# Patient Record
Sex: MALE | Race: WHITE | Employment: OTHER | URBAN - NONMETROPOLITAN AREA
[De-identification: names, ages, dates, MRNs, and addresses within clinical notes are randomized per-mention and may not be internally consistent; named-entity substitution may affect disease eponyms.]

---

## 2017-01-31 ENCOUNTER — OFFICE VISIT (OUTPATIENT)
Dept: PRIMARY CARE CLINIC | Age: 67
End: 2017-01-31
Payer: MEDICARE

## 2017-01-31 VITALS
SYSTOLIC BLOOD PRESSURE: 142 MMHG | OXYGEN SATURATION: 99 % | HEART RATE: 82 BPM | BODY MASS INDEX: 32.2 KG/M2 | RESPIRATION RATE: 16 BRPM | WEIGHT: 230 LBS | HEIGHT: 71 IN | DIASTOLIC BLOOD PRESSURE: 90 MMHG

## 2017-01-31 DIAGNOSIS — Z23 NEED FOR VACCINATION WITH 13-POLYVALENT PNEUMOCOCCAL CONJUGATE VACCINE: ICD-10-CM

## 2017-01-31 DIAGNOSIS — E11.42 DIABETIC POLYNEUROPATHY ASSOCIATED WITH TYPE 2 DIABETES MELLITUS (HCC): ICD-10-CM

## 2017-01-31 DIAGNOSIS — E11.9 TYPE 2 DIABETES MELLITUS WITHOUT COMPLICATION, WITHOUT LONG-TERM CURRENT USE OF INSULIN (HCC): Primary | ICD-10-CM

## 2017-01-31 DIAGNOSIS — I10 ESSENTIAL HYPERTENSION: ICD-10-CM

## 2017-01-31 DIAGNOSIS — L98.9 SKIN LESION OF FACE: ICD-10-CM

## 2017-01-31 PROCEDURE — 1036F TOBACCO NON-USER: CPT | Performed by: FAMILY MEDICINE

## 2017-01-31 PROCEDURE — 3044F HG A1C LEVEL LT 7.0%: CPT | Performed by: FAMILY MEDICINE

## 2017-01-31 PROCEDURE — G8427 DOCREV CUR MEDS BY ELIG CLIN: HCPCS | Performed by: FAMILY MEDICINE

## 2017-01-31 PROCEDURE — 1123F ACP DISCUSS/DSCN MKR DOCD: CPT | Performed by: FAMILY MEDICINE

## 2017-01-31 PROCEDURE — G0009 ADMIN PNEUMOCOCCAL VACCINE: HCPCS | Performed by: FAMILY MEDICINE

## 2017-01-31 PROCEDURE — 90670 PCV13 VACCINE IM: CPT | Performed by: FAMILY MEDICINE

## 2017-01-31 PROCEDURE — G8484 FLU IMMUNIZE NO ADMIN: HCPCS | Performed by: FAMILY MEDICINE

## 2017-01-31 PROCEDURE — G8419 CALC BMI OUT NRM PARAM NOF/U: HCPCS | Performed by: FAMILY MEDICINE

## 2017-01-31 PROCEDURE — 4040F PNEUMOC VAC/ADMIN/RCVD: CPT | Performed by: FAMILY MEDICINE

## 2017-01-31 PROCEDURE — 99214 OFFICE O/P EST MOD 30 MIN: CPT | Performed by: FAMILY MEDICINE

## 2017-01-31 PROCEDURE — 3017F COLORECTAL CA SCREEN DOC REV: CPT | Performed by: FAMILY MEDICINE

## 2017-02-08 ENCOUNTER — OFFICE VISIT (OUTPATIENT)
Dept: OTOLARYNGOLOGY | Facility: CLINIC | Age: 67
End: 2017-02-08

## 2017-02-08 VITALS
HEIGHT: 71 IN | SYSTOLIC BLOOD PRESSURE: 120 MMHG | HEART RATE: 68 BPM | WEIGHT: 223 LBS | BODY MASS INDEX: 31.22 KG/M2 | TEMPERATURE: 97.6 F | DIASTOLIC BLOOD PRESSURE: 80 MMHG

## 2017-02-08 DIAGNOSIS — D48.9 NEOPLASM OF UNCERTAIN BEHAVIOR: ICD-10-CM

## 2017-02-08 DIAGNOSIS — L82.1 SEBORRHEIC KERATOSES: ICD-10-CM

## 2017-02-08 DIAGNOSIS — C44.319 BASAL CELL CARCINOMA OF LEFT CHEEK: Primary | ICD-10-CM

## 2017-02-08 PROCEDURE — 99203 OFFICE O/P NEW LOW 30 MIN: CPT | Performed by: OTOLARYNGOLOGY

## 2017-02-08 RX ORDER — GABAPENTIN 300 MG/1
800 CAPSULE ORAL
COMMUNITY
Start: 2015-09-28

## 2017-02-08 RX ORDER — OXYCODONE AND ACETAMINOPHEN 10; 325 MG/1; MG/1
1 TABLET ORAL EVERY 6 HOURS PRN
COMMUNITY

## 2017-02-08 RX ORDER — SUCRALFATE 1 G/1
TABLET ORAL
COMMUNITY
Start: 2015-09-28 | End: 2019-01-21

## 2017-02-08 RX ORDER — POLYETHYLENE GLYCOL 3350 17 G/17G
POWDER, FOR SOLUTION ORAL
COMMUNITY
Start: 2016-10-31 | End: 2019-08-05

## 2017-02-08 RX ORDER — PRAVASTATIN SODIUM 80 MG/1
80 TABLET ORAL NIGHTLY
COMMUNITY
Start: 2016-12-27

## 2017-02-08 RX ORDER — CETIRIZINE HYDROCHLORIDE 10 MG/1
TABLET ORAL
COMMUNITY
Start: 2016-12-27 | End: 2019-01-21

## 2017-02-08 RX ORDER — PANTOPRAZOLE SODIUM 40 MG/1
TABLET, DELAYED RELEASE ORAL
COMMUNITY
Start: 2016-12-27 | End: 2019-08-05

## 2017-02-08 RX ORDER — ESCITALOPRAM OXALATE 20 MG/1
TABLET ORAL
COMMUNITY
Start: 2015-10-22 | End: 2020-09-08

## 2017-02-08 RX ORDER — TRIAMCINOLONE ACETONIDE 1 MG/G
CREAM TOPICAL
COMMUNITY
Start: 2015-09-28 | End: 2019-08-05

## 2017-02-08 RX ORDER — TAMSULOSIN HYDROCHLORIDE 0.4 MG/1
CAPSULE ORAL
COMMUNITY
Start: 2016-11-29

## 2017-02-08 RX ORDER — LISINOPRIL 40 MG/1
40 TABLET ORAL DAILY
COMMUNITY
Start: 2016-12-27

## 2017-02-08 NOTE — PROGRESS NOTES
History of Present Illness  Manolo Sagastume complains of a skin lesion of the left cheek . The lesion has been present for 2 years. The lesion has has changed. Symptoms associated with the lesion are: increasing diameter, increasing thickness, bleeding, tendency to be traumatized. Patient denies itching, pain, drainage, infection.    He states he has no prior history of skin cancer.    He states that a biopsy has not been performed.     He also complains of a skin lesion of the left chin. The lesion has been present for 4 years. Symptoms associated with the lesion are: tendency to be traumatized. Patient denies increasing diameter, increasing thickness, darkening color, itching, pain, drainage, infection. Nothing makes the lesion better or worse. A biopsy has not been performed.     He also complains of a multiple skin lesions of the back. The lesion has been present for several years. Symptoms associated with the lesion are: increasing diameter, increasing thickness. Patient denies bleeding, pain, drainage, infection. Nothing makes the lesion better or worse. A biopsy has not been performed.       Past Medical History   Diagnosis Date   • Chronic back pain    • COPD (chronic obstructive pulmonary disease)    • Hypertension    • Type 2 diabetes mellitus      Past Surgical History   Procedure Laterality Date   • Appendectomy       Family History   Problem Relation Age of Onset   • Cancer Mother    • Heart disease Father      Social History   Substance Use Topics   • Smoking status: Former Smoker   • Smokeless tobacco: None   • Alcohol use No     Allergies:  Penicillins    Current Outpatient Prescriptions:   •  albuterol (VENTOLIN HFA) 108 (90 BASE) MCG/ACT inhaler, INHALE 2 PUFFS INTO THE LUNGS EVERY 6 HOURS AS NEEDED., Disp: , Rfl:   •  cetirizine (zyrTEC) 10 MG tablet, TAKE 1 TABLET BY MOUTH DAILY, Disp: , Rfl:   •  escitalopram (LEXAPRO) 20 MG tablet, Take 20 mg by mouth daily , Disp: , Rfl:   •  gabapentin  "(NEURONTIN) 300 MG capsule, Take 1 capsule by mouth 3 times daily, Disp: , Rfl:   •  lisinopril (PRINIVIL,ZESTRIL) 40 MG tablet, TAKE ONE TABLET BY MOUTH DAILY., Disp: , Rfl:   •  metFORMIN (GLUCOPHAGE) 1000 MG tablet, TAKE ONE TABLET BY MOUTH TWO TIMES A DAY WITH MEALS, Disp: , Rfl:   •  oxyCODONE-acetaminophen (PERCOCET)  MG per tablet, Every 8 (Eight) Hours., Disp: , Rfl:   •  pantoprazole (PROTONIX) 40 MG EC tablet, TAKE ONE TABLET BY MOUTH EVERY DAY, Disp: , Rfl:   •  polyethylene glycol (MIRALAX) pack packet, Take 17 g by mouth daily, Disp: , Rfl:   •  pravastatin (PRAVACHOL) 80 MG tablet, TAKE ONE TABLET BY MOUTH EVERY EVENING., Disp: , Rfl:   •  sucralfate (CARAFATE) 1 G tablet, Take 1 tablet by mouth daily, Disp: , Rfl:   •  tamsulosin (FLOMAX) 0.4 MG capsule 24 hr capsule, Take 1 capsule by mouth daily, Disp: , Rfl:   •  tiotropium (SPIRIVA HANDIHALER) 18 MCG per inhalation capsule, Inhale 1 capsule into the lungs daily, Disp: , Rfl:   •  triamcinolone (KENALOG) 0.1 % cream, Apply topically 2 times daily., Disp: , Rfl:     Review of Systems   Constitutional: Negative for activity change, appetite change, chills, fatigue, fever and unexpected weight change.   HENT: Negative for congestion, dental problem, facial swelling and nosebleeds.    Eyes: Negative for discharge and redness.   Skin: Negative for color change, pallor and rash.   Hematological: Negative for adenopathy. Does not bruise/bleed easily.       Visit Vitals   • /80   • Pulse 68   • Temp 97.6 °F (36.4 °C)   • Ht 71\" (180.3 cm)   • Wt 223 lb (101 kg)   • BMI 31.1 kg/m2       Physical Exam   Constitutional: He is oriented to person, place, and time. He appears well-developed and well-nourished. He is cooperative. No distress.   HENT:   Head: Normocephalic and atraumatic.   Right Ear: External ear normal.   Left Ear: External ear normal.   Nose: Nose normal.   Mouth/Throat: Oropharynx is clear and moist.   Eyes: Conjunctivae and EOM " are normal. Pupils are equal, round, and reactive to light. Right eye exhibits no discharge. Left eye exhibits no discharge. No scleral icterus.   Neck: Normal range of motion and phonation normal. Neck supple. No tracheal deviation present.   Pulmonary/Chest: Effort normal. No stridor. No respiratory distress.   Musculoskeletal: Normal range of motion. He exhibits no edema or deformity.   Lymphadenopathy:     He has no cervical adenopathy.   Neurological: He is alert and oriented to person, place, and time. He has normal strength. No cranial nerve deficit. Coordination normal.   Skin: Skin is warm and dry. Lesion (left cheek with 8 mm lesion with pearly, raised borders and central ulceration c/w BCC, left chin with 6mm raised, erythematous nodule c/w possible granuloma ) noted. No rash noted. He is not diaphoretic. No erythema. No pallor.   Multiple SKs scattered over the back   Psychiatric: He has a normal mood and affect. His speech is normal and behavior is normal. Judgment and thought content normal. Cognition and memory are normal.   Nursing note and vitals reviewed.              Manolo was seen today for skin lesion.    Diagnoses and all orders for this visit:    Basal cell carcinoma of left cheek  -     Excision of Malignant Lesion (1.1 - 2 Cm)    Neoplasm of uncertain behavior    Seborrheic keratoses        Photographs were taken today. Postoperative care instructions were given.     Discussion of skin lesion. Discussed risks, benefits, alternatives, and possible complications of excision of the skin lesion with reconstruction utilizing local tissue rearrangement, full-thickness skin grafting, or local interpolated flaps. Risks include, but are not limited too: bleeding, infection, hematoma, recurrence, need for additional procedures, flap failure, cosmetic deformity. Patient understands risks and would like to proceed with surgery.     I have recommended excision of the basal cell carcinoma of the left  cheek with frozen section analysis and lesion on the left chin with permanent section analysis and reconstruction with linear closure in the office under local anesthesia.     Return for 1 week postoperatively.    Satinder Cox MD   02/08/17  1:22 PM

## 2017-02-16 ENCOUNTER — TELEPHONE (OUTPATIENT)
Dept: PRIMARY CARE CLINIC | Age: 67
End: 2017-02-16

## 2017-03-07 ENCOUNTER — HOSPITAL ENCOUNTER (OUTPATIENT)
Dept: GENERAL RADIOLOGY | Age: 67
Discharge: HOME | End: 2017-03-07
Payer: MEDICARE

## 2017-03-07 DIAGNOSIS — M51.17 INTERVERTEBRAL DISC DISORDER WITH RADICULOPATHY OF LUMBOSACRAL REGION: ICD-10-CM

## 2017-03-07 DIAGNOSIS — M47.812 OSTEOARTHRITIS OF CERVICAL SPINE, UNSPECIFIED SPINAL OSTEOARTHRITIS COMPLICATION STATUS: ICD-10-CM

## 2017-03-07 PROCEDURE — 72100 X-RAY EXAM L-S SPINE 2/3 VWS: CPT

## 2017-03-07 PROCEDURE — 72040 X-RAY EXAM NECK SPINE 2-3 VW: CPT

## 2017-03-16 ENCOUNTER — PROCEDURE VISIT (OUTPATIENT)
Dept: OTOLARYNGOLOGY | Facility: CLINIC | Age: 67
End: 2017-03-16

## 2017-03-16 VITALS
SYSTOLIC BLOOD PRESSURE: 138 MMHG | RESPIRATION RATE: 20 BRPM | BODY MASS INDEX: 31.22 KG/M2 | HEIGHT: 71 IN | DIASTOLIC BLOOD PRESSURE: 93 MMHG | TEMPERATURE: 97.7 F | HEART RATE: 96 BPM | WEIGHT: 223 LBS

## 2017-03-16 DIAGNOSIS — C44.319 BASAL CELL CARCINOMA OF LEFT CHEEK: ICD-10-CM

## 2017-03-16 DIAGNOSIS — D48.5 NEOPLASM OF UNCERTAIN BEHAVIOR OF SKIN OF CHIN: Primary | ICD-10-CM

## 2017-03-16 PROCEDURE — 13132 CMPLX RPR F/C/C/M/N/AX/G/H/F: CPT | Performed by: OTOLARYNGOLOGY

## 2017-03-16 PROCEDURE — 88305 TISSUE EXAM BY PATHOLOGIST: CPT | Performed by: OTOLARYNGOLOGY

## 2017-03-16 PROCEDURE — 11643 EXC F/E/E/N/L MAL+MRG 2.1-3: CPT | Performed by: OTOLARYNGOLOGY

## 2017-03-16 PROCEDURE — 11443 EXC FACE-MM B9+MARG 2.1-3 CM: CPT | Performed by: OTOLARYNGOLOGY

## 2017-03-16 PROCEDURE — 88331 PATH CONSLTJ SURG 1 BLK 1SPC: CPT | Performed by: OTOLARYNGOLOGY

## 2017-03-16 RX ORDER — POLYETHYLENE GLYCOL 3350 17 G/17G
POWDER, FOR SOLUTION ORAL
Refills: 5 | COMMUNITY
Start: 2017-03-02 | End: 2019-08-05 | Stop reason: SDUPTHER

## 2017-03-16 NOTE — PROGRESS NOTES
Preprocedure diagnosis: 1) clinically suspicious for basal cell carcinoma of the left cheek     2) irritated, changing nevus of the left chin     Post procedure diagnosis: 1) basal cell carcinoma of the left cheek     2) irritated, changing nevus of the left chin     Procedure performed: 1) Excision malignant neoplasm of skin of left cheek 14 mm x 30 mm     2)  excisional biopsy neoplasm of uncertain behavior of left chin 12 mm x 22 mm     3) complex closure of skin of left cheek and chin 6 cm    Surgeon: Satinder Cox M.D.    Anesthesia: Local with 3 cc of 1% lidocaine with 1:100,000 epinephrine    Specimen:  1) clinically suspicious for BCC of left cheek - stitch at 12 o'clock      2) neoplasm of uncertain behavior skin of left chin     Complications: None    Disposition: Home    Details: After patient verification and informed consent was obtained, the skin was prepped with alcohol and infiltrated with 1% lidocaine with 1:100,000 epinephrine.  After approximately 10-15 minutes the skin was tested for anesthesia.  The skin was then sterilely prepped with Hibiclens and the patient was sterilely draped.    The skin surrounding each lesion was incised in fusiform fashion with a separate 15 blade beveling the incision laterally to facilitate wound eversion.  The skin was grasped and the lesions were removed from the underlying tissue utilizing sharp dissection with the 15 blade.  The cheek specimen was oriented with a stitch at 12 o'clock and sent for frozen section analysis.  The chin lesion was not oriented, and was sent for permanent.  Hemostasis was obtained with bipolar cautery set at 12.      The surrounding skin was undermined in the subcutaneous plane for approximate 1 cm in each direction utilizing a fresh 15 blade.  Hemostasis was again obtained with bipolar cautery set at 12.  The tissue was advanced and closed utilizing 4-0 and 5-0 undyed Vicryl suture in buried fashion, followed by 5-0 fast absorbing  suture.  Antibiotic ointment was placed to the incision.    The patient tolerated the procedure without any complication.

## 2017-03-17 LAB
CYTO UR: NORMAL
CYTO UR: NORMAL
LAB AP CASE REPORT: NORMAL
LAB AP CASE REPORT: NORMAL
LAB AP CLINICAL INFORMATION: NORMAL
LAB AP CLINICAL INFORMATION: NORMAL
Lab: NORMAL
PATH REPORT.FINAL DX SPEC: NORMAL
PATH REPORT.FINAL DX SPEC: NORMAL
PATH REPORT.GROSS SPEC: NORMAL
PATH REPORT.GROSS SPEC: NORMAL

## 2017-03-23 ENCOUNTER — OFFICE VISIT (OUTPATIENT)
Dept: OTOLARYNGOLOGY | Facility: CLINIC | Age: 67
End: 2017-03-23

## 2017-03-23 VITALS
BODY MASS INDEX: 32.4 KG/M2 | SYSTOLIC BLOOD PRESSURE: 190 MMHG | DIASTOLIC BLOOD PRESSURE: 93 MMHG | WEIGHT: 231.4 LBS | HEART RATE: 58 BPM | TEMPERATURE: 98.2 F | HEIGHT: 71 IN | RESPIRATION RATE: 20 BRPM

## 2017-03-23 DIAGNOSIS — C44.319 BASAL CELL CARCINOMA OF LEFT CHEEK: Primary | ICD-10-CM

## 2017-03-23 DIAGNOSIS — D18.00 BENIGN HEMANGIOMA: ICD-10-CM

## 2017-03-23 PROCEDURE — 99024 POSTOP FOLLOW-UP VISIT: CPT | Performed by: NURSE PRACTITIONER

## 2017-03-23 RX ORDER — PEN NEEDLE, DIABETIC 31 GX5/16"
NEEDLE, DISPOSABLE MISCELLANEOUS
COMMUNITY
Start: 2016-12-27

## 2017-03-23 RX ORDER — GLUCOSAMINE HCL/CHONDROITIN SU 500-400 MG
CAPSULE ORAL
COMMUNITY
Start: 2016-12-27

## 2017-03-23 NOTE — PROGRESS NOTES
Manolo Sagastume presents for a routine postoperative visit following excision of a skin lesion clinically suspicious for basal cell carcinoma of the left cheek with linear closure and excisional biopsy of a skin lesion on the left chin on 03/16/17.     Subjective: Since surgery, he is doing well without complaints.  Symptoms denied postoperatively include fever, chills, bleeding and drainage. The patient states the pain has ceased since surgery.     Objective:   Pathology review: Pathology is available. 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         Physical Exam:  Left cheek and left chin incisions healing well- sutures removed.     Assessment:  Manolo was seen today for skin lesion.    Diagnoses and all orders for this visit:    Basal cell carcinoma of left cheek    Benign hemangioma        Plan:  Patient Instructions   Protect the incisions from sunlight. Sunlight to the incisions will cause permanent pigmentation to the incision line and make the incision more noticeable. After the incision has reepithelialized, you may begin to use sunscreen with an SPF of 15 or greater    I discussed the use of  Vitamin E, Mederma, or a high-quality extra virgin olive oil to the incisions to optimize the end result. Apply topically twice daily, or as directed, to help optimize wound healing and decrease erythema.    Discussed the importance of routine skin checks with a dermatologist every 6-12 months.       Return in about 6 months (around 9/23/2017), or if symptoms worsen or fail to improve, for Recheck.      Sanjuana Powell, CHELSEA  03/23/17  1:34 PM

## 2017-04-05 RX ORDER — TAMSULOSIN HYDROCHLORIDE 0.4 MG/1
CAPSULE ORAL
Qty: 30 CAPSULE | Refills: 3 | Status: SHIPPED | OUTPATIENT
Start: 2017-04-05 | End: 2017-09-09 | Stop reason: SDUPTHER

## 2017-04-26 ENCOUNTER — TELEPHONE (OUTPATIENT)
Dept: PRIMARY CARE CLINIC | Age: 67
End: 2017-04-26

## 2017-04-26 ENCOUNTER — OFFICE VISIT (OUTPATIENT)
Dept: PRIMARY CARE CLINIC | Age: 67
End: 2017-04-26
Payer: MEDICARE

## 2017-04-26 VITALS
HEIGHT: 71 IN | DIASTOLIC BLOOD PRESSURE: 78 MMHG | HEART RATE: 77 BPM | WEIGHT: 227.6 LBS | SYSTOLIC BLOOD PRESSURE: 140 MMHG | OXYGEN SATURATION: 96 % | BODY MASS INDEX: 31.86 KG/M2

## 2017-04-26 DIAGNOSIS — E11.9 TYPE 2 DIABETES MELLITUS WITHOUT COMPLICATION, WITHOUT LONG-TERM CURRENT USE OF INSULIN (HCC): ICD-10-CM

## 2017-04-26 DIAGNOSIS — K59.03 DRUG-INDUCED CONSTIPATION: Primary | ICD-10-CM

## 2017-04-26 DIAGNOSIS — R21 SKIN RASH: ICD-10-CM

## 2017-04-26 DIAGNOSIS — L98.9 SKIN LESION OF CHEST WALL: ICD-10-CM

## 2017-04-26 DIAGNOSIS — N52.9 ERECTILE DYSFUNCTION, UNSPECIFIED ERECTILE DYSFUNCTION TYPE: ICD-10-CM

## 2017-04-26 DIAGNOSIS — I10 ESSENTIAL HYPERTENSION: ICD-10-CM

## 2017-04-26 DIAGNOSIS — C44.91 BASAL CELL CARCINOMA: ICD-10-CM

## 2017-04-26 PROCEDURE — 1123F ACP DISCUSS/DSCN MKR DOCD: CPT | Performed by: FAMILY MEDICINE

## 2017-04-26 PROCEDURE — G8427 DOCREV CUR MEDS BY ELIG CLIN: HCPCS | Performed by: FAMILY MEDICINE

## 2017-04-26 PROCEDURE — 4040F PNEUMOC VAC/ADMIN/RCVD: CPT | Performed by: FAMILY MEDICINE

## 2017-04-26 PROCEDURE — 99214 OFFICE O/P EST MOD 30 MIN: CPT | Performed by: FAMILY MEDICINE

## 2017-04-26 PROCEDURE — G8417 CALC BMI ABV UP PARAM F/U: HCPCS | Performed by: FAMILY MEDICINE

## 2017-04-26 PROCEDURE — 1036F TOBACCO NON-USER: CPT | Performed by: FAMILY MEDICINE

## 2017-04-26 PROCEDURE — 3017F COLORECTAL CA SCREEN DOC REV: CPT | Performed by: FAMILY MEDICINE

## 2017-04-26 PROCEDURE — 3044F HG A1C LEVEL LT 7.0%: CPT | Performed by: FAMILY MEDICINE

## 2017-04-26 RX ORDER — TADALAFIL 10 MG/1
10 TABLET ORAL PRN
Qty: 6 TABLET | Refills: 1 | Status: SHIPPED | OUTPATIENT
Start: 2017-04-26 | End: 2018-06-04

## 2017-04-26 RX ORDER — TRIAMCINOLONE ACETONIDE 1 MG/G
CREAM TOPICAL
Qty: 1 TUBE | Refills: 1 | Status: SHIPPED | OUTPATIENT
Start: 2017-04-26 | End: 2020-01-13 | Stop reason: SDUPTHER

## 2017-04-26 ASSESSMENT — ENCOUNTER SYMPTOMS
COUGH: 0
SHORTNESS OF BREATH: 0

## 2017-05-16 ENCOUNTER — TELEPHONE (OUTPATIENT)
Dept: OTOLARYNGOLOGY | Facility: CLINIC | Age: 67
End: 2017-05-16

## 2017-05-23 RX ORDER — LISINOPRIL 40 MG/1
TABLET ORAL
Qty: 30 TABLET | Refills: 3 | Status: SHIPPED | OUTPATIENT
Start: 2017-05-23 | End: 2017-10-09 | Stop reason: SDUPTHER

## 2017-06-12 ENCOUNTER — PROCEDURE VISIT (OUTPATIENT)
Dept: OTOLARYNGOLOGY | Facility: CLINIC | Age: 67
End: 2017-06-12

## 2017-06-12 VITALS
SYSTOLIC BLOOD PRESSURE: 145 MMHG | TEMPERATURE: 98 F | HEIGHT: 71 IN | WEIGHT: 232 LBS | BODY MASS INDEX: 32.48 KG/M2 | DIASTOLIC BLOOD PRESSURE: 80 MMHG | HEART RATE: 59 BPM | RESPIRATION RATE: 20 BRPM

## 2017-06-12 DIAGNOSIS — C44.529 SQUAMOUS CELL CARCINOMA OF SKIN OF OTHER PART OF TRUNK: ICD-10-CM

## 2017-06-12 DIAGNOSIS — D04.5 SQUAMOUS CELL CARCINOMA IN SITU OF SKIN OF CHEST: Primary | ICD-10-CM

## 2017-06-12 PROCEDURE — 88305 TISSUE EXAM BY PATHOLOGIST: CPT | Performed by: OTOLARYNGOLOGY

## 2017-06-12 PROCEDURE — 13101 CMPLX RPR TRUNK 2.6-7.5 CM: CPT | Performed by: OTOLARYNGOLOGY

## 2017-06-12 PROCEDURE — 11604 EXC TR-EXT MAL+MARG 3.1-4 CM: CPT | Performed by: OTOLARYNGOLOGY

## 2017-06-12 PROCEDURE — 11603 EXC TR-EXT MAL+MARG 2.1-3 CM: CPT | Performed by: OTOLARYNGOLOGY

## 2017-06-12 NOTE — PROGRESS NOTES
Preprocedure diagnosis: 1) suspect squamous cell carcinoma of skin of left chest     2)  suspect actinic keratosis versus squamous cell carcinoma in situ skin of right chest     Post procedure diagnosis:  1) suspect squamous cell carcinoma of skin of left chest     2)  suspect actinic keratosis versus squamous cell carcinoma in situ skin of right chest     Procedure performed: 1) Excision malignant neoplasm of skin of left chest, 1.6 cm x 3.7 cm    2) excision malignant neoplasm skin of right chest, 1.3 cm x 2.7 cm       3) complex closure closure of skin of test, 7 cm    Surgeon: Satinder Cox M.D.    Anesthesia: Local with 5 cc of 1% lidocaine with 1:100,000 epinephrine    Specimen:  1) suspect squamous cell carcinoma of skin of left chest - stitch at 12 o'clock, permanent      2) suspect actinic keratosis versus squamous cell carcinoma in situ skin of right chest - stitch at 12 o'clock, permanent     Complications: None    Disposition: Home    Details: After patient verification and informed consent was obtained, the skin was prepped with alcohol and infiltrated with 1% lidocaine with 1:100,000 epinephrine.  After approximately 10-15 minutes the skin was tested for anesthesia.  The skin was then sterilely prepped with Hibiclens and the patient was sterilely draped.    The skin surrounding each lesion was incised in fusiform fashion with a fresh 15 blade beveling the incisions laterally to facilitate wound eversion.  The skin was grasped and the lesions were removed from the underlying tissue utilizing sharp dissection with the 15 blade.  The specimen was oriented with a stitch at 12 o'clock and sent for frozen section analysis.  Hemostasis was obtained with bipolar cautery set at 12.      The surrounding skin was undermined in the subcutaneous plane for approximate 1 cm in each direction utilizing a fresh 15 blade.  Hemostasis was again obtained with bipolar cautery set at 12.  The tissue was advanced and  closed utilizing 3-0 and 4-0 undyed Vicryl suture in buried fashion, followed by 4-0 nylon suture in simple fashion. Mastisol and steristrips, telfa and tegaderm were placed.    The patient tolerated the procedure without any complication.

## 2017-06-16 LAB
CYTO UR: NORMAL
LAB AP CASE REPORT: NORMAL
LAB AP CLINICAL INFORMATION: NORMAL
Lab: NORMAL
PATH REPORT.FINAL DX SPEC: NORMAL
PATH REPORT.GROSS SPEC: NORMAL

## 2017-06-20 ENCOUNTER — OFFICE VISIT (OUTPATIENT)
Dept: OTOLARYNGOLOGY | Facility: CLINIC | Age: 67
End: 2017-06-20

## 2017-06-20 VITALS
DIASTOLIC BLOOD PRESSURE: 78 MMHG | HEART RATE: 63 BPM | TEMPERATURE: 98 F | HEIGHT: 71 IN | WEIGHT: 232 LBS | SYSTOLIC BLOOD PRESSURE: 139 MMHG | RESPIRATION RATE: 20 BRPM | BODY MASS INDEX: 32.48 KG/M2

## 2017-06-20 DIAGNOSIS — D18.00 BENIGN HEMANGIOMA: Primary | ICD-10-CM

## 2017-06-20 DIAGNOSIS — C44.319 BASAL CELL CARCINOMA OF LEFT CHEEK: ICD-10-CM

## 2017-06-20 DIAGNOSIS — C44.519 BASAL CELL CARCINOMA OF CHEST: ICD-10-CM

## 2017-06-20 PROCEDURE — 99024 POSTOP FOLLOW-UP VISIT: CPT | Performed by: NURSE PRACTITIONER

## 2017-06-20 NOTE — PROGRESS NOTES
PRIMARY CARE PROVIDER: Pablo Lanier MD  REFERRING PROVIDER: No ref. provider found    Chief Complaint   Patient presents with   • Post-op     EXC  CHEST LESIONS X2       Subjective   History of Present Illness:  Manolo Sagastume is a  66 y.o.  male who presents for follow up s/p excisional biopsy of skin lesions of the right and left chest on June 12, 2017. The patient has had a relatively normal postoperative course and currently has no related complaints.     Subjective: Since surgery, he is doing well without complaints.  Symptoms denied postoperatively include fever, chills, bleeding and drainage. The patient states the pain has decreased since surgery.     Objective:   Pathology review: Pathology is available. Pathology demonstrates a diagnosis of basal cell carcinoma with clear margins.     He is also s/p excision of a basal cell carcinoma of the left cheek with clear margins (linear closure) and excision of a benign capillary hemangioma of the left chin with clear margins (linear closure) on 3/16/17.    Review of Systems:  Review of Systems   Constitutional: Negative for activity change, appetite change, chills, fatigue, fever and unexpected weight change.   HENT: Negative for congestion, dental problem, facial swelling and nosebleeds.    Eyes: Negative for discharge and redness.   Skin: Negative for color change, pallor and rash.   Hematological: Negative for adenopathy. Does not bruise/bleed easily.       Past History:  Past Medical History:   Diagnosis Date   • Basal cell carcinoma of left cheek    • Chronic back pain    • COPD (chronic obstructive pulmonary disease)    • Hypertension    • Neoplasm of uncertain behavior of skin of chest    • Seborrheic keratosis    • Type 2 diabetes mellitus      Past Surgical History:   Procedure Laterality Date   • APPENDECTOMY     • SKIN LESION EXCISION      CHEST X2     Family History   Problem Relation Age of Onset   • Cancer Mother    • Heart disease  Father      Social History   Substance Use Topics   • Smoking status: Former Smoker   • Smokeless tobacco: None   • Alcohol use No     Allergies:  Penicillins    Current Outpatient Prescriptions:   •  albuterol (VENTOLIN HFA) 108 (90 BASE) MCG/ACT inhaler, INHALE 2 PUFFS INTO THE LUNGS EVERY 6 HOURS AS NEEDED., Disp: , Rfl:   •  cetirizine (zyrTEC) 10 MG tablet, TAKE 1 TABLET BY MOUTH DAILY, Disp: , Rfl:   •  escitalopram (LEXAPRO) 20 MG tablet, Take 20 mg by mouth daily , Disp: , Rfl:   •  gabapentin (NEURONTIN) 300 MG capsule, Take 1 capsule by mouth 3 times daily, Disp: , Rfl:   •  Glucose Blood (BLOOD GLUCOSE TEST) strip, Test once daily. Medtrix test stripes, Disp: , Rfl:   •  LANCETS ULTRA THIN misc, TEST ONCE DAILY, Disp: , Rfl:   •  lisinopril (PRINIVIL,ZESTRIL) 40 MG tablet, TAKE ONE TABLET BY MOUTH DAILY., Disp: , Rfl:   •  metFORMIN (GLUCOPHAGE) 1000 MG tablet, TAKE ONE TABLET BY MOUTH TWO TIMES A DAY WITH MEALS, Disp: , Rfl:   •  oxyCODONE-acetaminophen (PERCOCET)  MG per tablet, Every 8 (Eight) Hours., Disp: , Rfl:   •  pantoprazole (PROTONIX) 40 MG EC tablet, TAKE ONE TABLET BY MOUTH EVERY DAY, Disp: , Rfl:   •  polyethylene glycol (MIRALAX) pack packet, Take 17 g by mouth daily, Disp: , Rfl:   •  polyethylene glycol (MIRALAX) powder, , Disp: , Rfl: 5  •  pravastatin (PRAVACHOL) 80 MG tablet, TAKE ONE TABLET BY MOUTH EVERY EVENING., Disp: , Rfl:   •  sucralfate (CARAFATE) 1 G tablet, Take 1 tablet by mouth daily, Disp: , Rfl:   •  tamsulosin (FLOMAX) 0.4 MG capsule 24 hr capsule, Take 1 capsule by mouth daily, Disp: , Rfl:   •  tiotropium (SPIRIVA HANDIHALER) 18 MCG per inhalation capsule, Inhale 1 capsule into the lungs daily, Disp: , Rfl:   •  triamcinolone (KENALOG) 0.1 % cream, Apply topically 2 times daily., Disp: , Rfl:       Objective     Vital Signs:  Temp:  [98 °F (36.7 °C)] 98 °F (36.7 °C)  Heart Rate:  [63] 63  Resp:  [20] 20  BP: (139)/(78) 139/78    Physical Exam:  Physical Exam    Constitutional: He is oriented to person, place, and time. He appears well-developed and well-nourished. He is cooperative. No distress.   HENT:   Head: Normocephalic and atraumatic.   Right Ear: External ear normal.   Left Ear: External ear normal.   Nose: Nose normal.   Mouth/Throat: Oropharynx is clear and moist.   Eyes: Conjunctivae, EOM and lids are normal.   Neck: Phonation normal. Neck supple.   Pulmonary/Chest: Effort normal. No stridor. No respiratory distress.   Lymphadenopathy:        Head (right side): No submental, no submandibular, no tonsillar, no preauricular, no posterior auricular and no occipital adenopathy present.        Head (left side): No submental, no submandibular, no tonsillar, no preauricular, no posterior auricular and no occipital adenopathy present.     He has no cervical adenopathy.   Neurological: He is alert and oriented to person, place, and time. He has normal strength.   Skin: Skin is warm and dry.   Right and left chest incisions healing well- sutures removed. Left cheek and left chin incisions well healed without evidence of recurrence   Psychiatric: He has a normal mood and affect. His speech is normal and behavior is normal. Thought content normal.       Results Review:           Assessment   Assessment:  1. Benign hemangioma    2. Basal cell carcinoma of left cheek    3. Basal cell carcinoma of chest        Plan   Plan:    Protect the incisions from sunlight. Sunlight to the incisions will cause permanent pigmentation to the incision line and make the incision more noticeable. After the incision has reepithelialized, you may begin to use sunscreen with an SPF of 15 or greater    I discussed the use of  Vitamin E, Mederma, or a high-quality extra virgin olive oil to the incisions to optimize the end result. Apply topically twice daily, or as directed, to help optimize wound healing and decrease erythema.    Return in about 6 months (around 12/20/2017), or if symptoms worsen  or fail to improve, for Recheck.    My findings and recommendations were discussed and questions were answered.     Sanjuana Powell, CHELSEA  06/20/17  12:51 PM

## 2017-06-21 RX ORDER — POLYETHYLENE GLYCOL 3350 17 G/17G
17 POWDER, FOR SOLUTION ORAL DAILY
Qty: 10 EACH | Refills: 5 | Status: SHIPPED | OUTPATIENT
Start: 2017-06-21 | End: 2017-12-02 | Stop reason: SDUPTHER

## 2017-07-26 ENCOUNTER — OFFICE VISIT (OUTPATIENT)
Dept: PRIMARY CARE CLINIC | Age: 67
End: 2017-07-26
Payer: MEDICARE

## 2017-07-26 VITALS
HEIGHT: 71 IN | HEART RATE: 63 BPM | WEIGHT: 231 LBS | SYSTOLIC BLOOD PRESSURE: 124 MMHG | TEMPERATURE: 97.8 F | DIASTOLIC BLOOD PRESSURE: 72 MMHG | BODY MASS INDEX: 32.34 KG/M2 | OXYGEN SATURATION: 98 %

## 2017-07-26 DIAGNOSIS — E11.9 TYPE 2 DIABETES MELLITUS WITHOUT COMPLICATION, WITHOUT LONG-TERM CURRENT USE OF INSULIN (HCC): Primary | ICD-10-CM

## 2017-07-26 DIAGNOSIS — M25.562 CHRONIC PAIN OF LEFT KNEE: ICD-10-CM

## 2017-07-26 DIAGNOSIS — G89.29 CHRONIC PAIN OF LEFT KNEE: ICD-10-CM

## 2017-07-26 DIAGNOSIS — I10 ESSENTIAL HYPERTENSION: ICD-10-CM

## 2017-07-26 DIAGNOSIS — K59.09 CHRONIC CONSTIPATION: ICD-10-CM

## 2017-07-26 PROCEDURE — 3017F COLORECTAL CA SCREEN DOC REV: CPT | Performed by: FAMILY MEDICINE

## 2017-07-26 PROCEDURE — 1036F TOBACCO NON-USER: CPT | Performed by: FAMILY MEDICINE

## 2017-07-26 PROCEDURE — 3046F HEMOGLOBIN A1C LEVEL >9.0%: CPT | Performed by: FAMILY MEDICINE

## 2017-07-26 PROCEDURE — 4040F PNEUMOC VAC/ADMIN/RCVD: CPT | Performed by: FAMILY MEDICINE

## 2017-07-26 PROCEDURE — 1123F ACP DISCUSS/DSCN MKR DOCD: CPT | Performed by: FAMILY MEDICINE

## 2017-07-26 PROCEDURE — G8417 CALC BMI ABV UP PARAM F/U: HCPCS | Performed by: FAMILY MEDICINE

## 2017-07-26 PROCEDURE — G8427 DOCREV CUR MEDS BY ELIG CLIN: HCPCS | Performed by: FAMILY MEDICINE

## 2017-07-26 PROCEDURE — 99214 OFFICE O/P EST MOD 30 MIN: CPT | Performed by: FAMILY MEDICINE

## 2017-07-26 ASSESSMENT — ENCOUNTER SYMPTOMS
COUGH: 0
SHORTNESS OF BREATH: 0

## 2017-07-27 DIAGNOSIS — E11.9 TYPE 2 DIABETES MELLITUS WITHOUT COMPLICATION, WITHOUT LONG-TERM CURRENT USE OF INSULIN (HCC): ICD-10-CM

## 2017-07-27 LAB
ALBUMIN SERPL-MCNC: 4.1 G/DL (ref 3.5–5.2)
ALP BLD-CCNC: 57 U/L (ref 40–130)
ALT SERPL-CCNC: 11 U/L (ref 5–41)
ANION GAP SERPL CALCULATED.3IONS-SCNC: 14 MMOL/L (ref 7–19)
AST SERPL-CCNC: 13 U/L (ref 5–40)
BILIRUB SERPL-MCNC: 0.5 MG/DL (ref 0.2–1.2)
BUN BLDV-MCNC: 16 MG/DL (ref 8–23)
CALCIUM SERPL-MCNC: 9.6 MG/DL (ref 8.8–10.2)
CHLORIDE BLD-SCNC: 105 MMOL/L (ref 98–111)
CO2: 24 MMOL/L (ref 22–29)
CREAT SERPL-MCNC: 1 MG/DL (ref 0.5–1.2)
CREATININE URINE: 115 MG/DL (ref 4.2–622)
GFR NON-AFRICAN AMERICAN: >60
GLUCOSE BLD-MCNC: 78 MG/DL (ref 74–109)
HBA1C MFR BLD: 5.8 %
MICROALBUMIN UR-MCNC: 1.6 MG/DL (ref 0–19)
MICROALBUMIN/CREAT UR-RTO: 13.9 MG/G
POTASSIUM SERPL-SCNC: 4.5 MMOL/L (ref 3.5–5)
SODIUM BLD-SCNC: 143 MMOL/L (ref 136–145)
TOTAL PROTEIN: 7 G/DL (ref 6.6–8.7)

## 2017-08-08 ENCOUNTER — HOSPITAL ENCOUNTER (OUTPATIENT)
Dept: MRI IMAGING | Age: 67
Discharge: HOME | End: 2017-08-08
Payer: MEDICARE

## 2017-08-08 DIAGNOSIS — G89.29 CHRONIC PAIN OF LEFT KNEE: ICD-10-CM

## 2017-08-08 DIAGNOSIS — M25.562 CHRONIC PAIN OF LEFT KNEE: ICD-10-CM

## 2017-08-08 PROCEDURE — 73721 MRI JNT OF LWR EXTRE W/O DYE: CPT

## 2017-08-14 ENCOUNTER — TELEPHONE (OUTPATIENT)
Dept: PRIMARY CARE CLINIC | Age: 67
End: 2017-08-14

## 2017-08-14 DIAGNOSIS — R93.89 ABNORMAL MRI: Primary | ICD-10-CM

## 2017-08-23 ENCOUNTER — TELEPHONE (OUTPATIENT)
Dept: PRIMARY CARE CLINIC | Age: 67
End: 2017-08-23

## 2017-09-09 RX ORDER — TAMSULOSIN HYDROCHLORIDE 0.4 MG/1
CAPSULE ORAL
Qty: 30 CAPSULE | Refills: 3 | Status: SHIPPED | OUTPATIENT
Start: 2017-09-09 | End: 2018-06-04 | Stop reason: SDUPTHER

## 2017-10-10 RX ORDER — LISINOPRIL 40 MG/1
TABLET ORAL
Qty: 30 TABLET | Refills: 3 | Status: SHIPPED | OUTPATIENT
Start: 2017-10-10 | End: 2018-06-04 | Stop reason: SDUPTHER

## 2017-10-30 ENCOUNTER — OFFICE VISIT (OUTPATIENT)
Dept: PRIMARY CARE CLINIC | Age: 67
End: 2017-10-30
Payer: MEDICARE

## 2017-10-30 VITALS
HEIGHT: 72 IN | SYSTOLIC BLOOD PRESSURE: 128 MMHG | TEMPERATURE: 97.9 F | RESPIRATION RATE: 18 BRPM | HEART RATE: 61 BPM | OXYGEN SATURATION: 97 % | BODY MASS INDEX: 32.91 KG/M2 | WEIGHT: 243 LBS | DIASTOLIC BLOOD PRESSURE: 78 MMHG

## 2017-10-30 DIAGNOSIS — I10 ESSENTIAL HYPERTENSION: ICD-10-CM

## 2017-10-30 DIAGNOSIS — Z23 NEEDS FLU SHOT: ICD-10-CM

## 2017-10-30 DIAGNOSIS — E11.9 TYPE 2 DIABETES MELLITUS WITHOUT COMPLICATION, WITHOUT LONG-TERM CURRENT USE OF INSULIN (HCC): Primary | ICD-10-CM

## 2017-10-30 DIAGNOSIS — E11.42 DIABETIC POLYNEUROPATHY ASSOCIATED WITH TYPE 2 DIABETES MELLITUS (HCC): ICD-10-CM

## 2017-10-30 PROCEDURE — 4040F PNEUMOC VAC/ADMIN/RCVD: CPT | Performed by: FAMILY MEDICINE

## 2017-10-30 PROCEDURE — G0008 ADMIN INFLUENZA VIRUS VAC: HCPCS | Performed by: FAMILY MEDICINE

## 2017-10-30 PROCEDURE — G8417 CALC BMI ABV UP PARAM F/U: HCPCS | Performed by: FAMILY MEDICINE

## 2017-10-30 PROCEDURE — 3044F HG A1C LEVEL LT 7.0%: CPT | Performed by: FAMILY MEDICINE

## 2017-10-30 PROCEDURE — 1123F ACP DISCUSS/DSCN MKR DOCD: CPT | Performed by: FAMILY MEDICINE

## 2017-10-30 PROCEDURE — G8484 FLU IMMUNIZE NO ADMIN: HCPCS | Performed by: FAMILY MEDICINE

## 2017-10-30 PROCEDURE — 99214 OFFICE O/P EST MOD 30 MIN: CPT | Performed by: FAMILY MEDICINE

## 2017-10-30 PROCEDURE — 1036F TOBACCO NON-USER: CPT | Performed by: FAMILY MEDICINE

## 2017-10-30 PROCEDURE — 90688 IIV4 VACCINE SPLT 0.5 ML IM: CPT | Performed by: FAMILY MEDICINE

## 2017-10-30 PROCEDURE — G8427 DOCREV CUR MEDS BY ELIG CLIN: HCPCS | Performed by: FAMILY MEDICINE

## 2017-10-30 PROCEDURE — 3017F COLORECTAL CA SCREEN DOC REV: CPT | Performed by: FAMILY MEDICINE

## 2017-10-30 RX ORDER — GABAPENTIN 400 MG/1
400 CAPSULE ORAL 3 TIMES DAILY
Qty: 90 CAPSULE | Refills: 5 | Status: SHIPPED | OUTPATIENT
Start: 2017-10-30 | End: 2017-12-14 | Stop reason: SDUPTHER

## 2017-10-30 RX ORDER — LORATADINE 10 MG/1
10 TABLET ORAL DAILY
Qty: 30 TABLET | Refills: 3 | Status: SHIPPED | OUTPATIENT
Start: 2017-10-30 | End: 2019-04-17 | Stop reason: SDUPTHER

## 2017-10-30 ASSESSMENT — ENCOUNTER SYMPTOMS
COUGH: 0
SHORTNESS OF BREATH: 0

## 2017-10-30 NOTE — PROGRESS NOTES
Kyle Dent is a 78 y.o. male  Chief Complaint   Patient presents with    Diabetes    Hypertension       HPI  Treatment Adherence:   Medication compliance:  compliant all of the time  Diet compliance:  compliant most of the time  Weight trend: stable  Current exercise: no regular exercise  Barriers: none    Diabetes Mellitus Type 2: Current symptoms/problems include none. Home blood sugar records: fasting range: 80-90  Any episodes of hypoglycemia? no  Eye exam current (within one year): yes  Tobacco history: He  reports that he has quit smoking. He has never used smokeless tobacco.   Daily Aspirin? Yes    Hypertension:  Home blood pressure monitoring: No.  He is adherent to a low sodium diet. Patient denies chest pain and shortness of breath. Antihypertensive medication side effects: no medication side effects noted. Use of agents associated with hypertension: none. Hyperlipidemia:  No new myalgias or GI upset on pravastatin (Pravachol). Lab Results   Component Value Date    LABA1C 5.8 07/27/2017    LABA1C 5.5 11/10/2016    LABA1C 5.6 04/25/2016     Lab Results   Component Value Date    LABMICR 1.60 07/27/2017    CREATININE 1.0 07/27/2017     Lab Results   Component Value Date    ALT 11 07/27/2017    AST 13 07/27/2017     Lab Results   Component Value Date    CHOL 210 (H) 04/25/2016    TRIG 81 (L) 04/25/2016    HDL 56 04/25/2016    LDLCALC 138 04/25/2016    LDLDIRECT 131 (H) 10/07/2015          Review of Systems   Constitutional: Negative for chills and fever. Respiratory: Negative for cough and shortness of breath. Cardiovascular: Negative for chest pain and leg swelling. Prior to Admission medications    Medication Sig Start Date End Date Taking? Authorizing Provider   triamcinolone (KENALOG) 0.1 % ointment Apply topically 2 times daily.  10/30/17 11/6/17 Yes Ermelinda Preston MD   gabapentin (NEURONTIN) 400 MG capsule Take 1 capsule by mouth 3 times daily 10/30/17  Yes

## 2017-12-04 RX ORDER — POLYETHYLENE GLYCOL 3350 17 G/17G
POWDER, FOR SOLUTION ORAL
Qty: 527 G | Refills: 5 | Status: SHIPPED | OUTPATIENT
Start: 2017-12-04 | End: 2018-07-27 | Stop reason: SDUPTHER

## 2017-12-15 RX ORDER — GABAPENTIN 400 MG/1
400 CAPSULE ORAL 3 TIMES DAILY
Qty: 90 CAPSULE | Refills: 5 | Status: SHIPPED | OUTPATIENT
Start: 2017-12-15 | End: 2018-06-04 | Stop reason: SDUPTHER

## 2018-01-17 ENCOUNTER — TELEPHONE (OUTPATIENT)
Dept: OTOLARYNGOLOGY | Facility: CLINIC | Age: 68
End: 2018-01-17

## 2018-01-17 ENCOUNTER — HOSPITAL ENCOUNTER (OUTPATIENT)
Dept: GENERAL RADIOLOGY | Facility: HOSPITAL | Age: 68
Discharge: HOME | End: 2018-01-17
Attending: OTOLARYNGOLOGY | Admitting: OTOLARYNGOLOGY

## 2018-01-17 ENCOUNTER — OFFICE VISIT (OUTPATIENT)
Dept: OTOLARYNGOLOGY | Facility: CLINIC | Age: 68
End: 2018-01-17

## 2018-01-17 ENCOUNTER — HOSPITAL ENCOUNTER (OUTPATIENT)
Dept: GENERAL RADIOLOGY | Facility: HOSPITAL | Age: 68
Discharge: HOME | End: 2018-01-17
Attending: OTOLARYNGOLOGY

## 2018-01-17 VITALS
RESPIRATION RATE: 20 BRPM | WEIGHT: 232 LBS | TEMPERATURE: 97.8 F | BODY MASS INDEX: 32.48 KG/M2 | SYSTOLIC BLOOD PRESSURE: 137 MMHG | HEIGHT: 71 IN | HEART RATE: 85 BPM | DIASTOLIC BLOOD PRESSURE: 82 MMHG
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
## 2018-01-17 DIAGNOSIS — R07.81 RIB PAIN ON LEFT SIDE: ICD-10-CM

## 2018-01-17 DIAGNOSIS — C44.319 BASAL CELL CARCINOMA OF LEFT CHEEK: ICD-10-CM

## 2018-01-17 DIAGNOSIS — M79.602 PAIN OF LEFT UPPER EXTREMITY: ICD-10-CM
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
## 2018-01-17 PROCEDURE — 73030 X-RAY EXAM OF SHOULDER: CPT

## 2018-01-17 PROCEDURE — 73060 X-RAY EXAM OF HUMERUS: CPT

## 2018-01-17 PROCEDURE — 71046 X-RAY EXAM CHEST 2 VIEWS: CPT

## 2018-01-17 PROCEDURE — 99214 OFFICE O/P EST MOD 30 MIN: CPT | Performed by: OTOLARYNGOLOGY

## 2018-01-17 NOTE — PROGRESS NOTES
CC:   Chief Complaint   Patient presents with   • Follow-up     1) exc lesion of chest x2      2) recent fall     3) dry eyes     4) Lesion behing right ear     5) Multiple facial lesions    HPI: Manolo Sagastume is a 67 y.o. male reports multiple areas of skin irritation of the face, neck, body, extremities.  He has been present for many years.  Nothing has made this better or worse.  He has tried nothing for them.  He does have the following skin history:    1) basal cell carcinoma skin of left cheek excised by me 03/16/2017 with linear closure.    2) hemangioma of the chin removed by me on 3/16/2017 with linear closure.     3) basal cell carcinoma skin of the chest removed by me 06/12/2017 with linear closure.    He also reports slipping on the ice 5 days ago.  He fell and hit his head, and left arm.  He reports resolution of the head pain.  He reports possibly a few seconds of loss of consciousness.  He has had improving, but continued pain in the left shoulder, left humerus, and left ribs.  This pain is worse with palpation.  He denies any chest pain with exertion or shortness of breath.    He also reports a lesion of his right postauricular skin.  This is been present for many years and is not bothering him.  This has not bled.  Since slowly getting larger.    He also reports dry eyes for 1 month.  He has associated irritation of the lateral canthi left greater than right.    He has been using Zyrtec.  He has not been using any eyedrops.  He does report some dry eye symptoms    He would like to be established with a dermatologist.     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    • Seborrheic keratosis    • Type 2 diabetes mellitus      Past Surgical History:   Procedure Laterality Date   • APPENDECTOMY     • SKIN LESION  EXCISION      CHEST X2     Family History   Problem Relation Age of Onset   • Cancer Mother    • Heart disease Father      Social History   Substance Use Topics   • Smoking status: Former Smoker   • Smokeless tobacco: Never Used   • Alcohol use No     Allergies:  Penicillins    Current Outpatient Prescriptions:   •  albuterol (VENTOLIN HFA) 108 (90 BASE) MCG/ACT inhaler, INHALE 2 PUFFS INTO THE LUNGS EVERY 6 HOURS AS NEEDED., Disp: , Rfl:   •  cetirizine (zyrTEC) 10 MG tablet, TAKE 1 TABLET BY MOUTH DAILY, Disp: , Rfl:   •  escitalopram (LEXAPRO) 20 MG tablet, Take 20 mg by mouth daily , Disp: , Rfl:   •  gabapentin (NEURONTIN) 300 MG capsule, Take 1 capsule by mouth 3 times daily, Disp: , Rfl:   •  Glucose Blood (BLOOD GLUCOSE TEST) strip, Test once daily. Medtrix test stripes, Disp: , Rfl:   •  LANCETS ULTRA THIN misc, TEST ONCE DAILY, Disp: , Rfl:   •  lisinopril (PRINIVIL,ZESTRIL) 40 MG tablet, TAKE ONE TABLET BY MOUTH DAILY., Disp: , Rfl:   •  metFORMIN (GLUCOPHAGE) 1000 MG tablet, TAKE ONE TABLET BY MOUTH TWO TIMES A DAY WITH MEALS, Disp: , Rfl:   •  oxyCODONE-acetaminophen (PERCOCET)  MG per tablet, Every 8 (Eight) Hours., Disp: , Rfl:   •  pantoprazole (PROTONIX) 40 MG EC tablet, TAKE ONE TABLET BY MOUTH EVERY DAY, Disp: , Rfl:   •  polyethylene glycol (MIRALAX) pack packet, Take 17 g by mouth daily, Disp: , Rfl:   •  polyethylene glycol (MIRALAX) powder, , Disp: , Rfl: 5  •  pravastatin (PRAVACHOL) 80 MG tablet, TAKE ONE TABLET BY MOUTH EVERY EVENING., Disp: , Rfl:   •  sucralfate (CARAFATE) 1 G tablet, Take 1 tablet by mouth daily, Disp: , Rfl:   •  tamsulosin (FLOMAX) 0.4 MG capsule 24 hr capsule, Take 1 capsule by mouth daily, Disp: , Rfl:   •  tiotropium (SPIRIVA HANDIHALER) 18 MCG per inhalation capsule, Inhale 1 capsule into the lungs daily, Disp: , Rfl:   •  triamcinolone (KENALOG) 0.1 % cream, Apply topically 2 times daily., Disp: , Rfl:     ROS:  Review of Systems   Constitutional: Negative  "for chills, fatigue and fever.   Respiratory: Negative for chest tightness and shortness of breath.    Cardiovascular: Negative for chest pain.   Musculoskeletal:        Rib pain   Skin: Positive for color change and rash. Negative for wound.   Hematological: Negative for adenopathy. Does not bruise/bleed easily.   Psychiatric/Behavioral: Negative for behavioral problems and confusion.   All other systems reviewed and are negative.      PE:  /82  Pulse 85  Temp 97.8 °F (36.6 °C)  Resp 20  Ht 180.3 cm (71\")  Wt 105 kg (232 lb)  BMI 32.36 kg/m2  Physical Exam   Constitutional: He is oriented to person, place, and time. He appears well-developed and well-nourished. He is cooperative. No distress.   HENT:   Head: Normocephalic and atraumatic.   Right Ear: External ear normal.   Left Ear: External ear normal.   Nose: Nose normal.   Eyes: Conjunctivae and EOM are normal. Pupils are equal, round, and reactive to light. Right eye exhibits no discharge. Left eye exhibits no discharge. No scleral icterus.       Neck: Normal range of motion. Neck supple. No JVD present. No tracheal deviation present. No thyromegaly present.   Cardiovascular: Normal rate, regular rhythm and normal heart sounds.    Pulmonary/Chest: Effort normal and breath sounds normal. No stridor. He exhibits tenderness (left rib).   Musculoskeletal: Normal range of motion. He exhibits no edema or deformity.        Arms:  Lymphadenopathy:     He has no cervical adenopathy.   Neurological: He is alert and oriented to person, place, and time. He has normal strength. No cranial nerve deficit. Coordination normal.   Skin: Skin is warm and dry. Lesion (right postauricular 1 cm pasted-on lesion c/ wseborrheic keratosis) noted. No rash noted. He is not diaphoretic. No erythema. No pallor.   Multiple AKs of the face.  Left cheek incision well-healed without recurrence.   Psychiatric: He has a normal mood and affect. His speech is normal and behavior is " normal. Judgment and thought content normal. Cognition and memory are normal.   Nursing note and vitals reviewed.      Data review:  Pathology reviewed:        Assessment:  1. Fall from slipping on ice, initial encounter    2. Pain of left upper extremity    3. Rib pain on left side    4. Basal cell carcinoma of left cheek        Plan:    Orders Placed This Encounter   Procedures   • XR Chest 2 View   • XR Shoulder 2+ View Left (In Office)   • XR humerus left   • Ambulatory Referral to Dermatology         1.  I have offered Shave biopsy of the right postauricular skin lesion.  He declined this, as it is not bothering him.  Should it change, bleed, or worsen I would recommend biopsy.  This is not appear concerning for melanoma.  We will get him set up with Dr. quiroz dermatology for his multiple actinic keratoses of his face, neck, body, extremities.  Continue the antihistamine and and artificial tears for his eyes.  Should this persist, follow-up with ophthalmology.  For his recent fall and left shoulder, rib, humerus pain, I have ordered imaging.  I will call him with results.  2. The risks and benefits of my recommendations, as well as other treatment options were discussed with the patient and spouse today.     No Follow-up on file.      Satinder Cox MD   01/17/2018  11:55 AM

## 2018-01-17 NOTE — TELEPHONE ENCOUNTER
Patient informed of normal CHEST XRAY, and XR of left shoulder and arm.  If pain continues, let Dr. Lanier know.  Patient reports understanding.

## 2018-01-30 ENCOUNTER — TELEPHONE (OUTPATIENT)
Dept: OTOLARYNGOLOGY | Facility: CLINIC | Age: 68
End: 2018-01-30

## 2018-03-21 RX ORDER — PANTOPRAZOLE SODIUM 40 MG/1
TABLET, DELAYED RELEASE ORAL
Qty: 30 TABLET | Refills: 11 | Status: SHIPPED | OUTPATIENT
Start: 2018-03-21 | End: 2018-06-04 | Stop reason: SDUPTHER

## 2018-03-21 RX ORDER — PRAVASTATIN SODIUM 80 MG/1
TABLET ORAL
Qty: 30 TABLET | Refills: 11 | Status: SHIPPED | OUTPATIENT
Start: 2018-03-21 | End: 2018-06-04 | Stop reason: SDUPTHER

## 2018-03-27 RX ORDER — CETIRIZINE HYDROCHLORIDE 10 MG/1
TABLET ORAL
Qty: 30 TABLET | Refills: 11 | Status: SHIPPED | OUTPATIENT
Start: 2018-03-27 | End: 2018-06-04 | Stop reason: SDUPTHER

## 2018-04-05 ENCOUNTER — OFFICE VISIT (OUTPATIENT)
Dept: RETAIL CLINIC | Facility: CLINIC | Age: 68
End: 2018-04-05

## 2018-04-05 ENCOUNTER — APPOINTMENT (OUTPATIENT)
Dept: CT IMAGING | Facility: HOSPITAL | Age: 68
End: 2018-04-05

## 2018-04-05 ENCOUNTER — TELEPHONE (OUTPATIENT)
Dept: RETAIL CLINIC | Facility: CLINIC | Age: 68
End: 2018-04-05

## 2018-04-05 ENCOUNTER — HOSPITAL ENCOUNTER (EMERGENCY)
Facility: HOSPITAL | Age: 68
Discharge: HOME | End: 2018-04-05
Attending: EMERGENCY MEDICINE | Admitting: EMERGENCY MEDICINE

## 2018-04-05 VITALS
HEART RATE: 64 BPM | BODY MASS INDEX: 31.5 KG/M2 | WEIGHT: 225 LBS | TEMPERATURE: 98.5 F | OXYGEN SATURATION: 97 % | HEIGHT: 71 IN | DIASTOLIC BLOOD PRESSURE: 81 MMHG | RESPIRATION RATE: 18 BRPM | SYSTOLIC BLOOD PRESSURE: 154 MMHG

## 2018-04-05 VITALS
TEMPERATURE: 97.9 F | SYSTOLIC BLOOD PRESSURE: 164 MMHG | HEART RATE: 67 BPM | DIASTOLIC BLOOD PRESSURE: 76 MMHG | OXYGEN SATURATION: 99 %

## 2018-04-05 DIAGNOSIS — I10 HYPERTENSION, UNSPECIFIED TYPE: Primary | ICD-10-CM

## 2018-04-05 DIAGNOSIS — I15.9 SECONDARY HYPERTENSION: Primary | ICD-10-CM

## 2018-04-05 DIAGNOSIS — R41.82 ALTERED MENTAL STATUS, UNSPECIFIED ALTERED MENTAL STATUS TYPE: ICD-10-CM

## 2018-04-05 DIAGNOSIS — Z76.89 REFERRAL OF PATIENT: ICD-10-CM

## 2018-04-05 LAB
ALBUMIN SERPL-MCNC: 4.5 G/DL (ref 3.5–5)
ALBUMIN/GLOB SERPL: 1.5 G/DL (ref 1.1–2.5)
ALP SERPL-CCNC: 70 U/L (ref 24–120)
ALT SERPL W P-5'-P-CCNC: 29 U/L (ref 0–54)
ANION GAP SERPL CALCULATED.3IONS-SCNC: 10 MMOL/L (ref 4–13)
AST SERPL-CCNC: 23 U/L (ref 7–45)
BASOPHILS # BLD AUTO: 0.03 10*3/MM3 (ref 0–0.2)
BASOPHILS NFR BLD AUTO: 0.3 % (ref 0–2)
BILIRUB SERPL-MCNC: 0.8 MG/DL (ref 0.1–1)
BILIRUB UR QL STRIP: NEGATIVE
BUN BLD-MCNC: 26 MG/DL (ref 5–21)
BUN/CREAT SERPL: 19.8 (ref 7–25)
CALCIUM SPEC-SCNC: 9.7 MG/DL (ref 8.4–10.4)
CHLORIDE SERPL-SCNC: 106 MMOL/L (ref 98–110)
CLARITY UR: CLEAR
CO2 SERPL-SCNC: 27 MMOL/L (ref 24–31)
COLOR UR: YELLOW
CREAT BLD-MCNC: 1.31 MG/DL (ref 0.5–1.4)
DEPRECATED RDW RBC AUTO: 45 FL (ref 40–54)
EOSINOPHIL # BLD AUTO: 0.12 10*3/MM3 (ref 0–0.7)
EOSINOPHIL NFR BLD AUTO: 1.2 % (ref 0–4)
ERYTHROCYTE [DISTWIDTH] IN BLOOD BY AUTOMATED COUNT: 13.2 % (ref 12–15)
GFR SERPL CREATININE-BSD FRML MDRD: 55 ML/MIN/1.73
GLOBULIN UR ELPH-MCNC: 3 GM/DL
GLUCOSE BLD-MCNC: 103 MG/DL (ref 70–100)
GLUCOSE UR STRIP-MCNC: NEGATIVE MG/DL
HCT VFR BLD AUTO: 43.1 % (ref 40–52)
HGB BLD-MCNC: 14.9 G/DL (ref 14–18)
HGB UR QL STRIP.AUTO: NEGATIVE
IMM GRANULOCYTES # BLD: 0.04 10*3/MM3 (ref 0–0.03)
IMM GRANULOCYTES NFR BLD: 0.4 % (ref 0–5)
KETONES UR QL STRIP: NEGATIVE
LEUKOCYTE ESTERASE UR QL STRIP.AUTO: NEGATIVE
LYMPHOCYTES # BLD AUTO: 1.11 10*3/MM3 (ref 0.72–4.86)
LYMPHOCYTES NFR BLD AUTO: 10.9 % (ref 15–45)
MCH RBC QN AUTO: 31.6 PG (ref 28–32)
MCHC RBC AUTO-ENTMCNC: 34.6 G/DL (ref 33–36)
MCV RBC AUTO: 91.5 FL (ref 82–95)
MONOCYTES # BLD AUTO: 0.77 10*3/MM3 (ref 0.19–1.3)
MONOCYTES NFR BLD AUTO: 7.5 % (ref 4–12)
NEUTROPHILS # BLD AUTO: 8.16 10*3/MM3 (ref 1.87–8.4)
NEUTROPHILS NFR BLD AUTO: 79.7 % (ref 39–78)
NITRITE UR QL STRIP: NEGATIVE
NRBC BLD MANUAL-RTO: 0 /100 WBC (ref 0–0)
PH UR STRIP.AUTO: 6 [PH] (ref 5–8)
PLATELET # BLD AUTO: 100 10*3/MM3 (ref 130–400)
PMV BLD AUTO: 9.1 FL (ref 6–12)
POTASSIUM BLD-SCNC: 4 MMOL/L (ref 3.5–5.3)
PROT SERPL-MCNC: 7.5 G/DL (ref 6.3–8.7)
PROT UR QL STRIP: NEGATIVE
RBC # BLD AUTO: 4.71 10*6/MM3 (ref 4.8–5.9)
SODIUM BLD-SCNC: 143 MMOL/L (ref 135–145)
SP GR UR STRIP: 1.02 (ref 1–1.03)
TROPONIN I SERPL-MCNC: <0.012 NG/ML (ref 0–0.03)
UROBILINOGEN UR QL STRIP: NORMAL
WBC NRBC COR # BLD: 10.23 10*3/MM3 (ref 4.8–10.8)

## 2018-04-05 PROCEDURE — 99284 EMERGENCY DEPT VISIT MOD MDM: CPT

## 2018-04-05 PROCEDURE — 81003 URINALYSIS AUTO W/O SCOPE: CPT | Performed by: EMERGENCY MEDICINE

## 2018-04-05 PROCEDURE — 99213 OFFICE O/P EST LOW 20 MIN: CPT | Performed by: NURSE PRACTITIONER

## 2018-04-05 PROCEDURE — 85025 COMPLETE CBC W/AUTO DIFF WBC: CPT | Performed by: EMERGENCY MEDICINE

## 2018-04-05 PROCEDURE — 80053 COMPREHEN METABOLIC PANEL: CPT | Performed by: EMERGENCY MEDICINE

## 2018-04-05 PROCEDURE — 70450 CT HEAD/BRAIN W/O DYE: CPT

## 2018-04-05 PROCEDURE — 84484 ASSAY OF TROPONIN QUANT: CPT | Performed by: EMERGENCY MEDICINE

## 2018-04-05 RX ORDER — LISINOPRIL 10 MG/1
40 TABLET ORAL ONCE
Status: COMPLETED | OUTPATIENT
Start: 2018-04-05 | End: 2018-04-05

## 2018-04-05 RX ORDER — FLUTICASONE PROPIONATE 50 MCG
2 SPRAY, SUSPENSION (ML) NASAL DAILY
Qty: 1 BOTTLE | Refills: 0 | Status: SHIPPED | OUTPATIENT
Start: 2018-04-05 | End: 2018-04-05 | Stop reason: HOSPADM

## 2018-04-05 RX ORDER — AZITHROMYCIN 250 MG/1
TABLET, FILM COATED ORAL
Qty: 6 TABLET | Refills: 0 | Status: SHIPPED | OUTPATIENT
Start: 2018-04-05 | End: 2019-01-21

## 2018-04-05 RX ORDER — AZITHROMYCIN 250 MG/1
TABLET, FILM COATED ORAL
Qty: 6 TABLET | Refills: 0 | Status: SHIPPED | OUTPATIENT
Start: 2018-04-05 | End: 2018-04-05 | Stop reason: HOSPADM

## 2018-04-05 RX ADMIN — LISINOPRIL 40 MG: 10 TABLET ORAL at 14:45

## 2018-04-05 NOTE — PROGRESS NOTES
Subjective   Manolo Sagastume is a 67 y.o. male.     Sore Throat    This is a new problem. The current episode started in the past 7 days (2 days). The problem has been gradually worsening. Maximum temperature: Unknown. Associated symptoms include congestion, coughing (Phlegm), ear pain, headaches and shortness of breath. Pertinent negatives include no diarrhea or vomiting. Treatments tried: States he has taken some kind of medication but doesn't know what it is.        The following portions of the patient's history were reviewed and updated as appropriate: allergies, current medications, past family history, past medical history, past social history, past surgical history and problem list.    Review of Systems   Constitutional: Fever: unknown.   HENT: Positive for congestion, ear pain, postnasal drip and sore throat.    Eyes: Negative.    Respiratory: Positive for cough (Phlegm) and shortness of breath.    Gastrointestinal: Negative for diarrhea, nausea and vomiting.   Neurological: Positive for weakness and headaches.       Objective   Physical Exam   Constitutional: He appears well-developed and well-nourished. He does not appear ill. No distress.   HENT:   Right Ear: External ear normal. Tympanic membrane is not erythematous.   Left Ear: External ear normal. Tympanic membrane is not erythematous.   Nose: Right sinus exhibits maxillary sinus tenderness and frontal sinus tenderness. Left sinus exhibits maxillary sinus tenderness and frontal sinus tenderness.   Mouth/Throat: Posterior oropharyngeal erythema (mild) present. No oropharyngeal exudate. Tonsils are 2+ on the right. Tonsils are 2+ on the left. No tonsillar exudate.   Neck: Neck supple.   Cardiovascular: Normal rate, regular rhythm and normal heart sounds.  Exam reveals no gallop and no friction rub.    No murmur heard.  Pulmonary/Chest: Effort normal and breath sounds normal. No respiratory distress. He has no decreased breath sounds. He has no wheezes.  He has no rhonchi. He has no rales.   Lymphadenopathy:     He has no cervical adenopathy.   Neurological: He is alert. Coordination abnormal.   Patient off balance with ambulation to and from exam room.  Patient memory is decreased; could not remember if or what he ate for breakfast; cannot remember if he took his medication today.  Does remember taking something for illness but unsure what it was.  Unable to problem solve finding ride to higher level of care.     Skin: Skin is warm and dry. He is not diaphoretic.   Psychiatric: He has a normal mood and affect.         Assessment/Plan   Manolo was seen today for sore throat.    Diagnoses and all orders for this visit:      Hypertension, unspecified type    Altered mental status, unspecified altered mental status type    Referral of patient      Patient does not remember what medication he was taking at home for symptoms (thinks it is over the counter but unsure.  Stated he had 3 left so he took them).  Patient also does not recall if he has taken his Blood pressure medication today.  States he can't remember.  PAR reports that patient could not remember his birthday while checking in.  Patient reports that shortness of breath, memory loss, and being off balance is normal for him.  BP high but started to improved throughout visit.  Patient stood at end of exam/visit and lost balance and stumbled that did not result in fall.  Patient was asked to sit and remain in office.  Spoke to nurse with Dr. Lanier's office to see what patient's typical status is.  Discussed normal range of BP and that labs are typically within normal limits but hasn't had them checked in a while.  Scheduled to see them next Wednesday.  Blood Glucose checked at approximately 12:10pm: 112.  BP rechecked: 190/100 left arm; 175/93 to right arm.  Spoke to patient's son via phone who states patient does get off balance at times and memory comes and goes.  Son stated he was worried about him this  morning and requested that he go to the doctor but he was supposed to wait for ride.  Patient drove himself to clinic.  Patient's daughter-in law arrived to transport patient to high level of care. Discussed Urgent care for BP management if patient was at normal mental status for him, but ER if patient appeared abnormal.  Daughter-in law states patient's status was not normal and agreed to take patient to the ER.  She was assisted to a personal vehicle with patient.   Rx at pharmacy canceled due to pending ER evaluation.

## 2018-04-05 NOTE — PATIENT INSTRUCTIONS
Continue daily allergy medication  You may use Albuterol Inhaler as needed for wheezing or shortness of breath.  Start antibiotic.   Avoid OTC medications that contain decongestants  If no improvement over the next 2-3 days, or symptoms worsen, follow up with primary doctor or go to Urgent Care        Sinusitis, Adult  Sinusitis is soreness and inflammation of your sinuses. Sinuses are hollow spaces in the bones around your face. Your sinuses are located:  · Around your eyes.  · In the middle of your forehead.  · Behind your nose.  · In your cheekbones.  Your sinuses and nasal passages are lined with a stringy fluid (mucus). Mucus normally drains out of your sinuses. When your nasal tissues become inflamed or swollen, the mucus can become trapped or blocked so air cannot flow through your sinuses. This allows bacteria, viruses, and funguses to grow, which leads to infection.  Sinusitis can develop quickly and last for 7?10 days (acute) or for more than 12 weeks (chronic). Sinusitis often develops after a cold.  What are the causes?  This condition is caused by anything that creates swelling in the sinuses or stops mucus from draining, including:  · Allergies.  · Asthma.  · Bacterial or viral infection.  · Abnormally shaped bones between the nasal passages.  · Nasal growths that contain mucus (nasal polyps).  · Narrow sinus openings.  · Pollutants, such as chemicals or irritants in the air.  · A foreign object stuck in the nose.  · A fungal infection. This is rare.  What increases the risk?  The following factors may make you more likely to develop this condition:  · Having allergies or asthma.  · Having had a recent cold or respiratory tract infection.  · Having structural deformities or blockages in your nose or sinuses.  · Having a weak immune system.  · Doing a lot of swimming or diving.  · Overusing nasal sprays.  · Smoking.  What are the signs or symptoms?  The main symptoms of this condition are pain and a  feeling of pressure around the affected sinuses. Other symptoms include:  · Upper toothache.  · Earache.  · Headache.  · Bad breath.  · Decreased sense of smell and taste.  · A cough that may get worse at night.  · Fatigue.  · Fever.  · Thick drainage from your nose. The drainage is often green and it may contain pus (purulent).  · Stuffy nose or congestion.  · Postnasal drip. This is when extra mucus collects in the throat or back of the nose.  · Swelling and warmth over the affected sinuses.  · Sore throat.  · Sensitivity to light.  How is this diagnosed?  This condition is diagnosed based on symptoms, a medical history, and a physical exam. To find out if your condition is acute or chronic, your health care provider may:  · Look in your nose for signs of nasal polyps.  · Tap over the affected sinus to check for signs of infection.  · View the inside of your sinuses using an imaging device that has a light attached (endoscope).  If your health care provider suspects that you have chronic sinusitis, you may also:  · Be tested for allergies.  · Have a sample of mucus taken from your nose (nasal culture) and checked for bacteria.  · Have a mucus sample examined to see if your sinusitis is related to an allergy.  If your sinusitis does not respond to treatment and it lasts longer than 8 weeks, you may have an MRI or CT scan to check your sinuses. These scans also help to determine how severe your infection is.  In rare cases, a bone biopsy may be done to rule out more serious types of fungal sinus disease.  How is this treated?  Treatment for sinusitis depends on the cause and whether your condition is chronic or acute. If a virus is causing your sinusitis, your symptoms will go away on their own within 10 days. You may be given medicines to relieve your symptoms, including:  · Topical nasal decongestants. They shrink swollen nasal passages and let mucus drain from your sinuses.  · Antihistamines. These drugs block  inflammation that is triggered by allergies. This can help to ease swelling in your nose and sinuses.  · Topical nasal corticosteroids. These are nasal sprays that ease inflammation and swelling in your nose and sinuses.  · Nasal saline washes. These rinses can help to get rid of thick mucus in your nose.  If your condition is caused by bacteria, you will be given an antibiotic medicine. If your condition is caused by a fungus, you will be given an antifungal medicine.  Surgery may be needed to correct underlying conditions, such as narrow nasal passages. Surgery may also be needed to remove polyps.  Follow these instructions at home:  Medicines   · Take, use, or apply over-the-counter and prescription medicines only as told by your health care provider. These may include nasal sprays.  · If you were prescribed an antibiotic medicine, take it as told by your health care provider. Do not stop taking the antibiotic even if you start to feel better.  Hydrate and Humidify   · Drink enough water to keep your urine clear or pale yellow. Staying hydrated will help to thin your mucus.  · Use a cool mist humidifier to keep the humidity level in your home above 50%.  · Inhale steam for 10-15 minutes, 3-4 times a day or as told by your health care provider. You can do this in the bathroom while a hot shower is running.  · Limit your exposure to cool or dry air.  Rest   · Rest as much as possible.  · Sleep with your head raised (elevated).  · Make sure to get enough sleep each night.  General instructions   · Apply a warm, moist washcloth to your face 3-4 times a day or as told by your health care provider. This will help with discomfort.  · Wash your hands often with soap and water to reduce your exposure to viruses and other germs. If soap and water are not available, use hand .  · Do not smoke. Avoid being around people who are smoking (secondhand smoke).  · Keep all follow-up visits as told by your health care  provider. This is important.  Contact a health care provider if:  · You have a fever.  · Your symptoms get worse.  · Your symptoms do not improve within 10 days.  Get help right away if:  · You have a severe headache.  · You have persistent vomiting.  · You have pain or swelling around your face or eyes.  · You have vision problems.  · You develop confusion.  · Your neck is stiff.  · You have trouble breathing.  This information is not intended to replace advice given to you by your health care provider. Make sure you discuss any questions you have with your health care provider.  Document Released: 12/18/2006 Document Revised: 08/13/2017 Document Reviewed: 10/12/2016  [employer] Patient Education © 2017 [employer]

## 2018-04-05 NOTE — TELEPHONE ENCOUNTER
Patient's daughter-in law contacted the office requesting antibiotic to be sent to pharmacy.  Initially canceled Rx due to patient going to ER for further evaluation.  Sinus issues not address, will resend Z-pack.

## 2018-04-05 NOTE — ED NOTES
PATIENT WENT TO THE URGENT CARE CLINIC AT [facility] D/T COUGH, CONGESTION, CHILLS AND HAD AN ELEVATED BP. PATIENT WAS INSTRUCTED TO COME TO ER FOR EVAL. PATIENT HAS NOT HAD HIS DAILY MEDICATIONS.      Olesya Bill RN  04/05/18 [id number]

## 2018-04-11 ENCOUNTER — OFFICE VISIT (OUTPATIENT)
Dept: PRIMARY CARE CLINIC | Age: 68
End: 2018-04-11
Payer: MEDICARE

## 2018-04-11 VITALS
DIASTOLIC BLOOD PRESSURE: 88 MMHG | HEART RATE: 59 BPM | SYSTOLIC BLOOD PRESSURE: 138 MMHG | WEIGHT: 234 LBS | TEMPERATURE: 97.5 F | OXYGEN SATURATION: 91 % | HEIGHT: 71 IN | BODY MASS INDEX: 32.76 KG/M2

## 2018-04-11 DIAGNOSIS — E11.9 TYPE 2 DIABETES MELLITUS WITHOUT COMPLICATION, WITHOUT LONG-TERM CURRENT USE OF INSULIN (HCC): Primary | ICD-10-CM

## 2018-04-11 DIAGNOSIS — G62.9 NEUROPATHY: ICD-10-CM

## 2018-04-11 DIAGNOSIS — E78.5 HYPERLIPIDEMIA, UNSPECIFIED HYPERLIPIDEMIA TYPE: ICD-10-CM

## 2018-04-11 DIAGNOSIS — I10 WELL-CONTROLLED HYPERTENSION: ICD-10-CM

## 2018-04-11 LAB — HBA1C MFR BLD: 5.4 %

## 2018-04-11 PROCEDURE — G8417 CALC BMI ABV UP PARAM F/U: HCPCS | Performed by: NURSE PRACTITIONER

## 2018-04-11 PROCEDURE — 3017F COLORECTAL CA SCREEN DOC REV: CPT | Performed by: NURSE PRACTITIONER

## 2018-04-11 PROCEDURE — 99214 OFFICE O/P EST MOD 30 MIN: CPT | Performed by: NURSE PRACTITIONER

## 2018-04-11 PROCEDURE — 83036 HEMOGLOBIN GLYCOSYLATED A1C: CPT | Performed by: NURSE PRACTITIONER

## 2018-04-11 PROCEDURE — 2022F DILAT RTA XM EVC RTNOPTHY: CPT | Performed by: NURSE PRACTITIONER

## 2018-04-11 PROCEDURE — 3044F HG A1C LEVEL LT 7.0%: CPT | Performed by: NURSE PRACTITIONER

## 2018-04-11 PROCEDURE — 1036F TOBACCO NON-USER: CPT | Performed by: NURSE PRACTITIONER

## 2018-04-11 PROCEDURE — 1123F ACP DISCUSS/DSCN MKR DOCD: CPT | Performed by: NURSE PRACTITIONER

## 2018-04-11 PROCEDURE — 4040F PNEUMOC VAC/ADMIN/RCVD: CPT | Performed by: NURSE PRACTITIONER

## 2018-04-11 PROCEDURE — G8427 DOCREV CUR MEDS BY ELIG CLIN: HCPCS | Performed by: NURSE PRACTITIONER

## 2018-04-11 ASSESSMENT — ENCOUNTER SYMPTOMS
EYES NEGATIVE: 1
SORE THROAT: 0
RHINORRHEA: 0
NAUSEA: 0
ALLERGIC/IMMUNOLOGIC NEGATIVE: 1
VOMITING: 0
SHORTNESS OF BREATH: 0
CONSTIPATION: 0
CHEST TIGHTNESS: 0
DIARRHEA: 0

## 2018-04-17 DIAGNOSIS — I10 WELL-CONTROLLED HYPERTENSION: ICD-10-CM

## 2018-04-17 LAB
ALBUMIN SERPL-MCNC: 4.2 G/DL (ref 3.5–5.2)
ALP BLD-CCNC: 65 U/L (ref 40–130)
ALT SERPL-CCNC: 21 U/L (ref 5–41)
ANION GAP SERPL CALCULATED.3IONS-SCNC: 13 MMOL/L (ref 7–19)
AST SERPL-CCNC: 17 U/L (ref 5–40)
BILIRUB SERPL-MCNC: 0.5 MG/DL (ref 0.2–1.2)
BUN BLDV-MCNC: 26 MG/DL (ref 8–23)
CALCIUM SERPL-MCNC: 9.7 MG/DL (ref 8.8–10.2)
CHLORIDE BLD-SCNC: 102 MMOL/L (ref 98–111)
CHOLESTEROL, TOTAL: 193 MG/DL (ref 160–199)
CO2: 26 MMOL/L (ref 22–29)
CREAT SERPL-MCNC: 1.2 MG/DL (ref 0.5–1.2)
GFR NON-AFRICAN AMERICAN: >60
GLUCOSE BLD-MCNC: 106 MG/DL (ref 74–109)
HCT VFR BLD CALC: 47.4 % (ref 42–52)
HDLC SERPL-MCNC: 54 MG/DL (ref 55–121)
HEMOGLOBIN: 15.3 G/DL (ref 14–18)
LDL CHOLESTEROL CALCULATED: 111 MG/DL
MCH RBC QN AUTO: 30.8 PG (ref 27–31)
MCHC RBC AUTO-ENTMCNC: 32.3 G/DL (ref 33–37)
MCV RBC AUTO: 95.6 FL (ref 80–94)
PDW BLD-RTO: 13.1 % (ref 11.5–14.5)
PLATELET # BLD: 138 K/UL (ref 130–400)
PMV BLD AUTO: 9.2 FL (ref 9.4–12.4)
POTASSIUM SERPL-SCNC: 4.4 MMOL/L (ref 3.5–5)
RBC # BLD: 4.96 M/UL (ref 4.7–6.1)
SODIUM BLD-SCNC: 141 MMOL/L (ref 136–145)
TOTAL PROTEIN: 6.9 G/DL (ref 6.6–8.7)
TRIGL SERPL-MCNC: 141 MG/DL (ref 0–149)
WBC # BLD: 7.5 K/UL (ref 4.8–10.8)

## 2018-05-03 ENCOUNTER — TELEPHONE (OUTPATIENT)
Dept: PRIMARY CARE CLINIC | Age: 68
End: 2018-05-03

## 2018-05-09 DIAGNOSIS — E11.9 TYPE 2 DIABETES MELLITUS WITHOUT COMPLICATION, WITHOUT LONG-TERM CURRENT USE OF INSULIN (HCC): Primary | ICD-10-CM

## 2018-05-17 ENCOUNTER — CARE COORDINATION (OUTPATIENT)
Dept: CARE COORDINATION | Age: 68
End: 2018-05-17

## 2018-06-04 ENCOUNTER — OFFICE VISIT (OUTPATIENT)
Dept: PRIMARY CARE CLINIC | Age: 68
End: 2018-06-04
Payer: MEDICARE

## 2018-06-04 ENCOUNTER — CARE COORDINATION (OUTPATIENT)
Dept: CARE COORDINATION | Age: 68
End: 2018-06-04

## 2018-06-04 VITALS
SYSTOLIC BLOOD PRESSURE: 136 MMHG | HEIGHT: 71 IN | BODY MASS INDEX: 30.1 KG/M2 | DIASTOLIC BLOOD PRESSURE: 74 MMHG | WEIGHT: 215 LBS | OXYGEN SATURATION: 98 % | RESPIRATION RATE: 20 BRPM | TEMPERATURE: 98 F | HEART RATE: 77 BPM

## 2018-06-04 DIAGNOSIS — Z91.030 BEE STING ALLERGY: ICD-10-CM

## 2018-06-04 DIAGNOSIS — E11.42 DIABETIC PERIPHERAL NEUROPATHY ASSOCIATED WITH TYPE 2 DIABETES MELLITUS (HCC): Primary | ICD-10-CM

## 2018-06-04 DIAGNOSIS — J43.1 PANLOBULAR EMPHYSEMA (HCC): ICD-10-CM

## 2018-06-04 DIAGNOSIS — E11.9 TYPE 2 DIABETES MELLITUS WITHOUT COMPLICATION, WITHOUT LONG-TERM CURRENT USE OF INSULIN (HCC): ICD-10-CM

## 2018-06-04 PROCEDURE — 3044F HG A1C LEVEL LT 7.0%: CPT | Performed by: FAMILY MEDICINE

## 2018-06-04 PROCEDURE — 1036F TOBACCO NON-USER: CPT | Performed by: FAMILY MEDICINE

## 2018-06-04 PROCEDURE — 4040F PNEUMOC VAC/ADMIN/RCVD: CPT | Performed by: FAMILY MEDICINE

## 2018-06-04 PROCEDURE — G8926 SPIRO NO PERF OR DOC: HCPCS | Performed by: FAMILY MEDICINE

## 2018-06-04 PROCEDURE — G8417 CALC BMI ABV UP PARAM F/U: HCPCS | Performed by: FAMILY MEDICINE

## 2018-06-04 PROCEDURE — 99214 OFFICE O/P EST MOD 30 MIN: CPT | Performed by: FAMILY MEDICINE

## 2018-06-04 PROCEDURE — 1123F ACP DISCUSS/DSCN MKR DOCD: CPT | Performed by: FAMILY MEDICINE

## 2018-06-04 PROCEDURE — 3017F COLORECTAL CA SCREEN DOC REV: CPT | Performed by: FAMILY MEDICINE

## 2018-06-04 PROCEDURE — 3023F SPIROM DOC REV: CPT | Performed by: FAMILY MEDICINE

## 2018-06-04 PROCEDURE — G8427 DOCREV CUR MEDS BY ELIG CLIN: HCPCS | Performed by: FAMILY MEDICINE

## 2018-06-04 PROCEDURE — 2022F DILAT RTA XM EVC RTNOPTHY: CPT | Performed by: FAMILY MEDICINE

## 2018-06-04 RX ORDER — GABAPENTIN 400 MG/1
400 CAPSULE ORAL 3 TIMES DAILY
Qty: 90 CAPSULE | Refills: 0 | Status: SHIPPED | OUTPATIENT
Start: 2018-06-04 | End: 2018-08-06 | Stop reason: SDUPTHER

## 2018-06-04 RX ORDER — PRAVASTATIN SODIUM 80 MG/1
TABLET ORAL
Qty: 30 TABLET | Refills: 11 | Status: SHIPPED | OUTPATIENT
Start: 2018-06-04 | End: 2018-08-02 | Stop reason: SDUPTHER

## 2018-06-04 RX ORDER — ESCITALOPRAM OXALATE 20 MG/1
20 TABLET ORAL DAILY
Qty: 30 TABLET | Refills: 3 | Status: SHIPPED | OUTPATIENT
Start: 2018-06-04 | End: 2019-09-04

## 2018-06-04 RX ORDER — CETIRIZINE HYDROCHLORIDE 10 MG/1
TABLET ORAL
Qty: 30 TABLET | Refills: 11 | Status: SHIPPED | OUTPATIENT
Start: 2018-06-04 | End: 2019-11-08 | Stop reason: SDUPTHER

## 2018-06-04 RX ORDER — LISINOPRIL 40 MG/1
TABLET ORAL
Qty: 30 TABLET | Refills: 3 | Status: SHIPPED | OUTPATIENT
Start: 2018-06-04 | End: 2018-08-02 | Stop reason: SDUPTHER

## 2018-06-04 RX ORDER — PANTOPRAZOLE SODIUM 40 MG/1
TABLET, DELAYED RELEASE ORAL
Qty: 30 TABLET | Refills: 11 | Status: SHIPPED | OUTPATIENT
Start: 2018-06-04 | End: 2019-11-08 | Stop reason: SDUPTHER

## 2018-06-04 RX ORDER — ALBUTEROL SULFATE 90 UG/1
AEROSOL, METERED RESPIRATORY (INHALATION)
Qty: 18 G | Refills: 3 | Status: SHIPPED | OUTPATIENT
Start: 2018-06-04 | End: 2019-11-08 | Stop reason: SDUPTHER

## 2018-06-04 RX ORDER — TAMSULOSIN HYDROCHLORIDE 0.4 MG/1
CAPSULE ORAL
Qty: 30 CAPSULE | Refills: 3 | Status: SHIPPED | OUTPATIENT
Start: 2018-06-04 | End: 2019-01-14 | Stop reason: SDUPTHER

## 2018-06-04 ASSESSMENT — ENCOUNTER SYMPTOMS
CONSTIPATION: 0
WHEEZING: 0
SHORTNESS OF BREATH: 0
NAUSEA: 0
COUGH: 0
VOMITING: 0
ABDOMINAL PAIN: 0
CHEST TIGHTNESS: 0
DIARRHEA: 0

## 2018-06-22 ENCOUNTER — TELEPHONE (OUTPATIENT)
Dept: PRIMARY CARE CLINIC | Age: 68
End: 2018-06-22

## 2018-06-22 DIAGNOSIS — T63.441A ALLERGIC REACTION TO BEE STING: Primary | ICD-10-CM

## 2018-06-22 RX ORDER — EPINEPHRINE 0.3 MG/.3ML
INJECTION SUBCUTANEOUS
Qty: 0.3 ML | Refills: 3 | Status: SHIPPED | OUTPATIENT
Start: 2018-06-22

## 2018-07-27 RX ORDER — POLYETHYLENE GLYCOL 3350 17 G/17G
POWDER, FOR SOLUTION ORAL
Qty: 527 G | Refills: 5 | Status: SHIPPED | OUTPATIENT
Start: 2018-07-27 | End: 2019-02-12 | Stop reason: SDUPTHER

## 2018-08-02 RX ORDER — PRAVASTATIN SODIUM 80 MG/1
TABLET ORAL
Qty: 90 TABLET | Refills: 3 | Status: SHIPPED | OUTPATIENT
Start: 2018-08-02 | End: 2018-10-29 | Stop reason: SDUPTHER

## 2018-08-02 RX ORDER — LISINOPRIL 40 MG/1
TABLET ORAL
Qty: 90 TABLET | Refills: 3 | Status: SHIPPED | OUTPATIENT
Start: 2018-08-02 | End: 2019-11-08 | Stop reason: SDUPTHER

## 2018-08-06 DIAGNOSIS — E11.9 TYPE 2 DIABETES MELLITUS WITHOUT COMPLICATION, WITHOUT LONG-TERM CURRENT USE OF INSULIN (HCC): ICD-10-CM

## 2018-08-06 DIAGNOSIS — E11.42 DIABETIC PERIPHERAL NEUROPATHY ASSOCIATED WITH TYPE 2 DIABETES MELLITUS (HCC): ICD-10-CM

## 2018-08-06 RX ORDER — GABAPENTIN 400 MG/1
CAPSULE ORAL
Qty: 90 CAPSULE | Refills: 0 | OUTPATIENT
Start: 2018-08-06 | End: 2018-09-05 | Stop reason: SDUPTHER

## 2018-09-05 DIAGNOSIS — E11.42 DIABETIC PERIPHERAL NEUROPATHY ASSOCIATED WITH TYPE 2 DIABETES MELLITUS (HCC): ICD-10-CM

## 2018-09-05 DIAGNOSIS — E11.9 TYPE 2 DIABETES MELLITUS WITHOUT COMPLICATION, WITHOUT LONG-TERM CURRENT USE OF INSULIN (HCC): ICD-10-CM

## 2018-09-06 RX ORDER — GABAPENTIN 400 MG/1
CAPSULE ORAL
Qty: 90 CAPSULE | Refills: 0 | Status: SHIPPED | OUTPATIENT
Start: 2018-09-06 | End: 2018-10-15 | Stop reason: ALTCHOICE

## 2018-10-15 ENCOUNTER — OFFICE VISIT (OUTPATIENT)
Dept: PRIMARY CARE CLINIC | Age: 68
End: 2018-10-15
Payer: MEDICARE

## 2018-10-15 VITALS
WEIGHT: 220.2 LBS | OXYGEN SATURATION: 99 % | DIASTOLIC BLOOD PRESSURE: 80 MMHG | BODY MASS INDEX: 30.83 KG/M2 | HEIGHT: 71 IN | TEMPERATURE: 98 F | HEART RATE: 56 BPM | SYSTOLIC BLOOD PRESSURE: 138 MMHG

## 2018-10-15 DIAGNOSIS — E11.9 TYPE 2 DIABETES MELLITUS WITHOUT COMPLICATION, WITHOUT LONG-TERM CURRENT USE OF INSULIN (HCC): ICD-10-CM

## 2018-10-15 DIAGNOSIS — E11.42 DIABETIC PERIPHERAL NEUROPATHY ASSOCIATED WITH TYPE 2 DIABETES MELLITUS (HCC): Primary | ICD-10-CM

## 2018-10-15 DIAGNOSIS — L84 FOOT CALLUS: ICD-10-CM

## 2018-10-15 LAB — HBA1C MFR BLD: 5.1 %

## 2018-10-15 PROCEDURE — 1036F TOBACCO NON-USER: CPT | Performed by: NURSE PRACTITIONER

## 2018-10-15 PROCEDURE — 1123F ACP DISCUSS/DSCN MKR DOCD: CPT | Performed by: NURSE PRACTITIONER

## 2018-10-15 PROCEDURE — 3044F HG A1C LEVEL LT 7.0%: CPT | Performed by: NURSE PRACTITIONER

## 2018-10-15 PROCEDURE — 3017F COLORECTAL CA SCREEN DOC REV: CPT | Performed by: NURSE PRACTITIONER

## 2018-10-15 PROCEDURE — G8484 FLU IMMUNIZE NO ADMIN: HCPCS | Performed by: NURSE PRACTITIONER

## 2018-10-15 PROCEDURE — 4040F PNEUMOC VAC/ADMIN/RCVD: CPT | Performed by: NURSE PRACTITIONER

## 2018-10-15 PROCEDURE — 83036 HEMOGLOBIN GLYCOSYLATED A1C: CPT | Performed by: NURSE PRACTITIONER

## 2018-10-15 PROCEDURE — G8427 DOCREV CUR MEDS BY ELIG CLIN: HCPCS | Performed by: NURSE PRACTITIONER

## 2018-10-15 PROCEDURE — 1101F PT FALLS ASSESS-DOCD LE1/YR: CPT | Performed by: NURSE PRACTITIONER

## 2018-10-15 PROCEDURE — G8417 CALC BMI ABV UP PARAM F/U: HCPCS | Performed by: NURSE PRACTITIONER

## 2018-10-15 PROCEDURE — 2022F DILAT RTA XM EVC RTNOPTHY: CPT | Performed by: NURSE PRACTITIONER

## 2018-10-15 PROCEDURE — 99213 OFFICE O/P EST LOW 20 MIN: CPT | Performed by: NURSE PRACTITIONER

## 2018-10-15 RX ORDER — GLUCOSAMINE HCL/CHONDROITIN SU 500-400 MG
CAPSULE ORAL
Qty: 100 STRIP | Refills: 3 | Status: SHIPPED | OUTPATIENT
Start: 2018-10-15 | End: 2019-01-02

## 2018-10-15 RX ORDER — GABAPENTIN 600 MG/1
600 TABLET ORAL 3 TIMES DAILY
Qty: 90 TABLET | Refills: 2 | Status: SHIPPED | OUTPATIENT
Start: 2018-10-15 | End: 2019-07-05 | Stop reason: SDUPTHER

## 2018-10-15 RX ORDER — LANCETS 30 GAUGE
EACH MISCELLANEOUS
Qty: 100 EACH | Refills: 5 | Status: SHIPPED | OUTPATIENT
Start: 2018-10-15 | End: 2020-05-27

## 2018-10-15 ASSESSMENT — ENCOUNTER SYMPTOMS
COUGH: 0
NAUSEA: 0
DIARRHEA: 0
SHORTNESS OF BREATH: 0
WHEEZING: 0
VOMITING: 0
ABDOMINAL PAIN: 0

## 2018-10-15 ASSESSMENT — PATIENT HEALTH QUESTIONNAIRE - PHQ9
1. LITTLE INTEREST OR PLEASURE IN DOING THINGS: 0
SUM OF ALL RESPONSES TO PHQ QUESTIONS 1-9: 0
SUM OF ALL RESPONSES TO PHQ QUESTIONS 1-9: 0
DEPRESSION UNABLE TO ASSESS: PT REFUSES
2. FEELING DOWN, DEPRESSED OR HOPELESS: 0
SUM OF ALL RESPONSES TO PHQ9 QUESTIONS 1 & 2: 0

## 2018-10-15 NOTE — PROGRESS NOTES
[address] [address]  Phone:  [phone]  Fax:  [phone]      Lanie Merchant is a 80 y.o. male who presents today for hismedical conditions/complaints as noted below. Lanie Merchant is c/o of Numbness (feet)      Chief Complaint   Patient presents with    Numbness     feet       HPI:     HPI    Lanie Merchant presents today for numbness in his feet. He states this has been present for at least two years. His wife made him this appointment due to it worsening. Now, he is having increased numbness and feeling like his feet are on fire. He takes Gabapentin and this helps. He does not take this three times per day as prescribed. He states sometimes taking it twice per day and at times taking two at a time, but not consistently taking three times per day. He states this does help some when he takes gabapentin. He also goes to pain management. He takes Norco for pain. This helps with his chronic pain, but not with the numbness and burning in his feet. He denies intermittent claudication, edema, or shortness of breath. He does not have a glucometer. He is unsure what his glucose is at home. Past Medical History:   Diagnosis Date    Chronic back pain     COPD (chronic obstructive pulmonary disease) (HCC)     Hypertension     Type II or unspecified type diabetes mellitus without mention of complication, not stated as uncontrolled         Past Surgical History:   Procedure Laterality Date    APPENDECTOMY         Social History   Substance Use Topics    Smoking status: Former Smoker    Smokeless tobacco: Never Used    Alcohol use No        Current Outpatient Prescriptions   Medication Sig Dispense Refill    gabapentin (NEURONTIN) 600 MG tablet Take 1 tablet by mouth 3 times daily for 90 days. . 90 tablet 2    blood glucose monitor kit and supplies 1 kit by Other route daily Test 1 times a day & as needed for symptoms of irregular blood glucose.  1 kit 0    Negative for appetite change, fatigue and fever. Respiratory: Negative for cough, shortness of breath and wheezing. Cardiovascular: Negative for chest pain and leg swelling. Gastrointestinal: Negative for abdominal pain, diarrhea, nausea and vomiting. Genitourinary: Negative for difficulty urinating and dysuria. Skin: Negative for rash and wound. Neurological: Positive for numbness (burning in feet). Negative for dizziness, weakness and headaches. Objective:     Physical Exam   Constitutional: He is oriented to person, place, and time. He appears well-developed and well-nourished. HENT:   Head: Normocephalic and atraumatic. Eyes: Pupils are equal, round, and reactive to light. Neck: Normal range of motion. Neck supple. Cardiovascular: Normal rate, regular rhythm and intact distal pulses. Pulses:       Dorsalis pedis pulses are 3+ on the right side, and 3+ on the left side. Posterior tibial pulses are 3+ on the right side, and 3+ on the left side. Pulmonary/Chest: Effort normal and breath sounds normal. No respiratory distress. Abdominal: Soft. He exhibits no distension. There is no tenderness. Musculoskeletal: He exhibits no edema. Lumbar back: He exhibits normal range of motion and no tenderness. Lymphadenopathy:     He has no cervical adenopathy. Neurological: He is alert and oriented to person, place, and time. Skin: Skin is warm, dry and intact. No rash noted. No erythema. Psychiatric: He has a normal mood and affect. His behavior is normal. Thought content normal.   Nursing note and vitals reviewed. Visual inspection:  Deformity/amputation: absent  Skin lesions/pre-ulcerative calluses: present - right lateral second toe callus. No open areas.    Edema: right- negative, left- negative    Sensory exam:  Monofilament sensation: abnormal - only feels diminished sensations in mid plantar foot  (minimum of 5 random plantar locations tested, avoiding callused areas - > 1 area with absence of sensation is + for neuropathy)    Plus at least one of the following:  Pulses: normal,         /80   Pulse 56   Temp 98 °F (36.7 °C) (Temporal)   Ht 5' 11\" (1.803 m)   Wt 220 lb 3.2 oz (99.9 kg)   SpO2 99%   BMI 30.71 kg/m²     Assessment:      Diagnosis Orders   1. Diabetic peripheral neuropathy associated with type 2 diabetes mellitus (HCC)  POCT glycosylated hemoglobin (Hb A1C)    gabapentin (NEURONTIN) 600 MG tablet   2. Type 2 diabetes mellitus without complication, without long-term current use of insulin (HCC)  POCT glycosylated hemoglobin (Hb A1C)    gabapentin (NEURONTIN) 600 MG tablet    blood glucose monitor kit and supplies    blood glucose monitor strips    Lancets MISC    External Referral To Podiatry   3. Foot callus  External Referral To Podiatry       Results for orders placed or performed in visit on 10/15/18   POCT glycosylated hemoglobin (Hb A1C)   Result Value Ref Range    Hemoglobin A1C 5.1 %       Plan:     Sergio Vazquez reviewed. A1C controlled. Continue diabetic regimen. Glucometer ordered. Increase Gabapentin to 600mg TID. Return in about 3 months (around 1/15/2019), or if symptoms worsen or fail to improve, for chronic conditions. Orders Placed This Encounter   Procedures    External Referral To Podiatry     Referral Priority:   Routine     Referral Type:   Eval and Treat     Referral Reason:   Specialty Services Required     Requested Specialty:   Podiatry     Number of Visits Requested:   1    POCT glycosylated hemoglobin (Hb A1C)       Orders Placed This Encounter   Medications    gabapentin (NEURONTIN) 600 MG tablet     Sig: Take 1 tablet by mouth 3 times daily for 90 days. .     Dispense:  90 tablet     Refill:  2    blood glucose monitor kit and supplies     Si kit by Other route daily Test 1 times a day & as needed for symptoms of irregular blood glucose.      Dispense:  1 kit     Refill:  0    blood glucose

## 2018-10-23 ENCOUNTER — OFFICE VISIT (OUTPATIENT)
Dept: PRIMARY CARE CLINIC | Age: 68
End: 2018-10-23
Payer: MEDICARE

## 2018-10-23 VITALS
HEART RATE: 67 BPM | TEMPERATURE: 98.4 F | HEIGHT: 71 IN | BODY MASS INDEX: 30.32 KG/M2 | DIASTOLIC BLOOD PRESSURE: 84 MMHG | WEIGHT: 216.6 LBS | SYSTOLIC BLOOD PRESSURE: 134 MMHG | OXYGEN SATURATION: 98 %

## 2018-10-23 DIAGNOSIS — M75.50 SUBACROMIAL BURSITIS: ICD-10-CM

## 2018-10-23 DIAGNOSIS — M17.12 PRIMARY OSTEOARTHRITIS OF LEFT KNEE: Primary | ICD-10-CM

## 2018-10-23 PROCEDURE — 20610 DRAIN/INJ JOINT/BURSA W/O US: CPT | Performed by: FAMILY MEDICINE

## 2018-10-23 PROCEDURE — G8427 DOCREV CUR MEDS BY ELIG CLIN: HCPCS | Performed by: FAMILY MEDICINE

## 2018-10-23 PROCEDURE — 3017F COLORECTAL CA SCREEN DOC REV: CPT | Performed by: FAMILY MEDICINE

## 2018-10-23 PROCEDURE — 1123F ACP DISCUSS/DSCN MKR DOCD: CPT | Performed by: FAMILY MEDICINE

## 2018-10-23 PROCEDURE — G0008 ADMIN INFLUENZA VIRUS VAC: HCPCS | Performed by: FAMILY MEDICINE

## 2018-10-23 PROCEDURE — G8482 FLU IMMUNIZE ORDER/ADMIN: HCPCS | Performed by: FAMILY MEDICINE

## 2018-10-23 PROCEDURE — 90662 IIV NO PRSV INCREASED AG IM: CPT | Performed by: FAMILY MEDICINE

## 2018-10-23 PROCEDURE — 4040F PNEUMOC VAC/ADMIN/RCVD: CPT | Performed by: FAMILY MEDICINE

## 2018-10-23 PROCEDURE — 1036F TOBACCO NON-USER: CPT | Performed by: FAMILY MEDICINE

## 2018-10-23 PROCEDURE — G8417 CALC BMI ABV UP PARAM F/U: HCPCS | Performed by: FAMILY MEDICINE

## 2018-10-23 PROCEDURE — 99213 OFFICE O/P EST LOW 20 MIN: CPT | Performed by: FAMILY MEDICINE

## 2018-10-23 PROCEDURE — 1101F PT FALLS ASSESS-DOCD LE1/YR: CPT | Performed by: FAMILY MEDICINE

## 2018-10-23 RX ORDER — TRIAMCINOLONE ACETONIDE 40 MG/ML
40 INJECTION, SUSPENSION INTRA-ARTICULAR; INTRAMUSCULAR ONCE
Status: COMPLETED | OUTPATIENT
Start: 2018-10-23 | End: 2018-10-23

## 2018-10-23 RX ADMIN — TRIAMCINOLONE ACETONIDE 40 MG: 40 INJECTION, SUSPENSION INTRA-ARTICULAR; INTRAMUSCULAR at 17:40

## 2018-10-23 NOTE — PROGRESS NOTES
Per Dr. Hui Dietz this [address] jono up a left knee injection and left shoulder injection each with 1 mL of Kenalog [address] [address] [id number], lot # [id number], Exp 10/19. 8mL of Lidocaine [address] [address] [id number], Lot #[id number], Exp 03/20.

## 2018-10-24 ASSESSMENT — ENCOUNTER SYMPTOMS
SHORTNESS OF BREATH: 0
COUGH: 0

## 2018-10-29 RX ORDER — PRAVASTATIN SODIUM 80 MG/1
TABLET ORAL
Qty: 90 TABLET | Refills: 3 | Status: SHIPPED | OUTPATIENT
Start: 2018-10-29 | End: 2019-11-08 | Stop reason: SDUPTHER

## 2019-01-02 ENCOUNTER — OFFICE VISIT (OUTPATIENT)
Dept: PRIMARY CARE CLINIC | Age: 69
End: 2019-01-02
Payer: MEDICARE

## 2019-01-02 VITALS
HEART RATE: 64 BPM | RESPIRATION RATE: 16 BRPM | SYSTOLIC BLOOD PRESSURE: 138 MMHG | BODY MASS INDEX: 33.07 KG/M2 | WEIGHT: 231 LBS | HEIGHT: 70 IN | DIASTOLIC BLOOD PRESSURE: 86 MMHG | OXYGEN SATURATION: 95 %

## 2019-01-02 DIAGNOSIS — E11.9 TYPE 2 DIABETES MELLITUS WITHOUT COMPLICATION, WITHOUT LONG-TERM CURRENT USE OF INSULIN (HCC): ICD-10-CM

## 2019-01-02 DIAGNOSIS — Z00.00 ROUTINE GENERAL MEDICAL EXAMINATION AT A HEALTH CARE FACILITY: Primary | ICD-10-CM

## 2019-01-02 DIAGNOSIS — J44.9 CHRONIC OBSTRUCTIVE PULMONARY DISEASE, UNSPECIFIED COPD TYPE (HCC): ICD-10-CM

## 2019-01-02 PROCEDURE — 3046F HEMOGLOBIN A1C LEVEL >9.0%: CPT | Performed by: FAMILY MEDICINE

## 2019-01-02 PROCEDURE — G8482 FLU IMMUNIZE ORDER/ADMIN: HCPCS | Performed by: FAMILY MEDICINE

## 2019-01-02 PROCEDURE — G0438 PPPS, INITIAL VISIT: HCPCS | Performed by: FAMILY MEDICINE

## 2019-01-02 PROCEDURE — 4040F PNEUMOC VAC/ADMIN/RCVD: CPT | Performed by: FAMILY MEDICINE

## 2019-01-02 ASSESSMENT — PATIENT HEALTH QUESTIONNAIRE - PHQ9
SUM OF ALL RESPONSES TO PHQ QUESTIONS 1-9: 0
SUM OF ALL RESPONSES TO PHQ QUESTIONS 1-9: 0

## 2019-01-02 ASSESSMENT — LIFESTYLE VARIABLES: HOW OFTEN DO YOU HAVE A DRINK CONTAINING ALCOHOL: 0

## 2019-01-02 ASSESSMENT — ANXIETY QUESTIONNAIRES: GAD7 TOTAL SCORE: 0

## 2019-01-14 RX ORDER — TAMSULOSIN HYDROCHLORIDE 0.4 MG/1
CAPSULE ORAL
Qty: 30 CAPSULE | Refills: 3 | Status: SHIPPED | OUTPATIENT
Start: 2019-01-14 | End: 2019-11-08 | Stop reason: SDUPTHER

## 2019-01-15 ENCOUNTER — OFFICE VISIT (OUTPATIENT)
Dept: PRIMARY CARE CLINIC | Age: 69
End: 2019-01-15
Payer: MEDICARE

## 2019-01-15 VITALS
OXYGEN SATURATION: 98 % | SYSTOLIC BLOOD PRESSURE: 136 MMHG | DIASTOLIC BLOOD PRESSURE: 84 MMHG | WEIGHT: 217 LBS | HEART RATE: 56 BPM | RESPIRATION RATE: 18 BRPM | HEIGHT: 70 IN | TEMPERATURE: 98.2 F | BODY MASS INDEX: 31.07 KG/M2

## 2019-01-15 DIAGNOSIS — J44.9 CHRONIC OBSTRUCTIVE PULMONARY DISEASE, UNSPECIFIED COPD TYPE (HCC): ICD-10-CM

## 2019-01-15 DIAGNOSIS — E11.9 TYPE 2 DIABETES MELLITUS WITHOUT COMPLICATION, WITHOUT LONG-TERM CURRENT USE OF INSULIN (HCC): Primary | ICD-10-CM

## 2019-01-15 DIAGNOSIS — M17.12 PRIMARY OSTEOARTHRITIS OF LEFT KNEE: ICD-10-CM

## 2019-01-15 DIAGNOSIS — E78.2 MIXED HYPERLIPIDEMIA: ICD-10-CM

## 2019-01-15 DIAGNOSIS — M79.671 BILATERAL FOOT PAIN: ICD-10-CM

## 2019-01-15 DIAGNOSIS — I10 ESSENTIAL HYPERTENSION: ICD-10-CM

## 2019-01-15 DIAGNOSIS — M79.672 BILATERAL FOOT PAIN: ICD-10-CM

## 2019-01-15 LAB — HBA1C MFR BLD: 5.4 %

## 2019-01-15 PROCEDURE — 3017F COLORECTAL CA SCREEN DOC REV: CPT | Performed by: NURSE PRACTITIONER

## 2019-01-15 PROCEDURE — 83036 HEMOGLOBIN GLYCOSYLATED A1C: CPT | Performed by: NURSE PRACTITIONER

## 2019-01-15 PROCEDURE — 1101F PT FALLS ASSESS-DOCD LE1/YR: CPT | Performed by: NURSE PRACTITIONER

## 2019-01-15 PROCEDURE — 2022F DILAT RTA XM EVC RTNOPTHY: CPT | Performed by: NURSE PRACTITIONER

## 2019-01-15 PROCEDURE — 99214 OFFICE O/P EST MOD 30 MIN: CPT | Performed by: NURSE PRACTITIONER

## 2019-01-15 PROCEDURE — G8926 SPIRO NO PERF OR DOC: HCPCS | Performed by: NURSE PRACTITIONER

## 2019-01-15 PROCEDURE — 3044F HG A1C LEVEL LT 7.0%: CPT | Performed by: NURSE PRACTITIONER

## 2019-01-15 PROCEDURE — 3023F SPIROM DOC REV: CPT | Performed by: NURSE PRACTITIONER

## 2019-01-15 PROCEDURE — G8482 FLU IMMUNIZE ORDER/ADMIN: HCPCS | Performed by: NURSE PRACTITIONER

## 2019-01-15 PROCEDURE — 1036F TOBACCO NON-USER: CPT | Performed by: NURSE PRACTITIONER

## 2019-01-15 PROCEDURE — G8417 CALC BMI ABV UP PARAM F/U: HCPCS | Performed by: NURSE PRACTITIONER

## 2019-01-15 PROCEDURE — 4040F PNEUMOC VAC/ADMIN/RCVD: CPT | Performed by: NURSE PRACTITIONER

## 2019-01-15 PROCEDURE — G8427 DOCREV CUR MEDS BY ELIG CLIN: HCPCS | Performed by: NURSE PRACTITIONER

## 2019-01-15 PROCEDURE — 1123F ACP DISCUSS/DSCN MKR DOCD: CPT | Performed by: NURSE PRACTITIONER

## 2019-01-16 ASSESSMENT — ENCOUNTER SYMPTOMS
BACK PAIN: 0
ABDOMINAL PAIN: 0
VOMITING: 0
DIARRHEA: 0
WHEEZING: 0
COUGH: 0
SHORTNESS OF BREATH: 0
NAUSEA: 0

## 2019-01-16 NOTE — PROGRESS NOTES
[facility] - PODIATRY    Today's Date: 01/21/19    Patient Name: Manolo Sagastume  MRN: [id number]  CSN: [id number]  PCP: Pablo Lanier MD  Referring Provider: No ref. provider found    SUBJECTIVE     Chief Complaint   Patient presents with   • Establish Care     Patient is here to establish care. Patient c/o bilateral numbness and calluses. Pain scale: 9/10   • Diabetes     Patient is unsure of last blood sugar level. PCP: Dr Deyanira Lanier last visit 01/15/2019     HPI: Manolo Sagastume, a 68 y.o.male, comes to clinic as a(n) new patient presenting for diabetic foot exam and complaining of painful toenails. Patient has h/o BCC, back pain, COPD, HTN, DM2, Neuropathy. Patient is NIDDM and unsure of last BG level. Admits to numbness and tingling in feet. Denies open wounds or sores. Takes Gabapentin for neuropathy. States that his toenails are long, thick, and discolored. He has trouble caring for them himself. Admits pain at 9/10 level and described as aching, numbness and tingling. Denies previous treatment. Denies any constitutional symptoms. No other pedal complaints at this time.    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    • Seborrheic keratosis    • Type 2 diabetes mellitus (CMS/HCC)      Past Surgical History:   Procedure Laterality Date   • APPENDECTOMY     • SKIN LESION EXCISION      CHEST X2     Family History   Problem Relation Age of Onset   • Cancer Mother    • Heart disease Father      Social History     Socioeconomic History   • Marital status:      Spouse name: Not on file   • Number of children: Not on file   • Years of education: Not on file   • Highest education level: Not on file   Social Needs   • Financial resource strain: Not on file   • Food insecurity - worry:  "Not on file   • Food insecurity - inability: Not on file   • Transportation needs - medical: Not on file   • Transportation needs - non-medical: Not on file   Occupational History   • Not on file   Tobacco Use   • Smoking status: Former Smoker   • Smokeless tobacco: Never Used   Substance and Sexual Activity   • Alcohol use: No   • Drug use: Defer   • Sexual activity: Not on file   Other Topics Concern   • Not on file   Social History Narrative   • Not on file     Allergies   Allergen Reactions   • Penicillins Other (See Comments)     Made pt \"black out\"     Current Outpatient Medications   Medication Sig Dispense Refill   • albuterol (VENTOLIN HFA) 108 (90 BASE) MCG/ACT inhaler INHALE 2 PUFFS INTO THE LUNGS EVERY 6 HOURS AS NEEDED.     • escitalopram (LEXAPRO) 20 MG tablet Take 20 mg by mouth daily      • gabapentin (NEURONTIN) 300 MG capsule Take 1 capsule by mouth 3 times daily     • Glucose Blood (BLOOD GLUCOSE TEST) strip Test once daily. Medtrix test stripes     • LANCETS ULTRA THIN misc TEST ONCE DAILY     • metFORMIN (GLUCOPHAGE) 1000 MG tablet TAKE ONE TABLET BY MOUTH TWO TIMES A DAY WITH MEALS     • oxyCODONE-acetaminophen (PERCOCET)  MG per tablet Every 8 (Eight) Hours.     • pantoprazole (PROTONIX) 40 MG EC tablet TAKE ONE TABLET BY MOUTH EVERY DAY     • polyethylene glycol (MIRALAX) pack packet Take 17 g by mouth daily     • polyethylene glycol (MIRALAX) powder   5   • pravastatin (PRAVACHOL) 80 MG tablet TAKE ONE TABLET BY MOUTH EVERY EVENING.     • tamsulosin (FLOMAX) 0.4 MG capsule 24 hr capsule Take 1 capsule by mouth daily     • tiotropium (SPIRIVA HANDIHALER) 18 MCG per inhalation capsule Inhale 1 capsule into the lungs daily     • triamcinolone (KENALOG) 0.1 % cream Apply topically 2 times daily.     • lisinopril (PRINIVIL,ZESTRIL) 40 MG tablet TAKE ONE TABLET BY MOUTH DAILY.       No current facility-administered medications for this visit.      Review of Systems   Constitutional: Negative " for chills and fever.   HENT: Negative for congestion.    Respiratory: Negative for shortness of breath.    Cardiovascular: Negative for chest pain and leg swelling.   Gastrointestinal: Negative for constipation, diarrhea, nausea and vomiting.   Musculoskeletal:        Foot pain   Skin: Negative for wound.   Neurological: Positive for numbness.       OBJECTIVE     Vitals:    01/21/19 1547   BP: 140/80   Pulse: 58   SpO2: 99%       PHYSICAL EXAM  GEN:   Accompanied by none.     General Exam  Appearance: appears stated age and healthy   Orientation: alert and oriented to person, place, and time   Affect: appropriate   Gait: unimpaired   Assistance: independent   Shoe Gear: casual shoes    Foot/Ankle Exam  Inspection and Palpation  Ecchymosis - Right: none Left: none  Swelling - Right: none   Left: none    Arch - Right: normal Left: normal  Hammertoes - Right: absent Left: absent  Claw Toes - Right: absent Left: absent  Mallet Toes - Right: absent Left: absent  Hallux Valgus - Right: no Left: no  Hallux Limitus - Right: no Left: no  Skin - Right: skin intact  Left: skin intact   Nails -  Right: abnormally thick and onychomycosis Left: abnormally thick and onychomycosis     Vascular  Dorsalis Pedis - Right: 2+ Left: 2+  Posterior Tibial - Right: 2+ Left: 2+  Skin Temperature -  Right: warm  Left: warm    CFT - Right: 3 Left: 3  Pedal Hair Growth - Right: present Left: present  Varicosities -  Right: no varicosities  Left: no varicosities      Neurologic  Saphenous Nerve Sensation  - Right: diminished Left: diminished  Tibial Nerve Sensation - Right: diminished Left: diminished  Superficial Peroneal Nerve Sensation - Right: diminished Left: diminished  Deep Peroneal Nerve Sensation - Right: diminished Left: diminished  Sural Nerve Sensation - Right: diminished Left: diminished  Protective Sensation using [place] Monofilament - Right: 2 Left: 2    Muscle Strength  Dorsiflexion - Right: 5 Left: 5  Plantar Flexion  - Right: 5 Left: 5  Eversion - Right: 5 Left: 5  Inversion - Right: 5 Left: 5  Great Toe Extension - Right: 5 Left: 5  Great Toe Flexion - Right: 5 Left: 5    Range of Motion  Normal right ankle ROM  Normal left ankle ROM            RADIOLOGY/NUCLEAR:  No results found.    LABORATORY/CULTURE RESULTS:      PATHOLOGY RESULTS:       ASSESSMENT/PLAN     Manolo was seen today for establish care and diabetes.    Diagnoses and all orders for this visit:    Onychomycosis    Diabetic foot (CMS/HCC)    Foot callus    Diabetes mellitus type 2 with neurological manifestations (CMS/HCC)      Comprehensive lower extremity examination and evaluation was performed.  Discussed findings and treatment plan including risks, benefits, and treatment options with patient in detail. Patient agreed with treatment plan.  After verbal consent obtained, nail(s) x10 debrided of length and thickness with nail nipper without incidence  Patient may maintain nails and calluses at home utilizing emery board or pumice stone between visits as needed  Reviewed at home diabetic foot care including daily foot checks   Rx: Compounded Pain Cream   An After Visit Summary was printed and given to the patient at discharge, including (if requested) any available informative/educational handouts regarding diagnosis, treatment, or medications. All questions were answered to patient/family satisfaction. Should symptoms fail to improve or worsen they agree to call or return to clinic or to go to the Emergency Department. Discussed the importance of following up with any needed screening tests/labs/specialist appointments and any requested follow-up recommended by me today. Importance of maintaining follow-up discussed and patient accepts that missed appointments can delay diagnosis and potentially lead to worsening of conditions.  Return in about 3 months (around 4/21/2019)., or sooner if acute issues arise.        This document has been electronically signed by  Marvel Hooper DPM on January 21, 2019 4:51 PM

## 2019-01-17 ENCOUNTER — TELEPHONE (OUTPATIENT)
Dept: PODIATRY | Facility: CLINIC | Age: 69
End: 2019-01-17

## 2019-01-17 NOTE — TELEPHONE ENCOUNTER
Left message and callback number reminding patient of appointment with Dr. Davonte Hooper on January 21, 2019 at 3:45 pm.

## 2019-01-21 ENCOUNTER — OFFICE VISIT (OUTPATIENT)
Dept: PODIATRY | Facility: CLINIC | Age: 69
End: 2019-01-21

## 2019-01-21 VITALS
OXYGEN SATURATION: 99 % | HEART RATE: 58 BPM | DIASTOLIC BLOOD PRESSURE: 80 MMHG | WEIGHT: 220 LBS | BODY MASS INDEX: 30.8 KG/M2 | SYSTOLIC BLOOD PRESSURE: 140 MMHG | HEIGHT: 71 IN

## 2019-01-21 DIAGNOSIS — B35.1 ONYCHOMYCOSIS: Primary | ICD-10-CM

## 2019-01-21 DIAGNOSIS — L84 FOOT CALLUS: ICD-10-CM

## 2019-01-21 DIAGNOSIS — E11.49 DIABETES MELLITUS TYPE 2 WITH NEUROLOGICAL MANIFESTATIONS (HCC): ICD-10-CM

## 2019-01-21 DIAGNOSIS — E11.8 DIABETIC FOOT (HCC): ICD-10-CM

## 2019-01-21 PROCEDURE — 11721 DEBRIDE NAIL 6 OR MORE: CPT | Performed by: PODIATRIST

## 2019-01-21 PROCEDURE — 99203 OFFICE O/P NEW LOW 30 MIN: CPT | Performed by: PODIATRIST

## 2019-01-21 NOTE — PATIENT INSTRUCTIONS
Diabetes and Foot Care  Diabetes may cause you to have problems because of poor blood supply (circulation) to your feet and legs. This may cause the skin on your feet to become thinner, break easier, and heal more slowly. Your skin may become dry, and the skin may peel and crack. You may also have nerve damage in your legs and feet causing decreased feeling in them. You may not notice minor injuries to your feet that could lead to infections or more serious problems. Taking care of your feet is one of the most important things you can do for yourself.  Follow these instructions at home:  · Wear shoes at all times, even in the house. Do not go barefoot. Bare feet are easily injured.  · Check your feet daily for blisters, cuts, and redness. If you cannot see the bottom of your feet, use a mirror or ask someone for help.  · Wash your feet with warm water (do not use hot water) and mild soap. Then pat your feet and the areas between your toes until they are completely dry. Do not soak your feet as this can dry your skin.  · Apply a moisturizing lotion or petroleum jelly (that does not contain alcohol and is unscented) to the skin on your feet and to dry, brittle toenails. Do not apply lotion between your toes.  · Trim your toenails straight across. Do not dig under them or around the cuticle. File the edges of your nails with an emery board or nail file.  · Do not cut corns or calluses or try to remove them with medicine.  · Wear clean socks or stockings every day. Make sure they are not too tight. Do not wear knee-high stockings since they may decrease blood flow to your legs.  · Wear shoes that fit properly and have enough cushioning. To break in new shoes, wear them for just a few hours a day. This prevents you from injuring your feet. Always look in your shoes before you put them on to be sure there are no objects inside.  · Do not cross your legs. This may decrease the blood flow to your feet.  · If you find a  minor scrape, cut, or break in the skin on your feet, keep it and the skin around it clean and dry. These areas may be cleansed with mild soap and water. Do not cleanse the area with peroxide, alcohol, or iodine.  · When you remove an adhesive bandage, be sure not to damage the skin around it.  · If you have a wound, look at it several times a day to make sure it is healing.  · Do not use heating pads or hot water bottles. They may burn your skin. If you have lost feeling in your feet or legs, you may not know it is happening until it is too late.  · Make sure your health care provider performs a complete foot exam at least annually or more often if you have foot problems. Report any cuts, sores, or bruises to your health care provider immediately.  Contact a health care provider if:  · You have an injury that is not healing.  · You have cuts or breaks in the skin.  · You have an ingrown nail.  · You notice redness on your legs or feet.  · You feel burning or tingling in your legs or feet.  · You have pain or cramps in your legs and feet.  · Your legs or feet are numb.  · Your feet always feel cold.  Get help right away if:  · There is increasing redness, swelling, or pain in or around a wound.  · There is a red line that goes up your leg.  · Pus is coming from a wound.  · You develop a fever or as directed by your health care provider.  · You notice a bad smell coming from an ulcer or wound.  This information is not intended to replace advice given to you by your health care provider. Make sure you discuss any questions you have with your health care provider.  Document Released: 12/15/2001 Document Revised: 05/25/2017 Document Reviewed: 05/27/2014  [employer] Interactive Patient Education © 2017 [employer]

## 2019-03-25 ENCOUNTER — TELEPHONE (OUTPATIENT)
Dept: PRIMARY CARE CLINIC | Age: 69
End: 2019-03-25

## 2019-04-03 DIAGNOSIS — I10 ESSENTIAL HYPERTENSION: ICD-10-CM

## 2019-04-03 LAB
ALBUMIN SERPL-MCNC: 4.4 G/DL (ref 3.5–5.2)
ALP BLD-CCNC: 61 U/L (ref 40–130)
ALT SERPL-CCNC: 11 U/L (ref 5–41)
ANION GAP SERPL CALCULATED.3IONS-SCNC: 10 MMOL/L (ref 7–19)
AST SERPL-CCNC: 14 U/L (ref 5–40)
BASOPHILS ABSOLUTE: 0 K/UL (ref 0–0.2)
BASOPHILS RELATIVE PERCENT: 0.3 % (ref 0–1)
BILIRUB SERPL-MCNC: 0.9 MG/DL (ref 0.2–1.2)
BUN BLDV-MCNC: 20 MG/DL (ref 8–23)
CALCIUM SERPL-MCNC: 9.5 MG/DL (ref 8.8–10.2)
CHLORIDE BLD-SCNC: 106 MMOL/L (ref 98–111)
CHOLESTEROL, TOTAL: 199 MG/DL (ref 160–199)
CO2: 28 MMOL/L (ref 22–29)
CREAT SERPL-MCNC: 1.2 MG/DL (ref 0.5–1.2)
EOSINOPHILS ABSOLUTE: 0.2 K/UL (ref 0–0.6)
EOSINOPHILS RELATIVE PERCENT: 2.4 % (ref 0–5)
GFR NON-AFRICAN AMERICAN: >60
GLUCOSE BLD-MCNC: 91 MG/DL (ref 74–109)
HCT VFR BLD CALC: 44.4 % (ref 42–52)
HDLC SERPL-MCNC: 63 MG/DL (ref 55–121)
HEMOGLOBIN: 14.7 G/DL (ref 14–18)
LDL CHOLESTEROL CALCULATED: 118 MG/DL
LYMPHOCYTES ABSOLUTE: 2.3 K/UL (ref 1.1–4.5)
LYMPHOCYTES RELATIVE PERCENT: 34 % (ref 20–40)
MCH RBC QN AUTO: 31.9 PG (ref 27–31)
MCHC RBC AUTO-ENTMCNC: 33.1 G/DL (ref 33–37)
MCV RBC AUTO: 96.3 FL (ref 80–94)
MONOCYTES ABSOLUTE: 0.4 K/UL (ref 0–0.9)
MONOCYTES RELATIVE PERCENT: 6.4 % (ref 0–10)
NEUTROPHILS ABSOLUTE: 3.8 K/UL (ref 1.5–7.5)
NEUTROPHILS RELATIVE PERCENT: 56.6 % (ref 50–65)
PDW BLD-RTO: 12.8 % (ref 11.5–14.5)
PLATELET # BLD: 106 K/UL (ref 130–400)
PMV BLD AUTO: 9.8 FL (ref 9.4–12.4)
POTASSIUM SERPL-SCNC: 4.3 MMOL/L (ref 3.5–5)
RBC # BLD: 4.61 M/UL (ref 4.7–6.1)
SODIUM BLD-SCNC: 144 MMOL/L (ref 136–145)
TOTAL PROTEIN: 7.2 G/DL (ref 6.6–8.7)
TRIGL SERPL-MCNC: 92 MG/DL (ref 0–149)
WBC # BLD: 6.7 K/UL (ref 4.8–10.8)

## 2019-04-08 ENCOUNTER — TELEPHONE (OUTPATIENT)
Dept: PRIMARY CARE CLINIC | Age: 69
End: 2019-04-08

## 2019-04-17 ENCOUNTER — OFFICE VISIT (OUTPATIENT)
Dept: PRIMARY CARE CLINIC | Age: 69
End: 2019-04-17
Payer: MEDICARE

## 2019-04-17 VITALS
HEART RATE: 56 BPM | BODY MASS INDEX: 30.72 KG/M2 | TEMPERATURE: 98 F | DIASTOLIC BLOOD PRESSURE: 84 MMHG | SYSTOLIC BLOOD PRESSURE: 132 MMHG | OXYGEN SATURATION: 97 % | WEIGHT: 214.6 LBS | HEIGHT: 70 IN

## 2019-04-17 DIAGNOSIS — E11.42 DIABETIC PERIPHERAL NEUROPATHY ASSOCIATED WITH TYPE 2 DIABETES MELLITUS (HCC): ICD-10-CM

## 2019-04-17 DIAGNOSIS — K59.09 CHRONIC CONSTIPATION: ICD-10-CM

## 2019-04-17 DIAGNOSIS — M17.12 PRIMARY OSTEOARTHRITIS OF LEFT KNEE: ICD-10-CM

## 2019-04-17 DIAGNOSIS — E11.9 TYPE 2 DIABETES MELLITUS WITHOUT COMPLICATION, WITHOUT LONG-TERM CURRENT USE OF INSULIN (HCC): Primary | ICD-10-CM

## 2019-04-17 DIAGNOSIS — E78.5 HYPERLIPIDEMIA, UNSPECIFIED HYPERLIPIDEMIA TYPE: ICD-10-CM

## 2019-04-17 DIAGNOSIS — I10 ESSENTIAL HYPERTENSION: ICD-10-CM

## 2019-04-17 PROCEDURE — 2022F DILAT RTA XM EVC RTNOPTHY: CPT | Performed by: FAMILY MEDICINE

## 2019-04-17 PROCEDURE — 99214 OFFICE O/P EST MOD 30 MIN: CPT | Performed by: FAMILY MEDICINE

## 2019-04-17 PROCEDURE — G8427 DOCREV CUR MEDS BY ELIG CLIN: HCPCS | Performed by: FAMILY MEDICINE

## 2019-04-17 PROCEDURE — 3044F HG A1C LEVEL LT 7.0%: CPT | Performed by: FAMILY MEDICINE

## 2019-04-17 PROCEDURE — 1036F TOBACCO NON-USER: CPT | Performed by: FAMILY MEDICINE

## 2019-04-17 PROCEDURE — 4040F PNEUMOC VAC/ADMIN/RCVD: CPT | Performed by: FAMILY MEDICINE

## 2019-04-17 PROCEDURE — G8417 CALC BMI ABV UP PARAM F/U: HCPCS | Performed by: FAMILY MEDICINE

## 2019-04-17 PROCEDURE — 1123F ACP DISCUSS/DSCN MKR DOCD: CPT | Performed by: FAMILY MEDICINE

## 2019-04-17 PROCEDURE — 3017F COLORECTAL CA SCREEN DOC REV: CPT | Performed by: FAMILY MEDICINE

## 2019-04-17 RX ORDER — LORATADINE 10 MG/1
10 TABLET ORAL DAILY
Qty: 30 TABLET | Refills: 5 | Status: SHIPPED | OUTPATIENT
Start: 2019-04-17 | End: 2019-09-04

## 2019-04-17 ASSESSMENT — ENCOUNTER SYMPTOMS
CONSTIPATION: 1
COLOR CHANGE: 0
SHORTNESS OF BREATH: 0
COUGH: 0

## 2019-04-17 NOTE — PROGRESS NOTES
Kindra Perez is a 80 y.o. male    Chief Complaint   Patient presents with    Follow-up     3 months       HPI   Kindra Perez presents to the office for follow up of multiple medical problems. Patient has history of chronic back pain, chronic left shoulder pain, bilateral knee pain, chronic constipation. Treatment Adherence:   Medication compliance:  compliant all of the time  Diet compliance:  compliant most of the time  Weight trend: stable  Current exercise: no regular exercise  Barriers: none    Diabetes Mellitus Type 2: Current symptoms/problems include none. Home blood sugar records: fasting range: 98 when he has checked it  Any episodes of hypoglycemia? no  Eye exam current (within one year): yes  Tobacco history:   Social History     Tobacco History     Smoking Status  Former Smoker Quit date  10/23/2000 Smoking Frequency  1.5 packs/day for 15 years (22.5 pk yrs) Smoking Tobacco Type  Cigarettes    Smokeless Tobacco Use  Never Used              Daily Aspirin? No:      Hypertension:  Home blood pressure monitoring: No.  The patient is adherent to a low sodium diet. Patient denies chest pain and shortness of breath. Antihypertensive medication side effects: no medication side effects noted. Use of agents associated with hypertension: none. Hyperlipidemia:  No new myalgias or GI upset on pravastatin (Pravachol).        Lab Results   Component Value Date    LABA1C 5.4 01/15/2019     No results found for: EAG    Lab Results   Component Value Date     04/03/2019    K 4.3 04/03/2019     04/03/2019    CO2 28 04/03/2019    BUN 20 04/03/2019    CREATININE 1.2 04/03/2019    GLUCOSE 91 04/03/2019    CALCIUM 9.5 04/03/2019    PROT 7.2 04/03/2019    LABALBU 4.4 04/03/2019    BILITOT 0.9 04/03/2019    ALKPHOS 61 04/03/2019    AST 14 04/03/2019    ALT 11 04/03/2019    LABGLOM >60 04/03/2019    GLOB 2.5 04/25/2016         Lab Results   Component Value Date    LABMICR 1.60 07/27/2017 Patient is on chronic opiates for control of chronic pain syndrome. He is taking percocet. He is dealing with chronic constipation. He states medicare is no longer paying for miralax. He is taking colace daily. Review of Systems   Constitutional: Negative for chills and fever. Respiratory: Negative for cough and shortness of breath. Cardiovascular: Negative for chest pain and leg swelling. Gastrointestinal: Positive for constipation. Skin: Negative for color change and rash. Neurological: Positive for numbness. Prior to Admission medications    Medication Sig Start Date End Date Taking? Authorizing Provider   naloxegol (MOVANTIK) 25 MG TABS tablet Take 1 tablet by mouth every morning 4/17/19  Yes Zohra Velasquez MD   diclofenac sodium (VOLTAREN) 1 % GEL Apply 2 g topically 2 times daily 4/17/19  Yes Zohra Velasquez MD   loratadine (CLARITIN) 10 MG tablet Take 1 tablet by mouth daily 4/17/19  Yes Zohra Velasquez MD   tamsulosin (FLOMAX) 0.4 MG capsule TAKE ONE CAPSULE BY MOUTH ONCE DAILY 1/14/19  Yes Shilrey Gaitan MD   pravastatin (PRAVACHOL) 80 MG tablet TAKE ONE TABLET BY MOUTH EVERY EVENING. 10/29/18  Yes Zohra Velasquez MD   gabapentin (NEURONTIN) 600 MG tablet Take 1 tablet by mouth 3 times daily for 90 days. . 10/15/18 4/17/19 Yes Zohra Velasquez MD   Lancets MISC Daily 10/15/18  Yes Zohra Velasquez MD   metFORMIN (GLUCOPHAGE) 1000 MG tablet TAKE ONE TABLET BY MOUTH TWO TIMES A DAY WITH MEALS 8/2/18  Yes Zohra Velasquez MD   lisinopril (PRINIVIL;ZESTRIL) 40 MG tablet TAKE ONE TABLET BY MOUTH DAILY.  8/2/18  Yes Zohra Velasquez MD   EPINEPHrine [facility]) 0.3 MG/0.3ML SOAJ injection Use as directed for allergic reaction 6/22/18  Yes Shirley Gaitan MD   cetirizine (ZYRTEC) 10 MG tablet TAKE 1 TABLET BY MOUTH DAILY 6/4/18  Yes Shirley Gaitan MD   pantoprazole (PROTONIX) 40 MG tablet TAKE ONE TABLET BY MOUTH EVERY DAY 6/4/18  Yes Shirley Gaitan MD   albuterol sulfate HFA (VENTOLIN HFA) 108 (90 Base) MCG/ACT inhaler INHALE 2 PUFFS INTO THE LUNGS EVERY 6 HOURS AS NEEDED. 18  Yes Jhonnie Essex, MD   escitalopram (LEXAPRO) 20 MG tablet Take 1 tablet by mouth daily 18  Yes Jhonnie Essex, MD   triamcinolone (KENALOG) 0.1 % cream Apply topically 2 times daily.  17  Yes Sylvain Crane MD   Tens Unit MISC by Does not apply route Tens unit pad 16  Yes Jhonnie Essex, MD   oxyCODONE-acetaminophen (PERCOCET)  MG per tablet Take 1 tablet by mouth every 8 hours as needed for Pain   Yes Historical Provider, MD   omega-3 acid ethyl esters (LOVAZA) 1 G capsule Take 2 capsules by mouth 2 times daily 5/11/15 9/28/15  Chelsea Pruett PA-C       Past Medical History:   Diagnosis Date    Chronic back pain     [facility]     Hypertension     Type II or unspecified type diabetes mellitus without mention of complication, not stated as uncontrolled        Past Surgical History:   Procedure Laterality Date    APPENDECTOMY         Social History     Socioeconomic History    Marital status:      Spouse name: None    Number of children: None    Years of education: None    Highest education level: None   Occupational History    None   Social Needs    Financial resource strain: None    Food insecurity:     Worry: None     Inability: None    Transportation needs:     Medical: None     Non-medical: None   Tobacco Use    Smoking status: Former Smoker     Packs/day: 1.50     Years: 15.00     Pack years: 22.50     Types: Cigarettes     Last attempt to quit: 10/23/2000     Years since quittin.4    Smokeless tobacco: Never Used   Substance and Sexual Activity    Alcohol use: No    Drug use: No    Sexual activity: None   Lifestyle    Physical activity:     Days per week: None     Minutes per session: None    Stress: None   Relationships    Social connections:     Talks on phone: None     Gets together: None     Attends Restorationist service: None     Active member of club or organization: None     Attends meetings of clubs or organizations: None     Relationship status: None    Intimate partner violence:     Fear of current or ex partner: None     Emotionally abused: None     Physically abused: None     Forced sexual activity: None   Other Topics Concern    None   Social History Narrative    None       Physical Exam   Constitutional: He is oriented to person, place, and time. He appears well-developed and well-nourished. No distress. HENT:   Head: Normocephalic and atraumatic. Eyes: Conjunctivae are normal. No scleral icterus. Cardiovascular: Normal rate, regular rhythm and normal heart sounds. No murmur heard. Pulmonary/Chest: Effort normal and breath sounds normal. No respiratory distress. He has no wheezes. Musculoskeletal: He exhibits no edema. Left shoulder: He exhibits decreased range of motion, tenderness and bony tenderness. Left knee: He exhibits decreased range of motion and bony tenderness. Tenderness found. Neurological: He is alert and oriented to person, place, and time. Skin: Skin is warm. No rash noted. Psychiatric: He has a normal mood and affect. His behavior is normal.   Nursing note and vitals reviewed. Monofilament Exam Reveals:  Pulses: normal  Edema:normal  Skin Lesions:multiple preulcerative callouse on right and left foot    Right Foot:    Left Foot:    Normal sensation at no where                        Normal sensation at no where   Diminished sensation at 3, 4, and 6               Diminished sensation at 3, 4, 5, 6  No sensation at 1, 2, 7, 8, 9                            No sensation at 1, 2, 7, 8, 9          Assessment    ICD-10-CM    1. Type 2 diabetes mellitus without complication, without long-term current use of insulin (HCC) E11.9 The current medical regimen is effective;  continue present plan and medications.    Lab Results

## 2019-05-02 ENCOUNTER — TELEPHONE (OUTPATIENT)
Dept: PODIATRY | Facility: CLINIC | Age: 69
End: 2019-05-02

## 2019-07-05 DIAGNOSIS — E11.42 DIABETIC PERIPHERAL NEUROPATHY ASSOCIATED WITH TYPE 2 DIABETES MELLITUS (HCC): ICD-10-CM

## 2019-07-05 DIAGNOSIS — E11.9 TYPE 2 DIABETES MELLITUS WITHOUT COMPLICATION, WITHOUT LONG-TERM CURRENT USE OF INSULIN (HCC): ICD-10-CM

## 2019-07-05 RX ORDER — GABAPENTIN 600 MG/1
600 TABLET ORAL 3 TIMES DAILY
Qty: 90 TABLET | Refills: 2 | OUTPATIENT
Start: 2019-07-05 | End: 2019-11-08 | Stop reason: SDUPTHER

## 2019-07-05 NOTE — TELEPHONE ENCOUNTER
Jessica Horan called to request a refill on his medication. Last office visit : 4/17/2019   Next office visit : 1/8/2020     Requested Prescriptions     Signed Prescriptions Disp Refills    gabapentin (NEURONTIN) 600 MG tablet 90 tablet 2     Sig: Take 1 tablet by mouth 3 times daily for 30 days. Authorizing Provider: Janette Mayes User: Art Millry    metFORMIN (GLUCOPHAGE) 1000 MG tablet 180 tablet 3     Sig: TAKE ONE TABLET BY MOUTH TWO TIMES A DAY WITH MEALS     Authorizing Provider: Janette Barber     Ordering User: Prashant Kimball MA         I sent metformin refill and called in gabapentin.

## 2019-08-02 ENCOUNTER — TELEPHONE (OUTPATIENT)
Dept: PRIMARY CARE CLINIC | Age: 69
End: 2019-08-02

## 2019-08-05 ENCOUNTER — OFFICE VISIT (OUTPATIENT)
Dept: GASTROENTEROLOGY | Facility: CLINIC | Age: 69
End: 2019-08-05

## 2019-08-05 VITALS
TEMPERATURE: 98.3 F | SYSTOLIC BLOOD PRESSURE: 128 MMHG | HEIGHT: 71 IN | WEIGHT: 199 LBS | BODY MASS INDEX: 27.86 KG/M2 | OXYGEN SATURATION: 98 % | DIASTOLIC BLOOD PRESSURE: 76 MMHG | HEART RATE: 68 BPM

## 2019-08-05 DIAGNOSIS — R10.13 EPIGASTRIC PAIN: ICD-10-CM

## 2019-08-05 DIAGNOSIS — K21.9 GASTROESOPHAGEAL REFLUX DISEASE, ESOPHAGITIS PRESENCE NOT SPECIFIED: ICD-10-CM

## 2019-08-05 DIAGNOSIS — R12 HEARTBURN: Primary | ICD-10-CM

## 2019-08-05 PROCEDURE — 99214 OFFICE O/P EST MOD 30 MIN: CPT | Performed by: NURSE PRACTITIONER

## 2019-08-05 RX ORDER — CETIRIZINE HYDROCHLORIDE 10 MG/1
1 TABLET ORAL DAILY
COMMUNITY
Start: 2018-06-04

## 2019-08-05 RX ORDER — PANTOPRAZOLE SODIUM 40 MG/1
40 TABLET, DELAYED RELEASE ORAL DAILY
Qty: 30 TABLET | Refills: 11 | Status: SHIPPED | OUTPATIENT
Start: 2019-08-05 | End: 2020-09-08

## 2019-08-05 NOTE — H&P (VIEW-ONLY)
Chief Complaint:   Chief Complaint   Patient presents with   • Heartburn     Pt c/o burning in his stomach/esophagus for the last few months          Patient ID: Manolo Sagastume is a 69 y.o. male     History of Present Illness: This is a very pleasant 69-year-old male who tells me that he has been having quite a bit of heartburn and reflux over the past few months.    The patient states that he has noted that he is having burning in his stomach as well as his esophagus.  He states it is worse with spicy foods.  He states this is been going on for several months.  He states he has not taken any over-the-counter medications to help with this.  He denies any chronic NSAID use.    The patient denies any nausea, vomiting, dysphagia, or hematemesis.  The patient denies any fever or chills.  Denies any melena or hematochezia.  Denies any unintentional weight loss or loss of appetite.  The patient is due for a colonoscopy next year.  The patient's last EGD was performed in 2012 with findings of duodenitis.      Past Medical History:   Diagnosis Date   • Basal cell carcinoma of left cheek    • Chronic back pain    • COPD (chronic obstructive pulmonary disease) (CMS/HCC)    • History of colon polyps    • Hypertension    • Neoplasm of uncertain behavior of skin of chest    • Seborrheic keratosis    • Type 2 diabetes mellitus (CMS/HCC)        Past Surgical History:   Procedure Laterality Date   • APPENDECTOMY     • CAPSULE ENDOSCOPY  09/27/2012    Normal; Gastric passage time: 0h42m; Small bowel passage time: 6h33m   • COLONOSCOPY  09/14/2015    8mm tubular adenomatous polyp in the mid transverse colon; Diverticulosis; Internal hemorrhoids; Repeat 5 years   • COLONOSCOPY  06/20/2012    8mm hyperplastic polyp in the ascending colon; Internal hemorrhoids; Repeat 3-4 years   • COLONOSCOPY  01/19/2010    Less than 3mm adenomatous polyp in the cecum; 5mm adenomatous polyp in the ascending colon; 7mm hyperplastic polyp in the  transverse colon; 1cm tubular adenomatous polyp in the sigmoid colon; Internal hemorrhoids; Repeat 3 years   • COLONOSCOPY  09/11/2008    Dr. Mittal-Small, pedunculated adenomatous polyp at 30cm; Very tortuous colon not allowing visualization to the base of the cecum; Anusitis; Repeat 3 years   • COLONOSCOPY  07/16/2007    Multiple polyps in the sigmoid colon-Six 1cm hyperplastic polyps removed, but not all polyps were removed; Five hyperplastic rectal polyps removed, but not all polyps were removed; Brown spot of no significance in the ascending colon; Repeat flex sig in 3-6 months; Repeat full colon in 1 year   • ENDOSCOPY  06/20/2012    HH; Duodenitis   • ENDOSCOPY  09/18/2008    Dr. Mittal-Obvious Mckeon's esophagus; HH; Gastritis    • ENDOSCOPY  03/27/2007    Irregular Z-line-rule out Mckeon's-path showed no evidence of intestinal metaplasia   • SKIN LESION EXCISION      CHEST X2         Current Outpatient Medications:   •  albuterol (VENTOLIN HFA) 108 (90 BASE) MCG/ACT inhaler, INHALE 2 PUFFS INTO THE LUNGS EVERY 6 HOURS AS NEEDED., Disp: , Rfl:   •  cetirizine (zyrTEC) 10 MG tablet, Take 1 tablet by mouth Daily., Disp: , Rfl:   •  escitalopram (LEXAPRO) 20 MG tablet, Take 20 mg by mouth daily , Disp: , Rfl:   •  gabapentin (NEURONTIN) 300 MG capsule, Take 1 capsule by mouth 3 times daily, Disp: , Rfl:   •  Glucose Blood (BLOOD GLUCOSE TEST) strip, Test once daily. Medtrix test stripes, Disp: , Rfl:   •  LANCETS ULTRA THIN misc, TEST ONCE DAILY, Disp: , Rfl:   •  lisinopril (PRINIVIL,ZESTRIL) 40 MG tablet, TAKE ONE TABLET BY MOUTH DAILY., Disp: , Rfl:   •  metFORMIN (GLUCOPHAGE) 1000 MG tablet, TAKE ONE TABLET BY MOUTH TWO TIMES A DAY WITH MEALS, Disp: , Rfl:   •  Naloxegol Oxalate (MOVANTIK) 12.5 MG tablet, Take 12.5 mg by mouth Every Morning., Disp: , Rfl:   •  oxyCODONE-acetaminophen (PERCOCET)  MG per tablet, Every 8 (Eight) Hours., Disp: , Rfl:   •  pravastatin (PRAVACHOL) 80 MG tablet, TAKE ONE  "TABLET BY MOUTH EVERY EVENING., Disp: , Rfl:   •  tamsulosin (FLOMAX) 0.4 MG capsule 24 hr capsule, Take 1 capsule by mouth daily, Disp: , Rfl:   •  tiotropium (SPIRIVA HANDIHALER) 18 MCG per inhalation capsule, Inhale 1 capsule into the lungs daily, Disp: , Rfl:   •  pantoprazole (PROTONIX) 40 MG EC tablet, Take 1 tablet by mouth Daily., Disp: 30 tablet, Rfl: 11    Allergies   Allergen Reactions   • Penicillins Other (See Comments)     Made pt \"black out\"       Social History     Socioeconomic History   • Marital status:      Spouse name: Not on file   • Number of children: Not on file   • Years of education: Not on file   • Highest education level: Not on file   Tobacco Use   • Smoking status: Former Smoker   • Smokeless tobacco: Never Used   Substance and Sexual Activity   • Alcohol use: No   • Drug use: Defer       Family History   Problem Relation Age of Onset   • Cancer Mother    • Heart disease Father    • Colon cancer Neg Hx    • Colon polyps Neg Hx    • Esophageal cancer Neg Hx    • Liver disease Neg Hx    • Liver cancer Neg Hx    • Rectal cancer Neg Hx    • Stomach cancer Neg Hx        Vitals:    08/05/19 1332   BP: 128/76   BP Location: Left arm   Patient Position: Sitting   Cuff Size: Adult   Pulse: 68   Temp: 98.3 °F (36.8 °C)   TempSrc: Tympanic   SpO2: 98%   Weight: 90.3 kg (199 lb)   Height: 180.3 cm (71\")       Review of Systems:    General:    Present -feeling well   Skin:    Not Present-Rash   HEENT:     Not Present-Acute visual changes or Acute hearing changes   Neck :    Not Present- swollen glands   Genitourinary:      Not Present- burning, frequency, urgency hematuria, dysuria,   Cardiovascular:   Not Present-chest pain, palpitations, or pressure   Respiratory:   Not Present- shortness of breath or cough   Gastrointestinal:  Musculoskeletal:  Neurological:  Psychiatric:   Present as mentioned in the HP    Not Present. Recent gait disturbances.    Not Present-Seizures and weakness in " extremities.    Not Present- Anxiety or Depression.       Physical Exam:    General Appearance:    Alert, cooperative, in no acute distress   Psych:    Mood appropriate    Eyes:          conjunctivae and sclerae normal, no   icterus, no pallor   ENMT:    Ears appear intact with no abnormalities noted oral mucosa moist   Neck:   No adenopathy, supple, trachea midline, no thyromegaly, no   carotid bruit, no JVD    Cardiovascular:    Regular rhythm and normal rate, normal S1 and S2, no            murmur, no gallop, no rub, no click   Gastrointestinal:     Inspection normal.  Normal bowel sounds, no masses, no organomegaly, soft round non-tender, non-distended, no guarding, no rebound or tenderness. No hepatosplenomegaly.   Skin:   No bleeding, bruising or rash   Neurologic:   nonfocal       Lab Results   Component Value Date    WBC 6.7 04/03/2019    WBC 10.23 04/05/2018    WBC 8.18 05/18/2015    HGB 14.7 04/03/2019    HGB 14.9 04/05/2018    HGB 15.4 05/18/2015    HCT 44.4 04/03/2019    HCT 43.1 04/05/2018    HCT 44.3 05/18/2015     (L) 04/03/2019     (L) 04/05/2018     (L) 05/18/2015        Lab Results   Component Value Date     04/03/2019     04/17/2018     04/05/2018    K 4.3 04/03/2019    K 4.4 04/17/2018    K 4.0 04/05/2018     04/03/2019     04/17/2018     04/05/2018    CO2 28 04/03/2019    CO2 26 04/17/2018    CO2 27.0 04/05/2018    BUN 20 04/03/2019    BUN 26 (H) 04/17/2018    BUN 26 (H) 04/05/2018    CREATININE 1.2 04/03/2019    CREATININE 1.2 04/17/2018    CREATININE 1.31 04/05/2018    BILITOT 0.9 04/03/2019    BILITOT 0.5 04/17/2018    BILITOT 0.8 04/05/2018    ALKPHOS 61 04/03/2019    ALKPHOS 65 04/17/2018    ALKPHOS 70 04/05/2018    ALT 11 04/03/2019    ALT 21 04/17/2018    ALT 29 04/05/2018    AST 14 04/03/2019    AST 17 04/17/2018    AST 23 04/05/2018    GLUCOSE 91 04/03/2019    GLUCOSE 106 04/17/2018    GLUCOSE 103 (H) 04/05/2018       No results  found for: INR    Body mass index is 27.75 kg/m². Patient's Body mass index is 27.75 kg/m². BMI is within normal parameters. No follow-up required..    Assessment and Plan:  Assessment/Plan   Manolo was seen today for heartburn.    Diagnoses and all orders for this visit:    Heartburn  Comments:  Initiate Protonix 40 mg daily.  Schedule EGD  Orders:  -     Case Request; Standing  -     Case Request    Epigastric pain  -     Case Request; Standing  -     Case Request    Gastroesophageal reflux disease, esophagitis presence not specified  -     Case Request; Standing  -     Case Request    Other orders  -     Follow Anesthesia Guidelines / Standing Orders; Future  -     Obtain Informed Consent; Future  -     Implement Anesthesia Orders Day of Procedure; Standing  -     Obtain Informed Consent; Standing  -     pantoprazole (PROTONIX) 40 MG EC tablet; Take 1 tablet by mouth Daily.        The risks, benefits, and alternatives of endoscopy were reviewed with the patient today.  Risks including perforation, with or without dilation, possibly requiring surgery.  Additional risks include risk of bleeding.  There is also the risk of a drug reaction or problems with anesthesia.  This will be discussed with the further by the anesthesia team on the day of the procedure. The benefits include the diagnosis and management of disease of the upper digestive tract.  Alternatives to endoscopy include upper GI series, laboratory testing, radiographic evaluation, or no intervention.  The patient verbalizes understanding and agrees.       There are no Patient Instructions on file for this visit.    Next follow-up appointment          EMR Dragon/Transcription disclaimer:  Much of this encounter note is an electronic transcription/translation of spoken language to printed text. The electronic translation of spoken language may permit erroneous, or at times, nonsensical words or phrases to be inadvertently transcribed; although I have  reviewed the note for such errors, some may still exist.

## 2019-08-14 ENCOUNTER — HOSPITAL ENCOUNTER (OUTPATIENT)
Facility: HOSPITAL | Age: 69
Setting detail: HOSPITAL OUTPATIENT SURGERY
Discharge: HOME | End: 2019-08-14
Attending: INTERNAL MEDICINE | Admitting: INTERNAL MEDICINE

## 2019-08-14 ENCOUNTER — ANESTHESIA (OUTPATIENT)
Dept: GASTROENTEROLOGY | Facility: HOSPITAL | Age: 69
End: 2019-08-14

## 2019-08-14 ENCOUNTER — ANESTHESIA EVENT (OUTPATIENT)
Dept: GASTROENTEROLOGY | Facility: HOSPITAL | Age: 69
End: 2019-08-14

## 2019-08-14 VITALS
BODY MASS INDEX: 29.03 KG/M2 | TEMPERATURE: 98.6 F | HEIGHT: 69 IN | HEART RATE: 56 BPM | RESPIRATION RATE: 14 BRPM | OXYGEN SATURATION: 98 % | SYSTOLIC BLOOD PRESSURE: 145 MMHG | WEIGHT: 196 LBS | DIASTOLIC BLOOD PRESSURE: 79 MMHG

## 2019-08-14 LAB — GLUCOSE BLDC GLUCOMTR-MCNC: 74 MG/DL (ref 70–130)

## 2019-08-14 PROCEDURE — 82962 GLUCOSE BLOOD TEST: CPT

## 2019-08-14 PROCEDURE — 25010000002 PROPOFOL 10 MG/ML EMULSION: Performed by: NURSE ANESTHETIST, CERTIFIED REGISTERED

## 2019-08-14 PROCEDURE — 43235 EGD DIAGNOSTIC BRUSH WASH: CPT | Performed by: INTERNAL MEDICINE

## 2019-08-14 RX ORDER — PROPOFOL 10 MG/ML
VIAL (ML) INTRAVENOUS AS NEEDED
Status: DISCONTINUED | OUTPATIENT
Start: 2019-08-14 | End: 2019-08-14 | Stop reason: SURG

## 2019-08-14 RX ORDER — LIDOCAINE HYDROCHLORIDE 20 MG/ML
INJECTION, SOLUTION INFILTRATION; PERINEURAL AS NEEDED
Status: DISCONTINUED | OUTPATIENT
Start: 2019-08-14 | End: 2019-08-14 | Stop reason: SURG

## 2019-08-14 RX ORDER — SODIUM CHLORIDE 9 MG/ML
500 INJECTION, SOLUTION INTRAVENOUS CONTINUOUS PRN
Status: DISCONTINUED | OUTPATIENT
Start: 2019-08-14 | End: 2019-08-14 | Stop reason: HOSPADM

## 2019-08-14 RX ORDER — SODIUM CHLORIDE 0.9 % (FLUSH) 0.9 %
3 SYRINGE (ML) INJECTION AS NEEDED
Status: DISCONTINUED | OUTPATIENT
Start: 2019-08-14 | End: 2019-08-14 | Stop reason: HOSPADM

## 2019-08-14 RX ADMIN — LIDOCAINE HYDROCHLORIDE 50 MG: 20 INJECTION, SOLUTION INFILTRATION; PERINEURAL at 14:31

## 2019-08-14 RX ADMIN — PROPOFOL 50 MG: 10 INJECTION, EMULSION INTRAVENOUS at 14:37

## 2019-08-14 RX ADMIN — SODIUM CHLORIDE 500 ML: 9 INJECTION, SOLUTION INTRAVENOUS at 12:15

## 2019-08-14 RX ADMIN — PROPOFOL 50 MG: 10 INJECTION, EMULSION INTRAVENOUS at 14:31

## 2019-08-14 NOTE — ANESTHESIA POSTPROCEDURE EVALUATION
Patient: Manolo Sagastume    Procedure Summary     Date:  08/14/19 Room / Location:  [facility] END[facility]COPY 2 / [facility]    Anesthesia Start:  1430 Anesthesia Stop:  1441    Procedure:  ESOPHAGOGASTRODUODENOSCOPY WITH ANESTHESIA (N/A ) Diagnosis:       Heartburn      Epigastric pain      Gastroesophageal reflux disease, esophagitis presence not specified      (Heartburn [R12])      (Epigastric pain [R10.13])      (Gastroesophageal reflux disease, esophagitis presence not specified [K21.9])    Surgeon:  Angy Syed MD Provider:  George Tellez CRNA    Anesthesia Type:  MAC ASA Status:  2          Anesthesia Type: MAC  Last vitals  BP   (P) 124/68 (08/14/19 1441)   Temp   98.6 °F (37 °C) (08/14/19 1154)   Pulse   65 (08/14/19 1154)   Resp   (P) 14 (08/14/19 1441)     SpO2   98 % (08/14/19 1154)     Post Anesthesia Care and Evaluation    Patient location during evaluation: PHASE II  Patient participation: complete - patient participated  Level of consciousness: awake and alert  Pain score: 0  Pain management: adequate  Airway patency: patent  Anesthetic complications: No anesthetic complications  PONV Status: none  Cardiovascular status: acceptable and stable  Respiratory status: acceptable  Hydration status: acceptable

## 2019-08-14 NOTE — INTERVAL H&P NOTE
H&P updated. The patient was examined and the following changes are noted:        Chart reviewed.  Patient has thrombocytopenia.  He has long-standing diabetic.  There is no liver imaging available for review.

## 2019-08-14 NOTE — ANESTHESIA PREPROCEDURE EVALUATION
Anesthesia Evaluation     Patient summary reviewed   no history of anesthetic complications:  NPO Solid Status: > 8 hours  NPO Liquid Status: > 8 hours           Airway   Mallampati: II  TM distance: >3 FB  Neck ROM: full  No difficulty expected  Dental    (+) poor dentition    Comment: Extremely poor dentition, denies loose teeth      Pulmonary    (+) a smoker Former, COPD,   (-) sleep apnea  Cardiovascular   Exercise tolerance: good (4-7 METS)    (+) hypertension, hyperlipidemia,       Neuro/Psych- negative ROS  GI/Hepatic/Renal/Endo    (+)  GERD,  diabetes mellitus,   (-) liver disease, no renal disease    Musculoskeletal     Abdominal    Substance History      OB/GYN          Other                        Anesthesia Plan    ASA 2     MAC     intravenous induction   Anesthetic plan, all risks, benefits, and alternatives have been provided, discussed and informed consent has been obtained with: patient.

## 2019-09-04 ENCOUNTER — HOSPITAL ENCOUNTER (OUTPATIENT)
Dept: GENERAL RADIOLOGY | Age: 69
Discharge: HOME | End: 2019-09-04
Payer: MEDICARE

## 2019-09-04 ENCOUNTER — OFFICE VISIT (OUTPATIENT)
Dept: PRIMARY CARE CLINIC | Age: 69
End: 2019-09-04
Payer: MEDICARE

## 2019-09-04 VITALS
HEIGHT: 71 IN | BODY MASS INDEX: 28.98 KG/M2 | TEMPERATURE: 97.6 F | HEART RATE: 67 BPM | DIASTOLIC BLOOD PRESSURE: 84 MMHG | WEIGHT: 207 LBS | OXYGEN SATURATION: 98 % | RESPIRATION RATE: 16 BRPM | SYSTOLIC BLOOD PRESSURE: 132 MMHG

## 2019-09-04 DIAGNOSIS — M25.512 CHRONIC LEFT SHOULDER PAIN: ICD-10-CM

## 2019-09-04 DIAGNOSIS — G89.29 CHRONIC LEFT SHOULDER PAIN: ICD-10-CM

## 2019-09-04 DIAGNOSIS — D69.6 THROMBOCYTOPENIA (HCC): Primary | ICD-10-CM

## 2019-09-04 DIAGNOSIS — E11.9 TYPE 2 DIABETES MELLITUS WITHOUT COMPLICATION, WITHOUT LONG-TERM CURRENT USE OF INSULIN (HCC): ICD-10-CM

## 2019-09-04 DIAGNOSIS — D69.6 THROMBOCYTOPENIA (HCC): ICD-10-CM

## 2019-09-04 DIAGNOSIS — L72.0 EPIDERMOID CYST: ICD-10-CM

## 2019-09-04 LAB
ALBUMIN SERPL-MCNC: 4.5 G/DL (ref 3.5–5.2)
ALP BLD-CCNC: 66 U/L (ref 40–130)
ALT SERPL-CCNC: 12 U/L (ref 5–41)
ANION GAP SERPL CALCULATED.3IONS-SCNC: 12 MMOL/L (ref 7–19)
AST SERPL-CCNC: 17 U/L (ref 5–40)
BASOPHILS ABSOLUTE: 0 K/UL (ref 0–0.2)
BASOPHILS RELATIVE PERCENT: 0.5 % (ref 0–1)
BILIRUB SERPL-MCNC: 0.6 MG/DL (ref 0.2–1.2)
BUN BLDV-MCNC: 28 MG/DL (ref 8–23)
CALCIUM SERPL-MCNC: 10 MG/DL (ref 8.8–10.2)
CHLORIDE BLD-SCNC: 104 MMOL/L (ref 98–111)
CO2: 25 MMOL/L (ref 22–29)
CREAT SERPL-MCNC: 1.4 MG/DL (ref 0.5–1.2)
EOSINOPHILS ABSOLUTE: 0.1 K/UL (ref 0–0.6)
EOSINOPHILS RELATIVE PERCENT: 2.5 % (ref 0–5)
GFR NON-AFRICAN AMERICAN: 50
GLUCOSE BLD-MCNC: 85 MG/DL (ref 74–109)
HBA1C MFR BLD: 5.2 % (ref 4–6)
HCT VFR BLD CALC: 43.3 % (ref 42–52)
HEMOGLOBIN: 14.2 G/DL (ref 14–18)
IMMATURE GRANULOCYTES #: 0 K/UL
INR BLD: 1.04 (ref 0.88–1.18)
LYMPHOCYTES ABSOLUTE: 1.7 K/UL (ref 1.1–4.5)
LYMPHOCYTES RELATIVE PERCENT: 29.3 % (ref 20–40)
MCH RBC QN AUTO: 32.1 PG (ref 27–31)
MCHC RBC AUTO-ENTMCNC: 32.8 G/DL (ref 33–37)
MCV RBC AUTO: 98 FL (ref 80–94)
MONOCYTES ABSOLUTE: 0.4 K/UL (ref 0–0.9)
MONOCYTES RELATIVE PERCENT: 6.7 % (ref 0–10)
NEUTROPHILS ABSOLUTE: 3.4 K/UL (ref 1.5–7.5)
NEUTROPHILS RELATIVE PERCENT: 60.8 % (ref 50–65)
PDW BLD-RTO: 13.1 % (ref 11.5–14.5)
PLATELET # BLD: 108 K/UL (ref 130–400)
PMV BLD AUTO: 10 FL (ref 9.4–12.4)
POTASSIUM SERPL-SCNC: 4.7 MMOL/L (ref 3.5–5)
PROTHROMBIN TIME: 13 SEC (ref 12–14.6)
RBC # BLD: 4.42 M/UL (ref 4.7–6.1)
SODIUM BLD-SCNC: 141 MMOL/L (ref 136–145)
TOTAL PROTEIN: 7.4 G/DL (ref 6.6–8.7)
TSH REFLEX FT4: 2.4 UIU/ML (ref 0.35–5.5)
WBC # BLD: 5.7 K/UL (ref 4.8–10.8)

## 2019-09-04 PROCEDURE — 1123F ACP DISCUSS/DSCN MKR DOCD: CPT | Performed by: NURSE PRACTITIONER

## 2019-09-04 PROCEDURE — 2022F DILAT RTA XM EVC RTNOPTHY: CPT | Performed by: NURSE PRACTITIONER

## 2019-09-04 PROCEDURE — G8427 DOCREV CUR MEDS BY ELIG CLIN: HCPCS | Performed by: NURSE PRACTITIONER

## 2019-09-04 PROCEDURE — 1036F TOBACCO NON-USER: CPT | Performed by: NURSE PRACTITIONER

## 2019-09-04 PROCEDURE — 3017F COLORECTAL CA SCREEN DOC REV: CPT | Performed by: NURSE PRACTITIONER

## 2019-09-04 PROCEDURE — 3044F HG A1C LEVEL LT 7.0%: CPT | Performed by: NURSE PRACTITIONER

## 2019-09-04 PROCEDURE — G8417 CALC BMI ABV UP PARAM F/U: HCPCS | Performed by: NURSE PRACTITIONER

## 2019-09-04 PROCEDURE — 4040F PNEUMOC VAC/ADMIN/RCVD: CPT | Performed by: NURSE PRACTITIONER

## 2019-09-04 PROCEDURE — 99214 OFFICE O/P EST MOD 30 MIN: CPT | Performed by: NURSE PRACTITIONER

## 2019-09-04 PROCEDURE — 73030 X-RAY EXAM OF SHOULDER: CPT

## 2019-09-04 ASSESSMENT — ENCOUNTER SYMPTOMS
GASTROINTESTINAL NEGATIVE: 1
RESPIRATORY NEGATIVE: 1
EYES NEGATIVE: 1

## 2019-09-04 NOTE — PROGRESS NOTES
[facility] PRIMARY CARE  [address] [id number] [address] [address]  Dept: [phone]  Dept Fax: [phone]  Loc: [phone]    Jim Contreras is a 77 y.o. male who presents today for his medical conditions/complaints as noted below. Jim Contreras is c/o of Shoulder Pain (L shoulder increased pain in shoulder joint and deltoid area); Skin Problem (knot on L lower back getting larger even after trying to drain it, pt. denies any heat coming off it and redness no pain ); and Other (Pt. states he was told by Blake Edouard his Platelet count was low a few weeks ago and that he needed to follow up with PCP. )      Chief Complaint   Patient presents with    Shoulder Pain     L shoulder increased pain in shoulder joint and deltoid area    Skin Problem     knot on L lower back getting larger even after trying to drain it, pt. denies any heat coming off it and redness no pain     Other     Pt. states he was told by Blake Edouard his Platelet count was low a few weeks ago and that he needed to follow up with PCP. HPI:     HPI   Patient here to discuss multiple issues including an area of concern to left lower back, left shoulder pain, and low platelets. He reports having his wife try to \"pop\" the area to the back, but nothing has came out of the area. Patient also reports having a procedure with Everlene Skeeters last month and being told that his platelets were too low and needs to have closer follow up. He reports that the pain to the left shoulder is all the time. He denies swelling to the left shoulder. He cannot preform anything over head due to the pain. He does take Percocet for the pain that has not helped with the shoulder. He has tried icing the shoulder over the last few weeks without relief. It has been ongoing for greater than 3 months with the pain without relief.        Past Medical History:   Diagnosis Date    Chronic back pain     COPD (chronic obstructive pulmonary disease) ([facility] [address]     Hypertension     Type II or unspecified type diabetes mellitus without mention of complication, not stated as uncontrolled         Past Surgical History:   Procedure Laterality Date    APPENDECTOMY         Social History     Tobacco Use    Smoking status: Former Smoker     Packs/day: 1.50     Years: 15.00     Pack years: 22.50     Types: Cigarettes     Last attempt to quit: 10/23/2000     Years since quittin.8    Smokeless tobacco: Never Used   Substance Use Topics    Alcohol use: No        Current Outpatient Medications   Medication Sig Dispense Refill    metFORMIN (GLUCOPHAGE) 1000 MG tablet TAKE ONE TABLET BY MOUTH TWO TIMES A DAY WITH MEALS 180 tablet 3    naloxegol (MOVANTIK) 25 MG TABS tablet Take 1 tablet by mouth every morning 30 tablet 5    diclofenac sodium (VOLTAREN) 1 % GEL Apply 2 g topically 2 times daily 1 Tube 3    tamsulosin (FLOMAX) 0.4 MG capsule TAKE ONE CAPSULE BY MOUTH ONCE DAILY 30 capsule 3    pravastatin (PRAVACHOL) 80 MG tablet TAKE ONE TABLET BY MOUTH EVERY EVENING. 90 tablet 3    Lancets MISC Daily 100 each 5    lisinopril (PRINIVIL;ZESTRIL) 40 MG tablet TAKE ONE TABLET BY MOUTH DAILY. 90 tablet 3    EPINEPHrine (EPIPEN) 0.3 MG/0.3ML SOAJ injection Use as directed for allergic reaction 0.3 mL 3    cetirizine (ZYRTEC) 10 MG tablet TAKE 1 TABLET BY MOUTH DAILY 30 tablet 11    pantoprazole (PROTONIX) 40 MG tablet TAKE ONE TABLET BY MOUTH EVERY DAY 30 tablet 11    albuterol sulfate HFA (VENTOLIN HFA) 108 (90 Base) MCG/ACT inhaler INHALE 2 PUFFS INTO THE LUNGS EVERY 6 HOURS AS NEEDED. 18 g 3    triamcinolone (KENALOG) 0.1 % cream Apply topically 2 times daily.  1 Tube 1    Tens Unit MISC by Does not apply route Tens unit pad 15 each 1    oxyCODONE-acetaminophen (PERCOCET)  MG per tablet Take 1 tablet by mouth every 8 hours as needed for Pain      gabapentin (NEURONTIN) 600 MG tablet Take 1 tablet by mouth 3 times daily for 30 days. 90 tablet 2     No current facility-administered medications for this visit. Allergies   Allergen Reactions    Pcn [Penicillins] Other (See Comments)     Made pt \"black out\"       Family History   Problem Relation Age of Onset    Cancer Mother     Heart Disease Father            Subjective:      Review of Systems   Constitutional: Negative. HENT: Negative. Eyes: Negative. Respiratory: Negative. Cardiovascular: Negative. Gastrointestinal: Negative. Endocrine: Negative. Genitourinary: Negative. Musculoskeletal: Positive for arthralgias (left shoulder). Skin: Negative. Neurological: Negative. Hematological: Negative. Psychiatric/Behavioral: Negative. Objective:     Physical Exam   Constitutional: He is oriented to person, place, and time. Vital signs are normal. He appears well-developed and well-nourished. obese   HENT:   Head: Normocephalic and atraumatic. Right Ear: Hearing, tympanic membrane, external ear and ear canal normal.   Left Ear: Hearing, tympanic membrane, external ear and ear canal normal.   Nose: Nose normal.   Mouth/Throat: Uvula is midline, oropharynx is clear and moist and mucous membranes are normal.   Eyes: Pupils are equal, round, and reactive to light. Conjunctivae, EOM and lids are normal.   Neck: Trachea normal and normal range of motion. Neck supple. No thyroid mass and no thyromegaly present. Cardiovascular: Normal rate, regular rhythm, normal heart sounds and intact distal pulses. Pulmonary/Chest: Effort normal and breath sounds normal.   Abdominal: Soft. Normal appearance and bowel sounds are normal.   Musculoskeletal:        Left shoulder: He exhibits decreased range of motion, tenderness, pain and decreased strength. Cervical back: Normal. He exhibits normal range of motion and no tenderness. Thoracic back: Normal. He exhibits normal range of motion and no tenderness.         Lumbar back: Normal. He exhibits

## 2019-09-09 ENCOUNTER — TELEPHONE (OUTPATIENT)
Dept: PRIMARY CARE CLINIC | Age: 69
End: 2019-09-09

## 2019-09-09 DIAGNOSIS — D69.6 TEMPORARY LOW PLATELET COUNT (HCC): Primary | ICD-10-CM

## 2019-09-10 ENCOUNTER — TELEPHONE (OUTPATIENT)
Dept: PRIMARY CARE CLINIC | Age: 69
End: 2019-09-10

## 2019-09-10 DIAGNOSIS — M25.512 CHRONIC LEFT SHOULDER PAIN: Primary | ICD-10-CM

## 2019-09-10 DIAGNOSIS — G89.29 CHRONIC LEFT SHOULDER PAIN: Primary | ICD-10-CM

## 2019-09-18 PROBLEM — C44.519 BASAL CELL CARCINOMA OF CHEST: Status: ACTIVE | Noted: 2017-06-20

## 2019-09-18 PROBLEM — L82.1 SEBORRHEIC KERATOSIS: Status: ACTIVE | Noted: 2019-09-18

## 2019-09-18 PROBLEM — D18.00 BENIGN HEMANGIOMA: Status: ACTIVE | Noted: 2017-03-23
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
## 2019-09-18 PROBLEM — C44.319 BASAL CELL CARCINOMA OF LEFT CHEEK: Status: ACTIVE | Noted: 2019-09-18

## 2019-09-18 PROBLEM — R12 HEARTBURN: Status: ACTIVE | Noted: 2019-08-05

## 2019-09-18 PROBLEM — R10.13 EPIGASTRIC PAIN: Status: ACTIVE | Noted: 2019-08-05

## 2019-09-18 PROBLEM — K21.9 GASTROESOPHAGEAL REFLUX DISEASE: Status: ACTIVE | Noted: 2019-08-05

## 2019-09-18 NOTE — PROGRESS NOTES
(GLUCOPHAGE) 1000 MG tablet, TAKE ONE TABLET BY MOUTH TWO TIMES A DAY WITH MEALS, Disp: 180 tablet, Rfl: 3    naloxegol (MOVANTIK) 25 MG TABS tablet, Take 1 tablet by mouth every morning, Disp: 30 tablet, Rfl: 5    diclofenac sodium (VOLTAREN) 1 % GEL, Apply 2 g topically 2 times daily, Disp: 1 Tube, Rfl: 3    pravastatin (PRAVACHOL) 80 MG tablet, TAKE ONE TABLET BY MOUTH EVERY EVENING., Disp: 90 tablet, Rfl: 3    Lancets MISC, Daily, Disp: 100 each, Rfl: 5    lisinopril (PRINIVIL;ZESTRIL) 40 MG tablet, TAKE ONE TABLET BY MOUTH DAILY. , Disp: 90 tablet, Rfl: 3    EPINEPHrine (EPIPEN) 0.3 MG/0.3ML SOAJ injection, Use as directed for allergic reaction, Disp: 0.3 mL, Rfl: 3    cetirizine (ZYRTEC) 10 MG tablet, TAKE 1 TABLET BY MOUTH DAILY, Disp: 30 tablet, Rfl: 11    pantoprazole (PROTONIX) 40 MG tablet, TAKE ONE TABLET BY MOUTH EVERY DAY, Disp: 30 tablet, Rfl: 11    albuterol sulfate HFA (VENTOLIN HFA) 108 (90 Base) MCG/ACT inhaler, INHALE 2 PUFFS INTO THE LUNGS EVERY 6 HOURS AS NEEDED., Disp: 18 g, Rfl: 3    triamcinolone (KENALOG) 0.1 % cream, Apply topically 2 times daily. , Disp: 1 Tube, Rfl: 1    oxyCODONE-acetaminophen (PERCOCET)  MG per tablet, Take 1 tablet by mouth every 8 hours as needed for Pain, Disp: , Rfl:     gabapentin (NEURONTIN) 600 MG tablet, Take 1 tablet by mouth 3 times daily for 30 days. , Disp: 90 tablet, Rfl: 2    tamsulosin (FLOMAX) 0.4 MG capsule, TAKE ONE CAPSULE BY MOUTH ONCE DAILY, Disp: 30 capsule, Rfl: 3     Allergies: Allergies   Allergen Reactions    Bee Venom Anaphylaxis    Pcn [Penicillins] Other (See Comments)     Made pt \"black out\"     Social History:    Social History     Tobacco Use    Smoking status: Former Smoker     Packs/day: 1.50     Years: 15.00     Pack years: 22.50     Types: Cigarettes     Last attempt to quit: 10/23/2000     Years since quittin.9    Smokeless tobacco: Never Used   Substance Use Topics    Alcohol use: No    Drug use:  No Family History:   Family History   Problem Relation Age of Onset    Cancer Mother     Heart Disease Father          Subjective   REVIEW OF SYSTEMS:   Review of Systems   Constitutional: Positive for fatigue. Negative for chills, diaphoresis, fever and unexpected weight change. HENT: Negative for mouth sores, nosebleeds, sore throat, trouble swallowing and voice change. Eyes: Negative for photophobia, discharge and itching. Respiratory: Positive for shortness of breath. Negative for cough and wheezing. Cardiovascular: Negative for chest pain, palpitations and leg swelling. Gastrointestinal: Negative for abdominal distention, abdominal pain, blood in stool, constipation, diarrhea, nausea and vomiting. Endocrine: Negative for cold intolerance, heat intolerance, polydipsia and polyuria. Genitourinary: Negative for difficulty urinating, dysuria, hematuria and urgency. Musculoskeletal: Positive for arthralgias and back pain. Negative for joint swelling and myalgias. Skin: Negative for color change and rash. Allergic/Immunologic: Positive for environmental allergies (Anaphylactic reaction from bee stings). Neurological: Positive for numbness. Negative for dizziness, tremors, seizures, syncope and light-headedness. Hematological: Negative for adenopathy. Bruises/bleeds easily. Psychiatric/Behavioral: Positive for confusion (Remote memory issues). Negative for behavioral problems and suicidal ideas. The patient is not nervous/anxious. All other systems reviewed and are negative. Objective   /80   Pulse 61   Ht 5' 11\" (1.803 m)   Wt 203 lb (92.1 kg)   SpO2 99%   BMI 28.31 kg/m²     PHYSICAL EXAM:  Physical Exam   Constitutional: He appears well-developed and well-nourished. No distress. Chronically ill-appearing   HENT:   Head: Normocephalic and atraumatic.    Nose: Nose normal.   Mouth/Throat: Oropharynx is clear and moist.   Eyes: Conjunctivae and EOM are normal. No to at least 2014. Recent serology above on 9/4/2019 revealed that his LFTs were normal.  A PT/INR on 9/4/2019 was normal as well. I discussed the abnormalities on his CBC that was limited to his platelets, number. I discussed the role of platelets. He does have ecchymosis mostly on his arms. Serology will be requested. I discussed potential need for bone marrow aspiration and biopsy, described the procedure as well today. We will await findings from the initial serology. Orders Placed This Encounter   Procedures    CBC Auto Differential     5 part     Standing Status:   Future     Standing Expiration Date:   9/19/2020    FATOUMATA Screen with Reflex     Standing Status:   Future     Standing Expiration Date:   9/19/2020    APTT     Standing Status:   Future     Standing Expiration Date:   9/19/2020     Order Specific Question:   Daily Heparin Dose? Answer:   none    Fibrinogen     Standing Status:   Future     Standing Expiration Date:   9/19/2020    HIV Rapid 1&2     Standing Status:   Future     Standing Expiration Date:   9/19/2020     Order Specific Question:   Specify Date & Time of Incident     Answer:   no    Platelet antibodies, direct     Standing Status:   Future     Standing Expiration Date:   9/19/2020    Vitamin B12     Standing Status:   Future     Standing Expiration Date:   9/19/2020    Copper, Serum     Standing Status:   Future     Standing Expiration Date:   9/19/2020    Zinc     Standing Status:   Future     Standing Expiration Date:   9/19/2020    Folate     Standing Status:   Future     Standing Expiration Date:   9/19/2020    CBC Auto Differential     Standing Status:   Standing     Number of Occurrences:   12     Standing Expiration Date:   9/19/2020       Return in about 6 weeks (around 10/31/2019) for With [employer].       Michelle Junior PA-C  3:37 PM  9/19/2019

## 2019-09-19 ENCOUNTER — OFFICE VISIT (OUTPATIENT)
Dept: HEMATOLOGY | Age: 69
End: 2019-09-19
Payer: MEDICARE

## 2019-09-19 ENCOUNTER — HOSPITAL ENCOUNTER (OUTPATIENT)
Dept: INFUSION THERAPY | Age: 69
Discharge: HOME | End: 2019-09-19
Payer: MEDICARE

## 2019-09-19 VITALS
BODY MASS INDEX: 28.42 KG/M2 | OXYGEN SATURATION: 99 % | HEART RATE: 61 BPM | DIASTOLIC BLOOD PRESSURE: 80 MMHG | SYSTOLIC BLOOD PRESSURE: 137 MMHG | WEIGHT: 203 LBS | HEIGHT: 71 IN

## 2019-09-19 DIAGNOSIS — D69.6 THROMBOCYTOPENIA (HCC): ICD-10-CM

## 2019-09-19 DIAGNOSIS — E11.9 TYPE 2 DIABETES MELLITUS WITHOUT COMPLICATION, WITHOUT LONG-TERM CURRENT USE OF INSULIN (HCC): ICD-10-CM

## 2019-09-19 DIAGNOSIS — D69.6 THROMBOCYTOPENIA (HCC): Primary | ICD-10-CM

## 2019-09-19 PROCEDURE — G8427 DOCREV CUR MEDS BY ELIG CLIN: HCPCS | Performed by: PHYSICIAN ASSISTANT

## 2019-09-19 PROCEDURE — 1036F TOBACCO NON-USER: CPT | Performed by: PHYSICIAN ASSISTANT

## 2019-09-19 PROCEDURE — 2022F DILAT RTA XM EVC RTNOPTHY: CPT | Performed by: PHYSICIAN ASSISTANT

## 2019-09-19 PROCEDURE — 99211 OFF/OP EST MAY X REQ PHY/QHP: CPT

## 2019-09-19 PROCEDURE — 4040F PNEUMOC VAC/ADMIN/RCVD: CPT | Performed by: PHYSICIAN ASSISTANT

## 2019-09-19 PROCEDURE — 3044F HG A1C LEVEL LT 7.0%: CPT | Performed by: PHYSICIAN ASSISTANT

## 2019-09-19 PROCEDURE — 85025 COMPLETE CBC W/AUTO DIFF WBC: CPT

## 2019-09-19 PROCEDURE — 3017F COLORECTAL CA SCREEN DOC REV: CPT | Performed by: PHYSICIAN ASSISTANT

## 2019-09-19 PROCEDURE — 1123F ACP DISCUSS/DSCN MKR DOCD: CPT | Performed by: PHYSICIAN ASSISTANT

## 2019-09-19 PROCEDURE — 99203 OFFICE O/P NEW LOW 30 MIN: CPT | Performed by: PHYSICIAN ASSISTANT

## 2019-09-19 PROCEDURE — 82607 VITAMIN B-12: CPT

## 2019-09-19 PROCEDURE — G8417 CALC BMI ABV UP PARAM F/U: HCPCS | Performed by: PHYSICIAN ASSISTANT

## 2019-09-19 PROCEDURE — 82746 ASSAY OF FOLIC ACID SERUM: CPT

## 2019-09-19 ASSESSMENT — ENCOUNTER SYMPTOMS
PHOTOPHOBIA: 0
VOMITING: 0
DIARRHEA: 0
ABDOMINAL DISTENTION: 0
NAUSEA: 0
TROUBLE SWALLOWING: 0
EYE DISCHARGE: 0
SORE THROAT: 0
COLOR CHANGE: 0
SHORTNESS OF BREATH: 1
VOICE CHANGE: 0
COUGH: 0
ABDOMINAL PAIN: 0
WHEEZING: 0
CONSTIPATION: 0
BLOOD IN STOOL: 0
BACK PAIN: 1
EYE ITCHING: 0

## 2019-10-07 ENCOUNTER — HOSPITAL ENCOUNTER (OUTPATIENT)
Dept: INFUSION THERAPY | Age: 69
Discharge: HOME | End: 2019-10-07
Payer: MEDICARE

## 2019-10-07 DIAGNOSIS — D69.6 THROMBOCYTOPENIA (HCC): ICD-10-CM

## 2019-10-07 DIAGNOSIS — D69.6 THROMBOCYTOPENIA (HCC): Primary | ICD-10-CM

## 2019-10-07 PROCEDURE — 6360000002 HC RX W HCPCS

## 2019-10-07 PROCEDURE — 96372 THER/PROPH/DIAG INJ SC/IM: CPT

## 2019-10-28 ENCOUNTER — TELEPHONE (OUTPATIENT)
Dept: PRIMARY CARE CLINIC | Age: 69
End: 2019-10-28

## 2019-10-31 ENCOUNTER — HOSPITAL ENCOUNTER (OUTPATIENT)
Dept: INFUSION THERAPY | Age: 69
Discharge: HOME | End: 2019-10-31
Payer: MEDICARE

## 2019-10-31 ENCOUNTER — OFFICE VISIT (OUTPATIENT)
Dept: HEMATOLOGY | Age: 69
End: 2019-10-31
Payer: MEDICARE

## 2019-10-31 VITALS
HEART RATE: 74 BPM | WEIGHT: 207.5 LBS | OXYGEN SATURATION: 98 % | BODY MASS INDEX: 29.05 KG/M2 | HEIGHT: 71 IN | DIASTOLIC BLOOD PRESSURE: 78 MMHG | SYSTOLIC BLOOD PRESSURE: 136 MMHG

## 2019-10-31 DIAGNOSIS — E53.8 B12 DEFICIENCY: ICD-10-CM

## 2019-10-31 DIAGNOSIS — D69.6 THROMBOCYTOPENIA (HCC): Primary | ICD-10-CM

## 2019-10-31 PROCEDURE — 1036F TOBACCO NON-USER: CPT | Performed by: PHYSICIAN ASSISTANT

## 2019-10-31 PROCEDURE — 85025 COMPLETE CBC W/AUTO DIFF WBC: CPT

## 2019-10-31 PROCEDURE — 1123F ACP DISCUSS/DSCN MKR DOCD: CPT | Performed by: PHYSICIAN ASSISTANT

## 2019-10-31 PROCEDURE — G8427 DOCREV CUR MEDS BY ELIG CLIN: HCPCS | Performed by: PHYSICIAN ASSISTANT

## 2019-10-31 PROCEDURE — 3017F COLORECTAL CA SCREEN DOC REV: CPT | Performed by: PHYSICIAN ASSISTANT

## 2019-10-31 PROCEDURE — G8417 CALC BMI ABV UP PARAM F/U: HCPCS | Performed by: PHYSICIAN ASSISTANT

## 2019-10-31 PROCEDURE — 6360000002 HC RX W HCPCS: Performed by: PHYSICIAN ASSISTANT

## 2019-10-31 PROCEDURE — 4040F PNEUMOC VAC/ADMIN/RCVD: CPT | Performed by: PHYSICIAN ASSISTANT

## 2019-10-31 PROCEDURE — 96372 THER/PROPH/DIAG INJ SC/IM: CPT

## 2019-10-31 PROCEDURE — G8484 FLU IMMUNIZE NO ADMIN: HCPCS | Performed by: PHYSICIAN ASSISTANT

## 2019-10-31 PROCEDURE — 99213 OFFICE O/P EST LOW 20 MIN: CPT | Performed by: PHYSICIAN ASSISTANT

## 2019-10-31 RX ORDER — CYANOCOBALAMIN 1000 UG/ML
1000 INJECTION INTRAMUSCULAR; SUBCUTANEOUS ONCE
Status: CANCELLED
Start: 2019-10-31

## 2019-10-31 RX ORDER — CYANOCOBALAMIN 1000 UG/ML
1000 INJECTION INTRAMUSCULAR; SUBCUTANEOUS ONCE
Status: CANCELLED
Start: 2019-11-07

## 2019-10-31 RX ORDER — CYANOCOBALAMIN 1000 UG/ML
1000 INJECTION INTRAMUSCULAR; SUBCUTANEOUS ONCE
Status: COMPLETED
Start: 2019-10-31 | End: 2019-10-31

## 2019-10-31 RX ADMIN — CYANOCOBALAMIN 1000 MCG: 1000 INJECTION, SOLUTION INTRAMUSCULAR; SUBCUTANEOUS at 12:30

## 2019-10-31 ASSESSMENT — ENCOUNTER SYMPTOMS
BACK PAIN: 1
VOMITING: 0
VOICE CHANGE: 0
ABDOMINAL PAIN: 0
BLOOD IN STOOL: 0
WHEEZING: 0
EYE ITCHING: 0
SORE THROAT: 0
NAUSEA: 0
EYE DISCHARGE: 0
PHOTOPHOBIA: 0
CONSTIPATION: 0
COLOR CHANGE: 0
ABDOMINAL DISTENTION: 0
COUGH: 0
SHORTNESS OF BREATH: 1
TROUBLE SWALLOWING: 0
DIARRHEA: 0

## 2019-11-03 ENCOUNTER — APPOINTMENT (OUTPATIENT)
Dept: CT IMAGING | Age: 69
DRG: 247 | End: 2019-11-03
Payer: MEDICARE

## 2019-11-03 ENCOUNTER — HOSPITAL ENCOUNTER (INPATIENT)
Age: 69
LOS: 1 days | Discharge: HOME | DRG: 247 | End: 2019-11-05
Attending: EMERGENCY MEDICINE | Admitting: HOSPITALIST
Payer: MEDICARE

## 2019-11-03 ENCOUNTER — APPOINTMENT (OUTPATIENT)
Dept: GENERAL RADIOLOGY | Age: 69
DRG: 247 | End: 2019-11-03
Payer: MEDICARE

## 2019-11-03 DIAGNOSIS — R00.1 SYMPTOMATIC BRADYCARDIA: Primary | ICD-10-CM

## 2019-11-03 DIAGNOSIS — R07.9 CHEST PAIN, UNSPECIFIED TYPE: ICD-10-CM

## 2019-11-03 DIAGNOSIS — R10.12 ABDOMINAL PAIN, LEFT UPPER QUADRANT: ICD-10-CM

## 2019-11-03 LAB
ALBUMIN SERPL-MCNC: 4.3 G/DL (ref 3.5–5.2)
ALP BLD-CCNC: 68 U/L (ref 40–130)
ALT SERPL-CCNC: 18 U/L (ref 5–41)
ANION GAP SERPL CALCULATED.3IONS-SCNC: 11 MMOL/L (ref 7–19)
AST SERPL-CCNC: 14 U/L (ref 5–40)
BASOPHILS ABSOLUTE: 0 K/UL (ref 0–0.2)
BASOPHILS RELATIVE PERCENT: 0.2 % (ref 0–1)
BILIRUB SERPL-MCNC: 0.5 MG/DL (ref 0.2–1.2)
BUN BLDV-MCNC: 19 MG/DL (ref 8–23)
CALCIUM SERPL-MCNC: 9.3 MG/DL (ref 8.8–10.2)
CHLORIDE BLD-SCNC: 100 MMOL/L (ref 98–111)
CO2: 26 MMOL/L (ref 22–29)
CREAT SERPL-MCNC: 1.2 MG/DL (ref 0.5–1.2)
D DIMER: 0.36 UG/ML FEU (ref 0–0.48)
EOSINOPHILS ABSOLUTE: 0.1 K/UL (ref 0–0.6)
EOSINOPHILS RELATIVE PERCENT: 1.1 % (ref 0–5)
GFR NON-AFRICAN AMERICAN: >60
GLUCOSE BLD-MCNC: 235 MG/DL (ref 74–109)
HCT VFR BLD CALC: 43.8 % (ref 42–52)
HEMOGLOBIN: 14.1 G/DL (ref 14–18)
IMMATURE GRANULOCYTES #: 0 K/UL
INR BLD: 1.07 (ref 0.88–1.18)
LIPASE: 52 U/L (ref 13–60)
LYMPHOCYTES ABSOLUTE: 1.5 K/UL (ref 1.1–4.5)
LYMPHOCYTES RELATIVE PERCENT: 13.7 % (ref 20–40)
MCH RBC QN AUTO: 32 PG (ref 27–31)
MCHC RBC AUTO-ENTMCNC: 32.2 G/DL (ref 33–37)
MCV RBC AUTO: 99.3 FL (ref 80–94)
MONOCYTES ABSOLUTE: 1 K/UL (ref 0–0.9)
MONOCYTES RELATIVE PERCENT: 9.1 % (ref 0–10)
NEUTROPHILS ABSOLUTE: 8.5 K/UL (ref 1.5–7.5)
NEUTROPHILS RELATIVE PERCENT: 75.6 % (ref 50–65)
PDW BLD-RTO: 13.3 % (ref 11.5–14.5)
PLATELET # BLD: 100 K/UL (ref 130–400)
PMV BLD AUTO: 9.8 FL (ref 9.4–12.4)
POTASSIUM SERPL-SCNC: 4.3 MMOL/L (ref 3.5–5)
PRO-BNP: 146 PG/ML (ref 0–900)
PROTHROMBIN TIME: 13.3 SEC (ref 12–14.6)
RBC # BLD: 4.41 M/UL (ref 4.7–6.1)
SODIUM BLD-SCNC: 137 MMOL/L (ref 136–145)
TOTAL PROTEIN: 7.3 G/DL (ref 6.6–8.7)
TROPONIN: <0.01 NG/ML (ref 0–0.03)
WBC # BLD: 11.2 K/UL (ref 4.8–10.8)

## 2019-11-03 PROCEDURE — 2580000003 HC RX 258: Performed by: EMERGENCY MEDICINE

## 2019-11-03 PROCEDURE — 96375 TX/PRO/DX INJ NEW DRUG ADDON: CPT

## 2019-11-03 PROCEDURE — 2580000003 HC RX 258: Performed by: INTERNAL MEDICINE

## 2019-11-03 PROCEDURE — 36415 COLL VENOUS BLD VENIPUNCTURE: CPT

## 2019-11-03 PROCEDURE — G0378 HOSPITAL OBSERVATION PER HR: HCPCS

## 2019-11-03 PROCEDURE — 99285 EMERGENCY DEPT VISIT HI MDM: CPT

## 2019-11-03 PROCEDURE — C9113 INJ PANTOPRAZOLE SODIUM, VIA: HCPCS | Performed by: EMERGENCY MEDICINE

## 2019-11-03 PROCEDURE — 71275 CT ANGIOGRAPHY CHEST: CPT

## 2019-11-03 PROCEDURE — 6360000002 HC RX W HCPCS: Performed by: EMERGENCY MEDICINE

## 2019-11-03 PROCEDURE — 96374 THER/PROPH/DIAG INJ IV PUSH: CPT

## 2019-11-03 PROCEDURE — 83690 ASSAY OF LIPASE: CPT

## 2019-11-03 PROCEDURE — 74175 CTA ABDOMEN W/CONTRAST: CPT

## 2019-11-03 PROCEDURE — 85025 COMPLETE CBC W/AUTO DIFF WBC: CPT

## 2019-11-03 PROCEDURE — 71045 X-RAY EXAM CHEST 1 VIEW: CPT

## 2019-11-03 PROCEDURE — 93005 ELECTROCARDIOGRAM TRACING: CPT | Performed by: EMERGENCY MEDICINE

## 2019-11-03 PROCEDURE — 80053 COMPREHEN METABOLIC PANEL: CPT

## 2019-11-03 PROCEDURE — 85610 PROTHROMBIN TIME: CPT

## 2019-11-03 PROCEDURE — 84484 ASSAY OF TROPONIN QUANT: CPT

## 2019-11-03 PROCEDURE — 83880 ASSAY OF NATRIURETIC PEPTIDE: CPT

## 2019-11-03 PROCEDURE — 93005 ELECTROCARDIOGRAM TRACING: CPT | Performed by: INTERNAL MEDICINE

## 2019-11-03 PROCEDURE — 96376 TX/PRO/DX INJ SAME DRUG ADON: CPT

## 2019-11-03 PROCEDURE — 6360000004 HC RX CONTRAST MEDICATION: Performed by: EMERGENCY MEDICINE

## 2019-11-03 PROCEDURE — 85379 FIBRIN DEGRADATION QUANT: CPT

## 2019-11-03 PROCEDURE — 6370000000 HC RX 637 (ALT 250 FOR IP): Performed by: EMERGENCY MEDICINE

## 2019-11-03 RX ORDER — ONDANSETRON 2 MG/ML
4 INJECTION INTRAMUSCULAR; INTRAVENOUS EVERY 6 HOURS PRN
Status: DISCONTINUED | OUTPATIENT
Start: 2019-11-03 | End: 2019-11-04 | Stop reason: SDUPTHER

## 2019-11-03 RX ORDER — SODIUM CHLORIDE 9 MG/ML
10 INJECTION INTRAVENOUS DAILY
Status: DISCONTINUED | OUTPATIENT
Start: 2019-11-03 | End: 2019-11-04 | Stop reason: SDUPTHER

## 2019-11-03 RX ORDER — PANTOPRAZOLE SODIUM 40 MG/10ML
40 INJECTION, POWDER, LYOPHILIZED, FOR SOLUTION INTRAVENOUS DAILY
Status: DISCONTINUED | OUTPATIENT
Start: 2019-11-03 | End: 2019-11-04 | Stop reason: SDUPTHER

## 2019-11-03 RX ORDER — SODIUM CHLORIDE 0.9 % (FLUSH) 0.9 %
10 SYRINGE (ML) INJECTION EVERY 12 HOURS SCHEDULED
Status: DISCONTINUED | OUTPATIENT
Start: 2019-11-03 | End: 2019-11-04 | Stop reason: SDUPTHER

## 2019-11-03 RX ORDER — ONDANSETRON 2 MG/ML
4 INJECTION INTRAMUSCULAR; INTRAVENOUS ONCE
Status: COMPLETED | OUTPATIENT
Start: 2019-11-03 | End: 2019-11-03

## 2019-11-03 RX ORDER — SODIUM CHLORIDE 0.9 % (FLUSH) 0.9 %
10 SYRINGE (ML) INJECTION PRN
Status: DISCONTINUED | OUTPATIENT
Start: 2019-11-03 | End: 2019-11-04 | Stop reason: SDUPTHER

## 2019-11-03 RX ORDER — MORPHINE SULFATE 4 MG/ML
4 INJECTION, SOLUTION INTRAMUSCULAR; INTRAVENOUS ONCE
Status: COMPLETED | OUTPATIENT
Start: 2019-11-03 | End: 2019-11-03

## 2019-11-03 RX ORDER — ACETAMINOPHEN 325 MG/1
650 TABLET ORAL EVERY 4 HOURS PRN
Status: DISCONTINUED | OUTPATIENT
Start: 2019-11-03 | End: 2019-11-04 | Stop reason: SDUPTHER

## 2019-11-03 RX ORDER — SENNA PLUS 8.6 MG/1
1 TABLET ORAL DAILY PRN
Status: DISCONTINUED | OUTPATIENT
Start: 2019-11-03 | End: 2019-11-05 | Stop reason: HOSPADM

## 2019-11-03 RX ORDER — SODIUM CHLORIDE 9 MG/ML
INJECTION, SOLUTION INTRAVENOUS CONTINUOUS
Status: DISCONTINUED | OUTPATIENT
Start: 2019-11-03 | End: 2019-11-04 | Stop reason: SDUPTHER

## 2019-11-03 RX ADMIN — MORPHINE SULFATE 4 MG: 4 INJECTION INTRAVENOUS at 18:00

## 2019-11-03 RX ADMIN — PANTOPRAZOLE SODIUM 40 MG: 40 INJECTION, POWDER, FOR SOLUTION INTRAVENOUS at 19:36

## 2019-11-03 RX ADMIN — MORPHINE SULFATE 4 MG: 4 INJECTION INTRAVENOUS at 18:41

## 2019-11-03 RX ADMIN — LIDOCAINE HYDROCHLORIDE: 20 SOLUTION ORAL; TOPICAL at 19:17

## 2019-11-03 RX ADMIN — ONDANSETRON 4 MG: 2 INJECTION INTRAMUSCULAR; INTRAVENOUS at 18:04

## 2019-11-03 RX ADMIN — SODIUM CHLORIDE 10 ML: 9 INJECTION, SOLUTION INTRAMUSCULAR; INTRAVENOUS; SUBCUTANEOUS at 19:36

## 2019-11-03 RX ADMIN — SODIUM CHLORIDE: 9 INJECTION, SOLUTION INTRAVENOUS at 23:41

## 2019-11-03 RX ADMIN — IOPAMIDOL 90 ML: 755 INJECTION, SOLUTION INTRAVENOUS at 18:24

## 2019-11-03 ASSESSMENT — PAIN DESCRIPTION - LOCATION
LOCATION: CHEST;HEAD
LOCATION: CHEST

## 2019-11-03 ASSESSMENT — ENCOUNTER SYMPTOMS
COUGH: 0
SHORTNESS OF BREATH: 1
ABDOMINAL PAIN: 1

## 2019-11-03 ASSESSMENT — PAIN DESCRIPTION - PAIN TYPE
TYPE: ACUTE PAIN
TYPE: ACUTE PAIN

## 2019-11-03 ASSESSMENT — PAIN SCALES - GENERAL
PAINLEVEL_OUTOF10: 10
PAINLEVEL_OUTOF10: 4
PAINLEVEL_OUTOF10: 7
PAINLEVEL_OUTOF10: 10
PAINLEVEL_OUTOF10: 7

## 2019-11-04 PROBLEM — R07.9 CHEST PAIN, UNSPECIFIED: Status: ACTIVE | Noted: 2019-11-04

## 2019-11-04 LAB
CHOLESTEROL, TOTAL: 134 MG/DL (ref 160–199)
EKG P AXIS: 10 DEGREES
EKG P AXIS: 13 DEGREES
EKG P AXIS: 19 DEGREES
EKG P-R INTERVAL: 154 MS
EKG P-R INTERVAL: 158 MS
EKG P-R INTERVAL: 158 MS
EKG Q-T INTERVAL: 356 MS
EKG Q-T INTERVAL: 378 MS
EKG Q-T INTERVAL: 382 MS
EKG QRS DURATION: 90 MS
EKG QRS DURATION: 92 MS
EKG QRS DURATION: 92 MS
EKG QTC CALCULATION (BAZETT): 391 MS
EKG QTC CALCULATION (BAZETT): 399 MS
EKG QTC CALCULATION (BAZETT): 403 MS
EKG T AXIS: 21 DEGREES
EKG T AXIS: 21 DEGREES
EKG T AXIS: 8 DEGREES
GLUCOSE BLD-MCNC: 159 MG/DL (ref 70–99)
GLUCOSE BLD-MCNC: 81 MG/DL (ref 70–99)
GLUCOSE BLD-MCNC: 91 MG/DL (ref 70–99)
HDLC SERPL-MCNC: 64 MG/DL (ref 55–121)
LDL CHOLESTEROL CALCULATED: 58 MG/DL
LV EF: 60 %
LVEF MODALITY: NORMAL
PERFORMED ON: ABNORMAL
PERFORMED ON: NORMAL
PERFORMED ON: NORMAL
RAPID INFLUENZA  B AGN: NEGATIVE
RAPID INFLUENZA A AGN: NEGATIVE
SEDIMENTATION RATE, ERYTHROCYTE: 16 MM/HR (ref 0–15)
TRIGL SERPL-MCNC: 59 MG/DL (ref 0–149)
TROPONIN: 0.02 NG/ML (ref 0–0.03)
TROPONIN: 0.03 NG/ML (ref 0–0.03)
TROPONIN: 0.03 NG/ML (ref 0–0.03)
TROPONIN: 0.04 NG/ML (ref 0–0.03)

## 2019-11-04 PROCEDURE — C1769 GUIDE WIRE: HCPCS

## 2019-11-04 PROCEDURE — B2151ZZ FLUOROSCOPY OF LEFT HEART USING LOW OSMOLAR CONTRAST: ICD-10-PCS | Performed by: INTERNAL MEDICINE

## 2019-11-04 PROCEDURE — 2140000000 HC CCU INTERMEDIATE R&B

## 2019-11-04 PROCEDURE — 6370000000 HC RX 637 (ALT 250 FOR IP)

## 2019-11-04 PROCEDURE — 6370000000 HC RX 637 (ALT 250 FOR IP): Performed by: INTERNAL MEDICINE

## 2019-11-04 PROCEDURE — 85652 RBC SED RATE AUTOMATED: CPT

## 2019-11-04 PROCEDURE — 93458 L HRT ARTERY/VENTRICLE ANGIO: CPT

## 2019-11-04 PROCEDURE — 92928 PRQ TCAT PLMT NTRAC ST 1 LES: CPT | Performed by: INTERNAL MEDICINE

## 2019-11-04 PROCEDURE — B2111ZZ FLUOROSCOPY OF MULTIPLE CORONARY ARTERIES USING LOW OSMOLAR CONTRAST: ICD-10-PCS | Performed by: INTERNAL MEDICINE

## 2019-11-04 PROCEDURE — 2580000003 HC RX 258: Performed by: INTERNAL MEDICINE

## 2019-11-04 PROCEDURE — C1887 CATHETER, GUIDING: HCPCS

## 2019-11-04 PROCEDURE — 99152 MOD SED SAME PHYS/QHP 5/>YRS: CPT

## 2019-11-04 PROCEDURE — C1874 STENT, COATED/COV W/DEL SYS: HCPCS

## 2019-11-04 PROCEDURE — 027034Z DILATION OF CORONARY ARTERY, ONE ARTERY WITH DRUG-ELUTING INTRALUMINAL DEVICE, PERCUTANEOUS APPROACH: ICD-10-PCS | Performed by: INTERNAL MEDICINE

## 2019-11-04 PROCEDURE — C1894 INTRO/SHEATH, NON-LASER: HCPCS

## 2019-11-04 PROCEDURE — 4A023N7 MEASUREMENT OF CARDIAC SAMPLING AND PRESSURE, LEFT HEART, PERCUTANEOUS APPROACH: ICD-10-PCS | Performed by: INTERNAL MEDICINE

## 2019-11-04 PROCEDURE — 6360000002 HC RX W HCPCS

## 2019-11-04 PROCEDURE — 80061 LIPID PANEL: CPT

## 2019-11-04 PROCEDURE — 2709999900 HC NON-CHARGEABLE SUPPLY

## 2019-11-04 PROCEDURE — 99152 MOD SED SAME PHYS/QHP 5/>YRS: CPT | Performed by: INTERNAL MEDICINE

## 2019-11-04 PROCEDURE — C1725 CATH, TRANSLUMIN NON-LASER: HCPCS

## 2019-11-04 PROCEDURE — 99221 1ST HOSP IP/OBS SF/LOW 40: CPT | Performed by: INTERNAL MEDICINE

## 2019-11-04 PROCEDURE — 99153 MOD SED SAME PHYS/QHP EA: CPT

## 2019-11-04 PROCEDURE — 2500000003 HC RX 250 WO HCPCS

## 2019-11-04 PROCEDURE — 92928 PRQ TCAT PLMT NTRAC ST 1 LES: CPT

## 2019-11-04 PROCEDURE — 93306 TTE W/DOPPLER COMPLETE: CPT

## 2019-11-04 PROCEDURE — 96375 TX/PRO/DX INJ NEW DRUG ADDON: CPT

## 2019-11-04 PROCEDURE — 82948 REAGENT STRIP/BLOOD GLUCOSE: CPT

## 2019-11-04 PROCEDURE — 87040 BLOOD CULTURE FOR BACTERIA: CPT

## 2019-11-04 PROCEDURE — 6360000004 HC RX CONTRAST MEDICATION: Performed by: HOSPITALIST

## 2019-11-04 PROCEDURE — 84484 ASSAY OF TROPONIN QUANT: CPT

## 2019-11-04 PROCEDURE — 36415 COLL VENOUS BLD VENIPUNCTURE: CPT

## 2019-11-04 PROCEDURE — 93458 L HRT ARTERY/VENTRICLE ANGIO: CPT | Performed by: INTERNAL MEDICINE

## 2019-11-04 PROCEDURE — 96372 THER/PROPH/DIAG INJ SC/IM: CPT

## 2019-11-04 PROCEDURE — 87804 INFLUENZA ASSAY W/OPTIC: CPT

## 2019-11-04 PROCEDURE — 6360000002 HC RX W HCPCS: Performed by: INTERNAL MEDICINE

## 2019-11-04 RX ORDER — ALPRAZOLAM 0.5 MG/1
0.5 TABLET ORAL
Status: ACTIVE | OUTPATIENT
Start: 2019-11-04 | End: 2019-11-04

## 2019-11-04 RX ORDER — GABAPENTIN 600 MG/1
600 TABLET ORAL 3 TIMES DAILY
Status: DISCONTINUED | OUTPATIENT
Start: 2019-11-04 | End: 2019-11-05 | Stop reason: HOSPADM

## 2019-11-04 RX ORDER — ACETAMINOPHEN 325 MG/1
650 TABLET ORAL EVERY 4 HOURS PRN
Status: DISCONTINUED | OUTPATIENT
Start: 2019-11-04 | End: 2019-11-05 | Stop reason: HOSPADM

## 2019-11-04 RX ORDER — SODIUM CHLORIDE 9 MG/ML
INJECTION, SOLUTION INTRAVENOUS CONTINUOUS
Status: DISCONTINUED | OUTPATIENT
Start: 2019-11-04 | End: 2019-11-05 | Stop reason: HOSPADM

## 2019-11-04 RX ORDER — PANTOPRAZOLE SODIUM 40 MG/1
40 TABLET, DELAYED RELEASE ORAL DAILY
Status: DISCONTINUED | OUTPATIENT
Start: 2019-11-04 | End: 2019-11-05 | Stop reason: HOSPADM

## 2019-11-04 RX ORDER — ASPIRIN 81 MG/1
81 TABLET, CHEWABLE ORAL DAILY
Status: DISCONTINUED | OUTPATIENT
Start: 2019-11-04 | End: 2019-11-05 | Stop reason: HOSPADM

## 2019-11-04 RX ORDER — NICOTINE POLACRILEX 4 MG
15 LOZENGE BUCCAL PRN
Status: DISCONTINUED | OUTPATIENT
Start: 2019-11-04 | End: 2019-11-05 | Stop reason: HOSPADM

## 2019-11-04 RX ORDER — ALBUTEROL SULFATE 90 UG/1
2 AEROSOL, METERED RESPIRATORY (INHALATION) EVERY 6 HOURS PRN
Status: DISCONTINUED | OUTPATIENT
Start: 2019-11-04 | End: 2019-11-05 | Stop reason: HOSPADM

## 2019-11-04 RX ORDER — SODIUM CHLORIDE 0.9 % (FLUSH) 0.9 %
10 SYRINGE (ML) INJECTION PRN
Status: DISCONTINUED | OUTPATIENT
Start: 2019-11-04 | End: 2019-11-05 | Stop reason: HOSPADM

## 2019-11-04 RX ORDER — PRAVASTATIN SODIUM 20 MG
80 TABLET ORAL NIGHTLY
Status: DISCONTINUED | OUTPATIENT
Start: 2019-11-04 | End: 2019-11-04 | Stop reason: ALTCHOICE

## 2019-11-04 RX ORDER — OXYCODONE AND ACETAMINOPHEN 10; 325 MG/1; MG/1
1 TABLET ORAL EVERY 8 HOURS PRN
Status: DISCONTINUED | OUTPATIENT
Start: 2019-11-04 | End: 2019-11-05 | Stop reason: HOSPADM

## 2019-11-04 RX ORDER — SODIUM CHLORIDE 0.9 % (FLUSH) 0.9 %
10 SYRINGE (ML) INJECTION EVERY 12 HOURS SCHEDULED
Status: DISCONTINUED | OUTPATIENT
Start: 2019-11-04 | End: 2019-11-05 | Stop reason: HOSPADM

## 2019-11-04 RX ORDER — DEXTROSE MONOHYDRATE 50 MG/ML
100 INJECTION, SOLUTION INTRAVENOUS PRN
Status: DISCONTINUED | OUTPATIENT
Start: 2019-11-04 | End: 2019-11-05 | Stop reason: HOSPADM

## 2019-11-04 RX ORDER — COLCHICINE 0.6 MG/1
0.6 TABLET ORAL ONCE
Status: COMPLETED | OUTPATIENT
Start: 2019-11-04 | End: 2019-11-04

## 2019-11-04 RX ORDER — ONDANSETRON 2 MG/ML
4 INJECTION INTRAMUSCULAR; INTRAVENOUS EVERY 6 HOURS PRN
Status: DISCONTINUED | OUTPATIENT
Start: 2019-11-04 | End: 2019-11-05 | Stop reason: HOSPADM

## 2019-11-04 RX ORDER — CLOPIDOGREL BISULFATE 75 MG/1
75 TABLET ORAL DAILY
Status: DISCONTINUED | OUTPATIENT
Start: 2019-11-05 | End: 2019-11-05 | Stop reason: HOSPADM

## 2019-11-04 RX ORDER — ATORVASTATIN CALCIUM 40 MG/1
40 TABLET, FILM COATED ORAL NIGHTLY
Status: DISCONTINUED | OUTPATIENT
Start: 2019-11-04 | End: 2019-11-05 | Stop reason: HOSPADM

## 2019-11-04 RX ORDER — KETOROLAC TROMETHAMINE 30 MG/ML
15 INJECTION, SOLUTION INTRAMUSCULAR; INTRAVENOUS ONCE
Status: COMPLETED | OUTPATIENT
Start: 2019-11-04 | End: 2019-11-04

## 2019-11-04 RX ORDER — NITROGLYCERIN 0.4 MG/1
0.4 TABLET SUBLINGUAL EVERY 5 MIN PRN
Status: DISCONTINUED | OUTPATIENT
Start: 2019-11-04 | End: 2019-11-05 | Stop reason: HOSPADM

## 2019-11-04 RX ORDER — TAMSULOSIN HYDROCHLORIDE 0.4 MG/1
0.4 CAPSULE ORAL DAILY
Status: DISCONTINUED | OUTPATIENT
Start: 2019-11-04 | End: 2019-11-05 | Stop reason: HOSPADM

## 2019-11-04 RX ORDER — DEXTROSE MONOHYDRATE 25 G/50ML
12.5 INJECTION, SOLUTION INTRAVENOUS PRN
Status: DISCONTINUED | OUTPATIENT
Start: 2019-11-04 | End: 2019-11-05 | Stop reason: HOSPADM

## 2019-11-04 RX ADMIN — IOPAMIDOL 140 ML: 612 INJECTION, SOLUTION INTRAVENOUS at 14:30

## 2019-11-04 RX ADMIN — OXYCODONE HYDROCHLORIDE AND ACETAMINOPHEN 1 TABLET: 10; 325 TABLET ORAL at 01:07

## 2019-11-04 RX ADMIN — OXYCODONE HYDROCHLORIDE AND ACETAMINOPHEN 1 TABLET: 10; 325 TABLET ORAL at 09:13

## 2019-11-04 RX ADMIN — GABAPENTIN 600 MG: 600 TABLET, FILM COATED ORAL at 20:26

## 2019-11-04 RX ADMIN — ATORVASTATIN CALCIUM 40 MG: 40 TABLET, FILM COATED ORAL at 16:35

## 2019-11-04 RX ADMIN — PANTOPRAZOLE SODIUM 40 MG: 40 TABLET, DELAYED RELEASE ORAL at 09:07

## 2019-11-04 RX ADMIN — COLCHICINE 0.6 MG: 0.6 TABLET, FILM COATED ORAL at 01:42

## 2019-11-04 RX ADMIN — KETOROLAC TROMETHAMINE 15 MG: 30 INJECTION, SOLUTION INTRAMUSCULAR at 01:42

## 2019-11-04 RX ADMIN — GABAPENTIN 600 MG: 600 TABLET, FILM COATED ORAL at 09:07

## 2019-11-04 RX ADMIN — ENOXAPARIN SODIUM 40 MG: 40 INJECTION SUBCUTANEOUS at 09:07

## 2019-11-04 RX ADMIN — TAMSULOSIN HYDROCHLORIDE 0.4 MG: 0.4 CAPSULE ORAL at 09:07

## 2019-11-04 RX ADMIN — GABAPENTIN 600 MG: 600 TABLET, FILM COATED ORAL at 16:35

## 2019-11-04 RX ADMIN — INSULIN LISPRO 1 UNITS: 100 INJECTION, SOLUTION INTRAVENOUS; SUBCUTANEOUS at 20:26

## 2019-11-04 RX ADMIN — SODIUM CHLORIDE: 9 INJECTION, SOLUTION INTRAVENOUS at 14:49

## 2019-11-04 RX ADMIN — ASPIRIN 81 MG 81 MG: 81 TABLET ORAL at 16:35

## 2019-11-04 RX ADMIN — DICLOFENAC 2 G: 10 GEL TOPICAL at 09:07

## 2019-11-04 ASSESSMENT — PAIN DESCRIPTION - PAIN TYPE
TYPE: CHRONIC PAIN
TYPE: CHRONIC PAIN

## 2019-11-04 ASSESSMENT — ENCOUNTER SYMPTOMS
HEARTBURN: 0
DIARRHEA: 0
CONSTIPATION: 0
WHEEZING: 0
DOUBLE VISION: 0
ABDOMINAL PAIN: 0
BACK PAIN: 0
HEMOPTYSIS: 0
SPUTUM PRODUCTION: 0
SHORTNESS OF BREATH: 1
ABDOMINAL PAIN: 1
COUGH: 0
ORTHOPNEA: 0
BLOOD IN STOOL: 0
PHOTOPHOBIA: 0
NAUSEA: 0
VOMITING: 0
ABDOMINAL DISTENTION: 0
BLURRED VISION: 0

## 2019-11-04 ASSESSMENT — PAIN SCALES - GENERAL
PAINLEVEL_OUTOF10: 7
PAINLEVEL_OUTOF10: 4
PAINLEVEL_OUTOF10: 6
PAINLEVEL_OUTOF10: 6

## 2019-11-04 ASSESSMENT — PAIN DESCRIPTION - LOCATION
LOCATION: BACK;SHOULDER
LOCATION: BACK;SHOULDER

## 2019-11-05 VITALS
OXYGEN SATURATION: 99 % | DIASTOLIC BLOOD PRESSURE: 72 MMHG | RESPIRATION RATE: 18 BRPM | TEMPERATURE: 96.1 F | WEIGHT: 208 LBS | BODY MASS INDEX: 29.12 KG/M2 | SYSTOLIC BLOOD PRESSURE: 140 MMHG | HEIGHT: 71 IN | HEART RATE: 64 BPM

## 2019-11-05 LAB
EKG P AXIS: -9 DEGREES
EKG P AXIS: 5 DEGREES
EKG P-R INTERVAL: 154 MS
EKG P-R INTERVAL: 162 MS
EKG Q-T INTERVAL: 388 MS
EKG Q-T INTERVAL: 436 MS
EKG QRS DURATION: 100 MS
EKG QRS DURATION: 96 MS
EKG QTC CALCULATION (BAZETT): 387 MS
EKG QTC CALCULATION (BAZETT): 421 MS
EKG T AXIS: 24 DEGREES
EKG T AXIS: 26 DEGREES
GLUCOSE BLD-MCNC: 154 MG/DL (ref 70–99)
GLUCOSE BLD-MCNC: 211 MG/DL (ref 70–99)
GLUCOSE BLD-MCNC: 77 MG/DL (ref 70–99)
GLUCOSE BLD-MCNC: 92 MG/DL (ref 70–99)
HCT VFR BLD CALC: 39.2 % (ref 42–52)
HEMOGLOBIN: 12.4 G/DL (ref 14–18)
MCH RBC QN AUTO: 32.1 PG (ref 27–31)
MCHC RBC AUTO-ENTMCNC: 31.6 G/DL (ref 33–37)
MCV RBC AUTO: 101.6 FL (ref 80–94)
PDW BLD-RTO: 13.7 % (ref 11.5–14.5)
PERFORMED ON: ABNORMAL
PERFORMED ON: ABNORMAL
PERFORMED ON: NORMAL
PERFORMED ON: NORMAL
PLATELET # BLD: 93 K/UL (ref 130–400)
PMV BLD AUTO: 9.8 FL (ref 9.4–12.4)
RBC # BLD: 3.86 M/UL (ref 4.7–6.1)
TROPONIN: 0.04 NG/ML (ref 0–0.03)
WBC # BLD: 8.2 K/UL (ref 4.8–10.8)

## 2019-11-05 PROCEDURE — 6370000000 HC RX 637 (ALT 250 FOR IP): Performed by: INTERNAL MEDICINE

## 2019-11-05 PROCEDURE — 36415 COLL VENOUS BLD VENIPUNCTURE: CPT

## 2019-11-05 PROCEDURE — G0008 ADMIN INFLUENZA VIRUS VAC: HCPCS | Performed by: INTERNAL MEDICINE

## 2019-11-05 PROCEDURE — 84484 ASSAY OF TROPONIN QUANT: CPT

## 2019-11-05 PROCEDURE — 85027 COMPLETE CBC AUTOMATED: CPT

## 2019-11-05 PROCEDURE — 82948 REAGENT STRIP/BLOOD GLUCOSE: CPT

## 2019-11-05 PROCEDURE — 90686 IIV4 VACC NO PRSV 0.5 ML IM: CPT | Performed by: INTERNAL MEDICINE

## 2019-11-05 PROCEDURE — 93005 ELECTROCARDIOGRAM TRACING: CPT | Performed by: EMERGENCY MEDICINE

## 2019-11-05 PROCEDURE — 2580000003 HC RX 258: Performed by: INTERNAL MEDICINE

## 2019-11-05 PROCEDURE — 96372 THER/PROPH/DIAG INJ SC/IM: CPT

## 2019-11-05 PROCEDURE — 6360000002 HC RX W HCPCS: Performed by: INTERNAL MEDICINE

## 2019-11-05 RX ORDER — CLOPIDOGREL BISULFATE 75 MG/1
75 TABLET ORAL DAILY
Qty: 30 TABLET | Refills: 3 | Status: SHIPPED | OUTPATIENT
Start: 2019-11-06 | End: 2020-03-11 | Stop reason: SDUPTHER

## 2019-11-05 RX ORDER — ASPIRIN 81 MG/1
81 TABLET, CHEWABLE ORAL DAILY
Qty: 30 TABLET | Refills: 3 | Status: SHIPPED | OUTPATIENT
Start: 2019-11-06 | End: 2020-04-06 | Stop reason: SDUPTHER

## 2019-11-05 RX ORDER — NITROGLYCERIN 0.4 MG/1
TABLET SUBLINGUAL
Qty: 25 TABLET | Refills: 3 | Status: SHIPPED | OUTPATIENT
Start: 2019-11-05

## 2019-11-05 RX ADMIN — CLOPIDOGREL BISULFATE 75 MG: 75 TABLET ORAL at 08:50

## 2019-11-05 RX ADMIN — NALOXEGOL OXALATE 25 MG: 12.5 TABLET, FILM COATED ORAL at 08:50

## 2019-11-05 RX ADMIN — PANTOPRAZOLE SODIUM 40 MG: 40 TABLET, DELAYED RELEASE ORAL at 08:50

## 2019-11-05 RX ADMIN — ASPIRIN 81 MG 81 MG: 81 TABLET ORAL at 08:50

## 2019-11-05 RX ADMIN — INSULIN LISPRO 2 UNITS: 100 INJECTION, SOLUTION INTRAVENOUS; SUBCUTANEOUS at 06:15

## 2019-11-05 RX ADMIN — OXYCODONE HYDROCHLORIDE AND ACETAMINOPHEN 1 TABLET: 10; 325 TABLET ORAL at 04:10

## 2019-11-05 RX ADMIN — ENOXAPARIN SODIUM 40 MG: 40 INJECTION SUBCUTANEOUS at 08:50

## 2019-11-05 RX ADMIN — INFLUENZA VIRUS VACCINE 0.5 ML: 15; 15; 15; 15 SUSPENSION INTRAMUSCULAR at 11:27

## 2019-11-05 RX ADMIN — DICLOFENAC 2 G: 10 GEL TOPICAL at 08:51

## 2019-11-05 RX ADMIN — TAMSULOSIN HYDROCHLORIDE 0.4 MG: 0.4 CAPSULE ORAL at 08:50

## 2019-11-05 RX ADMIN — GABAPENTIN 600 MG: 600 TABLET, FILM COATED ORAL at 08:50

## 2019-11-05 RX ADMIN — SENNOSIDES 8.6 MG: 8.6 TABLET, FILM COATED ORAL at 05:16

## 2019-11-05 RX ADMIN — Medication 10 ML: at 08:51

## 2019-11-05 ASSESSMENT — PAIN DESCRIPTION - LOCATION: LOCATION: BACK

## 2019-11-05 ASSESSMENT — PAIN SCALES - GENERAL
PAINLEVEL_OUTOF10: 0
PAINLEVEL_OUTOF10: 1
PAINLEVEL_OUTOF10: 7

## 2019-11-05 ASSESSMENT — PAIN DESCRIPTION - PAIN TYPE: TYPE: CHRONIC PAIN

## 2019-11-05 ASSESSMENT — PAIN SCALES - WONG BAKER: WONGBAKER_NUMERICALRESPONSE: 2

## 2019-11-07 ENCOUNTER — TELEPHONE (OUTPATIENT)
Dept: INTERNAL MEDICINE | Age: 69
End: 2019-11-07

## 2019-11-08 ENCOUNTER — OFFICE VISIT (OUTPATIENT)
Dept: PRIMARY CARE CLINIC | Age: 69
End: 2019-11-08
Payer: MEDICARE

## 2019-11-08 VITALS
SYSTOLIC BLOOD PRESSURE: 120 MMHG | TEMPERATURE: 98 F | DIASTOLIC BLOOD PRESSURE: 72 MMHG | HEIGHT: 70 IN | WEIGHT: 212 LBS | BODY MASS INDEX: 30.35 KG/M2 | OXYGEN SATURATION: 96 % | HEART RATE: 64 BPM

## 2019-11-08 DIAGNOSIS — R07.9 CHEST PAIN, UNSPECIFIED TYPE: ICD-10-CM

## 2019-11-08 DIAGNOSIS — J30.1 SEASONAL ALLERGIC RHINITIS DUE TO POLLEN: ICD-10-CM

## 2019-11-08 DIAGNOSIS — R53.1 WEAKNESS: ICD-10-CM

## 2019-11-08 DIAGNOSIS — E11.42 DIABETIC PERIPHERAL NEUROPATHY ASSOCIATED WITH TYPE 2 DIABETES MELLITUS (HCC): ICD-10-CM

## 2019-11-08 DIAGNOSIS — Z51.81 THERAPEUTIC DRUG MONITORING: ICD-10-CM

## 2019-11-08 DIAGNOSIS — Z09 HOSPITAL DISCHARGE FOLLOW-UP: Primary | ICD-10-CM

## 2019-11-08 DIAGNOSIS — E11.9 TYPE 2 DIABETES MELLITUS WITHOUT COMPLICATION, WITHOUT LONG-TERM CURRENT USE OF INSULIN (HCC): ICD-10-CM

## 2019-11-08 DIAGNOSIS — I25.10 CORONARY ARTERY DISEASE INVOLVING NATIVE CORONARY ARTERY OF NATIVE HEART WITHOUT ANGINA PECTORIS: ICD-10-CM

## 2019-11-08 DIAGNOSIS — H61.23 IMPACTED CERUMEN OF BOTH EARS: ICD-10-CM

## 2019-11-08 DIAGNOSIS — R00.1 BRADYCARDIA: ICD-10-CM

## 2019-11-08 LAB
AMPHETAMINE SCREEN, URINE: NORMAL
BARBITURATE SCREEN, URINE: NORMAL
BENZODIAZEPINE SCREEN, URINE: NORMAL
BUPRENORPHINE URINE: NORMAL
COCAINE METABOLITE SCREEN URINE: NORMAL
GABAPENTIN SCREEN, URINE: NORMAL
MDMA URINE: NORMAL
METHADONE SCREEN, URINE: NORMAL
METHAMPHETAMINE, URINE: NORMAL
OPIATE SCREEN URINE: NORMAL
OXYCODONE SCREEN URINE: NORMAL
PHENCYCLIDINE SCREEN URINE: NORMAL
PROPOXYPHENE SCREEN, URINE: NORMAL
THC SCREEN, URINE: NORMAL
TRICYCLIC ANTIDEPRESSANTS, UR: NORMAL

## 2019-11-08 PROCEDURE — 99496 TRANSJ CARE MGMT HIGH F2F 7D: CPT | Performed by: NURSE PRACTITIONER

## 2019-11-08 PROCEDURE — 69210 REMOVE IMPACTED EAR WAX UNI: CPT | Performed by: NURSE PRACTITIONER

## 2019-11-08 PROCEDURE — 1111F DSCHRG MED/CURRENT MED MERGE: CPT | Performed by: NURSE PRACTITIONER

## 2019-11-08 PROCEDURE — 80305 DRUG TEST PRSMV DIR OPT OBS: CPT | Performed by: NURSE PRACTITIONER

## 2019-11-08 RX ORDER — LISINOPRIL 40 MG/1
TABLET ORAL
Qty: 90 TABLET | Refills: 3 | Status: SHIPPED | OUTPATIENT
Start: 2019-11-08 | End: 2020-10-20

## 2019-11-08 RX ORDER — CETIRIZINE HYDROCHLORIDE 10 MG/1
TABLET ORAL
Qty: 90 TABLET | Refills: 3 | Status: SHIPPED | OUTPATIENT
Start: 2019-11-08 | End: 2020-04-06 | Stop reason: SDUPTHER

## 2019-11-08 RX ORDER — TAMSULOSIN HYDROCHLORIDE 0.4 MG/1
0.4 CAPSULE ORAL DAILY
Qty: 90 CAPSULE | Refills: 3 | Status: SHIPPED | OUTPATIENT
Start: 2019-11-08 | End: 2020-12-03

## 2019-11-08 RX ORDER — ALBUTEROL SULFATE 90 UG/1
AEROSOL, METERED RESPIRATORY (INHALATION)
Qty: 18 G | Refills: 3 | Status: SHIPPED | OUTPATIENT
Start: 2019-11-08 | End: 2020-04-06 | Stop reason: SDUPTHER

## 2019-11-08 RX ORDER — AZELASTINE 1 MG/ML
1 SPRAY, METERED NASAL 2 TIMES DAILY
Qty: 2 BOTTLE | Refills: 1 | Status: SHIPPED | OUTPATIENT
Start: 2019-11-08 | End: 2020-04-06 | Stop reason: SDUPTHER

## 2019-11-08 RX ORDER — PRAVASTATIN SODIUM 80 MG/1
TABLET ORAL
Qty: 90 TABLET | Refills: 3 | Status: SHIPPED | OUTPATIENT
Start: 2019-11-08 | End: 2020-10-20

## 2019-11-08 RX ORDER — PANTOPRAZOLE SODIUM 40 MG/1
TABLET, DELAYED RELEASE ORAL
Qty: 90 TABLET | Refills: 3 | Status: SHIPPED | OUTPATIENT
Start: 2019-11-08 | End: 2020-04-06 | Stop reason: SDUPTHER

## 2019-11-08 RX ORDER — GABAPENTIN 600 MG/1
600 TABLET ORAL 3 TIMES DAILY
Qty: 90 TABLET | Refills: 2 | Status: SHIPPED | OUTPATIENT
Start: 2019-11-08 | End: 2020-03-13

## 2019-11-09 LAB
BLOOD CULTURE, ROUTINE: NORMAL
CULTURE, BLOOD 2: NORMAL

## 2019-11-10 ASSESSMENT — ENCOUNTER SYMPTOMS
SHORTNESS OF BREATH: 0
EYES NEGATIVE: 1
WHEEZING: 0
STRIDOR: 0
SINUS PAIN: 0
SORE THROAT: 0
COUGH: 1
SINUS PRESSURE: 0
GASTROINTESTINAL NEGATIVE: 1

## 2019-11-14 ENCOUNTER — CARE COORDINATION (OUTPATIENT)
Dept: CARE COORDINATION | Age: 69
End: 2019-11-14

## 2019-11-21 ENCOUNTER — HOSPITAL ENCOUNTER (OUTPATIENT)
Dept: INFUSION THERAPY | Age: 69
Discharge: HOME | End: 2019-11-21
Payer: MEDICARE

## 2019-11-21 DIAGNOSIS — E53.8 B12 DEFICIENCY: Primary | ICD-10-CM

## 2019-11-21 PROCEDURE — 96372 THER/PROPH/DIAG INJ SC/IM: CPT

## 2019-11-21 PROCEDURE — 6360000002 HC RX W HCPCS: Performed by: PHYSICIAN ASSISTANT

## 2019-11-21 RX ORDER — CYANOCOBALAMIN 1000 UG/ML
1000 INJECTION INTRAMUSCULAR; SUBCUTANEOUS ONCE
Status: CANCELLED
Start: 2019-11-28

## 2019-11-21 RX ORDER — CYANOCOBALAMIN 1000 UG/ML
1000 INJECTION INTRAMUSCULAR; SUBCUTANEOUS ONCE
Status: COMPLETED
Start: 2019-11-21 | End: 2019-11-21

## 2019-11-21 RX ADMIN — CYANOCOBALAMIN 1000 MCG: 1000 INJECTION, SOLUTION INTRAMUSCULAR; SUBCUTANEOUS at 15:16

## 2019-11-25 ENCOUNTER — HOSPITAL ENCOUNTER (EMERGENCY)
Age: 69
Discharge: HOME | End: 2019-11-25
Attending: EMERGENCY MEDICINE
Payer: MEDICARE

## 2019-11-25 ENCOUNTER — APPOINTMENT (OUTPATIENT)
Dept: GENERAL RADIOLOGY | Age: 69
End: 2019-11-25
Payer: MEDICARE

## 2019-11-25 ENCOUNTER — APPOINTMENT (OUTPATIENT)
Dept: CT IMAGING | Age: 69
End: 2019-11-25
Payer: MEDICARE

## 2019-11-25 VITALS
DIASTOLIC BLOOD PRESSURE: 62 MMHG | RESPIRATION RATE: 22 BRPM | SYSTOLIC BLOOD PRESSURE: 121 MMHG | HEART RATE: 53 BPM | HEIGHT: 70 IN | WEIGHT: 212 LBS | TEMPERATURE: 98.3 F | BODY MASS INDEX: 30.35 KG/M2 | OXYGEN SATURATION: 98 %

## 2019-11-25 DIAGNOSIS — R07.9 CHEST PAIN, UNSPECIFIED TYPE: Primary | ICD-10-CM

## 2019-11-25 DIAGNOSIS — R20.0 NUMBNESS AND TINGLING: ICD-10-CM

## 2019-11-25 DIAGNOSIS — R20.2 NUMBNESS AND TINGLING: ICD-10-CM

## 2019-11-25 LAB
ALBUMIN SERPL-MCNC: 4 G/DL (ref 3.5–5.2)
ALP BLD-CCNC: 77 U/L (ref 40–130)
ALT SERPL-CCNC: 15 U/L (ref 5–41)
AMPHETAMINE SCREEN, URINE: NEGATIVE
ANION GAP SERPL CALCULATED.3IONS-SCNC: 10 MMOL/L (ref 7–19)
AST SERPL-CCNC: 13 U/L (ref 5–40)
BARBITURATE SCREEN URINE: NEGATIVE
BASOPHILS ABSOLUTE: 0 K/UL (ref 0–0.2)
BASOPHILS RELATIVE PERCENT: 0.3 % (ref 0–1)
BENZODIAZEPINE SCREEN, URINE: NEGATIVE
BILIRUB SERPL-MCNC: 0.4 MG/DL (ref 0.2–1.2)
BILIRUBIN URINE: NEGATIVE
BLOOD, URINE: NEGATIVE
BUN BLDV-MCNC: 30 MG/DL (ref 8–23)
CALCIUM SERPL-MCNC: 9.3 MG/DL (ref 8.8–10.2)
CANNABINOID SCREEN URINE: NEGATIVE
CHLORIDE BLD-SCNC: 106 MMOL/L (ref 98–111)
CLARITY: CLEAR
CO2: 24 MMOL/L (ref 22–29)
COCAINE METABOLITE SCREEN URINE: NEGATIVE
COLOR: YELLOW
CREAT SERPL-MCNC: 1.4 MG/DL (ref 0.5–1.2)
EOSINOPHILS ABSOLUTE: 0.2 K/UL (ref 0–0.6)
EOSINOPHILS RELATIVE PERCENT: 2.3 % (ref 0–5)
ETHANOL: <10 MG/DL (ref 0–0.08)
GFR NON-AFRICAN AMERICAN: 50
GLUCOSE BLD-MCNC: 140 MG/DL
GLUCOSE BLD-MCNC: 146 MG/DL (ref 70–99)
GLUCOSE BLD-MCNC: 168 MG/DL (ref 74–109)
GLUCOSE URINE: NEGATIVE MG/DL
HCT VFR BLD CALC: 36.6 % (ref 42–52)
HEMOGLOBIN: 12.1 G/DL (ref 14–18)
IMMATURE GRANULOCYTES #: 0 K/UL
KETONES, URINE: NEGATIVE MG/DL
LEUKOCYTE ESTERASE, URINE: NEGATIVE
LIPASE: 44 U/L (ref 13–60)
LYMPHOCYTES ABSOLUTE: 1.9 K/UL (ref 1.1–4.5)
LYMPHOCYTES RELATIVE PERCENT: 22 % (ref 20–40)
Lab: NORMAL
MCH RBC QN AUTO: 32.3 PG (ref 27–31)
MCHC RBC AUTO-ENTMCNC: 33.1 G/DL (ref 33–37)
MCV RBC AUTO: 97.6 FL (ref 80–94)
MONOCYTES ABSOLUTE: 0.5 K/UL (ref 0–0.9)
MONOCYTES RELATIVE PERCENT: 5.5 % (ref 0–10)
NEUTROPHILS ABSOLUTE: 6 K/UL (ref 1.5–7.5)
NEUTROPHILS RELATIVE PERCENT: 69.6 % (ref 50–65)
NITRITE, URINE: NEGATIVE
OPIATE SCREEN URINE: NEGATIVE
PDW BLD-RTO: 13.2 % (ref 11.5–14.5)
PERFORMED ON: ABNORMAL
PH UA: 5.5 (ref 5–8)
PLATELET # BLD: 106 K/UL (ref 130–400)
PMV BLD AUTO: 9.4 FL (ref 9.4–12.4)
POTASSIUM REFLEX MAGNESIUM: 4.5 MMOL/L (ref 3.5–5)
PRO-BNP: 98 PG/ML (ref 0–900)
PROTEIN UA: NEGATIVE MG/DL
RBC # BLD: 3.75 M/UL (ref 4.7–6.1)
SODIUM BLD-SCNC: 140 MMOL/L (ref 136–145)
SPECIFIC GRAVITY UA: 1.02 (ref 1–1.03)
TOTAL PROTEIN: 6.6 G/DL (ref 6.6–8.7)
TROPONIN: <0.01 NG/ML (ref 0–0.03)
TROPONIN: <0.01 NG/ML (ref 0–0.03)
UROBILINOGEN, URINE: 0.2 E.U./DL
WBC # BLD: 8.6 K/UL (ref 4.8–10.8)

## 2019-11-25 PROCEDURE — 84484 ASSAY OF TROPONIN QUANT: CPT

## 2019-11-25 PROCEDURE — 99283 EMERGENCY DEPT VISIT LOW MDM: CPT | Performed by: INTERNAL MEDICINE

## 2019-11-25 PROCEDURE — 85025 COMPLETE CBC W/AUTO DIFF WBC: CPT

## 2019-11-25 PROCEDURE — 93005 ELECTROCARDIOGRAM TRACING: CPT | Performed by: EMERGENCY MEDICINE

## 2019-11-25 PROCEDURE — 83690 ASSAY OF LIPASE: CPT

## 2019-11-25 PROCEDURE — G0480 DRUG TEST DEF 1-7 CLASSES: HCPCS

## 2019-11-25 PROCEDURE — 71045 X-RAY EXAM CHEST 1 VIEW: CPT

## 2019-11-25 PROCEDURE — 81003 URINALYSIS AUTO W/O SCOPE: CPT

## 2019-11-25 PROCEDURE — 83880 ASSAY OF NATRIURETIC PEPTIDE: CPT

## 2019-11-25 PROCEDURE — 99285 EMERGENCY DEPT VISIT HI MDM: CPT

## 2019-11-25 PROCEDURE — 70450 CT HEAD/BRAIN W/O DYE: CPT

## 2019-11-25 PROCEDURE — 82948 REAGENT STRIP/BLOOD GLUCOSE: CPT

## 2019-11-25 PROCEDURE — 80053 COMPREHEN METABOLIC PANEL: CPT

## 2019-11-25 PROCEDURE — 36415 COLL VENOUS BLD VENIPUNCTURE: CPT

## 2019-11-25 PROCEDURE — 99999 PR OFFICE/OUTPT VISIT,PROCEDURE ONLY: CPT | Performed by: EMERGENCY MEDICINE

## 2019-11-25 PROCEDURE — 80307 DRUG TEST PRSMV CHEM ANLYZR: CPT

## 2019-11-25 ASSESSMENT — ENCOUNTER SYMPTOMS
EYE PAIN: 0
SHORTNESS OF BREATH: 0
ABDOMINAL PAIN: 0
RHINORRHEA: 0
WHEEZING: 0
DIARRHEA: 0
VOICE CHANGE: 0
VOMITING: 0
COUGH: 0
ABDOMINAL PAIN: 1
BACK PAIN: 0
BLOOD IN STOOL: 0
ABDOMINAL DISTENTION: 0
EYE REDNESS: 0

## 2019-11-26 LAB
EKG P AXIS: -4 DEGREES
EKG P-R INTERVAL: 182 MS
EKG Q-T INTERVAL: 456 MS
EKG QRS DURATION: 102 MS
EKG QTC CALCULATION (BAZETT): 434 MS
EKG T AXIS: 36 DEGREES

## 2019-11-26 PROCEDURE — 93010 ELECTROCARDIOGRAM REPORT: CPT | Performed by: INTERNAL MEDICINE

## 2019-11-27 ENCOUNTER — HOSPITAL ENCOUNTER (OUTPATIENT)
Dept: INFUSION THERAPY | Age: 69
Discharge: HOME | End: 2019-11-27
Payer: MEDICARE

## 2019-11-27 DIAGNOSIS — E53.8 B12 DEFICIENCY: Primary | ICD-10-CM

## 2019-11-27 PROCEDURE — 6360000002 HC RX W HCPCS: Performed by: PHYSICIAN ASSISTANT

## 2019-11-27 PROCEDURE — 96372 THER/PROPH/DIAG INJ SC/IM: CPT

## 2019-11-27 RX ORDER — CYANOCOBALAMIN 1000 UG/ML
1000 INJECTION INTRAMUSCULAR; SUBCUTANEOUS ONCE
Status: COMPLETED
Start: 2019-11-27 | End: 2019-11-27

## 2019-11-27 RX ORDER — CYANOCOBALAMIN 1000 UG/ML
1000 INJECTION INTRAMUSCULAR; SUBCUTANEOUS ONCE
Status: CANCELLED
Start: 2019-11-28

## 2019-11-27 RX ADMIN — CYANOCOBALAMIN 1000 MCG: 1000 INJECTION, SOLUTION INTRAMUSCULAR; SUBCUTANEOUS at 15:07

## 2019-12-12 ENCOUNTER — HOSPITAL ENCOUNTER (OUTPATIENT)
Dept: INFUSION THERAPY | Age: 69
Discharge: HOME | End: 2019-12-12
Payer: MEDICARE

## 2019-12-12 DIAGNOSIS — D69.6 THROMBOCYTOPENIA (HCC): ICD-10-CM

## 2019-12-12 DIAGNOSIS — E53.8 B12 DEFICIENCY: Primary | ICD-10-CM

## 2019-12-12 PROCEDURE — 6360000002 HC RX W HCPCS: Performed by: PHYSICIAN ASSISTANT

## 2019-12-12 PROCEDURE — 96372 THER/PROPH/DIAG INJ SC/IM: CPT

## 2019-12-12 RX ORDER — CYANOCOBALAMIN 1000 UG/ML
1000 INJECTION INTRAMUSCULAR; SUBCUTANEOUS ONCE
Status: COMPLETED
Start: 2019-12-12 | End: 2019-12-12

## 2019-12-12 RX ORDER — CYANOCOBALAMIN 1000 UG/ML
1000 INJECTION INTRAMUSCULAR; SUBCUTANEOUS ONCE
Status: CANCELLED
Start: 2019-12-19

## 2019-12-12 RX ADMIN — CYANOCOBALAMIN 1000 MCG: 1000 INJECTION, SOLUTION INTRAMUSCULAR; SUBCUTANEOUS at 14:44

## 2019-12-15 ENCOUNTER — HOSPITAL ENCOUNTER (EMERGENCY)
Facility: HOSPITAL | Age: 69
Discharge: HOME | End: 2019-12-15
Attending: EMERGENCY MEDICINE | Admitting: EMERGENCY MEDICINE

## 2019-12-15 ENCOUNTER — APPOINTMENT (OUTPATIENT)
Dept: GENERAL RADIOLOGY | Facility: HOSPITAL | Age: 69
End: 2019-12-15

## 2019-12-15 VITALS
BODY MASS INDEX: 30.31 KG/M2 | SYSTOLIC BLOOD PRESSURE: 130 MMHG | OXYGEN SATURATION: 97 % | TEMPERATURE: 98 F | HEART RATE: 68 BPM | RESPIRATION RATE: 21 BRPM | WEIGHT: 200 LBS | HEIGHT: 68 IN | DIASTOLIC BLOOD PRESSURE: 72 MMHG

## 2019-12-15 DIAGNOSIS — R07.81 PLEURITIC CHEST PAIN: Primary | ICD-10-CM

## 2019-12-15 LAB
ALBUMIN SERPL-MCNC: 4.4 G/DL (ref 3.5–5.2)
ALBUMIN/GLOB SERPL: 1.8 G/DL
ALP SERPL-CCNC: 88 U/L (ref 39–117)
ALT SERPL W P-5'-P-CCNC: 15 U/L (ref 1–41)
ANION GAP SERPL CALCULATED.3IONS-SCNC: 9 MMOL/L (ref 5–15)
AST SERPL-CCNC: 17 U/L (ref 1–40)
BASOPHILS # BLD AUTO: 0.02 10*3/MM3 (ref 0–0.2)
BASOPHILS NFR BLD AUTO: 0.2 % (ref 0–1.5)
BILIRUB SERPL-MCNC: 0.2 MG/DL (ref 0.2–1.2)
BUN BLD-MCNC: 27 MG/DL (ref 8–23)
BUN/CREAT SERPL: 21.6 (ref 7–25)
CALCIUM SPEC-SCNC: 9.8 MG/DL (ref 8.6–10.5)
CHLORIDE SERPL-SCNC: 102 MMOL/L (ref 98–107)
CO2 SERPL-SCNC: 29 MMOL/L (ref 22–29)
CREAT BLD-MCNC: 1.25 MG/DL (ref 0.76–1.27)
D DIMER PPP FEU-MCNC: 0.96 MG/L (FEU) (ref 0–0.5)
DEPRECATED RDW RBC AUTO: 48.6 FL (ref 37–54)
EOSINOPHIL # BLD AUTO: 0.25 10*3/MM3 (ref 0–0.4)
EOSINOPHIL NFR BLD AUTO: 2.5 % (ref 0.3–6.2)
ERYTHROCYTE [DISTWIDTH] IN BLOOD BY AUTOMATED COUNT: 13.6 % (ref 12.3–15.4)
GFR SERPL CREATININE-BSD FRML MDRD: 57 ML/MIN/1.73
GLOBULIN UR ELPH-MCNC: 2.4 GM/DL
GLUCOSE BLD-MCNC: 158 MG/DL (ref 65–99)
HCT VFR BLD AUTO: 39.3 % (ref 37.5–51)
HGB BLD-MCNC: 13 G/DL (ref 13–17.7)
HOLD SPECIMEN: NORMAL
HOLD SPECIMEN: NORMAL
LYMPHOCYTES # BLD AUTO: 1.19 10*3/MM3 (ref 0.7–3.1)
LYMPHOCYTES NFR BLD AUTO: 12.1 % (ref 19.6–45.3)
MCH RBC QN AUTO: 31.8 PG (ref 26.6–33)
MCHC RBC AUTO-ENTMCNC: 33.1 G/DL (ref 31.5–35.7)
MCV RBC AUTO: 96.1 FL (ref 79–97)
MONOCYTES # BLD AUTO: 0.69 10*3/MM3 (ref 0.1–0.9)
MONOCYTES NFR BLD AUTO: 7 % (ref 5–12)
NEUTROPHILS # BLD AUTO: 7.67 10*3/MM3 (ref 1.7–7)
NEUTROPHILS NFR BLD AUTO: 77.8 % (ref 42.7–76)
PLATELET # BLD AUTO: 138 10*3/MM3 (ref 140–450)
PMV BLD AUTO: 9.2 FL (ref 6–12)
POTASSIUM BLD-SCNC: 4.8 MMOL/L (ref 3.5–5.2)
PROT SERPL-MCNC: 6.8 G/DL (ref 6–8.5)
RBC # BLD AUTO: 4.09 10*6/MM3 (ref 4.14–5.8)
SODIUM BLD-SCNC: 140 MMOL/L (ref 136–145)
TROPONIN T SERPL-MCNC: <0.01 NG/ML (ref 0–0.03)
WBC NRBC COR # BLD: 9.86 10*3/MM3 (ref 3.4–10.8)
WHOLE BLOOD HOLD SPECIMEN: NORMAL
WHOLE BLOOD HOLD SPECIMEN: NORMAL

## 2019-12-15 PROCEDURE — 93005 ELECTROCARDIOGRAM TRACING: CPT | Performed by: EMERGENCY MEDICINE

## 2019-12-15 PROCEDURE — 80053 COMPREHEN METABOLIC PANEL: CPT | Performed by: EMERGENCY MEDICINE

## 2019-12-15 PROCEDURE — 85379 FIBRIN DEGRADATION QUANT: CPT | Performed by: EMERGENCY MEDICINE

## 2019-12-15 PROCEDURE — 71045 X-RAY EXAM CHEST 1 VIEW: CPT

## 2019-12-15 PROCEDURE — 25010000002 KETOROLAC TROMETHAMINE PER 15 MG: Performed by: EMERGENCY MEDICINE

## 2019-12-15 PROCEDURE — 99284 EMERGENCY DEPT VISIT MOD MDM: CPT

## 2019-12-15 PROCEDURE — 85025 COMPLETE CBC W/AUTO DIFF WBC: CPT | Performed by: EMERGENCY MEDICINE

## 2019-12-15 PROCEDURE — 96374 THER/PROPH/DIAG INJ IV PUSH: CPT

## 2019-12-15 PROCEDURE — 84484 ASSAY OF TROPONIN QUANT: CPT | Performed by: EMERGENCY MEDICINE

## 2019-12-15 PROCEDURE — 93010 ELECTROCARDIOGRAM REPORT: CPT | Performed by: INTERNAL MEDICINE

## 2019-12-15 RX ORDER — METHYLPREDNISOLONE 4 MG/1
TABLET ORAL
Qty: 1 EACH | Refills: 0 | Status: SHIPPED | OUTPATIENT
Start: 2019-12-15 | End: 2020-09-08

## 2019-12-15 RX ORDER — HYDROCODONE BITARTRATE AND ACETAMINOPHEN 7.5; 325 MG/1; MG/1
1 TABLET ORAL EVERY 4 HOURS PRN
Qty: 10 TABLET | Refills: 0 | Status: SHIPPED | OUTPATIENT
Start: 2019-12-15 | End: 2020-09-08 | Stop reason: ALTCHOICE

## 2019-12-15 RX ORDER — SODIUM CHLORIDE 0.9 % (FLUSH) 0.9 %
10 SYRINGE (ML) INJECTION AS NEEDED
Status: DISCONTINUED | OUTPATIENT
Start: 2019-12-15 | End: 2019-12-15 | Stop reason: HOSPADM

## 2019-12-15 RX ORDER — KETOROLAC TROMETHAMINE 15 MG/ML
15 INJECTION, SOLUTION INTRAMUSCULAR; INTRAVENOUS ONCE
Status: COMPLETED | OUTPATIENT
Start: 2019-12-15 | End: 2019-12-15

## 2019-12-15 RX ADMIN — KETOROLAC TROMETHAMINE 15 MG: 15 INJECTION, SOLUTION INTRAMUSCULAR; INTRAVENOUS at 13:55

## 2019-12-15 NOTE — ED PROVIDER NOTES
Subjective   Patient complains of sudden chest pain that started about 2 hours ago.  He describes it as a sharp pain along the left side of the sternal border.  It hurts whenever he breathes or moves or coughs in any way.  He has had a cough and congestion over the last couple days and has been using some Mucinex to try to loosen the phlegm up but this pain started today.  He cannot use his nebulizer well because the pain was so severe.  He is not feeling short of breath when he is being still when the pain hurts and bad that he cannot breathe deeply.  He does have a cardiac stent and says some of this feels like that pain also.      History provided by:  Patient   used: No    Pain With Breathing   Location:  Left chest  Quality:  Sharp  Severity:  Severe  Onset quality:  Sudden  Duration:  2 hours  Timing:  Constant  Progression:  Waxing and waning  Chronicity:  New  Associated symptoms: chest pain and cough    Associated symptoms: no abdominal pain, no congestion, no diarrhea, no ear pain, no fatigue, no fever, no headaches, no loss of consciousness, no myalgias, no nausea, no rash, no rhinorrhea, no shortness of breath, no sore throat, no vomiting and no wheezing        Review of Systems   Constitutional: Negative.  Negative for fatigue and fever.   HENT: Negative.  Negative for congestion, ear pain, rhinorrhea and sore throat.    Respiratory: Positive for cough. Negative for shortness of breath and wheezing.    Cardiovascular: Positive for chest pain.   Gastrointestinal: Negative.  Negative for abdominal pain, diarrhea, nausea and vomiting.   Genitourinary: Negative.    Musculoskeletal: Negative.  Negative for myalgias.   Skin: Negative.  Negative for rash.   Neurological: Negative.  Negative for loss of consciousness and headaches.   Psychiatric/Behavioral: Negative.    All other systems reviewed and are negative.      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    • Seborrheic keratosis    • Type 2 diabetes mellitus (CMS/HCC)        Allergies   Allergen Reactions   • Penicillins Other (See Comments)     Made pt \"black out\"       Past Surgical History:   Procedure Laterality Date   • APPENDECTOMY     • CAPSULE ENDOSCOPY  09/27/2012    Normal; Gastric passage time: 0h42m; Small bowel passage time: 6h33m   • COLONOSCOPY  09/14/2015    8mm tubular adenomatous polyp in the mid transverse colon; Diverticulosis; Internal hemorrhoids; Repeat 5 years   • COLONOSCOPY  06/20/2012    8mm hyperplastic polyp in the ascending colon; Internal hemorrhoids; Repeat 3-4 years   • COLONOSCOPY  01/19/2010    Less than 3mm adenomatous polyp in the cecum; 5mm adenomatous polyp in the ascending colon; 7mm hyperplastic polyp in the transverse colon; 1cm tubular adenomatous polyp in the sigmoid colon; Internal hemorrhoids; Repeat 3 years   • COLONOSCOPY  09/11/2008    Dr. Mittal-Small, pedunculated adenomatous polyp at 30cm; Very tortuous colon not allowing visualization to the base of the cecum; Anusitis; Repeat 3 years   • COLONOSCOPY  07/16/2007    Multiple polyps in the sigmoid colon-Six 1cm hyperplastic polyps removed, but not all polyps were removed; Five hyperplastic rectal polyps removed, but not all polyps were removed; Brown spot of no significance in the ascending colon; Repeat flex sig in 3-6 months; Repeat full colon in 1 year   • ENDOSCOPY  06/20/2012    HH; Duodenitis   • ENDOSCOPY  09/18/2008    Dr. Mittal-Obvious Mckeon's esophagus; HH; Gastritis    • ENDOSCOPY  03/27/2007    Irregular Z-line-rule out Mckeon's-path showed no evidence of intestinal metaplasia   • ENDOSCOPY N/A 8/14/2019    Procedure: ESOPHAGOGASTRODUODENOSCOPY WITH ANESTHESIA;  Surgeon: " Angy Syed MD;  Location: [facility] ENDOSCOPY;  Service: Gastroenterology   • SKIN LESION EXCISION      CHEST X2       Family History   Problem Relation Age of Onset   • Cancer Mother    • Heart disease Father    • Colon cancer Neg Hx    • Colon polyps Neg Hx    • Esophageal cancer Neg Hx    • Liver disease Neg Hx    • Liver cancer Neg Hx    • Rectal cancer Neg Hx    • Stomach cancer Neg Hx        Social History     Socioeconomic History   • Marital status:      Spouse name: Not on file   • Number of children: Not on file   • Years of education: Not on file   • Highest education level: Not on file   Tobacco Use   • Smoking status: Former Smoker   • Smokeless tobacco: Never Used   Substance and Sexual Activity   • Alcohol use: No   • Drug use: Defer       Prior to Admission medications    Medication Sig Start Date End Date Taking? Authorizing Provider   albuterol (VENTOLIN HFA) 108 (90 BASE) MCG/ACT inhaler INHALE 2 PUFFS INTO THE LUNGS EVERY 6 HOURS AS NEEDED. 12/27/16   Mary Mohan MD   cetirizine (zyrTEC) 10 MG tablet Take 1 tablet by mouth Daily. 6/4/18   Mary Mohan MD   escitalopram (LEXAPRO) 20 MG tablet Take 20 mg by mouth daily  10/22/15   Mary Mohan MD   gabapentin (NEURONTIN) 300 MG capsule Take 1 capsule by mouth 3 times daily 9/28/15   Mary Mohan MD   Glucose Blood (BLOOD GLUCOSE TEST) strip Test once daily. Medtrix test stripes 12/27/16   Mary Mohan MD   LANCETS ULTRA THIN misc TEST ONCE DAILY 12/27/16   Mary Mohan MD   lisinopril (PRINIVIL,ZESTRIL) 40 MG tablet TAKE ONE TABLET BY MOUTH DAILY. 12/27/16   Mary Mohan MD   metFORMIN (GLUCOPHAGE) 1000 MG tablet TAKE ONE TABLET BY MOUTH TWO TIMES A DAY WITH MEALS 12/27/16   Mary Mohan MD   Naloxegol Oxalate (MOVANTIK) 12.5 MG tablet Take 12.5 mg by mouth Every Morning.    Mary Mohan MD   oxyCODONE-acetaminophen (PERCOCET)  MG per tablet Every 8  (Eight) Hours.    ProviderMary MD   pantoprazole (PROTONIX) 40 MG EC tablet Take 1 tablet by mouth Daily. 8/5/19   Tonya Marie APRN   pravastatin (PRAVACHOL) 80 MG tablet TAKE ONE TABLET BY MOUTH EVERY EVENING. 12/27/16   Mary Mohan MD   tamsulosin (FLOMAX) 0.4 MG capsule 24 hr capsule Take 1 capsule by mouth daily 11/29/16   Mary Mohan MD   tiotropium (SPIRIVA HANDIHALER) 18 MCG per inhalation capsule Inhale 1 capsule into the lungs daily 9/28/15   Mary Mohan MD       Medications   sodium chloride 0.9 % flush 10 mL (has no administration in time range)   sodium chloride 0.9 % flush 10 mL (has no administration in time range)   ketorolac (TORADOL) injection 15 mg (15 mg Intravenous Given 12/15/19 1355)       Vitals:    12/15/19 1445   BP: 134/72   Pulse: 67   Resp: 20   Temp:    SpO2: 94%         Objective   Physical Exam   Constitutional: He is oriented to person, place, and time. He appears well-developed and well-nourished.   HENT:   Head: Normocephalic and atraumatic.   Neck: Normal range of motion. Neck supple.   Cardiovascular: Normal rate and regular rhythm.   Pulmonary/Chest: Effort normal and breath sounds normal.   Chest wall is tender to palpation in the left side next to the sternal border.  There is no crepitus or deformity noted.   Abdominal: Soft. Bowel sounds are normal.   Musculoskeletal: Normal range of motion.   Neurological: He is alert and oriented to person, place, and time.   Skin: Skin is warm and dry.   Psychiatric: He has a normal mood and affect. His behavior is normal.   Nursing note and vitals reviewed.      Procedures         Lab Results (last 24 hours)     Procedure Component Value Units Date/Time    CBC & Differential [[id number]] Collected:  12/15/19 1353    Specimen:  Blood Updated:  12/15/19 1421    Narrative:       The following orders were created for panel order CBC & Differential.  Procedure                               Abnormality          Status                     ---------                               -----------         ------                     CBC Auto Differential[[id number]]        Abnormal            Final result                 Please view results for these tests on the individual orders.    Comprehensive Metabolic Panel [[id number]]  (Abnormal) Collected:  12/15/19 1353    Specimen:  Blood Updated:  12/15/19 1425     Glucose 158 mg/dL      BUN 27 mg/dL      Creatinine 1.25 mg/dL      Sodium 140 mmol/L      Potassium 4.8 mmol/L      Chloride 102 mmol/L      CO2 29.0 mmol/L      Calcium 9.8 mg/dL      Total Protein 6.8 g/dL      Albumin 4.40 g/dL      ALT (SGPT) 15 U/L      AST (SGOT) 17 U/L      Alkaline Phosphatase 88 U/L      Total Bilirubin 0.2 mg/dL      eGFR Non African Amer 57 mL/min/1.73      Globulin 2.4 gm/dL      A/G Ratio 1.8 g/dL      BUN/Creatinine Ratio 21.6     Anion Gap 9.0 mmol/L     Narrative:       GFR Normal >60  Chronic Kidney Disease <60  Kidney Failure <15      Troponin [[id number]]  (Normal) Collected:  12/15/19 1353    Specimen:  Blood Updated:  12/15/19 1425     Troponin T <0.010 ng/mL     Narrative:       Troponin T Reference Range:  <= 0.03 ng/mL-   Negative for AMI  >0.03 ng/mL-     Abnormal for myocardial necrosis.  Clinicians would have to utilize clinical acumen, EKG, Troponin and serial changes to determine if it is an Acute Myocardial Infarction or myocardial injury due to an underlying chronic condition.     D-dimer, Quantitative [[id number]]  (Abnormal) Collected:  12/15/19 1353    Specimen:  Blood Updated:  12/15/19 1447     D-Dimer, Quantitative 0.96 mg/L (FEU)     Narrative:       Reference Range is 0-0.50 mg/L FEU. However, results <0.50 mg/L FEU tends to rule out DVT or PE. Results >0.50 mg/L FEU are not useful in predicting absence or presence of DVT or PE.      CBC Auto Differential [[id number]]  (Abnormal) Collected:  12/15/19 1353    Specimen:  Blood Updated:  12/15/19 1421     WBC 9.86  10*3/mm3      RBC 4.09 10*6/mm3      Hemoglobin 13.0 g/dL      Hematocrit 39.3 %      MCV 96.1 fL      MCH 31.8 pg      MCHC 33.1 g/dL      RDW 13.6 %      RDW-SD 48.6 fl      MPV 9.2 fL      Platelets 138 10*3/mm3      Neutrophil % 77.8 %      Lymphocyte % 12.1 %      Monocyte % 7.0 %      Eosinophil % 2.5 %      Basophil % 0.2 %      Neutrophils, Absolute 7.67 10*3/mm3      Lymphocytes, Absolute 1.19 10*3/mm3      Monocytes, Absolute 0.69 10*3/mm3      Eosinophils, Absolute 0.25 10*3/mm3      Basophils, Absolute 0.02 10*3/mm3           XR Chest 1 View   Final Result   1. No radiographic evidence of acute cardiopulmonary process.           This report was finalized on 12/15/2019 14:54 by Dr. Ryan Mancuso MD.          ED Course  ED Course as of Dec 15 1521   Sun Dec 15, 2019   1513 Told the patient the studies here all seem to be okay.  There are no signs of heart attack and his chest x-ray is okay.  His dimer is mildly elevated but I think that is not high enough to be of great concern.  His description is appropriate description pleuritic pain.  We will treat that and see if we get him better.  He is discharged in stable condition.    [TR]      ED Course User Index  [TR] Trip Thompson Jr., MD          MDM  Number of Diagnoses or Management Options  Pleuritic chest pain: new and requires workup     Amount and/or Complexity of Data Reviewed  Clinical lab tests: ordered and reviewed  Tests in the radiology section of CPT®: ordered and reviewed  Tests in the medicine section of CPT®: ordered and reviewed    Risk of Complications, Morbidity, and/or Mortality  Presenting problems: moderate  Diagnostic procedures: moderate  Management options: moderate    Patient Progress  Patient progress: stable      Final diagnoses:   Pleuritic chest pain          Trip Thompson Jr., MD  12/15/19 1521

## 2019-12-20 ENCOUNTER — OFFICE VISIT (OUTPATIENT)
Dept: CARDIOLOGY | Age: 69
End: 2019-12-20
Payer: MEDICARE

## 2019-12-20 VITALS
WEIGHT: 213.2 LBS | DIASTOLIC BLOOD PRESSURE: 74 MMHG | BODY MASS INDEX: 30.52 KG/M2 | HEIGHT: 70 IN | SYSTOLIC BLOOD PRESSURE: 142 MMHG | HEART RATE: 68 BPM

## 2019-12-20 DIAGNOSIS — I10 ESSENTIAL HYPERTENSION: ICD-10-CM

## 2019-12-20 DIAGNOSIS — E78.5 DYSLIPIDEMIA: ICD-10-CM

## 2019-12-20 DIAGNOSIS — D69.6 THROMBOCYTOPENIA (HCC): ICD-10-CM

## 2019-12-20 DIAGNOSIS — I25.10 CORONARY ARTERY DISEASE INVOLVING NATIVE CORONARY ARTERY OF NATIVE HEART WITHOUT ANGINA PECTORIS: Primary | ICD-10-CM

## 2019-12-20 PROCEDURE — G8482 FLU IMMUNIZE ORDER/ADMIN: HCPCS | Performed by: CLINICAL NURSE SPECIALIST

## 2019-12-20 PROCEDURE — 99214 OFFICE O/P EST MOD 30 MIN: CPT | Performed by: CLINICAL NURSE SPECIALIST

## 2019-12-20 PROCEDURE — 1123F ACP DISCUSS/DSCN MKR DOCD: CPT | Performed by: CLINICAL NURSE SPECIALIST

## 2019-12-20 PROCEDURE — G8598 ASA/ANTIPLAT THER USED: HCPCS | Performed by: CLINICAL NURSE SPECIALIST

## 2019-12-20 PROCEDURE — G8427 DOCREV CUR MEDS BY ELIG CLIN: HCPCS | Performed by: CLINICAL NURSE SPECIALIST

## 2019-12-20 PROCEDURE — 4040F PNEUMOC VAC/ADMIN/RCVD: CPT | Performed by: CLINICAL NURSE SPECIALIST

## 2019-12-20 PROCEDURE — 3017F COLORECTAL CA SCREEN DOC REV: CPT | Performed by: CLINICAL NURSE SPECIALIST

## 2019-12-20 PROCEDURE — G8417 CALC BMI ABV UP PARAM F/U: HCPCS | Performed by: CLINICAL NURSE SPECIALIST

## 2019-12-20 PROCEDURE — 1036F TOBACCO NON-USER: CPT | Performed by: CLINICAL NURSE SPECIALIST

## 2019-12-20 RX ORDER — METHYLPREDNISOLONE 4 MG/1
4 TABLET ORAL DAILY
COMMUNITY
Start: 2019-12-15 | End: 2020-02-13 | Stop reason: ALTCHOICE

## 2019-12-26 ENCOUNTER — HOSPITAL ENCOUNTER (OUTPATIENT)
Dept: INFUSION THERAPY | Age: 69
Discharge: HOME | End: 2019-12-26
Payer: MEDICARE

## 2019-12-26 DIAGNOSIS — D69.6 THROMBOCYTOPENIA (HCC): ICD-10-CM

## 2019-12-26 DIAGNOSIS — E53.8 B12 DEFICIENCY: Primary | ICD-10-CM

## 2019-12-26 PROCEDURE — 6360000002 HC RX W HCPCS: Performed by: PHYSICIAN ASSISTANT

## 2019-12-26 PROCEDURE — 96372 THER/PROPH/DIAG INJ SC/IM: CPT

## 2019-12-26 RX ORDER — CYANOCOBALAMIN 1000 UG/ML
1000 INJECTION INTRAMUSCULAR; SUBCUTANEOUS ONCE
Status: CANCELLED
Start: 2020-01-02

## 2019-12-26 RX ORDER — CYANOCOBALAMIN 1000 UG/ML
1000 INJECTION INTRAMUSCULAR; SUBCUTANEOUS ONCE
Status: COMPLETED
Start: 2019-12-26 | End: 2019-12-26

## 2019-12-26 RX ADMIN — CYANOCOBALAMIN 1000 MCG: 1000 INJECTION, SOLUTION INTRAMUSCULAR; SUBCUTANEOUS at 15:25

## 2020-01-13 ENCOUNTER — OFFICE VISIT (OUTPATIENT)
Dept: PRIMARY CARE CLINIC | Age: 70
End: 2020-01-13
Payer: MEDICARE

## 2020-01-13 VITALS
DIASTOLIC BLOOD PRESSURE: 78 MMHG | TEMPERATURE: 97.3 F | HEIGHT: 71 IN | BODY MASS INDEX: 30.18 KG/M2 | HEART RATE: 54 BPM | OXYGEN SATURATION: 98 % | SYSTOLIC BLOOD PRESSURE: 130 MMHG | WEIGHT: 215.6 LBS

## 2020-01-13 DIAGNOSIS — I10 ESSENTIAL HYPERTENSION: ICD-10-CM

## 2020-01-13 DIAGNOSIS — E11.9 TYPE 2 DIABETES MELLITUS WITHOUT COMPLICATION, WITHOUT LONG-TERM CURRENT USE OF INSULIN (HCC): ICD-10-CM

## 2020-01-13 DIAGNOSIS — E78.5 HYPERLIPIDEMIA, UNSPECIFIED HYPERLIPIDEMIA TYPE: ICD-10-CM

## 2020-01-13 PROBLEM — E11.42 DIABETIC PERIPHERAL NEUROPATHY ASSOCIATED WITH TYPE 2 DIABETES MELLITUS (HCC): Status: ACTIVE | Noted: 2020-01-13

## 2020-01-13 LAB
ALBUMIN SERPL-MCNC: 4.5 G/DL (ref 3.5–5.2)
ALP BLD-CCNC: 68 U/L (ref 40–130)
ALT SERPL-CCNC: 17 U/L (ref 5–41)
AMPHETAMINE SCREEN, URINE: NORMAL
ANION GAP SERPL CALCULATED.3IONS-SCNC: 11 MMOL/L (ref 7–19)
AST SERPL-CCNC: 20 U/L (ref 5–40)
BARBITURATE SCREEN, URINE: NORMAL
BASOPHILS ABSOLUTE: 0 K/UL (ref 0–0.2)
BASOPHILS RELATIVE PERCENT: 0.5 % (ref 0–1)
BENZODIAZEPINE SCREEN, URINE: NORMAL
BILIRUB SERPL-MCNC: 0.6 MG/DL (ref 0.2–1.2)
BUN BLDV-MCNC: 20 MG/DL (ref 8–23)
BUPRENORPHINE URINE: NORMAL
CALCIUM SERPL-MCNC: 9.7 MG/DL (ref 8.8–10.2)
CHLORIDE BLD-SCNC: 104 MMOL/L (ref 98–111)
CHOLESTEROL, FASTING: 150 MG/DL (ref 160–199)
CO2: 25 MMOL/L (ref 22–29)
COCAINE METABOLITE SCREEN URINE: NORMAL
CREAT SERPL-MCNC: 1.3 MG/DL (ref 0.5–1.2)
CREATININE URINE: 117.1 MG/DL (ref 4.2–622)
EOSINOPHILS ABSOLUTE: 0.2 K/UL (ref 0–0.6)
EOSINOPHILS RELATIVE PERCENT: 3.2 % (ref 0–5)
GABAPENTIN SCREEN, URINE: NORMAL
GFR NON-AFRICAN AMERICAN: 55
GLUCOSE BLD-MCNC: 91 MG/DL (ref 74–109)
HBA1C MFR BLD: 5.2 % (ref 4–6)
HCT VFR BLD CALC: 44.1 % (ref 42–52)
HDLC SERPL-MCNC: 71 MG/DL (ref 55–121)
HEMOGLOBIN: 14.3 G/DL (ref 14–18)
IMMATURE GRANULOCYTES #: 0 K/UL
LDL CHOLESTEROL CALCULATED: 63 MG/DL
LYMPHOCYTES ABSOLUTE: 1.6 K/UL (ref 1.1–4.5)
LYMPHOCYTES RELATIVE PERCENT: 25.9 % (ref 20–40)
MCH RBC QN AUTO: 31.8 PG (ref 27–31)
MCHC RBC AUTO-ENTMCNC: 32.4 G/DL (ref 33–37)
MCV RBC AUTO: 98.2 FL (ref 80–94)
MDMA URINE: NORMAL
METHADONE SCREEN, URINE: NORMAL
METHAMPHETAMINE, URINE: NORMAL
MICROALBUMIN UR-MCNC: <1.2 MG/DL (ref 0–19)
MICROALBUMIN/CREAT UR-RTO: NORMAL MG/G
MONOCYTES ABSOLUTE: 0.4 K/UL (ref 0–0.9)
MONOCYTES RELATIVE PERCENT: 6.9 % (ref 0–10)
NEUTROPHILS ABSOLUTE: 4 K/UL (ref 1.5–7.5)
NEUTROPHILS RELATIVE PERCENT: 63.2 % (ref 50–65)
OPIATE SCREEN URINE: NORMAL
OXYCODONE SCREEN URINE: NORMAL
PDW BLD-RTO: 13.5 % (ref 11.5–14.5)
PHENCYCLIDINE SCREEN URINE: NORMAL
PLATELET # BLD: 116 K/UL (ref 130–400)
PMV BLD AUTO: 9.6 FL (ref 9.4–12.4)
POTASSIUM SERPL-SCNC: 5 MMOL/L (ref 3.5–5)
PROPOXYPHENE SCREEN, URINE: NORMAL
RBC # BLD: 4.49 M/UL (ref 4.7–6.1)
SODIUM BLD-SCNC: 140 MMOL/L (ref 136–145)
THC SCREEN, URINE: NORMAL
TOTAL PROTEIN: 7.6 G/DL (ref 6.6–8.7)
TRICYCLIC ANTIDEPRESSANTS, UR: NORMAL
TRIGLYCERIDE, FASTING: 80 MG/DL (ref 0–149)
WBC # BLD: 6.3 K/UL (ref 4.8–10.8)

## 2020-01-13 PROCEDURE — G8482 FLU IMMUNIZE ORDER/ADMIN: HCPCS | Performed by: NURSE PRACTITIONER

## 2020-01-13 PROCEDURE — G0439 PPPS, SUBSEQ VISIT: HCPCS | Performed by: NURSE PRACTITIONER

## 2020-01-13 PROCEDURE — 80305 DRUG TEST PRSMV DIR OPT OBS: CPT | Performed by: NURSE PRACTITIONER

## 2020-01-13 PROCEDURE — 1123F ACP DISCUSS/DSCN MKR DOCD: CPT | Performed by: NURSE PRACTITIONER

## 2020-01-13 PROCEDURE — 3017F COLORECTAL CA SCREEN DOC REV: CPT | Performed by: NURSE PRACTITIONER

## 2020-01-13 PROCEDURE — 4040F PNEUMOC VAC/ADMIN/RCVD: CPT | Performed by: NURSE PRACTITIONER

## 2020-01-13 PROCEDURE — 3044F HG A1C LEVEL LT 7.0%: CPT | Performed by: NURSE PRACTITIONER

## 2020-01-13 RX ORDER — TRIAMCINOLONE ACETONIDE 1 MG/G
CREAM TOPICAL
Qty: 1 TUBE | Refills: 1 | Status: SHIPPED | OUTPATIENT
Start: 2020-01-13

## 2020-01-13 ASSESSMENT — PATIENT HEALTH QUESTIONNAIRE - PHQ9
SUM OF ALL RESPONSES TO PHQ QUESTIONS 1-9: 0
SUM OF ALL RESPONSES TO PHQ QUESTIONS 1-9: 0

## 2020-01-13 NOTE — PATIENT INSTRUCTIONS
Personalized Preventive Plan for Erica Martinez - 1/13/2020  Medicare offers a range of preventive health benefits. Some of the tests and screenings are paid in full while other may be subject to a deductible, co-insurance, and/or copay. Some of these benefits include a comprehensive review of your medical history including lifestyle, illnesses that may run in your family, and various assessments and screenings as appropriate. After reviewing your medical record and screening and assessments performed today your provider may have ordered immunizations, labs, imaging, and/or referrals for you. A list of these orders (if applicable) as well as your Preventive Care list are included within your After Visit Summary for your review. Other Preventive Recommendations:    · A preventive eye exam performed by an eye specialist is recommended every 1-2 years to screen for glaucoma; cataracts, macular degeneration, and other eye disorders. · A preventive dental visit is recommended every 6 months. · Try to get at least 150 minutes of exercise per week or 10,000 steps per day on a pedometer . · Order or download the FREE \"Exercise & Physical Activity: Your Everyday Guide\" from [employer]. Call 4-318.395.6260 or search [employer] online. · You need 2089-4046 mg of calcium and 8305-0003 IU of vitamin D per day. It is possible to meet your calcium requirement with diet alone, but a vitamin D supplement is usually necessary to meet this goal.  · When exposed to the sun, use a sunscreen that protects against both UVA and UVB radiation with an SPF of 30 or greater. Reapply every 2 to 3 hours or after sweating, drying off with a towel, or swimming. · Always wear a seat belt when traveling in a car. Always wear a helmet when riding a bicycle or motorcycle.

## 2020-01-14 ENCOUNTER — TELEPHONE (OUTPATIENT)
Dept: PSYCHIATRY | Age: 70
End: 2020-01-14

## 2020-01-14 NOTE — TELEPHONE ENCOUNTER
The number [phone] is the sons phone number and he said we needed to call his father at [phone].

## 2020-01-14 NOTE — TELEPHONE ENCOUNTER
Called and spoke with patient to get him scheduled to see Jose Kaiser and he refused   an appointment and said if he felt like it he would call us at a later time to scheduled an appointment. Our information was given to the patient.

## 2020-01-20 ENCOUNTER — TELEPHONE (OUTPATIENT)
Dept: PSYCHIATRY | Age: 70
End: 2020-01-20

## 2020-01-23 ENCOUNTER — TELEPHONE (OUTPATIENT)
Dept: PRIMARY CARE CLINIC | Age: 70
End: 2020-01-23

## 2020-01-29 NOTE — PROGRESS NOTES
Progress Note      Pt Name: Ashley Collado  YOB: 1950  MRN: [id number]    Date of evaluation: 1/30/2020  History Obtained From:  patient, spouse, electronic medical record    CHIEF COMPLAINT:    Chief Complaint   Patient presents with    Other     Thrombocytopenia      Current active problems  Thrombocytopenia  B12 deficiency    HISTORY OF PRESENT ILLNESS:    Ashley Collado is a 77 y.o.  male with B12 deficiency and mild to moderate thrombocytopenia followed in the office since 9/19/2019. He was set up for B12 replacement however did not come for his follow-up injections. I saw him on 10/31/2019 and we set up weekly B12 x4 then B12 every 2 weeks for 2 doses. He is now on the monthly dosing. He continues to have significant fatigue. Burning of his feet has not improved any. Dr. Cheryl Crook did place some cardiac stents and he is now on Plavix and aspirin. He denies any COPD exacerbation. Blood pressure is stable at this time. He is diabetic but again does not know his A1c. HEMATOLOGY HISTORY: Thrombocytopenia, B12 deficiency  Katarzyna Banks was seen in initial hematology consultation on 9/19/2019 referred [address] [place] [address] for evaluation of thrombocytopenia.     Katarzyna Banks presented to his initial visit with his wife. I asked him if he had ever had a prior issues with low platelets and he denied knowing any history. He did report that he bruised easily and bleeds easily at times, \"as long as I can remember\". He denied taking aspirin or oral nonsteroidals.     CBCs obtained from epic:          Abdominal ultrasound 10/7/2015 (ordered by Dr. Figueroa Pan) revealed a \"normal spleen\"-no measurement given. I have asked him if he remembered previously being seen by Dr. Figueroa Pan, and he reported that he did not.     He denied any prior history of liver dysfunction.     He has chronic back pain and gets injections by Dr. Shukri So, and denied having any significant bleeding issues except for superficial bleeding.     He had been experiencing thrombocytopenia per records dating back to at least 2014. Recent serology on 9/4/2019 revealed that his LFTs were normal.  A PT/INR on 9/4/2019 was normal as well.     I discussed the abnormalities on his CBC that was limited to his platelets, number 1 we will maintain his wife on his initial visit. I discussed the role of platelets. He does have ecchymosis mostly on his arms. CBC on 9/19/2019 revealed a WBC of 9.94 with normal percent differential, Hgb 14 with MCV 98.8, PLT 91,000.     Serology will be requested. I discussed potential need for bone marrow aspiration and biopsy, described the procedure as well today. We will await findings from the initial serology. Serology 9/19/2019  DIC screen - negative  B12 - 295  Folate - 9.4  Copper - 80  Zinc - 70  Antiplatelet ab - Ia/IIa -negative,  IIb/IIIa POSITIVE  HIV - Non reactive  SPEP - No M spike  QI - IgG 1030, IgA 322, IgM 50 - all WNL  MMA - 253    TREATMENT SUMMARY  B12 1000 mcg IM      Past Medical History:   Diagnosis Date    CAD (coronary artery disease)     Chronic back pain     COPD (chronic obstructive pulmonary disease) (HCC)     Hypertension     Peripheral sensory neuropathy     Pleurisy     Type II or unspecified type diabetes mellitus without mention of complication, not stated as uncontrolled         Past Surgical History:   Procedure Laterality Date    APPENDECTOMY      CARDIAC CATHETERIZATION  11/04/2019    PIETER to LAD           Current Outpatient Medications:     triamcinolone (KENALOG) 0.1 % cream, Apply topically 2 times daily. , Disp: 1 Tube, Rfl: 1    diclofenac sodium (VOLTAREN) 1 % GEL, Apply 2 g topically 2 times daily, Disp: 1 Tube, Rfl: 3    methylPREDNISolone (MEDROL DOSEPACK) 4 MG tablet, Take 4 mg by mouth daily, Disp: , Rfl:     gabapentin (NEURONTIN) 600 MG tablet, Take 1 tablet by mouth 3 times daily for 90 days. , Disp: 90 tablet, Rfl: 2    pravastatin (PRAVACHOL) 80 MG tablet, TAKE ONE TABLET BY MOUTH EVERY EVENING., Disp: 90 tablet, Rfl: 3    tamsulosin (FLOMAX) 0.4 MG capsule, Take 1 capsule by mouth daily, Disp: 90 capsule, Rfl: 3    lisinopril (PRINIVIL;ZESTRIL) 40 MG tablet, TAKE ONE TABLET BY MOUTH DAILY. , Disp: 90 tablet, Rfl: 3    cetirizine (ZYRTEC) 10 MG tablet, TAKE 1 TABLET BY MOUTH DAILY, Disp: 90 tablet, Rfl: 3    pantoprazole (PROTONIX) 40 MG tablet, TAKE ONE TABLET BY MOUTH EVERY DAY, Disp: 90 tablet, Rfl: 3    Misc. Devices (WALKER) MISC, 1 each by Does not apply route daily, Disp: 1 each, Rfl: 0    azelastine (ASTELIN) 0.1 % nasal spray, 1 spray by Nasal route 2 times daily Use in each nostril as directed, Disp: 2 Bottle, Rfl: 1    albuterol sulfate HFA (VENTOLIN HFA) 108 (90 Base) MCG/ACT inhaler, INHALE 2 PUFFS INTO THE LUNGS EVERY 6 HOURS AS NEEDED., Disp: 18 g, Rfl: 3    aspirin 81 MG chewable tablet, Take 1 tablet by mouth daily, Disp: 30 tablet, Rfl: 3    nitroGLYCERIN (NITROSTAT) 0.4 MG SL tablet, up to max of 3 total doses.  If no relief after 1 dose, call 911., Disp: 25 tablet, Rfl: 3    clopidogrel (PLAVIX) 75 MG tablet, Take 1 tablet by mouth daily, Disp: 30 tablet, Rfl: 3    metFORMIN (GLUCOPHAGE) 1000 MG tablet, TAKE ONE TABLET BY MOUTH TWO TIMES A DAY WITH MEALS, Disp: 180 tablet, Rfl: 3    naloxegol (MOVANTIK) 25 MG TABS tablet, Take 1 tablet by mouth every morning, Disp: 30 tablet, Rfl: 5    Lancets MISC, Daily, Disp: 100 each, Rfl: 5    EPINEPHrine (EPIPEN) 0.3 MG/0.3ML SOAJ injection, Use as directed for allergic reaction, Disp: 0.3 mL, Rfl: 3    oxyCODONE-acetaminophen (PERCOCET)  MG per tablet, Take 1 tablet by mouth every 8 hours as needed for Pain, Disp: , Rfl:      Allergies   Allergen Reactions    Bee Venom Anaphylaxis    Pcn [Penicillins] Other (See Comments)     Made pt \"black out\"     Was  Social History     Tobacco Use    Smoking status: Former Smoker     Packs/day: 1.50     Years: 15.00     Pack years: 22.50 Types: Cigarettes     Last attempt to quit: 10/23/2000     Years since quittin.2    Smokeless tobacco: Never Used   Substance Use Topics    Alcohol use: No    Drug use: No       Family History   Problem Relation Age of Onset    Cancer Mother     Heart Disease Father        Subjective   REVIEW OF SYSTEMS:   Review of Systems   Constitutional: Positive for fatigue. Negative for chills, diaphoresis, fever and unexpected weight change. HENT: Negative for mouth sores, nosebleeds, sore throat, trouble swallowing and voice change. Eyes: Negative for photophobia, discharge and itching. Respiratory: Positive for shortness of breath. Negative for cough and wheezing. Cardiovascular: Negative for chest pain, palpitations and leg swelling. Gastrointestinal: Negative for abdominal distention, abdominal pain, blood in stool, constipation, diarrhea, nausea and vomiting. Endocrine: Negative for cold intolerance, heat intolerance, polydipsia and polyuria. Genitourinary: Negative for difficulty urinating, dysuria, hematuria and urgency. Musculoskeletal: Positive for arthralgias and back pain. Negative for joint swelling and myalgias. Skin: Negative for color change and rash. Allergic/Immunologic: Positive for environmental allergies. Neurological: Positive for numbness. Negative for dizziness, tremors, seizures, syncope and light-headedness. Hematological: Negative for adenopathy. Bruises/bleeds easily. Psychiatric/Behavioral: Negative for behavioral problems and suicidal ideas. The patient is not nervous/anxious. All other systems reviewed and are negative. Objective   /70   Pulse 57   Ht 5' 11\" (1.803 m)   Wt 212 lb 14.4 oz (96.6 kg)   SpO2 99%   BMI 29.69 kg/m²     PHYSICAL EXAM:  Physical Exam  Vitals signs reviewed. Constitutional:       General: He is not in acute distress. Appearance: He is well-developed. HENT:      Head: Normocephalic and atraumatic.       Nose:

## 2020-01-30 ENCOUNTER — HOSPITAL ENCOUNTER (OUTPATIENT)
Dept: INFUSION THERAPY | Age: 70
Discharge: HOME | End: 2020-01-30
Payer: MEDICARE

## 2020-01-30 ENCOUNTER — OFFICE VISIT (OUTPATIENT)
Dept: HEMATOLOGY | Age: 70
End: 2020-01-30
Payer: MEDICARE

## 2020-01-30 VITALS
OXYGEN SATURATION: 99 % | DIASTOLIC BLOOD PRESSURE: 70 MMHG | BODY MASS INDEX: 29.81 KG/M2 | SYSTOLIC BLOOD PRESSURE: 130 MMHG | HEIGHT: 71 IN | HEART RATE: 57 BPM | WEIGHT: 212.9 LBS

## 2020-01-30 DIAGNOSIS — E53.8 B12 DEFICIENCY: Primary | ICD-10-CM

## 2020-01-30 DIAGNOSIS — D69.6 THROMBOCYTOPENIA (HCC): ICD-10-CM

## 2020-01-30 PROCEDURE — 96372 THER/PROPH/DIAG INJ SC/IM: CPT

## 2020-01-30 PROCEDURE — 3017F COLORECTAL CA SCREEN DOC REV: CPT | Performed by: PHYSICIAN ASSISTANT

## 2020-01-30 PROCEDURE — G8417 CALC BMI ABV UP PARAM F/U: HCPCS | Performed by: PHYSICIAN ASSISTANT

## 2020-01-30 PROCEDURE — 4040F PNEUMOC VAC/ADMIN/RCVD: CPT | Performed by: PHYSICIAN ASSISTANT

## 2020-01-30 PROCEDURE — 1123F ACP DISCUSS/DSCN MKR DOCD: CPT | Performed by: PHYSICIAN ASSISTANT

## 2020-01-30 PROCEDURE — 85025 COMPLETE CBC W/AUTO DIFF WBC: CPT

## 2020-01-30 PROCEDURE — G8427 DOCREV CUR MEDS BY ELIG CLIN: HCPCS | Performed by: PHYSICIAN ASSISTANT

## 2020-01-30 PROCEDURE — 1036F TOBACCO NON-USER: CPT | Performed by: PHYSICIAN ASSISTANT

## 2020-01-30 PROCEDURE — 99213 OFFICE O/P EST LOW 20 MIN: CPT | Performed by: PHYSICIAN ASSISTANT

## 2020-01-30 PROCEDURE — 6360000002 HC RX W HCPCS: Performed by: PHYSICIAN ASSISTANT

## 2020-01-30 PROCEDURE — G8482 FLU IMMUNIZE ORDER/ADMIN: HCPCS | Performed by: PHYSICIAN ASSISTANT

## 2020-01-30 RX ORDER — CYANOCOBALAMIN 1000 UG/ML
1000 INJECTION INTRAMUSCULAR; SUBCUTANEOUS ONCE
Status: COMPLETED
Start: 2020-01-30 | End: 2020-01-30

## 2020-01-30 RX ORDER — CYANOCOBALAMIN 1000 UG/ML
1000 INJECTION INTRAMUSCULAR; SUBCUTANEOUS ONCE
Status: CANCELLED
Start: 2020-02-06

## 2020-01-30 RX ADMIN — CYANOCOBALAMIN 1000 MCG: 1000 INJECTION, SOLUTION INTRAMUSCULAR; SUBCUTANEOUS at 14:51

## 2020-01-30 ASSESSMENT — ENCOUNTER SYMPTOMS
ABDOMINAL PAIN: 0
NAUSEA: 0
VOMITING: 0
WHEEZING: 0
CONSTIPATION: 0
EYE ITCHING: 0
BACK PAIN: 1
COLOR CHANGE: 0
EYE DISCHARGE: 0
COUGH: 0
TROUBLE SWALLOWING: 0
SHORTNESS OF BREATH: 1
DIARRHEA: 0
ABDOMINAL DISTENTION: 0
BLOOD IN STOOL: 0
SORE THROAT: 0
PHOTOPHOBIA: 0
VOICE CHANGE: 0

## 2020-02-10 ENCOUNTER — OFFICE VISIT (OUTPATIENT)
Dept: URGENT CARE | Age: 70
End: 2020-02-10

## 2020-02-10 ENCOUNTER — APPOINTMENT (OUTPATIENT)
Dept: GENERAL RADIOLOGY | Age: 70
End: 2020-02-10
Payer: MEDICARE

## 2020-02-10 ENCOUNTER — HOSPITAL ENCOUNTER (EMERGENCY)
Age: 70
Discharge: HOME | End: 2020-02-10
Attending: EMERGENCY MEDICINE
Payer: MEDICARE

## 2020-02-10 ENCOUNTER — APPOINTMENT (OUTPATIENT)
Dept: CT IMAGING | Age: 70
End: 2020-02-10
Payer: MEDICARE

## 2020-02-10 VITALS
TEMPERATURE: 97.9 F | RESPIRATION RATE: 20 BRPM | DIASTOLIC BLOOD PRESSURE: 78 MMHG | HEIGHT: 71 IN | OXYGEN SATURATION: 98 % | BODY MASS INDEX: 28 KG/M2 | WEIGHT: 200 LBS | HEART RATE: 65 BPM | SYSTOLIC BLOOD PRESSURE: 143 MMHG

## 2020-02-10 VITALS
DIASTOLIC BLOOD PRESSURE: 70 MMHG | RESPIRATION RATE: 18 BRPM | HEART RATE: 73 BPM | OXYGEN SATURATION: 100 % | TEMPERATURE: 98.2 F | SYSTOLIC BLOOD PRESSURE: 112 MMHG

## 2020-02-10 LAB
ALBUMIN SERPL-MCNC: 4.5 G/DL (ref 3.5–5.2)
ALP BLD-CCNC: 62 U/L (ref 40–130)
ALT SERPL-CCNC: 9 U/L (ref 5–41)
AMPHETAMINE SCREEN, URINE: NEGATIVE
ANION GAP SERPL CALCULATED.3IONS-SCNC: 14 MMOL/L (ref 7–19)
APTT: 28.8 SEC (ref 26–36.2)
AST SERPL-CCNC: 14 U/L (ref 5–40)
BARBITURATE SCREEN URINE: NEGATIVE
BASOPHILS ABSOLUTE: 0 K/UL (ref 0–0.2)
BASOPHILS RELATIVE PERCENT: 0.2 % (ref 0–1)
BENZODIAZEPINE SCREEN, URINE: NEGATIVE
BILIRUB SERPL-MCNC: 0.7 MG/DL (ref 0.2–1.2)
BILIRUBIN URINE: NEGATIVE
BLOOD, URINE: NEGATIVE
BUN BLDV-MCNC: 17 MG/DL (ref 8–23)
CALCIUM SERPL-MCNC: 9.7 MG/DL (ref 8.8–10.2)
CANNABINOID SCREEN URINE: NEGATIVE
CHLORIDE BLD-SCNC: 100 MMOL/L (ref 98–111)
CLARITY: CLEAR
CO2: 24 MMOL/L (ref 22–29)
COCAINE METABOLITE SCREEN URINE: NEGATIVE
COLOR: YELLOW
CREAT SERPL-MCNC: 1.2 MG/DL (ref 0.5–1.2)
EOSINOPHILS ABSOLUTE: 0 K/UL (ref 0–0.6)
EOSINOPHILS RELATIVE PERCENT: 0.2 % (ref 0–5)
ETHANOL: <10 MG/DL (ref 0–0.08)
FOLATE: 14.1 NG/ML (ref 4.5–32.2)
GFR NON-AFRICAN AMERICAN: >60
GLUCOSE BLD-MCNC: 120 MG/DL (ref 74–109)
GLUCOSE URINE: NEGATIVE MG/DL
HCT VFR BLD CALC: 41.7 % (ref 42–52)
HEMOGLOBIN: 13.9 G/DL (ref 14–18)
IMMATURE GRANULOCYTES #: 0 K/UL
INR BLD: 1.08 (ref 0.88–1.18)
KETONES, URINE: NEGATIVE MG/DL
LEUKOCYTE ESTERASE, URINE: ABNORMAL
LYMPHOCYTES ABSOLUTE: 0.9 K/UL (ref 1.1–4.5)
LYMPHOCYTES RELATIVE PERCENT: 14 % (ref 20–40)
Lab: NORMAL
MCH RBC QN AUTO: 31.9 PG (ref 27–31)
MCHC RBC AUTO-ENTMCNC: 33.3 G/DL (ref 33–37)
MCV RBC AUTO: 95.6 FL (ref 80–94)
MONOCYTES ABSOLUTE: 0.5 K/UL (ref 0–0.9)
MONOCYTES RELATIVE PERCENT: 8.5 % (ref 0–10)
NEUTROPHILS ABSOLUTE: 4.8 K/UL (ref 1.5–7.5)
NEUTROPHILS RELATIVE PERCENT: 76.6 % (ref 50–65)
NITRITE, URINE: NEGATIVE
OPIATE SCREEN URINE: NEGATIVE
PDW BLD-RTO: 13.3 % (ref 11.5–14.5)
PH UA: 5 (ref 5–8)
PLATELET # BLD: 76 K/UL (ref 130–400)
PMV BLD AUTO: 9.6 FL (ref 9.4–12.4)
POTASSIUM SERPL-SCNC: 4.4 MMOL/L (ref 3.5–5)
PRO-BNP: 121 PG/ML (ref 0–900)
PROTEIN UA: NEGATIVE MG/DL
PROTHROMBIN TIME: 13.4 SEC (ref 12–14.6)
RBC # BLD: 4.36 M/UL (ref 4.7–6.1)
SODIUM BLD-SCNC: 138 MMOL/L (ref 136–145)
SPECIFIC GRAVITY UA: 1.01 (ref 1–1.03)
TOTAL PROTEIN: 7.5 G/DL (ref 6.6–8.7)
TROPONIN: <0.01 NG/ML (ref 0–0.03)
URINE REFLEX TO CULTURE: YES
UROBILINOGEN, URINE: 0.2 E.U./DL
VITAMIN B-12: 1363 PG/ML (ref 211–946)
WBC # BLD: 6.2 K/UL (ref 4.8–10.8)

## 2020-02-10 PROCEDURE — 70450 CT HEAD/BRAIN W/O DYE: CPT

## 2020-02-10 PROCEDURE — 82607 VITAMIN B-12: CPT

## 2020-02-10 PROCEDURE — 87086 URINE CULTURE/COLONY COUNT: CPT

## 2020-02-10 PROCEDURE — 85730 THROMBOPLASTIN TIME PARTIAL: CPT

## 2020-02-10 PROCEDURE — 85610 PROTHROMBIN TIME: CPT

## 2020-02-10 PROCEDURE — 85025 COMPLETE CBC W/AUTO DIFF WBC: CPT

## 2020-02-10 PROCEDURE — 93005 ELECTROCARDIOGRAM TRACING: CPT | Performed by: PHYSICIAN ASSISTANT

## 2020-02-10 PROCEDURE — 36415 COLL VENOUS BLD VENIPUNCTURE: CPT

## 2020-02-10 PROCEDURE — 83880 ASSAY OF NATRIURETIC PEPTIDE: CPT

## 2020-02-10 PROCEDURE — 80307 DRUG TEST PRSMV CHEM ANLYZR: CPT

## 2020-02-10 PROCEDURE — 84484 ASSAY OF TROPONIN QUANT: CPT

## 2020-02-10 PROCEDURE — 6370000000 HC RX 637 (ALT 250 FOR IP): Performed by: PHYSICIAN ASSISTANT

## 2020-02-10 PROCEDURE — G0480 DRUG TEST DEF 1-7 CLASSES: HCPCS

## 2020-02-10 PROCEDURE — 99285 EMERGENCY DEPT VISIT HI MDM: CPT

## 2020-02-10 PROCEDURE — 71046 X-RAY EXAM CHEST 2 VIEWS: CPT

## 2020-02-10 PROCEDURE — 81003 URINALYSIS AUTO W/O SCOPE: CPT

## 2020-02-10 PROCEDURE — 82746 ASSAY OF FOLIC ACID SERUM: CPT

## 2020-02-10 PROCEDURE — 80053 COMPREHEN METABOLIC PANEL: CPT

## 2020-02-10 RX ORDER — MECLIZINE HCL 12.5 MG/1
12.5 TABLET ORAL ONCE
Status: COMPLETED | OUTPATIENT
Start: 2020-02-10 | End: 2020-02-10

## 2020-02-10 RX ORDER — MECLIZINE HCL 12.5 MG/1
12.5 TABLET ORAL 3 TIMES DAILY PRN
Qty: 15 TABLET | Refills: 0 | Status: SHIPPED | OUTPATIENT
Start: 2020-02-10 | End: 2020-02-11 | Stop reason: SDUPTHER

## 2020-02-10 RX ADMIN — MECLIZINE 12.5 MG: 12.5 TABLET ORAL at 17:57

## 2020-02-10 ASSESSMENT — PAIN SCALES - GENERAL: PAINLEVEL_OUTOF10: 8

## 2020-02-10 NOTE — PROGRESS NOTES
[facility]  [address]  [address] [place] [address]  Dept: [phone]  Loc: [phone]    Thu Fontenot is a 77 y.o. male who presents today for his medical conditions/complaintsas noted below. Thu Fontenot is c/o of Congestion and Cough        HPI:     HPI    Past Medical History:   Diagnosis Date    CAD (coronary artery disease)     Chronic back pain     COPD (chronic obstructive pulmonary disease) (HCC)     Hypertension     Peripheral sensory neuropathy     Pleurisy     Type II or unspecified type diabetes mellitus without mention of complication, not stated as uncontrolled      Past Surgical History:   Procedure Laterality Date    APPENDECTOMY      CARDIAC CATHETERIZATION  2019    PIETER to LAD       Family History   Problem Relation Age of Onset    Cancer Mother     Heart Disease Father        Social History     Tobacco Use    Smoking status: Former Smoker     Packs/day: 1.50     Years: 15.00     Pack years: 22.50     Types: Cigarettes     Last attempt to quit: 10/23/2000     Years since quittin.3    Smokeless tobacco: Never Used   Substance Use Topics    Alcohol use: No      Current Outpatient Medications   Medication Sig Dispense Refill    triamcinolone (KENALOG) 0.1 % cream Apply topically 2 times daily. 1 Tube 1    diclofenac sodium (VOLTAREN) 1 % GEL Apply 2 g topically 2 times daily 1 Tube 3    methylPREDNISolone (MEDROL DOSEPACK) 4 MG tablet Take 4 mg by mouth daily      gabapentin (NEURONTIN) 600 MG tablet Take 1 tablet by mouth 3 times daily for 90 days. 90 tablet 2    pravastatin (PRAVACHOL) 80 MG tablet TAKE ONE TABLET BY MOUTH EVERY EVENING. 90 tablet 3    tamsulosin (FLOMAX) 0.4 MG capsule Take 1 capsule by mouth daily 90 capsule 3    lisinopril (PRINIVIL;ZESTRIL) 40 MG tablet TAKE ONE TABLET BY MOUTH DAILY.  90 tablet 3    cetirizine (ZYRTEC) 10 MG tablet TAKE 1 TABLET BY MOUTH DAILY 90 tablet 3  pantoprazole (PROTONIX) 40 MG tablet TAKE ONE TABLET BY MOUTH EVERY DAY 90 tablet 3    Misc. Devices (WALKER) MISC 1 each by Does not apply route daily 1 each 0    azelastine (ASTELIN) 0.1 % nasal spray 1 spray by Nasal route 2 times daily Use in each nostril as directed 2 Bottle 1    albuterol sulfate HFA (VENTOLIN HFA) 108 (90 Base) MCG/ACT inhaler INHALE 2 PUFFS INTO THE LUNGS EVERY 6 HOURS AS NEEDED. 18 g 3    aspirin 81 MG chewable tablet Take 1 tablet by mouth daily 30 tablet 3    nitroGLYCERIN (NITROSTAT) 0.4 MG SL tablet up to max of 3 total doses. If no relief after 1 dose, call 911. 25 tablet 3    clopidogrel (PLAVIX) 75 MG tablet Take 1 tablet by mouth daily 30 tablet 3    metFORMIN (GLUCOPHAGE) 1000 MG tablet TAKE ONE TABLET BY MOUTH TWO TIMES A DAY WITH MEALS 180 tablet 3    naloxegol (MOVANTIK) 25 MG TABS tablet Take 1 tablet by mouth every morning 30 tablet 5    Lancets MISC Daily 100 each 5    EPINEPHrine (EPIPEN) 0.3 MG/0.3ML SOAJ injection Use as directed for allergic reaction 0.3 mL 3    oxyCODONE-acetaminophen (PERCOCET)  MG per tablet Take 1 tablet by mouth every 8 hours as needed for Pain       No current facility-administered medications for this visit.       Allergies   Allergen Reactions    Bee Venom Anaphylaxis    Pcn [Penicillins] Other (See Comments)     Made pt \"black out\"       Health Maintenance   Topic Date Due    DTaP/Tdap/Td vaccine (1 - Tdap) 07/04/1962    Hepatitis B vaccine (1 of 3 - Risk 3-dose series) 07/04/1970    Shingles Vaccine (1 of 2) 07/04/2001    Diabetic retinal exam  08/21/2014    Diabetic foot exam  04/17/2020    Colon cancer screen colonoscopy  09/14/2020    Pneumococcal 65+ years Vaccine (2 of 2 - PPSV23) 09/28/2020    A1C test (Diabetic or Prediabetic)  01/13/2021    Diabetic microalbuminuria test  01/13/2021    Lipid screen  01/13/2021    Annual Wellness Visit (AWV)  01/13/2021    Potassium monitoring  01/13/2021    Creatinine

## 2020-02-11 ENCOUNTER — CARE COORDINATION (OUTPATIENT)
Dept: CARE COORDINATION | Age: 70
End: 2020-02-11

## 2020-02-11 LAB
EKG P AXIS: 31 DEGREES
EKG P-R INTERVAL: 152 MS
EKG Q-T INTERVAL: 392 MS
EKG QRS DURATION: 94 MS
EKG QTC CALCULATION (BAZETT): 404 MS
EKG T AXIS: 31 DEGREES

## 2020-02-11 PROCEDURE — 93010 ELECTROCARDIOGRAM REPORT: CPT | Performed by: INTERNAL MEDICINE

## 2020-02-11 RX ORDER — MECLIZINE HCL 12.5 MG/1
12.5 TABLET ORAL 3 TIMES DAILY PRN
Qty: 30 TABLET | Refills: 0 | Status: SHIPPED | OUTPATIENT
Start: 2020-02-11 | End: 2020-03-12

## 2020-02-11 ASSESSMENT — ENCOUNTER SYMPTOMS
PHOTOPHOBIA: 0
SHORTNESS OF BREATH: 0
BACK PAIN: 0
EYE DISCHARGE: 0
COUGH: 0
COLOR CHANGE: 0
EYE ITCHING: 0
APNEA: 0

## 2020-02-11 NOTE — CARE COORDINATION
I agree with the Care Coordinator's Plan of Care     I have sent in [id number] for refill. I am not sure that a sleep aid would help his back pain, but I do see that he sees pain management. I would recommend he follow up with PM regarding pain.

## 2020-02-11 NOTE — TELEPHONE ENCOUNTER
I'm sorry, I didn't mean to indicate that he wanted the sleep aid to help his back pain. I will clarify with pt if he feels his back pain is causing his sleeping problems. Thank you!

## 2020-02-11 NOTE — CARE COORDINATION
Ambulatory Care Coordination Note  2/11/2020  CM Risk Score: 3  Charlson 10 Year Mortality Risk Score: 98%     ACC: Timothy Saucedo, RN    Summary Note:  Spoke with Rober Macy and Judy Obrien via phone call today. Rober Cavazos denies any current problems, but requests I talk to Judy Micah about his medications. She states he does have some difficulty paying for medications after paying rent, bills, etc.  She prepares a 7-day pill tray for him weekly and is able to complete Med Rec with me today. He is still taking his movantik but claims it \"doesn't work\". He is currently out of Gabapentin. I was able to call [facility] and get this refilled. He has a rx for meclizine that Judy Micah needs to , she is doing this today. She wants to know if [facility] can call in a rx for meclizine, as they only have a 15 ct rx from ED. He is taking OTC fish oil instead of the rx lovaza. He is requesting a sleep aid and/or a muscle relaxer for his chronic back pain. Notified him that he would likely need to see Elba at the office to be prescribed something. He sees PM for his chronic back pain and receives injections q3 months. He is not a surgical candidate, and so is managed with injections and Percocet q8hrs. He states the gabapentin helps with his back also. His back pain impairs his mobility and performance of ADLs. They have to keep things up high for him to reach as he cannot easily bend over. He requires assistance getting dressed/putting on shoes. He has a walking stick for when he takes walks. Cold weather exacerbates his pain. His DM is currently very well-controlled as evidenced by his HbA1C of 5.2 last month. Judy Obrien is a type-2 diabetic and frequently makes meals for him. He is not very good about eating/cooking for himself when she is not around. He denies any symptoms other than diabetic neuropathy for which he takes gabapentin. His COPD is fairly well-controlled.   Judy Micah states he has been having some resp symptoms, a slight increase in cough. She is giving him Mucinex BID to help remove mucus. Enc Carlos to increase PO water intake to help thin secretions and avoid dehydration d/t Mucinex use. She states he does drink some water, but also diet citrus drinks. He did require a breathing treatment yesterday. Neris Pool states they need more albuterol vials for his nebulizer. I did not see this med on his list.    He was seen yesterday in ED for vertigo. Neris Pool states they did perform the Epley maneuver at that time and prescribed him meclizine. He is still having some dizziness today. Neris Pool has given him some OTC motion sickness medication to tide him over until she can  the meclizine. He has a scheduled b12 infusion and a bone marrow aspiration and biopsy on Thursday. Plan:  Carlos/Naima will notify of any further difficulty with affordability of medications. Will consult CESAR Christensen for assistance. He has Medicare/Medicaid and medications should be affordable at this time. They will continue to monitor Marsa Quiet for worsening of COPD and DM symptoms or new onset. He will take caution with ambulation and sit or lie down when vertigo occurs. Will f/u in 1-2 weeks. Ambulatory Care Coordination Assessment    Care Coordination Protocol  Program Enrollment:  Rising Risk  Week 1 - Initial Assessment     Do you have all of your prescriptions and are they filled?:  No  Barriers to medication adherence:  None  Are you able to afford your medications?:  Yes  How often do you have trouble taking your medications the way you have been told to take them?:  I always take them as prescribed. Do you have Home O2 Therapy?:  No      Ability to seek help/take action for Emergent Urgent situations i.e. fire, crime, inclement weather or health crisis. :  Independent  Ability to ambulate to restroom:  Independent  Ability handle personal hygeine needs (bathing/dressing/grooming):  Needs Assistance  Ability to manage Medications:  Needs Assistance  Ability to prepare Food Preparation:  Needs Assistance  Ability to maintain home (clean home, laundry):  Needs Assistance  Is patient able to live independently?:  Yes                 Are you experiencing loss of meaning?:  No  Are you experiencing loss of hope and peace?:  No     Suggested Interventions and Community Resources  Fall Risk Prevention: In Process Other Services or Interventions:  2/11/20: Some potential difficulty paying for medications   Medi Set or Pill Pack:  Completed   Zone Management Tools: In Process         Set up/Review Goals, Set up/Review an Education Plan              Prior to Admission medications    Medication Sig Start Date End Date Taking? Authorizing Provider   meclizine (ANTIVERT) 12.5 MG tablet Take 1 tablet by mouth 3 times daily as needed for Dizziness or Nausea 2/10/20 2/20/20 Yes MARINA Stack   triamcinolone (KENALOG) 0.1 % cream Apply topically 2 times daily. 1/13/20  Yes BRIGETTE Green   diclofenac sodium (VOLTAREN) 1 % GEL Apply 2 g topically 2 times daily 1/13/20  Yes BRIGETTE Triana   pravastatin (PRAVACHOL) 80 MG tablet TAKE ONE TABLET BY MOUTH EVERY EVENING. 11/8/19  Yes BRIGETTE Triana   tamsulosin (FLOMAX) 0.4 MG capsule Take 1 capsule by mouth daily 11/8/19  Yes BRIGETTE Triana   lisinopril (PRINIVIL;ZESTRIL) 40 MG tablet TAKE ONE TABLET BY MOUTH DAILY.  11/8/19  Yes BRIGETTE Triana   cetirizine (ZYRTEC) 10 MG tablet TAKE 1 TABLET BY MOUTH DAILY 11/8/19  Yes BRIGETTE Triana   pantoprazole (PROTONIX) 40 MG tablet TAKE ONE TABLET BY MOUTH EVERY DAY 11/8/19  Yes BRIGETTE Triana   azelastine (ASTELIN) 0.1 % nasal spray 1 spray by Nasal route 2 times daily Use in each nostril as directed 11/8/19  Yes BRIGETTE Triana   albuterol sulfate HFA (VENTOLIN HFA) 108 (90 Base) MCG/ACT inhaler INHALE 2 PUFFS INTO THE LUNGS EVERY 6 HOURS AS NEEDED. 11/8/19  Yes Erika Back MD have a nebulizer?:  Yes  Does the patient use a space with inhaled medications?:  No     Increase in cough         Symptoms:      Symptom course:  stable

## 2020-02-12 LAB — URINE CULTURE, ROUTINE: NORMAL

## 2020-02-12 NOTE — PROGRESS NOTES
Patient name: Danielle Sorensen  Patient : 1950      Procedure Note: Bone Marrow Aspirate and Biopsy    Indication: Thrombocytopenia    Informed consent was signed by Danielle Sorensen. he  was placed in the left lateral decubitus position. The patient was prepped and draped in sterile fashion. Lidocaine 1% was used for local anesthetic of the skin and periosteum. A bone marrow aspirate and biopsy was obtained from the [facility] and sent for surgical pathology review,  flow cytometry, and chromosome analysis. The total blood loss was less than 3 mL. The skin was cleaned and a sterile bandage was placed on the entrance site. Carlos Ramirez tolerated the procedure without complication.      Gurinder Marroquin PA-C    20  8:39 AM

## 2020-02-13 ENCOUNTER — OFFICE VISIT (OUTPATIENT)
Dept: HEMATOLOGY | Age: 70
End: 2020-02-13
Payer: MEDICARE

## 2020-02-13 ENCOUNTER — HOSPITAL ENCOUNTER (OUTPATIENT)
Dept: INFUSION THERAPY | Age: 70
Discharge: HOME | End: 2020-02-13
Payer: MEDICARE

## 2020-02-13 VITALS
BODY MASS INDEX: 28.39 KG/M2 | HEART RATE: 67 BPM | WEIGHT: 202.8 LBS | OXYGEN SATURATION: 96 % | HEIGHT: 71 IN | SYSTOLIC BLOOD PRESSURE: 120 MMHG | DIASTOLIC BLOOD PRESSURE: 72 MMHG

## 2020-02-13 DIAGNOSIS — D69.6 THROMBOCYTOPENIA (HCC): ICD-10-CM

## 2020-02-13 PROCEDURE — 88305 TISSUE EXAM BY PATHOLOGIST: CPT

## 2020-02-13 PROCEDURE — 88341 IMHCHEM/IMCYTCHM EA ADD ANTB: CPT

## 2020-02-13 PROCEDURE — 88185 FLOWCYTOMETRY/TC ADD-ON: CPT

## 2020-02-13 PROCEDURE — 88342 IMHCHEM/IMCYTCHM 1ST ANTB: CPT

## 2020-02-13 PROCEDURE — 88184 FLOWCYTOMETRY/ TC 1 MARKER: CPT

## 2020-02-13 PROCEDURE — 38222 DX BONE MARROW BX & ASPIR: CPT | Performed by: PHYSICIAN ASSISTANT

## 2020-02-13 PROCEDURE — 85097 BONE MARROW INTERPRETATION: CPT

## 2020-02-13 PROCEDURE — 85025 COMPLETE CBC W/AUTO DIFF WBC: CPT

## 2020-02-13 PROCEDURE — 88311 DECALCIFY TISSUE: CPT

## 2020-02-13 PROCEDURE — 88313 SPECIAL STAINS GROUP 2: CPT

## 2020-02-26 NOTE — PROGRESS NOTES
Progress Note      Pt Name: Liset Park  YOB: 1950  MRN: [id number]    Date of evaluation: 2/27/2020  History Obtained From:  patient, spouse, electronic medical record    CHIEF COMPLAINT:    Chief Complaint   Patient presents with    Other     Thrombocytopenia     Current active problems  Thrombocytopenia  B12 deficiency    HISTORY OF PRESENT ILLNESS:    Liset Park is a 77 y.o.  male with B12 deficiency and mild to moderate thrombocytopenia followed in the office since 9/19/2019. He did have bone marrow aspiration and biopsy performed a couple weeks ago and he is here to review the results. He denies any bleeding. He does have a drug-eluting cardiac stent in place so he is continuing on Plavix and aspirin daily. He does bruise somewhat. He denies any COPD exacerbation. He reports his blood pressure stable with his current medication. He is diabetic but again does not know his A1c but reports that his blood sugars have been running \"okay\". He does have moderate arthritic type symptoms which are stable. HEMATOLOGY HISTORY: Thrombocytopenia, B12 deficiency  Joseph Sesay was seen in initial hematology consultation on 9/19/2019 referred [facility] for evaluation of thrombocytopenia.     Joseph Sesay presented to his initial visit with his wife. I asked him if he had ever had a prior issues with low platelets and he denied knowing any history. He did report that he bruised easily and bleeds easily at times, \"as long as I can remember\". He denied taking aspirin or oral nonsteroidals.     CBCs obtained from epic:          Abdominal ultrasound 10/7/2015 (ordered by Dr. Lang Miller) revealed a \"normal spleen\"-no measurement given. I have asked him if he remembered previously being seen by Dr. Lang Miller, and he reported that he did not.     He denied any prior history of liver dysfunction.     He has chronic back pain and gets injections by Dr. Jet Hoyt, and denied having any significant bleeding issues except for superficial bleeding.     He had been experiencing thrombocytopenia per records dating back to at least 2014. Recent serology on 9/4/2019 revealed that his LFTs were normal.  A PT/INR on 9/4/2019 was normal as well.     I discussed the abnormalities on his CBC that was limited to his platelets, number 1 we will maintain his wife on his initial visit. I discussed the role of platelets. He does have ecchymosis mostly on his arms. CBC on 9/19/2019 revealed a WBC of 9.94 with normal percent differential, Hgb 14 with MCV 98.8, PLT 91,000.     Serology will be requested. I discussed potential need for bone marrow aspiration and biopsy, described the procedure as well today. We will await findings from the initial serology. Serology 9/19/2019  DIC screen - negative  B12 - 295  Folate - 9.4  Copper - 80  Zinc - 70  Antiplatelet ab - Ia/IIa -negative,  IIb/IIIa POSITIVE  HIV - Non reactive  SPEP - No M spike  QI - IgG 1030, IgA 322, IgM 50 - all WNL  MMA - 253  FATOUMATA - negative    Bone marrow aspiration and biopsy 2/13/2020  · Normocellular bone marrow for age (~30 to 35%) with trilineage hematopoiesis, no significant dyspoiesis and no increase in blasts (~1%)  · Adequate megakaryocytes  · No diagnostic evidence of lymphoproliferative or myelodysplastic process or metastatic malignancy or granulomas  · Normal male karyotype    CTA abdomen 11/3/2019 at [facility] revealed that the liver and spleen were \"normal\". TREATMENT SUMMARY  B12 1000 mcg IM then changed to p.o.       Past Medical History:   Diagnosis Date    CAD (coronary artery disease)     Chronic back pain     COPD (chronic obstructive pulmonary disease) (HCC)     Hypertension     Peripheral sensory neuropathy     Pleurisy     Type II or unspecified type diabetes mellitus without mention of complication, not stated as uncontrolled         Past Surgical History:   Procedure Laterality Date    APPENDECTOMY      CARDIAC CATHETERIZATION  11/04/2019    PIETER to LAD           Current Outpatient Medications:     gabapentin (NEURONTIN) 600 MG tablet, , Disp: , Rfl:     meclizine (ANTIVERT) 12.5 MG tablet, Take 1 tablet by mouth 3 times daily as needed for Dizziness or Nausea, Disp: 30 tablet, Rfl: 0    triamcinolone (KENALOG) 0.1 % cream, Apply topically 2 times daily. , Disp: 1 Tube, Rfl: 1    diclofenac sodium (VOLTAREN) 1 % GEL, Apply 2 g topically 2 times daily, Disp: 1 Tube, Rfl: 3    pravastatin (PRAVACHOL) 80 MG tablet, TAKE ONE TABLET BY MOUTH EVERY EVENING., Disp: 90 tablet, Rfl: 3    tamsulosin (FLOMAX) 0.4 MG capsule, Take 1 capsule by mouth daily, Disp: 90 capsule, Rfl: 3    lisinopril (PRINIVIL;ZESTRIL) 40 MG tablet, TAKE ONE TABLET BY MOUTH DAILY. , Disp: 90 tablet, Rfl: 3    cetirizine (ZYRTEC) 10 MG tablet, TAKE 1 TABLET BY MOUTH DAILY, Disp: 90 tablet, Rfl: 3    pantoprazole (PROTONIX) 40 MG tablet, TAKE ONE TABLET BY MOUTH EVERY DAY, Disp: 90 tablet, Rfl: 3    Misc. Devices (WALKER) MISC, 1 each by Does not apply route daily, Disp: 1 each, Rfl: 0    azelastine (ASTELIN) 0.1 % nasal spray, 1 spray by Nasal route 2 times daily Use in each nostril as directed, Disp: 2 Bottle, Rfl: 1    albuterol sulfate HFA (VENTOLIN HFA) 108 (90 Base) MCG/ACT inhaler, INHALE 2 PUFFS INTO THE LUNGS EVERY 6 HOURS AS NEEDED., Disp: 18 g, Rfl: 3    aspirin 81 MG chewable tablet, Take 1 tablet by mouth daily, Disp: 30 tablet, Rfl: 3    nitroGLYCERIN (NITROSTAT) 0.4 MG SL tablet, up to max of 3 total doses.  If no relief after 1 dose, call 911., Disp: 25 tablet, Rfl: 3    clopidogrel (PLAVIX) 75 MG tablet, Take 1 tablet by mouth daily, Disp: 30 tablet, Rfl: 3    metFORMIN (GLUCOPHAGE) 1000 MG tablet, TAKE ONE TABLET BY MOUTH TWO TIMES A DAY WITH MEALS, Disp: 180 tablet, Rfl: 3    naloxegol (MOVANTIK) 25 MG TABS tablet, Take 1 tablet by mouth every morning, Disp: 30 tablet, Rfl: 5    Lancets MISC, Daily, Disp: 100 each, Rfl: 5   EPINEPHrine (EPIPEN) 0.3 MG/0.3ML SOAJ injection, Use as directed for allergic reaction, Disp: 0.3 mL, Rfl: 3    oxyCODONE-acetaminophen (PERCOCET)  MG per tablet, Take 1 tablet by mouth every 8 hours as needed for Pain, Disp: , Rfl:     gabapentin (NEURONTIN) 600 MG tablet, Take 1 tablet by mouth 3 times daily for 90 days. , Disp: 90 tablet, Rfl: 2     Allergies   Allergen Reactions    Bee Venom Anaphylaxis    Pcn [Penicillins] Other (See Comments)     Made pt \"black out\"     Was  Social History     Tobacco Use    Smoking status: Former Smoker     Packs/day: 1.50     Years: 15.00     Pack years: 22.50     Types: Cigarettes     Last attempt to quit: 10/23/2000     Years since quittin.3    Smokeless tobacco: Never Used   Substance Use Topics    Alcohol use: No    Drug use: No       Family History   Problem Relation Age of Onset    Cancer Mother     Heart Disease Father        Subjective   REVIEW OF SYSTEMS:   Review of Systems   Constitutional: Positive for fatigue (Mild to moderate unchanged). Negative for chills, diaphoresis, fever and unexpected weight change. HENT: Negative for mouth sores, nosebleeds, sore throat, trouble swallowing and voice change. Eyes: Negative for photophobia, discharge and itching. Respiratory: Positive for shortness of breath (Mild and stable). Negative for cough and wheezing. Cardiovascular: Negative for chest pain, palpitations and leg swelling. Gastrointestinal: Negative for abdominal distention, abdominal pain, blood in stool, constipation, diarrhea, nausea and vomiting. Endocrine: Negative for cold intolerance, heat intolerance, polydipsia and polyuria. Genitourinary: Negative for difficulty urinating, dysuria, hematuria and urgency. Musculoskeletal: Positive for arthralgias and back pain. Negative for joint swelling and myalgias. Skin: Negative for color change and rash. Allergic/Immunologic: Positive for environmental allergies. Neurological: Positive for numbness. Negative for dizziness, tremors, seizures, syncope and light-headedness. Hematological: Negative for adenopathy. Bruises/bleeds easily. Psychiatric/Behavioral: Negative for behavioral problems and suicidal ideas. The patient is not nervous/anxious. All other systems reviewed and are negative. Objective   /72   Pulse 55   Ht 5' 11\" (1.803 m)   Wt 211 lb 4.8 oz (95.8 kg)   SpO2 99%   BMI 29.47 kg/m²     PHYSICAL EXAM:  Physical Exam  Vitals signs reviewed. Constitutional:       General: He is not in acute distress. Appearance: He is well-developed. HENT:      Head: Normocephalic and atraumatic. Nose: Nose normal.   Eyes:      General: No scleral icterus. Conjunctiva/sclera: Conjunctivae normal.   Neck:      Vascular: No JVD. Trachea: No tracheal deviation. Cardiovascular:      Rate and Rhythm: Normal rate and regular rhythm. Heart sounds: Normal heart sounds. No murmur. Pulmonary:      Effort: Pulmonary effort is normal. No respiratory distress. Breath sounds: Normal breath sounds. No wheezing. Abdominal:      General: Bowel sounds are normal. There is no distension. Palpations: Abdomen is soft. There is no mass. Tenderness: There is no abdominal tenderness. Musculoskeletal: Normal range of motion. General: No tenderness or deformity. Skin:     Findings: Ecchymosis (Forearms) present. No erythema or rash. Neurological:      Mental Status: He is alert and oriented to person, place, and time. Coordination: Coordination abnormal.      Gait: Gait abnormal.   Psychiatric:         Thought Content: Thought content normal.         VISIT DIAGNOSES  1. Thrombocytopenia ([address]    2. B12 deficiency    3. Lesion of pancreas        ASSESSMENT/PLAN:    1. Thrombocytopenia  2.   B12 deficiency    Bone marrow aspiration and biopsy 2/13/2020  · Normocellular bone marrow for age (~30 to 35%) with trilineage hematopoiesis, no significant dyspoiesis and no increase in blasts (~1%)  · Adequate megakaryocytes  · No diagnostic evidence of lymphoproliferative or myelodysplastic process or metastatic malignancy or granulomas  · Normal male karyotype    CTA abdomen 11/3/2019 at [facility] revealed that the liver and spleen were \"normal\". CBC today reveals a WBC of 8.6, Hgb 13.3 with MCV 99.3, PLT 98,000. He did have a positive antiplatelet antibody so he may have a mild ITP. However, his platelet count is adequate at present but if it were to drop in the 50,000 range we may consider a course of steroids. He is diabetic. He has been B12 IM here in the office. He is taking B12 pills so he will continue the B12 pills and I will recheck his B12 level in 3 months when I reevaluate him. 3.  Pancreas lesion. He did have a CTA of the abdomen performed on 11/3/2019 which revealed that his liver and spleen were \"normal\". It did show some rectal wall thickening, and a tiny density in the pancreatic tail. He has declined PAO. Last colonoscopy was 9/14/2015 by Dr. Duglas Barriga with follow-up planned for September of this year. CMP from 1/13/2020 revealed that his LFTs were within normal range. Creatinine was 1.3 with GFR 55. Consider MRI abdomen with and without contrast including MRCP protocol for correlation to further evaluate the tiny density in the pancreatic tail. I did review the imaging studies today with Tg Turner and his wife. His wife is concerned that he had a cardiac stent placed in November and wants to make sure that is compatible with MRI. I contacted radiology and discussed with the MRI technologist to confirm that the current cardiac stents are MRI compatible. The study will be ordered. Return in about 3 months (around 5/27/2020) for With [facility].      Fer Villanueva PA-C  2:45 PM  2/27/2020

## 2020-02-27 ENCOUNTER — HOSPITAL ENCOUNTER (OUTPATIENT)
Dept: INFUSION THERAPY | Age: 70
Discharge: HOME | End: 2020-02-27
Payer: MEDICARE

## 2020-02-27 ENCOUNTER — OFFICE VISIT (OUTPATIENT)
Dept: HEMATOLOGY | Age: 70
End: 2020-02-27
Payer: MEDICARE

## 2020-02-27 VITALS
DIASTOLIC BLOOD PRESSURE: 72 MMHG | BODY MASS INDEX: 29.58 KG/M2 | HEART RATE: 55 BPM | WEIGHT: 211.3 LBS | HEIGHT: 71 IN | SYSTOLIC BLOOD PRESSURE: 138 MMHG | OXYGEN SATURATION: 99 %

## 2020-02-27 DIAGNOSIS — D69.6 THROMBOCYTOPENIA (HCC): ICD-10-CM

## 2020-02-27 PROCEDURE — 4040F PNEUMOC VAC/ADMIN/RCVD: CPT | Performed by: PHYSICIAN ASSISTANT

## 2020-02-27 PROCEDURE — 99214 OFFICE O/P EST MOD 30 MIN: CPT | Performed by: PHYSICIAN ASSISTANT

## 2020-02-27 PROCEDURE — G8427 DOCREV CUR MEDS BY ELIG CLIN: HCPCS | Performed by: PHYSICIAN ASSISTANT

## 2020-02-27 PROCEDURE — 85025 COMPLETE CBC W/AUTO DIFF WBC: CPT

## 2020-02-27 PROCEDURE — 1123F ACP DISCUSS/DSCN MKR DOCD: CPT | Performed by: PHYSICIAN ASSISTANT

## 2020-02-27 PROCEDURE — 3017F COLORECTAL CA SCREEN DOC REV: CPT | Performed by: PHYSICIAN ASSISTANT

## 2020-02-27 PROCEDURE — G8417 CALC BMI ABV UP PARAM F/U: HCPCS | Performed by: PHYSICIAN ASSISTANT

## 2020-02-27 PROCEDURE — G8482 FLU IMMUNIZE ORDER/ADMIN: HCPCS | Performed by: PHYSICIAN ASSISTANT

## 2020-02-27 PROCEDURE — 1036F TOBACCO NON-USER: CPT | Performed by: PHYSICIAN ASSISTANT

## 2020-02-27 PROCEDURE — 99212 OFFICE O/P EST SF 10 MIN: CPT

## 2020-02-27 RX ORDER — GABAPENTIN 600 MG/1
TABLET ORAL
COMMUNITY
Start: 2020-02-11 | End: 2020-03-11

## 2020-02-27 ASSESSMENT — ENCOUNTER SYMPTOMS
SORE THROAT: 0
DIARRHEA: 0
ABDOMINAL DISTENTION: 0
SHORTNESS OF BREATH: 1
ABDOMINAL PAIN: 0
NAUSEA: 0
CONSTIPATION: 0
EYE DISCHARGE: 0
BACK PAIN: 1
COUGH: 0
TROUBLE SWALLOWING: 0
WHEEZING: 0
VOMITING: 0
BLOOD IN STOOL: 0
PHOTOPHOBIA: 0
EYE ITCHING: 0
VOICE CHANGE: 0
COLOR CHANGE: 0

## 2020-03-11 ENCOUNTER — OFFICE VISIT (OUTPATIENT)
Dept: CARDIOLOGY | Age: 70
End: 2020-03-11
Payer: MEDICARE

## 2020-03-11 VITALS
HEIGHT: 70 IN | BODY MASS INDEX: 29.92 KG/M2 | DIASTOLIC BLOOD PRESSURE: 68 MMHG | WEIGHT: 209 LBS | SYSTOLIC BLOOD PRESSURE: 124 MMHG | HEART RATE: 51 BPM

## 2020-03-11 PROCEDURE — 99214 OFFICE O/P EST MOD 30 MIN: CPT | Performed by: CLINICAL NURSE SPECIALIST

## 2020-03-11 PROCEDURE — G8427 DOCREV CUR MEDS BY ELIG CLIN: HCPCS | Performed by: CLINICAL NURSE SPECIALIST

## 2020-03-11 PROCEDURE — 4040F PNEUMOC VAC/ADMIN/RCVD: CPT | Performed by: CLINICAL NURSE SPECIALIST

## 2020-03-11 PROCEDURE — 1123F ACP DISCUSS/DSCN MKR DOCD: CPT | Performed by: CLINICAL NURSE SPECIALIST

## 2020-03-11 PROCEDURE — 1036F TOBACCO NON-USER: CPT | Performed by: CLINICAL NURSE SPECIALIST

## 2020-03-11 PROCEDURE — G8417 CALC BMI ABV UP PARAM F/U: HCPCS | Performed by: CLINICAL NURSE SPECIALIST

## 2020-03-11 PROCEDURE — 3017F COLORECTAL CA SCREEN DOC REV: CPT | Performed by: CLINICAL NURSE SPECIALIST

## 2020-03-11 PROCEDURE — G8482 FLU IMMUNIZE ORDER/ADMIN: HCPCS | Performed by: CLINICAL NURSE SPECIALIST

## 2020-03-11 RX ORDER — CLOPIDOGREL BISULFATE 75 MG/1
75 TABLET ORAL DAILY
Qty: 90 TABLET | Refills: 3 | Status: SHIPPED | OUTPATIENT
Start: 2020-03-11 | End: 2020-11-25

## 2020-03-11 NOTE — PROGRESS NOTES
[facility]  [place] [address]  Phone: [phone]  Fax: [phone]    OFFICE VISIT:  3/11/2020    Erica Martinez - : 1950    Reason For Visit:  Berenice Adams is a 77 y.o. male who is here for 3 Month Follow-Up (no cardiac symptoms) and Coronary Artery Disease  He was admitted to the hospital through the emergency room 11/3/2019 for chest pain  Heart catheterization with Dr. Garry Melendrez 2019. Single-vessel disease with obstructive mid LAD stenosis treated with drug-eluting stent.     Patient was evaluated in the emergency room 2019 for generalized weakness and chest pain. Thought to be noncardiac and evaluated by Dr. Garry Melendrez.     Patient was seen at [facility] emergency room 12/15/2019 for sharp chest pain that was positional and with coughing. No concern for myocardial ischemia. Thought to be pleuritic pain    Returns today in follow-up accompanied with his wife. He states overall he is doing fairly well. He has not had any more discomfort. He is using his treadmill regularly at home without any problems       Subjective  Berenice Adams denies exertional chest pain, shortness of breath, orthopnea, paroxysmal nocturnal dyspnea, syncope, presyncope, arrhythmia, edema and fatigue. The patient denies numbness or weakness to suggest cerebrovascular accident or transient ischemic attack. BRIGETTE Titus is PCP and follows labs including lipids.     Erica Martinez has the following history as recorded in [facility]:    Patient Active Problem List    Diagnosis Date Noted    Diabetic peripheral neuropathy associated with type 2 diabetes mellitus ([facility] 2020    CAD (coronary artery disease)     Chest pain, unspecified 2019    Symptomatic bradycardia     Chest pain 2019    B12 deficiency 10/31/2019    Thrombocytopenia ([facility] 2019    Basal cell carcinoma of left cheek 2019    Seborrheic keratosis 2019    Epigastric pain 2019 or shortness of breath. Cardiovascular - no exertional chest pain, orthopnea or PND. No sensation of arrhythmia or slow heart rate. No claudication or leg edema. Gastrointestinal - no abdominal swelling or pain. No blood in stool. No severe constipation, diarrhea, nausea, or vomiting. Genitourinary - no difficulty urinating, dysuria, frequency, or urgency. No flank pain or hematuria. Musculoskeletal - no back pain, gait disturbance, or myalgia. Skin - no color change or rash. No pallor. No new surgical incision. Neurologic - no speech difficulty, facial asymmetry or lateralizing weakness. No seizures, presyncope, syncope, or significant dizziness. Hematologic - no easy bruising or excessive bleeding. Psychiatric - no severe anxiety or insomnia. No confusion. All other review of systems are negative. Objective  Vital Signs - /68   Pulse 51   Ht 5' 10\" (1.778 m)   Wt 209 lb (94.8 kg)   BMI 29.99 kg/m²   General - Rober Cavazos is alert, cooperative, and pleasant. Well groomed. No acute distress. Body habitus is overweight. HEENT - The head is normocephalic. No circumoral cyanosis. Dentition is normal.   EYES -  No Xanthelasma, no arcus senilis, no conjunctival hemorrhages or discharge. Neck - Supple, without increased jugular venous pressures. No carotid bruits. No mass. Respiratory - Lungs are clear bilaterally. No wheezes or rales. Normal effort without use of accessory muscles. Cardiovascular - Heart has regular rhythm and rate. No murmurs, rubs or gallops. + pedal pulses and no varicosities. Abdominal -  Soft, nontender, nondistended. Bowel sounds are intact. Extremities - No clubbing, cyanosis, or  edema. Musculoskeletal -  No clubbing . No Osler's nodes. Gait normal   No kyphosis or scoliosis. Skin -  no statis ulcers or dermatitis. Neurological - No focal signs are identified. Oriented to person, place and time.     Psychiatric -  Appropriate affect and mood. Assessment:     Diagnosis Orders   1. Coronary artery disease involving native coronary artery of native heart without angina pectoris     2. Essential hypertension     3. Dyslipidemia     4. Thrombocytopenia ([facility]       Data:  BP Readings from Last 3 Encounters:   03/11/20 124/68   02/27/20 138/72   02/13/20 120/72    Pulse Readings from Last 3 Encounters:   03/11/20 51   02/27/20 55   02/13/20 67        Wt Readings from Last 3 Encounters:   03/11/20 209 lb (94.8 kg)   02/27/20 211 lb 4.8 oz (95.8 kg)   02/13/20 202 lb 12.8 oz (92 kg)     Blood pressure and heart rate controlled. Continues on DAPT aspirin and Plavix. Will need to continue 1 year post stenting  Does have thrombocytopenia with last platelet count 76. We will have to monitor carefully for bleeding  Following with hematology was last office visit last month. At this time we will watch and no treatment  On ACE inhibitor and statin-lipids controlled  Diabetic with last hemoglobin A1c 5.2  States taking medications as prescribed  Stable cardiovascular status. No evidence of overt heart failure, angina or dysrhythmia. Plan  Do not stop or hold your aspirin or Plavix 1 year from cardiac stenting  Follow up in 6 mos With Dr. Camilla Serrano   Call with any questions or concerns  Follow up with BRIGETTE Nuñez for non cardiac problems  Report any new problems  Cardiovascular Fitness-Exercise as tolerated. Strive for 30 minutes of exercise most days of the week. Cardiac / Healthy Diet  Continue current medications as directed  Continue plan of treatment  It is always recommended that you bring your medications bottles with you to each visit - this is for your safety! BRIGETTE Street    EMR dragon/transcription disclaimer: Much of this encounter note is electronic transcription/translation of spoken language to printed tach.  Electronic translation of spoken language may be erroneous, or at times, nonsensical words or phrases

## 2020-03-13 RX ORDER — GABAPENTIN 600 MG/1
TABLET ORAL
Qty: 90 TABLET | Refills: 2 | Status: SHIPPED | OUTPATIENT
Start: 2020-03-13 | End: 2020-06-29

## 2020-03-25 ENCOUNTER — CARE COORDINATION (OUTPATIENT)
Dept: CARE COORDINATION | Age: 70
End: 2020-03-25

## 2020-03-26 ENCOUNTER — CARE COORDINATION (OUTPATIENT)
Dept: CARE COORDINATION | Age: 70
End: 2020-03-26

## 2020-04-06 ENCOUNTER — VIRTUAL VISIT (OUTPATIENT)
Dept: PRIMARY CARE CLINIC | Age: 70
End: 2020-04-06
Payer: MEDICARE

## 2020-04-06 VITALS
SYSTOLIC BLOOD PRESSURE: 137 MMHG | HEIGHT: 70 IN | BODY MASS INDEX: 28.63 KG/M2 | HEART RATE: 62 BPM | DIASTOLIC BLOOD PRESSURE: 72 MMHG | WEIGHT: 200 LBS

## 2020-04-06 PROCEDURE — G8427 DOCREV CUR MEDS BY ELIG CLIN: HCPCS | Performed by: NURSE PRACTITIONER

## 2020-04-06 PROCEDURE — 2022F DILAT RTA XM EVC RTNOPTHY: CPT | Performed by: NURSE PRACTITIONER

## 2020-04-06 PROCEDURE — 4040F PNEUMOC VAC/ADMIN/RCVD: CPT | Performed by: NURSE PRACTITIONER

## 2020-04-06 PROCEDURE — 3017F COLORECTAL CA SCREEN DOC REV: CPT | Performed by: NURSE PRACTITIONER

## 2020-04-06 PROCEDURE — 99214 OFFICE O/P EST MOD 30 MIN: CPT | Performed by: NURSE PRACTITIONER

## 2020-04-06 PROCEDURE — 3044F HG A1C LEVEL LT 7.0%: CPT | Performed by: NURSE PRACTITIONER

## 2020-04-06 PROCEDURE — 1123F ACP DISCUSS/DSCN MKR DOCD: CPT | Performed by: NURSE PRACTITIONER

## 2020-04-06 RX ORDER — ASPIRIN 81 MG/1
81 TABLET, CHEWABLE ORAL DAILY
Qty: 30 TABLET | Refills: 3 | Status: SHIPPED | OUTPATIENT
Start: 2020-04-06

## 2020-04-06 RX ORDER — PANTOPRAZOLE SODIUM 40 MG/1
TABLET, DELAYED RELEASE ORAL
Qty: 90 TABLET | Refills: 3 | Status: SHIPPED | OUTPATIENT
Start: 2020-04-06 | End: 2020-12-15

## 2020-04-06 RX ORDER — ALBUTEROL SULFATE 90 UG/1
AEROSOL, METERED RESPIRATORY (INHALATION)
Qty: 18 G | Refills: 3 | Status: SHIPPED | OUTPATIENT
Start: 2020-04-06 | End: 2020-12-15

## 2020-04-06 RX ORDER — AZELASTINE 1 MG/ML
1 SPRAY, METERED NASAL 2 TIMES DAILY
Qty: 2 BOTTLE | Refills: 1 | Status: SHIPPED | OUTPATIENT
Start: 2020-04-06 | End: 2021-09-16 | Stop reason: SDUPTHER

## 2020-04-06 RX ORDER — CETIRIZINE HYDROCHLORIDE 10 MG/1
TABLET ORAL
Qty: 90 TABLET | Refills: 3 | Status: SHIPPED | OUTPATIENT
Start: 2020-04-06 | End: 2021-09-29 | Stop reason: SDUPTHER

## 2020-04-06 ASSESSMENT — ENCOUNTER SYMPTOMS
GASTROINTESTINAL NEGATIVE: 1
EYES NEGATIVE: 1
RESPIRATORY NEGATIVE: 1

## 2020-04-06 NOTE — PROGRESS NOTES
Types: Cigarettes     Last attempt to quit: 10/23/2000     Years since quittin.4    Smokeless tobacco: Never Used   Substance Use Topics    Alcohol use: No    Drug use: No        Allergies   Allergen Reactions    Bee Venom Anaphylaxis    Pcn [Penicillins] Other (See Comments)     Made pt \"black out\"   ,   Past Medical History:   Diagnosis Date    CAD (coronary artery disease)     Chronic back pain     COPD (chronic obstructive pulmonary disease) (HCC)     Hypertension     Peripheral sensory neuropathy     Pleurisy     Type II or unspecified type diabetes mellitus without mention of complication, not stated as uncontrolled        PHYSICAL EXAMINATION:  Vital Signs: (As obtained by patient/caregiver or practitioner observation)    Blood pressure- 137/72 Heart rate- 62    Constitutional: [x] Appears well-developed and well-nourished [x] No apparent distress      [] Abnormal-   Mental status  [x] Alert and awake  [x] Oriented to person/place/time [x]Able to follow commands      Eyes:  EOM    [x]  Normal  [] Abnormal-  Sclera  [x]  Normal  [] Abnormal -         Discharge [x]  None visible  [] Abnormal -    HENT:   [x] Normocephalic, atraumatic.   [] Abnormal   [x] Mouth/Throat: Mucous membranes are moist.     External Ears [x] Normal  [] Abnormal-     Neck: [x] No visualized mass     Pulmonary/Chest: [x] Respiratory effort normal.  [x] No visualized signs of difficulty breathing or respiratory distress        [] Abnormal-      Musculoskeletal:   [x] Normal gait with no signs of ataxia         [x] Normal range of motion of neck        [] Abnormal-       Neurological:        [x] No Facial Asymmetry (Cranial nerve 7 motor function) (limited exam to video visit)          [x] No gaze palsy        [] Abnormal-         Skin:        [x] No significant exanthematous lesions or discoloration noted on facial skin         [] Abnormal-            Psychiatric:       [x] Normal Affect [x] No Hallucinations        [] Abnormal-     Other pertinent observable physical exam findings-     ASSESSMENT/PLAN:  1. Seasonal allergic rhinitis due to pollen  - azelastine (ASTELIN) 0.1 % nasal spray; 1 spray by Nasal route 2 times daily Use in each nostril as directed  Dispense: 2 Bottle; Refill: 1  - cetirizine (ZYRTEC) 10 MG tablet; TAKE 1 TABLET BY MOUTH DAILY  Dispense: 90 tablet; Refill: 3    2. Chronic obstructive pulmonary disease, unspecified COPD type ([facility])  - albuterol sulfate HFA (VENTOLIN HFA) 108 (90 Base) MCG/ACT inhaler; INHALE 2 PUFFS INTO THE LUNGS EVERY 6 HOURS AS NEEDED. Dispense: 18 g; Refill: 3    3. Diabetic peripheral neuropathy associated with type 2 diabetes mellitus ([facility])  - metFORMIN (GLUCOPHAGE) 1000 MG tablet; TAKE ONE TABLET BY MOUTH TWO TIMES A DAY WITH MEALS  Dispense: 180 tablet; Refill: 3    4. Memory loss, short term  - [facility], BRIGETTE Smith, Neurology, [place]    5. Daytime somnolence  - Home Sleep Study; Future    6. Snoring  - Home Sleep Study; Future    7. Hypersomnia, unspecified   - Home Sleep Study; Future    8. Coronary artery disease involving native coronary artery of native heart without angina pectoris  - aspirin 81 MG chewable tablet; Take 1 tablet by mouth daily  Dispense: 30 tablet; Refill: 3    9. Gastroesophageal reflux disease, esophagitis presence not specified  - pantoprazole (PROTONIX) 40 MG tablet; TAKE ONE TABLET BY MOUTH EVERY DAY  Dispense: 90 tablet; Refill: 3    Over 50% of the total visit time of 25 minutes was spent on counseling and or coordination of care of seasonal allergic rhinitis, COPD, neuropathy, diabetes mellitus, memory loss, daytime somnolence, snoring, hypersomnia, CAD, GERD, medications, and follow-up. Return in about 3 months (around 7/6/2020). Justo Cody is a 77 y.o. male being evaluated by a Virtual Visit (video visit) encounter to address concerns as mentioned above. A caregiver was present when appropriate.  Due to this being a

## 2020-05-26 NOTE — PROGRESS NOTES
Progress Note      Pt Name: Dewey Toney: 1950  MRN: [id number]    Date of evaluation: 5/27/2020  History Obtained From:  patient, spouse, electronic medical record    CHIEF COMPLAINT:    Chief Complaint   Patient presents with    Other     Thrombocytopenia     Current active problems  Thrombocytopenia  B12 deficiency  Pancreatic lesion    HISTORY OF PRESENT ILLNESS:    Preston Blankenship is a 77 y.o.  male with B12 deficiency and mild to moderate thrombocytopenia followed in the office since 9/19/2019. He continues taking oral B12 daily. He denies any bleeding. He does have a drug-eluting cardiac stent in place so he is continuing on Plavix and aspirin daily. He does bruise somewhat. He was also found to have a small abnormality in the tail of the pancreas. Due to his stent he and his wife did not want to have a MRI performed. He denies any new abdominal pain. He does have chronic constipation issues. He does have COPD without any exacerbation. His blood pressures been stable on his current medication. His diabetes has been doing well and his A1c in January was 5.2. He has chronic arthritic issues and a significant problem with chronic back pain. HEMATOLOGY HISTORY: Thrombocytopenia, B12 deficiency  Dwight Vaz was seen in initial hematology consultation on 9/19/2019 referred [address] [place] [address] for evaluation of thrombocytopenia.     Dwight Vaz presented to his initial visit with his wife. I asked him if he had ever had a prior issues with low platelets and he denied knowing any history. He did report that he bruised easily and bleeds easily at times, \"as long as I can remember\". He denied taking aspirin or oral nonsteroidals.     CBCs obtained from epic:          Abdominal ultrasound 10/7/2015 (ordered by Dr. Didi Anderson) revealed a \"normal spleen\"-no measurement given.   I have asked him if he remembered previously being seen by Dr. Didi Anderson, and he reported that he did and suicidal ideas. The patient is not nervous/anxious. All other systems reviewed and are negative. Objective   /78   Pulse 83   Temp 98.3 °F (36.8 °C)   Ht 5' 10\" (1.778 m)   Wt 212 lb (96.2 kg)   SpO2 96%   BMI 30.42 kg/m²     PHYSICAL EXAM:  Physical Exam  Vitals signs reviewed. Constitutional:       General: He is not in acute distress. Appearance: He is well-developed. HENT:      Head: Normocephalic and atraumatic. Nose: Nose normal.   Eyes:      General: No scleral icterus. Conjunctiva/sclera: Conjunctivae normal.   Neck:      Vascular: No JVD. Trachea: No tracheal deviation. Cardiovascular:      Rate and Rhythm: Normal rate and regular rhythm. Heart sounds: Normal heart sounds. No murmur. Pulmonary:      Effort: Pulmonary effort is normal. No respiratory distress. Breath sounds: Normal breath sounds. No wheezing. Abdominal:      General: Bowel sounds are normal. There is no distension. Palpations: Abdomen is soft. There is no mass. Tenderness: There is no abdominal tenderness. Musculoskeletal: Normal range of motion. General: No tenderness or deformity. Skin:     Findings: Ecchymosis (Forearms abdomen) present. No erythema or rash. Neurological:      Mental Status: He is alert and oriented to person, place, and time. Coordination: Coordination abnormal.      Gait: Gait abnormal.   Psychiatric:         Thought Content: Thought content normal.         Lab Results   Component Value Date    WBC 7.89 05/27/2020    HGB 13.3 (L) 05/27/2020    HCT 40.3 05/27/2020    .2 (H) 05/27/2020    PLT 94 (L) 05/27/2020       VISIT DIAGNOSES  1. Thrombocytopenia ([address]    2. B12 deficiency    3. Lesion of pancreas        ASSESSMENT/PLAN:    1. Thrombocytopenia  2. B12 deficiency    PLT today is stable at 94,000. MCV is 100.2 so hemolytic panel will be requested. Hgb is okay at 13.3.       He did have a positive antiplatelet

## 2020-05-27 ENCOUNTER — OFFICE VISIT (OUTPATIENT)
Dept: HEMATOLOGY | Age: 70
End: 2020-05-27
Payer: MEDICARE

## 2020-05-27 ENCOUNTER — HOSPITAL ENCOUNTER (OUTPATIENT)
Dept: INFUSION THERAPY | Age: 70
Discharge: HOME | End: 2020-05-27
Payer: MEDICARE

## 2020-05-27 VITALS
SYSTOLIC BLOOD PRESSURE: 118 MMHG | HEART RATE: 83 BPM | HEIGHT: 70 IN | BODY MASS INDEX: 30.35 KG/M2 | WEIGHT: 212 LBS | OXYGEN SATURATION: 96 % | TEMPERATURE: 98.3 F | DIASTOLIC BLOOD PRESSURE: 78 MMHG

## 2020-05-27 DIAGNOSIS — D69.6 THROMBOCYTOPENIA (HCC): ICD-10-CM

## 2020-05-27 LAB
ALBUMIN SERPL-MCNC: 4.8 G/DL (ref 3.5–5.2)
ALP BLD-CCNC: 63 U/L (ref 40–130)
ALT SERPL-CCNC: 16 U/L (ref 21–72)
ANION GAP SERPL CALCULATED.3IONS-SCNC: 11 MMOL/L (ref 7–19)
AST SERPL-CCNC: 25 U/L (ref 17–59)
BASOPHILS ABSOLUTE: 0.02 K/UL (ref 0.01–0.08)
BASOPHILS RELATIVE PERCENT: 0.3 % (ref 0.1–1.2)
BILIRUB SERPL-MCNC: 1 MG/DL (ref 0.2–1.3)
BUN BLDV-MCNC: 24 MG/DL (ref 9–20)
CALCIUM SERPL-MCNC: 9.5 MG/DL (ref 8.4–10.2)
CHLORIDE BLD-SCNC: 102 MMOL/L (ref 98–111)
CO2: 27 MMOL/L (ref 22–29)
CREAT SERPL-MCNC: 1.6 MG/DL (ref 0.6–1.2)
EOSINOPHILS ABSOLUTE: 0.18 K/UL (ref 0.04–0.54)
EOSINOPHILS RELATIVE PERCENT: 2.3 % (ref 0.7–7)
GFR NON-AFRICAN AMERICAN: 43
GLOBULIN: 2.5 G/DL
GLUCOSE BLD-MCNC: 95 MG/DL (ref 74–106)
HCT VFR BLD CALC: 40.3 % (ref 40.1–51)
HEMOGLOBIN: 13.3 G/DL (ref 13.7–17.5)
LACTATE DEHYDROGENASE: 307 U/L (ref 313–618)
LYMPHOCYTES ABSOLUTE: 2.88 K/UL (ref 1.18–3.74)
LYMPHOCYTES RELATIVE PERCENT: 36.5 % (ref 19.3–53.1)
MCH RBC QN AUTO: 33.1 PG (ref 25.7–32.2)
MCHC RBC AUTO-ENTMCNC: 33 G/DL (ref 32.3–36.5)
MCV RBC AUTO: 100.2 FL (ref 79–92.2)
MONOCYTES ABSOLUTE: 0.67 K/UL (ref 0.24–0.82)
MONOCYTES RELATIVE PERCENT: 8.5 % (ref 4.7–12.5)
NEUTROPHILS ABSOLUTE: 4.14 K/UL (ref 1.56–6.13)
NEUTROPHILS RELATIVE PERCENT: 52.4 % (ref 34–71.1)
PDW BLD-RTO: 12.8 % (ref 11.6–14.4)
PLATELET # BLD: 94 K/UL (ref 163–337)
PMV BLD AUTO: 9.7 FL (ref 7.4–10.4)
POTASSIUM SERPL-SCNC: 4.8 MMOL/L (ref 3.5–5.1)
RBC # BLD: 4.02 M/UL (ref 4.63–6.08)
SODIUM BLD-SCNC: 140 MMOL/L (ref 137–145)
TOTAL PROTEIN: 7.3 G/DL (ref 6.3–8.2)
VITAMIN B-12: 867 PG/ML (ref 239–931)
WBC # BLD: 7.89 K/UL (ref 4.23–9.07)

## 2020-05-27 PROCEDURE — 1036F TOBACCO NON-USER: CPT | Performed by: PHYSICIAN ASSISTANT

## 2020-05-27 PROCEDURE — 99214 OFFICE O/P EST MOD 30 MIN: CPT | Performed by: PHYSICIAN ASSISTANT

## 2020-05-27 PROCEDURE — 85025 COMPLETE CBC W/AUTO DIFF WBC: CPT

## 2020-05-27 PROCEDURE — 83615 LACTATE (LD) (LDH) ENZYME: CPT

## 2020-05-27 PROCEDURE — 1123F ACP DISCUSS/DSCN MKR DOCD: CPT | Performed by: PHYSICIAN ASSISTANT

## 2020-05-27 PROCEDURE — G8417 CALC BMI ABV UP PARAM F/U: HCPCS | Performed by: PHYSICIAN ASSISTANT

## 2020-05-27 PROCEDURE — G8427 DOCREV CUR MEDS BY ELIG CLIN: HCPCS | Performed by: PHYSICIAN ASSISTANT

## 2020-05-27 PROCEDURE — 99212 OFFICE O/P EST SF 10 MIN: CPT

## 2020-05-27 PROCEDURE — 82607 VITAMIN B-12: CPT

## 2020-05-27 PROCEDURE — 4040F PNEUMOC VAC/ADMIN/RCVD: CPT | Performed by: PHYSICIAN ASSISTANT

## 2020-05-27 PROCEDURE — 80053 COMPREHEN METABOLIC PANEL: CPT

## 2020-05-27 PROCEDURE — 3017F COLORECTAL CA SCREEN DOC REV: CPT | Performed by: PHYSICIAN ASSISTANT

## 2020-05-27 ASSESSMENT — ENCOUNTER SYMPTOMS
TROUBLE SWALLOWING: 0
SHORTNESS OF BREATH: 1
NAUSEA: 0
VOMITING: 0
PHOTOPHOBIA: 0
DIARRHEA: 0
COLOR CHANGE: 0
BLOOD IN STOOL: 0
VOICE CHANGE: 0
COUGH: 0
EYE ITCHING: 0
ABDOMINAL DISTENTION: 0
WHEEZING: 0
EYE DISCHARGE: 0
SORE THROAT: 0
BACK PAIN: 1
CONSTIPATION: 0
ABDOMINAL PAIN: 0

## 2020-05-28 ENCOUNTER — TELEPHONE (OUTPATIENT)
Dept: INFUSION THERAPY | Age: 70
End: 2020-05-28

## 2020-06-03 ENCOUNTER — OFFICE VISIT (OUTPATIENT)
Dept: NEUROSURGERY | Age: 70
End: 2020-06-03
Payer: MEDICARE

## 2020-06-03 VITALS
BODY MASS INDEX: 30.35 KG/M2 | DIASTOLIC BLOOD PRESSURE: 66 MMHG | SYSTOLIC BLOOD PRESSURE: 107 MMHG | HEART RATE: 70 BPM | WEIGHT: 212 LBS | OXYGEN SATURATION: 97 % | HEIGHT: 70 IN

## 2020-06-03 DIAGNOSIS — R53.81 OTHER MALAISE: ICD-10-CM

## 2020-06-03 DIAGNOSIS — R41.3 MEMORY LOSS: ICD-10-CM

## 2020-06-03 LAB
ALBUMIN SERPL-MCNC: 4.7 G/DL (ref 3.5–5.2)
ALP BLD-CCNC: 54 U/L (ref 40–130)
ALT SERPL-CCNC: 18 U/L (ref 5–41)
ANION GAP SERPL CALCULATED.3IONS-SCNC: 17 MMOL/L (ref 7–19)
AST SERPL-CCNC: 22 U/L (ref 5–40)
BASOPHILS ABSOLUTE: 0 K/UL (ref 0–0.2)
BASOPHILS RELATIVE PERCENT: 0.1 % (ref 0–1)
BILIRUB SERPL-MCNC: 0.7 MG/DL (ref 0.2–1.2)
BUN BLDV-MCNC: 25 MG/DL (ref 8–23)
C-REACTIVE PROTEIN: 0.05 MG/DL (ref 0–0.5)
CALCIUM SERPL-MCNC: 10 MG/DL (ref 8.8–10.2)
CHLORIDE BLD-SCNC: 103 MMOL/L (ref 98–111)
CO2: 25 MMOL/L (ref 22–29)
CREAT SERPL-MCNC: 1.5 MG/DL (ref 0.5–1.2)
EOSINOPHILS ABSOLUTE: 0.2 K/UL (ref 0–0.6)
EOSINOPHILS RELATIVE PERCENT: 3 % (ref 0–5)
GFR NON-AFRICAN AMERICAN: 46
GLUCOSE BLD-MCNC: 140 MG/DL (ref 74–109)
HCT VFR BLD CALC: 44.2 % (ref 42–52)
HEMOGLOBIN: 14.7 G/DL (ref 14–18)
HIV-1 P24 AG: NORMAL
IMMATURE GRANULOCYTES #: 0 K/UL
LYMPHOCYTES ABSOLUTE: 2 K/UL (ref 1.1–4.5)
LYMPHOCYTES RELATIVE PERCENT: 27.4 % (ref 20–40)
MCH RBC QN AUTO: 32.5 PG (ref 27–31)
MCHC RBC AUTO-ENTMCNC: 33.3 G/DL (ref 33–37)
MCV RBC AUTO: 97.6 FL (ref 80–94)
MONOCYTES ABSOLUTE: 0.5 K/UL (ref 0–0.9)
MONOCYTES RELATIVE PERCENT: 6.5 % (ref 0–10)
NEUTROPHILS ABSOLUTE: 4.6 K/UL (ref 1.5–7.5)
NEUTROPHILS RELATIVE PERCENT: 62.7 % (ref 50–65)
PDW BLD-RTO: 12.9 % (ref 11.5–14.5)
PLATELET # BLD: 100 K/UL (ref 130–400)
PMV BLD AUTO: 10 FL (ref 9.4–12.4)
POTASSIUM SERPL-SCNC: 4.5 MMOL/L (ref 3.5–5)
RAPID HIV 1&2: NORMAL
RBC # BLD: 4.53 M/UL (ref 4.7–6.1)
SEDIMENTATION RATE, ERYTHROCYTE: 1 MM/HR (ref 0–15)
SODIUM BLD-SCNC: 145 MMOL/L (ref 136–145)
T4 FREE: 1.27 NG/DL (ref 0.93–1.7)
TOTAL PROTEIN: 7.1 G/DL (ref 6.6–8.7)
TSH SERPL DL<=0.05 MIU/L-ACNC: 1.49 UIU/ML (ref 0.27–4.2)
VITAMIN B-12: 783 PG/ML (ref 211–946)
WBC # BLD: 7.3 K/UL (ref 4.8–10.8)

## 2020-06-03 PROCEDURE — 99214 OFFICE O/P EST MOD 30 MIN: CPT | Performed by: NURSE PRACTITIONER

## 2020-06-03 PROCEDURE — 1036F TOBACCO NON-USER: CPT | Performed by: NURSE PRACTITIONER

## 2020-06-03 PROCEDURE — 1123F ACP DISCUSS/DSCN MKR DOCD: CPT | Performed by: NURSE PRACTITIONER

## 2020-06-03 PROCEDURE — 4040F PNEUMOC VAC/ADMIN/RCVD: CPT | Performed by: NURSE PRACTITIONER

## 2020-06-03 PROCEDURE — 3017F COLORECTAL CA SCREEN DOC REV: CPT | Performed by: NURSE PRACTITIONER

## 2020-06-03 PROCEDURE — G8417 CALC BMI ABV UP PARAM F/U: HCPCS | Performed by: NURSE PRACTITIONER

## 2020-06-03 PROCEDURE — G8427 DOCREV CUR MEDS BY ELIG CLIN: HCPCS | Performed by: NURSE PRACTITIONER

## 2020-06-03 NOTE — PROGRESS NOTES
[x]? Denies all unless marked  Cardiovascular:[]? Heart Disease []? Chest Pain []? Palpitations  [x]? Denies all unless marked  Pulmonary: []? Shortness of Breath []? Cough   [x]? Denies all unless marke  Gastrointestinal: []? Nausea  []? Vomiting  []? Abdominal Pain  []? Constipation  []? Diarrhea  []? Dark Bloody Stools  [x]? Denies all unless marked  Psychiatric/Behavioral:[]? Depression []? Anxiety [x]? Denies all unless marked  Genitourinary:   []? Frequency  []? Urgency  []? Incontinence []? Pain with Urination  [x]? Denies all unless marked  Extremities: []? Pain  []? Swelling  [x]? Denies all unless marked  Musculoskeletal: []? Muscle Pain  []? Joint Pain  []? Arthritis []? Muscle Cramps []? Muscle Twitches  [x]? Denies all unless marked  Sleep: []? Insomnia []? Snoring []? Restless Legs []? Sleep Apnea  []? Daytime Sleepiness  [x]? Denies all unless marked  Skin:[]? Rash []? Skin Discoloration [x]? Denies all unless marked   Neurological: [x]? Visual Disturbance/Memory Loss []? Loss of Balance []? Slurred Speech/Weakness []? Seizures  []? Vertigo/Dizziness []? Denies all unless marked    The MA has completed the ROS with the patient. I have reviewed it in its' entirety with the patient and agree with the documentation. PHYSICAL EXAM  /66   Pulse 70   Ht 5' 10\" (1.778 m)   Wt 212 lb (96.2 kg)   SpO2 97%   BMI 30.42 kg/m²       Constitutional - No acute distress    HEENT- Conjunctiva normal.  No scars, masses, or lesions over external nose or ears, no neck masses noted, no jugular vein distension, no bruit  Cardiac- Regular rate and rhythm  Pulmonary- Good expansion, normal effort without use of accessory muscles  Musculoskeletal - No significant wasting of muscles noted, no bony deformities  Extremities - No clubbing, cyanosis or edema  Skin - Warm, dry, and intact.   No rash, erythema, or pallor  Psychiatric - Mood, affect, and behavior appear normal      NEUROLOGICAL EXAM     Mental status   [x] Awake, LABALBU 4.8 05/27/2020    BILITOT 1.0 05/27/2020    ALKPHOS 63 05/27/2020    AST 25 05/27/2020    ALT 16 (L) 05/27/2020    LABGLOM 43 (A) 05/27/2020    GLOB 2.5 05/27/2020     Lab Results   Component Value Date    CHOL 134 (L) 11/04/2019    TRIG 59 11/04/2019    HDL 71 01/13/2020    LDLCALC 63 01/13/2020     Lab Results   Component Value Date    TSH 1.36 04/16/2015     Lab Results   Component Value Date    SEDRATE 16 (H) 11/04/2019      Reviewed referral records     ASSESSMENT:    Chayo Vo is a 77y.o. year old male here for evaluation of memory loss. Exam today is overall non focal outside of unsteady gait and cognitive testing. MoCA 13/30 which is consistent with dementia. However family reports that they have really only seen symptoms over the past 6 months. Patient did not speak much during exam, little eye contact made. Suspect neuropathy is source of unsteady gait. Will further clarify symptoms with MRI brain and lab work. Consider memory agent pending work up. ICD-10-CM    1. Memory loss R41.3 MRI Brain W WO Contrast     EEG     Vitamin B12     TSH without Reflex     T4, Free     Sedimentation Rate     RPR Reflex to Titer and TPPA     Electrophoresis Protein, Serum without Reflex to Immunofixation     Sveta Titer IgG by IFA Reflex     CBC Auto Differential     Comprehensive Metabolic Panel     C-Reactive Protein     Heavy Metals, Blood     HIV Screen     Vitamin B1, Whole Blood   2. Other malaise  R53.81 HIV Screen     PLAN:  1. MRI brain   2. EEG  3. Lab work as listed above  4. Consider memory agent pending work up  5. Discussed safety precautions, no driving, manage/monitor medications, increase supervision   6. Return in about 2 months (around 8/3/2020) for follow up, sooner if worsening. Serenity Guillen, APRN    Note:  A total of >50% (>23 minutes) of 45 minutes was spent discussing the pathophysiology and treatment and/or coordination of care of the above diagnoses.     This dictation

## 2020-06-04 LAB — RPR: NORMAL

## 2020-06-05 ENCOUNTER — TELEPHONE (OUTPATIENT)
Dept: NEUROSURGERY | Age: 70
End: 2020-06-05

## 2020-06-06 LAB
ANTINUCLEAR AB INTERPRETIVE COMMENT: NORMAL
ANTINUCLEAR ANTIBODY, HEP-2, IGG: NORMAL

## 2020-06-08 LAB
ALBUMIN SERPL-MCNC: 4.55 G/DL (ref 3.75–5.01)
ALPHA-1-GLOBULIN: 0.21 G/DL (ref 0.19–0.46)
ALPHA-2-GLOBULIN: 0.6 G/DL (ref 0.48–1.05)
ARSENIC BLOOD: <10 UG/L (ref 0–12)
BETA GLOBULIN: 0.77 G/DL (ref 0.48–1.1)
GAMMA GLOBULIN: 0.97 G/DL (ref 0.62–1.51)
LEAD LEVEL BLOOD: <2 UG/DL (ref 0–4.9)
MERCURY BLOOD: <2.5 UG/L (ref 0–10)
PROTEIN ELECTROPHORESIS, SERUM: NORMAL
SPE/IFE INTERPRETATION: NORMAL
TOTAL PROTEIN: 7.1 G/DL (ref 6.3–8.2)

## 2020-06-09 ENCOUNTER — TELEPHONE (OUTPATIENT)
Dept: NEUROSURGERY | Age: 70
End: 2020-06-09

## 2020-06-09 RX ORDER — MECLIZINE HYDROCHLORIDE 25 MG/1
25 TABLET ORAL 3 TIMES DAILY PRN
Qty: 90 TABLET | Refills: 0 | Status: CANCELLED | OUTPATIENT
Start: 2020-06-09 | End: 2020-07-09

## 2020-06-09 RX ORDER — MECLIZINE HYDROCHLORIDE 25 MG/1
25 TABLET ORAL 3 TIMES DAILY PRN
COMMUNITY
End: 2020-06-24 | Stop reason: SDUPTHER

## 2020-06-10 ENCOUNTER — TRANSCRIBE ORDERS (OUTPATIENT)
Dept: ADMINISTRATIVE | Facility: HOSPITAL | Age: 70
End: 2020-06-10

## 2020-06-10 ENCOUNTER — TELEPHONE (OUTPATIENT)
Dept: NEUROSURGERY | Age: 70
End: 2020-06-10

## 2020-06-10 DIAGNOSIS — Z01.818 PREOP TESTING: Primary | ICD-10-CM

## 2020-06-10 NOTE — TELEPHONE ENCOUNTER
Spoke with PT wife and stated that since the pt has only been seen once and the dizziness complaint was not addressed at the appointment [facility] cannot fill this medication. She voiced understanding.

## 2020-06-15 ENCOUNTER — LAB (OUTPATIENT)
Dept: LAB | Facility: HOSPITAL | Age: 70
End: 2020-06-15

## 2020-06-15 ENCOUNTER — HOSPITAL ENCOUNTER (OUTPATIENT)
Dept: CT IMAGING | Age: 70
Discharge: HOME | End: 2020-06-15
Payer: MEDICARE

## 2020-06-15 PROCEDURE — 74160 CT ABDOMEN W/CONTRAST: CPT

## 2020-06-15 PROCEDURE — U0003 INFECTIOUS AGENT DETECTION BY NUCLEIC ACID (DNA OR RNA); SEVERE ACUTE RESPIRATORY SYNDROME CORONAVIRUS 2 (SARS-COV-2) (CORONAVIRUS DISEASE [COVID-19]), AMPLIFIED PROBE TECHNIQUE, MAKING USE OF HIGH THROUGHPUT TECHNOLOGIES AS DESCRIBED BY CMS-2020-01-R: HCPCS | Performed by: PAIN MEDICINE

## 2020-06-15 PROCEDURE — 6360000004 HC RX CONTRAST MEDICATION: Performed by: PHYSICIAN ASSISTANT

## 2020-06-15 RX ADMIN — IOPAMIDOL 90 ML: 755 INJECTION, SOLUTION INTRAVENOUS at 11:04

## 2020-06-16 LAB
COVID LABCORP PRIORITY: NORMAL
SARS-COV-2 RNA RESP QL NAA+PROBE: NOT DETECTED

## 2020-06-22 LAB — VITAMIN B1 WHOLE BLOOD: 135 NMOL/L (ref 70–180)

## 2020-06-23 ENCOUNTER — HOSPITAL ENCOUNTER (OUTPATIENT)
Dept: NEUROLOGY | Age: 70
Discharge: HOME | End: 2020-06-23
Payer: MEDICARE

## 2020-06-23 ENCOUNTER — HOSPITAL ENCOUNTER (OUTPATIENT)
Dept: MRI IMAGING | Age: 70
Discharge: HOME | End: 2020-06-23
Payer: MEDICARE

## 2020-06-23 LAB
GFR NON-AFRICAN AMERICAN: 46
PERFORMED ON: ABNORMAL
POC CREATININE: 1.5 MG/DL (ref 0.3–1.3)
POC SAMPLE TYPE: ABNORMAL

## 2020-06-23 PROCEDURE — 6360000004 HC RX CONTRAST MEDICATION: Performed by: NURSE PRACTITIONER

## 2020-06-23 PROCEDURE — 95816 EEG AWAKE AND DROWSY: CPT

## 2020-06-23 PROCEDURE — 70553 MRI BRAIN STEM W/O & W/DYE: CPT

## 2020-06-23 PROCEDURE — A9577 INJ MULTIHANCE: HCPCS | Performed by: NURSE PRACTITIONER

## 2020-06-23 PROCEDURE — 82565 ASSAY OF CREATININE: CPT

## 2020-06-23 PROCEDURE — 95819 EEG AWAKE AND ASLEEP: CPT | Performed by: PSYCHIATRY & NEUROLOGY

## 2020-06-23 RX ADMIN — GADOBENATE DIMEGLUMINE 19 ML: 529 INJECTION, SOLUTION INTRAVENOUS at 15:58

## 2020-06-24 ENCOUNTER — TELEPHONE (OUTPATIENT)
Dept: PRIMARY CARE CLINIC | Age: 70
End: 2020-06-24

## 2020-06-24 RX ORDER — MECLIZINE HYDROCHLORIDE 25 MG/1
25 TABLET ORAL PRN
Qty: 30 TABLET | Refills: 2 | Status: SHIPPED | OUTPATIENT
Start: 2020-06-24 | End: 2020-07-24

## 2020-06-29 RX ORDER — GABAPENTIN 600 MG/1
TABLET ORAL
Qty: 90 TABLET | Refills: 2 | Status: SHIPPED | OUTPATIENT
Start: 2020-06-29 | End: 2020-08-05

## 2020-07-20 ENCOUNTER — OFFICE VISIT (OUTPATIENT)
Age: 70
End: 2020-07-20

## 2020-07-20 VITALS — OXYGEN SATURATION: 98 % | TEMPERATURE: 98.2 F | HEART RATE: 62 BPM

## 2020-07-22 LAB
REPORT: NORMAL
SARS-COV-2: NOT DETECTED
THIS TEST SENT TO: NORMAL

## 2020-07-23 ENCOUNTER — TELEPHONE (OUTPATIENT)
Age: 70
End: 2020-07-23

## 2020-07-23 NOTE — TELEPHONE ENCOUNTER
Mehdi Mendiola MA   2020  1:43 PM       Patient verified .  Patient notified of negative COVID19 test results and verbalized understanding.          Gerry Rosario, April, PAShaynaC   2020  9:37 AM       Call and let pt know [place] was negative, if sx's worsen need to see prn

## 2020-08-05 ENCOUNTER — VIRTUAL VISIT (OUTPATIENT)
Dept: NEUROSURGERY | Age: 70
End: 2020-08-05
Payer: MEDICARE

## 2020-08-05 PROCEDURE — 1123F ACP DISCUSS/DSCN MKR DOCD: CPT | Performed by: NURSE PRACTITIONER

## 2020-08-05 PROCEDURE — 3017F COLORECTAL CA SCREEN DOC REV: CPT | Performed by: NURSE PRACTITIONER

## 2020-08-05 PROCEDURE — 99213 OFFICE O/P EST LOW 20 MIN: CPT | Performed by: NURSE PRACTITIONER

## 2020-08-05 PROCEDURE — G8427 DOCREV CUR MEDS BY ELIG CLIN: HCPCS | Performed by: NURSE PRACTITIONER

## 2020-08-05 PROCEDURE — 4040F PNEUMOC VAC/ADMIN/RCVD: CPT | Performed by: NURSE PRACTITIONER

## 2020-08-05 RX ORDER — MEMANTINE HYDROCHLORIDE 5 MG/1
5 TABLET ORAL 2 TIMES DAILY
Qty: 60 TABLET | Refills: 5 | Status: SHIPPED | OUTPATIENT
Start: 2020-08-05 | End: 2020-10-21

## 2020-08-05 RX ORDER — GABAPENTIN 600 MG/1
600 TABLET ORAL 3 TIMES DAILY
COMMUNITY
End: 2020-09-22 | Stop reason: SDUPTHER

## 2020-08-05 NOTE — PROGRESS NOTES
[id number] [facility] Neurology Virtual Visit Note    2020    TELEHEALTH EVALUATION -- Audio/Visual (During  public health emergency)      Patient:   Rivas Hernandez  MR#:    [id number]  Account Number:                         YOB: 1950  Date of Evaluation:  2020  Time of Note:                          2:12 PM  Primary/Referring Physician:  BRIGETTE Hooper   Consulting Physician:  BRIGETTE Goncalves     FOLLOW UP    Chief Complaint   Patient presents with    Follow-up       Patient is located at home  Also present during this call is patient's wife   Provider is located at [place] [address]    HPI:    Rivas Hernandez (:  1950) has requested an audio/video evaluation for the following concern(s): For follow up of memory loss. Feels like there has been progression since last visit. Primarily short term memory affected, no clear issues with long term. First noted issues around 6 months ago. Some repetition of questions noted. Word recall difficulty noted as well. Some irritability noted. No clear personality changes. Denies depression/anxiety. Denies falls. Has noted gait changes but correlates this with neuropathy, unsteady, using a cane now. No urinary incontinence. No concern for hallucinations. Does not sleep well at night, reports this has always been an issue. Memory loss does interfere with ADLs at times. His wife handles his medications. He is not driving. Patient reports that he has gotten lost before while driving. No tickets or accidents. No prior memory agent. No prior memory medications. No stroke history. No family history of dementia or memory loss. Follows with hematology for anemia, family history with mother having leukemia. REVIEW OF SYSTEMS    Constitutional: []? Fever []? Sweat []? Chills []? Recent Injury [x]? Denies all unless marked  HEENT:[]? Headache  []? Head Injury/Hearing Loss  []? Sore Throat  []? Ear Ache/Dizziness  [x]? Denies all unless marked  Spine:  []? Neck pain  []? Back pain  []? Sciaticia  [x]? Denies all unless marked  Cardiovascular:[]? Heart Disease []? Chest Pain []? Palpitations  [x]? Denies all unless marked  Pulmonary: []? Shortness of Breath []? Cough   [x]? Denies all unless marke  Gastrointestinal: []? Nausea  []? Vomiting  []? Abdominal Pain  []? Constipation  []? Diarrhea  []? Dark Bloody Stools  [x]? Denies all unless marked  Psychiatric/Behavioral:[]? Depression []? Anxiety [x]? Denies all unless marked  Genitourinary:   []? Frequency  []? Urgency  []? Incontinence []? Pain with Urination  [x]? Denies all unless marked  Extremities: []? Pain  []? Swelling  [x]? Denies all unless marked  Musculoskeletal: []? Muscle Pain  []? Joint Pain  []? Arthritis []? Muscle Cramps []? Muscle Twitches  [x]? Denies all unless marked  Sleep: []? Insomnia []? Snoring []? Restless Legs []? Sleep Apnea  []? Daytime Sleepiness  [x]? Denies all unless marked  Skin:[]? Rash []? Skin Discoloration [x]? Denies all unless marked   Neurological: []? Visual Disturbance/Memory Loss []? Loss of Balance []? Slurred Speech/Weakness []? Seizures  []? Vertigo/Dizziness [x]? Denies all unless marked    The MA has completed the ROS with the patient. I have reviewed it in its' entirety with the patient and agree with the documentation.      Past Medical History:   Diagnosis Date    CAD (coronary artery disease)     Chronic back pain     COPD (chronic obstructive pulmonary disease) (HCC)     Hypertension     Peripheral sensory neuropathy     Pleurisy     Type II or unspecified type diabetes mellitus without mention of complication, not stated as uncontrolled        Past Surgical History:   Procedure Laterality Date    APPENDECTOMY      CARDIAC CATHETERIZATION  11/04/2019    PIETER to LAD       Family History   Problem Relation Age of Onset    Cancer Mother     Heart Disease Father        Social History     Socioeconomic History    Marital status: Take 1 tablet by mouth daily 30 tablet 3    cetirizine (ZYRTEC) 10 MG tablet TAKE 1 TABLET BY MOUTH DAILY 90 tablet 3    metFORMIN (GLUCOPHAGE) 1000 MG tablet TAKE ONE TABLET BY MOUTH TWO TIMES A DAY WITH MEALS 180 tablet 3    pantoprazole (PROTONIX) 40 MG tablet TAKE ONE TABLET BY MOUTH EVERY DAY 90 tablet 3    clopidogrel (PLAVIX) 75 MG tablet Take 1 tablet by mouth daily 90 tablet 3    triamcinolone (KENALOG) 0.1 % cream Apply topically 2 times daily. 1 Tube 1    diclofenac sodium (VOLTAREN) 1 % GEL Apply 2 g topically 2 times daily 1 Tube 3    pravastatin (PRAVACHOL) 80 MG tablet TAKE ONE TABLET BY MOUTH EVERY EVENING. 90 tablet 3    tamsulosin (FLOMAX) 0.4 MG capsule Take 1 capsule by mouth daily 90 capsule 3    lisinopril (PRINIVIL;ZESTRIL) 40 MG tablet TAKE ONE TABLET BY MOUTH DAILY. 90 tablet 3    Misc. Devices (WALKER) MISC 1 each by Does not apply route daily 1 each 0    nitroGLYCERIN (NITROSTAT) 0.4 MG SL tablet up to max of 3 total doses. If no relief after 1 dose, call 911. 25 tablet 3    EPINEPHrine (EPIPEN) 0.3 MG/0.3ML SOAJ injection Use as directed for allergic reaction 0.3 mL 3    oxyCODONE-acetaminophen (PERCOCET)  MG per tablet Take 1 tablet by mouth every 8 hours as needed for Pain      gabapentin (NEURONTIN) 600 MG tablet TAKE 1 TABLET BY MOUTH 3 TIMES DAILY 90 tablet 2     No current facility-administered medications for this visit.         Allergies   Allergen Reactions    Bee Venom Anaphylaxis    Pcn [Penicillins] Other (See Comments)     Made pt \"black out\"       PHYSICAL EXAMINATION:    Constitutional -   General appearance: No acute distress   EYES -   Conjunctiva normal  ENT-    No scars, masses, or lesions over external nose or ears  Cardiovascular -   No clubbing, cyanosis, or edema   Pulmonary-   Good expansion, normal effort without use of accessory muscles  Musculoskeletal -   No significant wasting of muscles noted  Gait as below, see gait exam in the neurologic exam  No gross bony deformities  Skin -   No rash, erythema, or pallor  Psychiatric -   Mood, affect, and behavior appear normal    Memory as below see mental status examination in the neurologic exam    NEUROLOGICAL EXAM    Mental status   [x]Awake, alert, oriented   []Affect attention and concentration appear appropriate  []Recent and remote memory appears unremarkable  []Speech normal without dysarthria or aphasia, comprehension and repetition intact. COMMENTS: MoCA 13/30- moderate loss noted     Cranial Nerves [x] PER, EOMI, no nystagmus, conjugate eye movements, no ptosis  [x]Face symmetric  [x]Tongue midline   [x]Shoulder shrug normal bilaterally  COMMENTS:   Motor   [x]Antigravity x 4 extremities  [x]Normal bulk and tone  [x]No tremor present  COMMENTS:       Coordination [x] HTS normal bilaterally   COMMENTS:       Gait                  []Normal steady gait    []Ataxic    []Spastic     []Magnetic     []Shuffling  COMMENTS: cautious, unsteady        [] OTHER:      Due to this being a TeleHealth encounter, evaluation of the following organ systems is limited: Vitals/Constitutional/EENT/Resp/CV/GI//MS/Neuro/Skin/Heme-Lymph-Imm.       LABS RECORD AND IMAGING REVIEW (As below and per HPI)    Lab Results   Component Value Date    EMEOPSXT62 783 06/03/2020     Lab Results   Component Value Date    WBC 7.3 06/03/2020    HGB 14.7 06/03/2020    HCT 44.2 06/03/2020    MCV 97.6 (H) 06/03/2020     (L) 06/03/2020     Lab Results   Component Value Date     06/03/2020    K 4.5 06/03/2020     06/03/2020    CO2 25 06/03/2020    BUN 25 (H) 06/03/2020    CREATININE 1.5 (H) 06/23/2020    GLUCOSE 140 (H) 06/03/2020    CALCIUM 10.0 06/03/2020    PROT 7.1 06/03/2020    PROT 7.1 06/03/2020    LABALBU 4.55 06/03/2020    LABALBU 4.7 06/03/2020    BILITOT 0.7 06/03/2020    ALKPHOS 54 06/03/2020    AST 22 06/03/2020    ALT 18 06/03/2020    LABGLOM 46 (A) 06/23/2020    GLOB 2.5 05/27/2020       Mri Brain W Wo Contrast    Result Date: 6/23/2020  MRI BRAIN W WO CONTRAST - 6/23/2020 2:00 PM Indication: Memory loss, Comparison: 4/24/2015 Findings: No diffusion restriction to suggest acute infarction. No increased susceptibility to suggest intracranial hemorrhage. The major physiologic flow voids are maintained. A few nonspecific scattered periventricular and subcortical T2 FLAIR hyperintense lesions are present. Most of these were present on a 2015 exam although a few appear slightly increased in size. No other abnormality of brain parenchyma signal intensity. No midline shift or mass effect. Mild global cerebral volume loss. No evidence of hydrocephalus. Cavum septum lucidum and vergae is again noted. Partially empty sella turcica. 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 Optic nerves appear unremarkable. Globes appear unremarkable. Mastoid air cells are clear. No paranasal sinus fluid levels or significant mucosal thickening. Following contrast administration, no abnormal foci of enhancement are identified. 1. No acute intracranial findings. 2. Chronic microvascular ischemic white matter change with only mild progression from 2015. 3. 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 Signed by Dr Hilary Solano on 6/23/2020 4:48 PM    EEG (6/2020)- normal     ASSESSMENT:    Linette Conrad is a 72y.o. year old male evaluated via Telehealth encounter for follow up of memory loss. Exam today is unchanged from prior. Suspect gait changes are neuropathy related. Prior MoCA score 13/30. Secondary work up with MRI brain, EEG and lab work was unremarkable. Given progression and prior MoCA testing will add Namenda today. Will slowly titrate this given some issues with IBS. Diagnosis Orders   1. Memory loss          PLAN:  1. Add Namenda, titrate to 10mg BID. Discussed side effects with patient.     2. Discussed safety precautions, no driving, manage/monitor medications, increase supervision   3. Recommend 30 minutes daily exercise, daily mental stimulation, and healthy diet with fish, fruits, vegetables, fiber and water   4. Follow up in 3 months, sooner with any worsening, video visit     An  electronic signature was used to authenticate this note. BRIGETTE Das       Pursuant to the emergency declaration under the [facility] waiver authority and the [employer] Act, this Virtual  Visit was conducted, with patient's consent, to reduce the patient's risk of exposure to COVID-19 and provide continuity of care for an established patient. Services were provided through a video synchronous discussion virtually to substitute for in-person clinic visit.

## 2020-08-21 ENCOUNTER — TELEPHONE (OUTPATIENT)
Dept: NEUROLOGY | Age: 70
End: 2020-08-21

## 2020-08-21 NOTE — TELEPHONE ENCOUNTER
Patient wife Williams Gunter left message stating that she had some medication questions and new behaviors to report. I called Naima back but she did not answer and mailbox was full.

## 2020-09-01 ENCOUNTER — TELEPHONE (OUTPATIENT)
Dept: CARDIOLOGY | Age: 70
End: 2020-09-01

## 2020-09-01 NOTE — TELEPHONE ENCOUNTER
Patient has appointment with Dr. Checo Dickey in October, they can discuss at that time.   Cannot hold meds due to drug-eluting stents in November 2019  At appointment can assess cardiac risk as well as when he can stop his medication

## 2020-09-08 ENCOUNTER — OFFICE VISIT (OUTPATIENT)
Dept: GASTROENTEROLOGY | Facility: CLINIC | Age: 70
End: 2020-09-08

## 2020-09-08 VITALS
HEIGHT: 70 IN | BODY MASS INDEX: 28.92 KG/M2 | OXYGEN SATURATION: 98 % | WEIGHT: 202 LBS | DIASTOLIC BLOOD PRESSURE: 74 MMHG | TEMPERATURE: 97.3 F | SYSTOLIC BLOOD PRESSURE: 118 MMHG | HEART RATE: 51 BPM

## 2020-09-08 DIAGNOSIS — Z79.02 ANTIPLATELET OR ANTITHROMBOTIC LONG-TERM USE: ICD-10-CM

## 2020-09-08 DIAGNOSIS — Z86.010 HISTORY OF ADENOMATOUS POLYP OF COLON: Primary | ICD-10-CM

## 2020-09-08 PROCEDURE — S0260 H&P FOR SURGERY: HCPCS | Performed by: NURSE PRACTITIONER

## 2020-09-08 RX ORDER — CLOPIDOGREL BISULFATE 75 MG/1
1 TABLET ORAL DAILY
Status: ON HOLD | COMMUNITY
Start: 2020-06-04 | End: 2021-02-24

## 2020-09-08 RX ORDER — MEMANTINE HYDROCHLORIDE 5 MG/1
5 TABLET ORAL 2 TIMES DAILY
COMMUNITY
Start: 2020-08-05

## 2020-09-08 RX ORDER — NITROGLYCERIN 0.4 MG/1
1 TABLET SUBLINGUAL TAKE AS DIRECTED
COMMUNITY
Start: 2019-11-05

## 2020-09-08 RX ORDER — ASPIRIN 81 MG/1
81 TABLET, CHEWABLE ORAL DAILY
COMMUNITY
Start: 2020-04-06

## 2020-09-08 RX ORDER — SODIUM, POTASSIUM,MAG SULFATES 17.5-3.13G
1 SOLUTION, RECONSTITUTED, ORAL ORAL EVERY 12 HOURS
Qty: 2 BOTTLE | Refills: 0 | Status: ON HOLD | OUTPATIENT
Start: 2020-09-08 | End: 2021-02-24

## 2020-09-08 NOTE — PROGRESS NOTES
Chief Complaint   Patient presents with   • Colon Cancer Screening     Pt presents today for colon recall-last colon was 9/14/2015; Personal history of adenomatous and hyperplastic polyps     Subjective   HPI  Manolo Sagastume is a 70 y.o. male who presents as a referral for preventative maintenance. He has no complaints of nausea or vomiting. No change in bowels. No wt loss. No BRBPR. No melena. There is no family hx for colon cancer. No abdominal pain. There was no Cologuard screening this year. The patients last colonoscopy 9/14/15 with adenomatous polyps.      Past Medical History:   Diagnosis Date   • Basal cell carcinoma of left cheek    • Chronic back pain    • COPD (chronic obstructive pulmonary disease) (CMS/HCC)    • GERD (gastroesophageal reflux disease)    • History of adenomatous polyp of colon    • History of colon polyps    • Hypertension    • Neoplasm of uncertain behavior of skin of chest    • Seborrheic keratosis    • Type 2 diabetes mellitus (CMS/HCC)      Past Surgical History:   Procedure Laterality Date   • APPENDECTOMY     • CAPSULE ENDOSCOPY  09/27/2012    Normal; Gastric passage time: 0h42m; Small bowel passage time: 6h33m   • COLONOSCOPY  09/14/2015    8mm tubular adenomatous polyp in the mid transverse colon; Diverticulosis; Internal hemorrhoids; Repeat 5 years   • COLONOSCOPY  06/20/2012    8mm hyperplastic polyp in the ascending colon; Internal hemorrhoids; Repeat 3-4 years   • COLONOSCOPY  01/19/2010    Less than 3mm adenomatous polyp in the cecum; 5mm adenomatous polyp in the ascending colon; 7mm hyperplastic polyp in the transverse colon; 1cm tubular adenomatous polyp in the sigmoid colon; Internal hemorrhoids; Repeat 3 years   • COLONOSCOPY  09/11/2008    Dr. Mittal-Small, pedunculated adenomatous polyp at 30cm; Very tortuous colon not allowing visualization to the base of the cecum; Anusitis; Repeat 3 years   • COLONOSCOPY  07/16/2007    Multiple polyps in the sigmoid colon-Six  1cm hyperplastic polyps removed, but not all polyps were removed; Five hyperplastic rectal polyps removed, but not all polyps were removed; Brown spot of no significance in the ascending colon; Repeat flex sig in 3-6 months; Repeat full colon in 1 year   • CORONARY STENT PLACEMENT  11/2019   • ENDOSCOPY  06/20/2012    HH; Duodenitis   • ENDOSCOPY  09/18/2008    Dr. Mittal-Obvious Mckeon's esophagus; HH; Gastritis    • ENDOSCOPY  03/27/2007    Irregular Z-line-rule out Mckeon's-path showed no evidence of intestinal metaplasia   • ENDOSCOPY N/A 8/14/2019    Normal esophagus; Normal stomach; Normal examined duodenum; No specimens collected   • SKIN LESION EXCISION      CHEST X2       Current Outpatient Medications:   •  albuterol (VENTOLIN HFA) 108 (90 BASE) MCG/ACT inhaler, Inhale 1 puff As Needed., Disp: , Rfl:   •  aspirin 81 MG chewable tablet, Chew 81 mg Daily., Disp: , Rfl:   •  cetirizine (zyrTEC) 10 MG tablet, Take 1 tablet by mouth Daily., Disp: , Rfl:   •  clopidogrel (PLAVIX) 75 MG tablet, Take 1 tablet by mouth Daily., Disp: , Rfl:   •  diclofenac (VOLTAREN) 1 % gel gel, Apply  topically to the appropriate area as directed As Needed., Disp: , Rfl:   •  gabapentin (NEURONTIN) 300 MG capsule, Take 1 capsule by mouth 3 times daily, Disp: , Rfl:   •  Glucose Blood (BLOOD GLUCOSE TEST) strip, Test once daily. Medtrix test stripes, Disp: , Rfl:   •  LANCETS ULTRA THIN misc, TEST ONCE DAILY, Disp: , Rfl:   •  lisinopril (PRINIVIL,ZESTRIL) 40 MG tablet, Take 40 mg by mouth Daily., Disp: , Rfl:   •  memantine (NAMENDA) 5 MG tablet, Take 5 mg by mouth 2 (two) times a day., Disp: , Rfl:   •  metFORMIN (GLUCOPHAGE) 1000 MG tablet, Take 1,000 mg by mouth 2 (Two) Times a Day With Meals., Disp: , Rfl:   •  Naloxegol Oxalate (MOVANTIK) 12.5 MG tablet, Take 12.5 mg by mouth Every Morning., Disp: , Rfl:   •  nitroglycerin (NITROSTAT) 0.4 MG SL tablet, Take 1 tablet by mouth Take As Directed., Disp: , Rfl:   •   "oxyCODONE-acetaminophen (PERCOCET)  MG per tablet, Take 1 tablet by mouth As Needed., Disp: , Rfl:   •  pravastatin (PRAVACHOL) 80 MG tablet, Take 80 mg by mouth Every Night., Disp: , Rfl:   •  tamsulosin (FLOMAX) 0.4 MG capsule 24 hr capsule, Take 1 capsule by mouth daily, Disp: , Rfl:   •  tiotropium (SPIRIVA HANDIHALER) 18 MCG per inhalation capsule, Place 1 capsule into inhaler and inhale Daily., Disp: , Rfl:   •  sodium-potassium-magnesium sulfates (Suprep Bowel Prep Kit) 17.5-3.13-1.6 GM/177ML solution oral solution, Take 1 bottle by mouth Every 12 (Twelve) Hours. Split dose prep as directed by office instructions provided.  2 bottles = one kit., Disp: 2 bottle, Rfl: 0  Allergies   Allergen Reactions   • Penicillins Other (See Comments)     Made pt \"black out\"     Social History     Socioeconomic History   • Marital status:      Spouse name: Not on file   • Number of children: Not on file   • Years of education: Not on file   • Highest education level: Not on file   Tobacco Use   • Smoking status: Former Smoker   • Smokeless tobacco: Never Used   Substance and Sexual Activity   • Alcohol use: No   • Drug use: Never     Family History   Problem Relation Age of Onset   • Cancer Mother    • Heart disease Father    • Colon cancer Neg Hx    • Colon polyps Neg Hx    • Esophageal cancer Neg Hx    • Liver disease Neg Hx    • Liver cancer Neg Hx    • Rectal cancer Neg Hx    • Stomach cancer Neg Hx        REVIEW OF SYSTEMS  General: well appearing, no fever chills or sweats, no unexplained wt loss  HEENT: no acute visual or hearing disturbances  Cardiovascular: No chest pain or palpitations  Pulmonary: No shortness of breath, coughing, wheezing or hemoptysis  : No burning, urgency, hematuria, or dysuria  Musculoskeletal: No joint pain or stiffness  Peripheral: no edema  Skin: No lesions or rashes  Neuro: No dizziness, headaches, stroke, syncope  Endocrine: No hot or cold intolerances  Hematological: No " "blood dyscrasias    Objective   Vitals:    09/08/20 0916   BP: 118/74   BP Location: Right arm   Patient Position: Sitting   Cuff Size: Adult   Pulse: 51   Temp: 97.3 °F (36.3 °C)   TempSrc: Infrared   SpO2: 98%   Weight: 91.6 kg (202 lb)   Height: 177.8 cm (70\")     Body mass index is 28.98 kg/m².    PHYSICAL EXAM  General: age appropriate well nourished well appearing, no acute distress  Head: normocephalic and atraumatic  Global assessment-supple  Neck-No JVD noted, no lymphadenopathy  Pulmonary-clear to auscultation bilaterally, normal respiratory effort  Cardiovascular-normal rate and rhythm, normal heart sounds, S1 and S2 noted  Abdomen-soft, non tender, non distended, normal bowel sounds all 4 quadrants, no hepatosplenomegaly noted  Extremities-No clubbing cyanosis or edema  Neuro-Non focal, converses appropriately, awake, alert, oriented    Lab Results - Last 18 Months   Lab Units 06/03/20  1402 05/27/20  1421 02/10/20  1520 01/13/20  1230 12/15/19  1353 11/25/19  0940 11/25/19  0917 11/04/19  0131 11/03/19  1721   GLUCOSE mg/dL 140* 95 120* 91 158* 140 168*  --  235*   BUN mg/dL 25* 24* 17 20 27*  --  30*  --  19   CREATININE mg/dL 1.5* 1.6* 1.2 1.3* 1.25  --  1.4*  --  1.2   SODIUM mmol/L 145 140 138 140 140  --  140  --  137   POTASSIUM mmol/L 4.5 4.8 4.4 5.0 4.8  --   --   --  4.3   CHLORIDE mmol/L 103 102 100 104 102  --  106  --  100   TOTAL CO2 mmol/L 25 27 24 25  --   --  24  --  26   CO2 mmol/L  --   --   --   --  29.0  --   --   --   --    TOTAL PROTEIN g/dL 7.1 7.3 7.5 7.6 6.8  --  6.6  --  7.3   ALBUMIN g/dL 4.7 4.8 4.5 4.5 4.40  --  4  --  4.3   ALT (SGPT) U/L 18 16* 9 17 15  --  15  --  18   AST (SGOT) U/L 22 25 14 20 17  --  13  --  14   ALK PHOS U/L 54 63 62 68 88  --  77  --  68   BILIRUBIN mg/dL 0.7 1.0 0.7 0.6 0.2  --  0.4  --  0.5   GLOBULIN g/dL  --  2.5  --   --  2.4  --   --   --   --    SED RATE mm/Hr 1  --   --   --   --   --   --  16*  --        Lab Results - Last 18 Months   Lab " Units 06/03/20  1402 05/27/20  1259 02/10/20  1520 01/13/20  1230 12/15/19  1353 11/25/19  0917   HEMOGLOBIN g/dL 14.7 13.3* 13.9* 14.3 13.0 12.1*   HEMATOCRIT % 44.2 40.3 41.7* 44.1 39.3 36.6*   MCV fL 97.6* 100.2* 95.6* 98.2* 96.1 97.6*   WBC K/uL 7.3 7.89 6.2 6.3 9.86 8.6   RDW % 12.9 12.8 13.3 13.5 13.6 13.2   MPV fL 10.0 9.7 9.6 9.6 9.2 9.4   PLATELETS K/uL 100* 94* 76* 116* 138* 106*   INR   --   --  1.08  --   --   --        Lab Results - Last 18 Months   Lab Units 06/03/20  1402 02/10/20  1520   TSH uIU/mL 1.490  --    FOLATE ng/mL  --  14.1            Imaging Results (Most Recent)     None        Assessment/Plan   Manolo was seen today for colon cancer screening.    Diagnoses and all orders for this visit:    History of adenomatous polyp of colon  -     Case Request; Standing  -     Case Request    Antiplatelet or antithrombotic long-term use  Comments:  on plavix CAD     Other orders  -     Follow Anesthesia Guidelines / Protocol; Future  -     Obtain Informed Consent; Future  -     sodium-potassium-magnesium sulfates (Suprep Bowel Prep Kit) 17.5-3.13-1.6 GM/177ML solution oral solution; Take 1 bottle by mouth Every 12 (Twelve) Hours. Split dose prep as directed by office instructions provided.  2 bottles = one kit.      COLONOSCOPY WITH ANESTHESIA (N/A)       Body mass index is 28.98 kg/m². Patient's Body mass index is 28.98 kg/m². BMI is within normal parameters. No follow-up required..      All risks, benefits, alternatives, and indications of colonoscopy procedure have been discussed with the patient. Risks to include perforation of the colon requiring possible surgery or colostomy, risk of bleeding from biopsies or removal of colon tissue, possibility of missing a colon polyp or cancer, or adverse drug reaction.  Benefits to include the diagnosis and management of disease of the colon and rectum. Alternatives to include barium enema, radiographic evaluation, lab testing or no intervention. Pt  verbalizes understanding and agrees.

## 2020-09-11 PROBLEM — Z86.010 HISTORY OF ADENOMATOUS POLYP OF COLON: Status: ACTIVE | Noted: 2020-09-11

## 2020-09-14 ENCOUNTER — TRANSCRIBE ORDERS (OUTPATIENT)
Dept: ADMINISTRATIVE | Facility: HOSPITAL | Age: 70
End: 2020-09-14

## 2020-09-14 DIAGNOSIS — Z01.818 PREOP TESTING: Primary | ICD-10-CM

## 2020-09-15 ENCOUNTER — LAB (OUTPATIENT)
Dept: LAB | Facility: HOSPITAL | Age: 70
End: 2020-09-15

## 2020-09-15 PROCEDURE — U0003 INFECTIOUS AGENT DETECTION BY NUCLEIC ACID (DNA OR RNA); SEVERE ACUTE RESPIRATORY SYNDROME CORONAVIRUS 2 (SARS-COV-2) (CORONAVIRUS DISEASE [COVID-19]), AMPLIFIED PROBE TECHNIQUE, MAKING USE OF HIGH THROUGHPUT TECHNOLOGIES AS DESCRIBED BY CMS-2020-01-R: HCPCS | Performed by: PAIN MEDICINE

## 2020-09-15 PROCEDURE — C9803 HOPD COVID-19 SPEC COLLECT: HCPCS | Performed by: PAIN MEDICINE

## 2020-09-16 LAB
COVID LABCORP PRIORITY: NORMAL
SARS-COV-2 RNA RESP QL NAA+PROBE: NOT DETECTED

## 2020-09-22 ENCOUNTER — OFFICE VISIT (OUTPATIENT)
Dept: PRIMARY CARE CLINIC | Age: 70
End: 2020-09-22
Payer: MEDICARE

## 2020-09-22 VITALS
DIASTOLIC BLOOD PRESSURE: 70 MMHG | TEMPERATURE: 96.9 F | BODY MASS INDEX: 29.89 KG/M2 | HEIGHT: 70 IN | OXYGEN SATURATION: 99 % | HEART RATE: 50 BPM | SYSTOLIC BLOOD PRESSURE: 130 MMHG | WEIGHT: 208.8 LBS

## 2020-09-22 PROCEDURE — 90694 VACC AIIV4 NO PRSRV 0.5ML IM: CPT | Performed by: FAMILY MEDICINE

## 2020-09-22 PROCEDURE — 4040F PNEUMOC VAC/ADMIN/RCVD: CPT | Performed by: FAMILY MEDICINE

## 2020-09-22 PROCEDURE — 3017F COLORECTAL CA SCREEN DOC REV: CPT | Performed by: FAMILY MEDICINE

## 2020-09-22 PROCEDURE — G8427 DOCREV CUR MEDS BY ELIG CLIN: HCPCS | Performed by: FAMILY MEDICINE

## 2020-09-22 PROCEDURE — G0008 ADMIN INFLUENZA VIRUS VAC: HCPCS | Performed by: FAMILY MEDICINE

## 2020-09-22 PROCEDURE — 2022F DILAT RTA XM EVC RTNOPTHY: CPT | Performed by: FAMILY MEDICINE

## 2020-09-22 PROCEDURE — 1036F TOBACCO NON-USER: CPT | Performed by: FAMILY MEDICINE

## 2020-09-22 PROCEDURE — 1123F ACP DISCUSS/DSCN MKR DOCD: CPT | Performed by: FAMILY MEDICINE

## 2020-09-22 PROCEDURE — 3044F HG A1C LEVEL LT 7.0%: CPT | Performed by: FAMILY MEDICINE

## 2020-09-22 PROCEDURE — 99214 OFFICE O/P EST MOD 30 MIN: CPT | Performed by: FAMILY MEDICINE

## 2020-09-22 PROCEDURE — G8417 CALC BMI ABV UP PARAM F/U: HCPCS | Performed by: FAMILY MEDICINE

## 2020-09-22 RX ORDER — GABAPENTIN 800 MG/1
800 TABLET ORAL 3 TIMES DAILY
Qty: 90 TABLET | Refills: 5 | Status: SHIPPED | OUTPATIENT
Start: 2020-09-22 | End: 2021-03-29

## 2020-09-22 ASSESSMENT — ENCOUNTER SYMPTOMS
CHEST TIGHTNESS: 0
WHEEZING: 0
COUGH: 0
DIARRHEA: 0
ABDOMINAL PAIN: 0
VOMITING: 0
SHORTNESS OF BREATH: 0
NAUSEA: 0
CONSTIPATION: 0

## 2020-09-22 NOTE — PROGRESS NOTES
Romi Salinas is a 72 y.o. male who presents today for   Chief Complaint   Patient presents with    Other     Diabetic foot exam       HPI  Patient is here for follow-up for diabetic shoes. He does note that his peripheral neuropathy has worsened. He has had negative work-up for heavy metals previously. His A1c has been controlled. He is on gabapentin 600 3 times daily. He currently wears diabetic shoes. He does have some bruising on his toes and he does not know where they came from. No change in PMH, family, social, or surgical history unless mentioned above. Review of Systems   Constitutional: Negative for chills and fever. Respiratory: Negative for cough, chest tightness, shortness of breath and wheezing. Cardiovascular: Negative for chest pain, palpitations and leg swelling. Gastrointestinal: Negative for abdominal pain, constipation, diarrhea, nausea and vomiting. Genitourinary: Negative for difficulty urinating, dysuria and frequency. Musculoskeletal: Positive for gait problem. Neurological: Positive for numbness (with burning). Negative for weakness. Past Medical History:   Diagnosis Date    CAD (coronary artery disease)     Chronic back pain     COPD (chronic obstructive pulmonary disease) ([facility])     Hypertension     Peripheral sensory neuropathy     Pleurisy     Type II or unspecified type diabetes mellitus without mention of complication, not stated as uncontrolled        Current Outpatient Medications   Medication Sig Dispense Refill    gabapentin (NEURONTIN) 800 MG tablet Take 1 tablet by mouth 3 times daily for 30 days.  90 tablet 5    Diabetic Shoe MISC by Does not apply route DISPENSE ONE PAIR DIABETIC SHOES AND THREE PAIRS OF HEAT MOLDED INSERTS 1 each 0    diclofenac sodium (VOLTAREN) 1 % GEL APPLY 2 G TO SKIN 2 TIMES DAILY 100 g 3    memantine (NAMENDA) 5 MG tablet Take 1 tablet by mouth 2 times daily 60 tablet 5    albuterol sulfate HFA (VENTOLIN HFA) 19.9    Smokeless tobacco: Never Used   Substance Use Topics    Alcohol use: No    Drug use: No       Family History   Problem Relation Age of Onset    Cancer Mother     Heart Disease Father        /70 (Site: Left Upper Arm)   Pulse 50   Temp 96.9 °F (36.1 °C)   Ht 5' 10\" (1.778 m)   Wt 208 lb 12.8 oz (94.7 kg)   SpO2 99%   BMI 29.96 kg/m²     Physical Exam  Vitals signs and nursing note reviewed. Constitutional:       General: He is not in acute distress. Appearance: He is well-developed. He is not ill-appearing, toxic-appearing or diaphoretic. HENT:      Head: Normocephalic and atraumatic. Cardiovascular:      Rate and Rhythm: Normal rate and regular rhythm. Heart sounds: Normal heart sounds. No murmur. No friction rub. No gallop. Pulmonary:      Effort: Pulmonary effort is normal. No respiratory distress. Breath sounds: Normal breath sounds. No wheezing or rales. Chest:      Chest wall: No tenderness. Abdominal:      General: Bowel sounds are normal. There is no distension. Palpations: Abdomen is soft. There is no mass. Tenderness: There is no abdominal tenderness. There is no guarding or rebound. Musculoskeletal:      Right lower leg: No edema. Left lower leg: No edema. Skin:     General: Skin is warm and dry. Nails: There is no clubbing. Neurological:      Mental Status: He is alert and oriented to person, place, and time. Coordination: Coordination normal.      Gait: Gait normal.   Psychiatric:         Mood and Affect: Mood is not anxious or depressed. Speech: Speech is not rapid and pressured or slurred. Judgment: Judgment normal. Judgment is not impulsive or inappropriate.      Monofilament Exam Reveals:  Pulses: normal  Edema:normal  Skin Lesions:bruising 2nd L toe and calluses on calcaneus b/l  Right Foot:    Left Foot:  Normal sensation at 0   Normal sensation at 0   Diminished sensation at 1-10   Diminished sensation at 1-10   No sensation at 0    No sensation at 0           Assessment:    ICD-10-CM    1. Diabetic peripheral neuropathy associated with type 2 diabetes mellitus (Former[facility])  E11.42 gabapentin (NEURONTIN) 800 MG tablet      DIABETES FOOT EXAM   2. Type 2 diabetes mellitus without complication, without long-term current use of insulin (Former[facility])  E11.9 gabapentin (NEURONTIN) 800 MG tablet      DIABETES FOOT EXAM       Plan:   Has decreased sensation of the foot as well as some callus changes and peripheral neuropathy; needs new diabetic shoes. Patient will use these as prescribed. Patient also needs to have gabapentin increased. Both of these are uncontrolled conditions  Orders Placed This Encounter   Procedures    INFLUENZA, QUADV, ADJUVANTED, 65 YRS =, IM, PF, PREFILL SYR, 0.5ML (FLUAD)     DIABETES FOOT EXAM     Orders Placed This Encounter   Medications    gabapentin (NEURONTIN) 800 MG tablet     Sig: Take 1 tablet by mouth 3 times daily for 30 days. Dispense:  90 tablet     Refill:  5    Diabetic Shoe MISC     Sig: by Does not apply route DISPENSE ONE PAIR DIABETIC SHOES AND THREE PAIRS OF HEAT MOLDED INSERTS     Dispense:  1 each     Refill:  0     Medications Discontinued During This Encounter   Medication Reason    gabapentin (NEURONTIN) 600 MG tablet REORDER     There are no Patient Instructions on file for this visit. Patient given educational handouts and has had all questions answered. Patient voices understanding and agrees to plans along with risks and benefits of plan. Patient isinstructed to continue prior meds, diet, and exercise plans unless instructed otherwise. Patient agrees to follow up as instructed and sooner if needed. Patient agrees to go to ER if condition becomes emergent. Notesmay be completed with dictation device and spelling errors may occur.   Materials may be copied and pasted from a notepad outside of EMR, all of which, I, Dr. Rosie Pham MD, take sole intellectual ownership of and have approved adding to my note. Return if symptoms worsen or fail to improve. On the basis of positive falls risk screening, assessment and plan is as follows: home safety tips provided.

## 2020-09-29 NOTE — PROGRESS NOTES
Progress Note      Pt Name: Adan Arteaga: 1950  MRN: [id number]    Date of evaluation: 9/30/2020  History Obtained From:  patient, spouse, electronic medical record    CHIEF COMPLAINT:    Chief Complaint   Patient presents with    Follow-up     Thrombocytopenia     Current active problems  Thrombocytopenia  B12 deficiency  Pancreatic lesion    HISTORY OF PRESENT ILLNESS:    Rey Gan is a 72 y.o.  male with B12 deficiency and mild to moderate thrombocytopenia followed in the office since 9/19/2019. He continues taking oral B12 daily. He continues on aspirin 81 and Plavix 75 mg daily for cardiac stent. He has significant peripheral neuropathy secondary to his diabetes and had his Neurontin increased recently. His wife states that he is having progressive sedation, fatigue. He does have COPD but has not had any exacerbations recently. Blood pressure overall has been fairly stable without significant elevation. She reports that his blood sugars have been doing fairly well overall. He has chronic arthritis issues as well as chronic back pain. He does not eat very well-he does not have much of an appetite. He feels as if he gets full fairly quickly and does have constipation issues. He apparently drinks plenty of fluids. HEMATOLOGY HISTORY: Thrombocytopenia, B12 deficiency  Ti Prince was seen in initial hematology consultation on 9/19/2019 referred [address] [place] [address] for evaluation of thrombocytopenia.     Ti Prince presented to his initial visit with his wife. I asked him if he had ever had a prior issues with low platelets and he denied knowing any history. He did report that he bruised easily and bleeds easily at times, \"as long as I can remember\". He denied taking aspirin or oral nonsteroidals.     CBCs obtained from epic:          Abdominal ultrasound 10/7/2015 (ordered by Dr. Alen Richter) revealed a \"normal spleen\"-no measurement given.   I have asked him if he in the pancreatic tail. He and his wife declined MRI-MRCP. His insurance would not cover a CA-19-9 to be drawn. CT abdomen with contrast 6/15/2020 [facility] was compared to 11/3/2019 revealed  · 4 mm hypodense nodule in tail of the pancreas possibly representing cyst or pseudocyst  · Nondilated pancreatic duct  · Another 6-month follow-up CT recommended to ensure stability. · Bilateral renal cysts and nonobstructing renal calculi        Past Medical History:   Diagnosis Date    CAD (coronary artery disease)     Chronic back pain     COPD (chronic obstructive pulmonary disease) (HCC)     Hypertension     Peripheral sensory neuropathy     Pleurisy     Type II or unspecified type diabetes mellitus without mention of complication, not stated as uncontrolled         Past Surgical History:   Procedure Laterality Date    APPENDECTOMY      CARDIAC CATHETERIZATION  11/04/2019    PIETER to LAD       Current Outpatient Medications:     gabapentin (NEURONTIN) 800 MG tablet, Take 1 tablet by mouth 3 times daily for 30 days. , Disp: 90 tablet, Rfl: 5    Diabetic Shoe MISC, by Does not apply route DISPENSE ONE PAIR DIABETIC SHOES AND THREE PAIRS OF HEAT MOLDED INSERTS, Disp: 1 each, Rfl: 0    diclofenac sodium (VOLTAREN) 1 % GEL, APPLY 2 G TO SKIN 2 TIMES DAILY, Disp: 100 g, Rfl: 3    memantine (NAMENDA) 5 MG tablet, Take 1 tablet by mouth 2 times daily, Disp: 60 tablet, Rfl: 5    albuterol sulfate HFA (VENTOLIN HFA) 108 (90 Base) MCG/ACT inhaler, INHALE 2 PUFFS INTO THE LUNGS EVERY 6 HOURS AS NEEDED., Disp: 18 g, Rfl: 3    azelastine (ASTELIN) 0.1 % nasal spray, 1 spray by Nasal route 2 times daily Use in each nostril as directed, Disp: 2 Bottle, Rfl: 1    aspirin 81 MG chewable tablet, Take 1 tablet by mouth daily, Disp: 30 tablet, Rfl: 3    cetirizine (ZYRTEC) 10 MG tablet, TAKE 1 TABLET BY MOUTH DAILY, Disp: 90 tablet, Rfl: 3    metFORMIN (GLUCOPHAGE) 1000 MG tablet, TAKE ONE TABLET BY MOUTH TWO TIMES A DAY WITH MEALS, Disp: 180 tablet, Rfl: 3    pantoprazole (PROTONIX) 40 MG tablet, TAKE ONE TABLET BY MOUTH EVERY DAY, Disp: 90 tablet, Rfl: 3    clopidogrel (PLAVIX) 75 MG tablet, Take 1 tablet by mouth daily, Disp: 90 tablet, Rfl: 3    triamcinolone (KENALOG) 0.1 % cream, Apply topically 2 times daily. , Disp: 1 Tube, Rfl: 1    pravastatin (PRAVACHOL) 80 MG tablet, TAKE ONE TABLET BY MOUTH EVERY EVENING., Disp: 90 tablet, Rfl: 3    tamsulosin (FLOMAX) 0.4 MG capsule, Take 1 capsule by mouth daily, Disp: 90 capsule, Rfl: 3    lisinopril (PRINIVIL;ZESTRIL) 40 MG tablet, TAKE ONE TABLET BY MOUTH DAILY. , Disp: 90 tablet, Rfl: 3    Misc. Devices (WALKER) MISC, 1 each by Does not apply route daily, Disp: 1 each, Rfl: 0    nitroGLYCERIN (NITROSTAT) 0.4 MG SL tablet, up to max of 3 total doses. If no relief after 1 dose, call 911., Disp: 25 tablet, Rfl: 3    EPINEPHrine (EPIPEN) 0.3 MG/0.3ML SOAJ injection, Use as directed for allergic reaction, Disp: 0.3 mL, Rfl: 3    oxyCODONE-acetaminophen (PERCOCET)  MG per tablet, Take 1 tablet by mouth every 8 hours as needed for Pain, Disp: , Rfl:        Allergies   Allergen Reactions    Bee Venom Anaphylaxis    Pcn [Penicillins] Other (See Comments)     Made pt \"black out\"       Social History     Tobacco Use    Smoking status: Former Smoker     Packs/day: 1.50     Years: 15.00     Pack years: 22.50     Types: Cigarettes     Last attempt to quit: 10/23/2000     Years since quittin.9    Smokeless tobacco: Never Used   Substance Use Topics    Alcohol use: No    Drug use: No       Family History   Problem Relation Age of Onset    Cancer Mother     Heart Disease Father        Subjective   REVIEW OF SYSTEMS:   Review of Systems   Constitutional: Positive for fatigue (Moderate and more noticeable). Negative for chills, diaphoresis, fever and unexpected weight change. HENT: Negative for mouth sores, nosebleeds, sore throat, trouble swallowing and voice change. Eyes: Negative for photophobia, discharge and itching. Respiratory: Positive for shortness of breath (Mild and stable). Negative for cough and wheezing. Cardiovascular: Negative for chest pain, palpitations and leg swelling. Gastrointestinal: Negative for abdominal distention, abdominal pain, blood in stool, constipation, diarrhea, nausea and vomiting. Endocrine: Negative for cold intolerance, heat intolerance, polydipsia and polyuria. Genitourinary: Negative for difficulty urinating, dysuria, hematuria and urgency. Musculoskeletal: Positive for arthralgias and back pain. Negative for joint swelling and myalgias. Skin: Negative for color change and rash. Allergic/Immunologic: Positive for environmental allergies. Neurological: Positive for numbness. Negative for dizziness, tremors, seizures, syncope and light-headedness. Hematological: Negative for adenopathy. Bruises/bleeds easily (Bruising is stable overall without any exacerbation). Psychiatric/Behavioral: Negative for behavioral problems and suicidal ideas. The patient is not nervous/anxious. All other systems reviewed and are negative. Objective   BP (!) 142/78   Pulse 59   Temp 97.5 °F (36.4 °C) (Temporal)   Ht 5' 10\" (1.778 m)   Wt 206 lb 1.6 oz (93.5 kg)   SpO2 98%   BMI 29.57 kg/m²     PHYSICAL EXAM:  Physical Exam  Vitals signs reviewed. Constitutional:       General: He is not in acute distress. Appearance: He is well-developed. HENT:      Head: Normocephalic and atraumatic. Nose: Nose normal.   Eyes:      General: No scleral icterus. Conjunctiva/sclera: Conjunctivae normal.   Neck:      Vascular: No JVD. Trachea: No tracheal deviation. Cardiovascular:      Rate and Rhythm: Normal rate and regular rhythm. Heart sounds: Normal heart sounds. No murmur. Pulmonary:      Effort: Pulmonary effort is normal. No respiratory distress. Breath sounds: Normal breath sounds. No wheezing. Abdominal:      General: Bowel sounds are normal. There is no distension. Palpations: Abdomen is soft. There is no mass. Tenderness: There is no abdominal tenderness. Musculoskeletal: Normal range of motion. General: No tenderness or deformity. Skin:     Findings: Ecchymosis (Mild on forearms) present. No erythema or rash. Neurological:      Mental Status: He is alert and oriented to person, place, and time. Coordination: Coordination abnormal.      Gait: Gait abnormal.   Psychiatric:         Thought Content: Thought content normal.         Narrative    Examination. CT ABDOMEN W CONTRAST 6/15/2020 8:25 AM    History: Abdominal pain. The lesion of the tail of pancreas in the    previous CT scan of the abdomen. DLP: 1168 mGycm. The CT scan of the abdomen is performed after intravenous contrast    enhancement. The images are acquired in axial plane with subsequent    reconstruction in coronal and sagittal planes. The comparison is made with the previous study dated 11/3/2019. The lung bases included in the study appears unremarkable. A few    calcified granulomas are noted. Mild dependent atelectasis at    bilateral bases. The liver and spleen appear normal. The gallbladder is moderately well    distended. No radiopaque gallstones are seen. Common bile duct is    nondilated. A tiny hypodense nodules in the tail of the pancreas is reidentified    and measures 4 mm in maximum dimension. It appears to be adjacent to    the nondilated pancreatic duct. The remaining pancreatic duct is    nondilated. The adrenal glands bilaterally appear normal.    There are several well-defined low density nodules in both kidneys,    the largest one located laterally in the upper pole measuring 1.9 cm    in diameter. The CT density suggest a cyst. The remaining nodules are    too small to be further characterized. There are several small    radiopaque calculi in both kidneys.  No hydronephrosis on either side. The visualized ureters appear normal. The urinary bladder is not    completely visualized are evaluated. The stomach and duodenum appear normal. The small bowel is    nondistended. Appendix is surgically absent. Moderate gas and stool is    seen in the visualized part of the colon. The distal colon is not    included in the study. Mild atheromatous changes of the abdominal aorta and iliac arteries    are noted. No aneurysmal dilatation. There is no evidence of abdominal or pelvic lymphadenopathy. No    mesenteric adenopathy. There is moderate laxity and protrusion of the central anterior    abdominal wall. The opacified small bowel loop protruding into this    defect. No elaine herniation. No obstruction. The images reviewed in bone window show chronic degenerative changes    of the lumbar and visualized thoracic spine. No focal bony lesion or    bone destruction.         Impression    A stable tiny low-density nodule in the tail of the    pancreas which may represent a cyst or a pseudocyst. No change since    November 2019. Another follow-up examination after 6 months may be    obtained to ensure stability. Bilateral nonobstructing renal calculi. Incompletely evaluated low-density adrenal nodules as detailed above. Other findings as above. Signed by Dr Grover Jamil on 6/15/2020 12:19 PM              Lab Results   Component Value Date    WBC 8.88 09/30/2020    HGB 14.0 09/30/2020    HCT 42.5 09/30/2020    .4 (H) 09/30/2020    PLT 92 (L) 09/30/2020       VISIT DIAGNOSES  1. Thrombocytopenia ([address]    2. B12 deficiency    3. Lesion of pancreas        ASSESSMENT/PLAN:    1. Thrombocytopenia  2. B12 deficiency      He did have a positive antiplatelet antibody so he may have a mild ITP. However, his platelet count is adequate at present but if it were to drop in the 50,000 range we may consider a course of steroids. He is diabetic.     Serology OLGA  2:04 PM  9/30/2020

## 2020-09-30 ENCOUNTER — HOSPITAL ENCOUNTER (OUTPATIENT)
Dept: INFUSION THERAPY | Age: 70
Discharge: HOME | End: 2020-09-30
Payer: MEDICARE

## 2020-09-30 ENCOUNTER — OFFICE VISIT (OUTPATIENT)
Dept: HEMATOLOGY | Age: 70
End: 2020-09-30
Payer: MEDICARE

## 2020-09-30 VITALS
DIASTOLIC BLOOD PRESSURE: 78 MMHG | OXYGEN SATURATION: 98 % | BODY MASS INDEX: 29.51 KG/M2 | SYSTOLIC BLOOD PRESSURE: 142 MMHG | HEART RATE: 59 BPM | HEIGHT: 70 IN | WEIGHT: 206.1 LBS | TEMPERATURE: 97.5 F

## 2020-09-30 DIAGNOSIS — D69.6 THROMBOCYTOPENIA (HCC): ICD-10-CM

## 2020-09-30 LAB
BASOPHILS ABSOLUTE: 0.01 K/UL (ref 0.01–0.08)
BASOPHILS RELATIVE PERCENT: 0.1 % (ref 0.1–1.2)
EOSINOPHILS ABSOLUTE: 0.23 K/UL (ref 0.04–0.54)
EOSINOPHILS RELATIVE PERCENT: 2.6 % (ref 0.7–7)
HCT VFR BLD CALC: 42.5 % (ref 40.1–51)
HEMOGLOBIN: 14 G/DL (ref 13.7–17.5)
LYMPHOCYTES ABSOLUTE: 2.65 K/UL (ref 1.18–3.74)
LYMPHOCYTES RELATIVE PERCENT: 29.8 % (ref 19.3–53.1)
MCH RBC QN AUTO: 33.7 PG (ref 25.7–32.2)
MCHC RBC AUTO-ENTMCNC: 32.9 G/DL (ref 32.3–36.5)
MCV RBC AUTO: 102.4 FL (ref 79–92.2)
MONOCYTES ABSOLUTE: 0.65 K/UL (ref 0.24–0.82)
MONOCYTES RELATIVE PERCENT: 7.3 % (ref 4.7–12.5)
NEUTROPHILS ABSOLUTE: 5.34 K/UL (ref 1.56–6.13)
NEUTROPHILS RELATIVE PERCENT: 60.2 % (ref 34–71.1)
PDW BLD-RTO: 12.8 % (ref 11.6–14.4)
PLATELET # BLD: 92 K/UL (ref 163–337)
PMV BLD AUTO: 8.9 FL (ref 7.4–10.4)
RBC # BLD: 4.15 M/UL (ref 4.63–6.08)
WBC # BLD: 8.88 K/UL (ref 4.23–9.07)

## 2020-09-30 PROCEDURE — G8427 DOCREV CUR MEDS BY ELIG CLIN: HCPCS | Performed by: PHYSICIAN ASSISTANT

## 2020-09-30 PROCEDURE — 3017F COLORECTAL CA SCREEN DOC REV: CPT | Performed by: PHYSICIAN ASSISTANT

## 2020-09-30 PROCEDURE — 99211 OFF/OP EST MAY X REQ PHY/QHP: CPT

## 2020-09-30 PROCEDURE — G8417 CALC BMI ABV UP PARAM F/U: HCPCS | Performed by: PHYSICIAN ASSISTANT

## 2020-09-30 PROCEDURE — 99214 OFFICE O/P EST MOD 30 MIN: CPT | Performed by: PHYSICIAN ASSISTANT

## 2020-09-30 PROCEDURE — 1036F TOBACCO NON-USER: CPT | Performed by: PHYSICIAN ASSISTANT

## 2020-09-30 PROCEDURE — 4040F PNEUMOC VAC/ADMIN/RCVD: CPT | Performed by: PHYSICIAN ASSISTANT

## 2020-09-30 PROCEDURE — 1123F ACP DISCUSS/DSCN MKR DOCD: CPT | Performed by: PHYSICIAN ASSISTANT

## 2020-09-30 PROCEDURE — 85025 COMPLETE CBC W/AUTO DIFF WBC: CPT

## 2020-09-30 ASSESSMENT — ENCOUNTER SYMPTOMS
VOICE CHANGE: 0
EYE DISCHARGE: 0
TROUBLE SWALLOWING: 0
VOMITING: 0
SHORTNESS OF BREATH: 1
PHOTOPHOBIA: 0
SORE THROAT: 0
EYE ITCHING: 0
COLOR CHANGE: 0
ABDOMINAL PAIN: 0
DIARRHEA: 0
WHEEZING: 0
ABDOMINAL DISTENTION: 0
CONSTIPATION: 0
BACK PAIN: 1
BLOOD IN STOOL: 0
NAUSEA: 0
COUGH: 0

## 2020-10-06 ENCOUNTER — CARE COORDINATION (OUTPATIENT)
Dept: CARE COORDINATION | Age: 70
End: 2020-10-06

## 2020-10-06 NOTE — CARE COORDINATION
Date/Time:  10/6/2020 2:19 PM  Attempted to reach patient by telephone. Left HIPPA compliant message requesting a return call. Will attempt to reach patient again.

## 2020-10-13 ENCOUNTER — OFFICE VISIT (OUTPATIENT)
Age: 70
End: 2020-10-13

## 2020-10-13 VITALS — TEMPERATURE: 97.9 F | OXYGEN SATURATION: 98 % | HEART RATE: 65 BPM

## 2020-10-15 LAB — SARS-COV-2, NAA: NOT DETECTED

## 2020-10-20 RX ORDER — MEMANTINE HYDROCHLORIDE 5 MG/1
5 TABLET ORAL 2 TIMES DAILY
Qty: 60 TABLET | Refills: 5 | Status: CANCELLED | OUTPATIENT
Start: 2020-10-20

## 2020-10-20 RX ORDER — LISINOPRIL 40 MG/1
TABLET ORAL
Qty: 90 TABLET | Refills: 3 | Status: SHIPPED | OUTPATIENT
Start: 2020-10-20 | End: 2022-01-02

## 2020-10-20 RX ORDER — PRAVASTATIN SODIUM 80 MG/1
TABLET ORAL
Qty: 90 TABLET | Refills: 3 | Status: SHIPPED | OUTPATIENT
Start: 2020-10-20 | End: 2022-01-02

## 2020-10-21 RX ORDER — MEMANTINE HYDROCHLORIDE 10 MG/1
10 TABLET ORAL 2 TIMES DAILY
Qty: 60 TABLET | Refills: 2 | Status: SHIPPED | OUTPATIENT
Start: 2020-10-21 | End: 2020-11-05 | Stop reason: SDUPTHER

## 2020-10-21 NOTE — TELEPHONE ENCOUNTER
Patient is now titrated up to 10mg in the AM and PM    Requested Prescriptions     Pending Prescriptions Disp Refills    memantine (NAMENDA) 10 MG tablet 60 tablet 2     Sig: Take 1 tablet by mouth 2 times daily       Last Office Visit: 8/5/2020  Next Office Visit: 11/5/2020

## 2020-11-03 PROBLEM — R07.9 CHEST PAIN: Status: RESOLVED | Noted: 2019-11-03 | Resolved: 2020-11-03

## 2020-11-05 ENCOUNTER — VIRTUAL VISIT (OUTPATIENT)
Dept: NEUROSURGERY | Age: 70
End: 2020-11-05
Payer: MEDICARE

## 2020-11-05 PROCEDURE — 99213 OFFICE O/P EST LOW 20 MIN: CPT | Performed by: NURSE PRACTITIONER

## 2020-11-05 PROCEDURE — G8427 DOCREV CUR MEDS BY ELIG CLIN: HCPCS | Performed by: NURSE PRACTITIONER

## 2020-11-05 PROCEDURE — 1123F ACP DISCUSS/DSCN MKR DOCD: CPT | Performed by: NURSE PRACTITIONER

## 2020-11-05 PROCEDURE — 4040F PNEUMOC VAC/ADMIN/RCVD: CPT | Performed by: NURSE PRACTITIONER

## 2020-11-05 PROCEDURE — 3017F COLORECTAL CA SCREEN DOC REV: CPT | Performed by: NURSE PRACTITIONER

## 2020-11-05 RX ORDER — MEMANTINE HYDROCHLORIDE 10 MG/1
10 TABLET ORAL 2 TIMES DAILY
Qty: 60 TABLET | Refills: 2 | Status: SHIPPED | OUTPATIENT
Start: 2020-11-05 | End: 2021-04-22 | Stop reason: SDUPTHER

## 2020-11-05 RX ORDER — MECLIZINE HYDROCHLORIDE 25 MG/1
25 TABLET ORAL 3 TIMES DAILY PRN
COMMUNITY
Start: 2020-10-19

## 2020-11-05 NOTE — PROGRESS NOTES
[facility] Neurology Virtual Visit Note    2020    TELEHEALTH EVALUATION -- Audio/Visual (During AJTTS-37 public health emergency)      Patient:   Lucita Shaffer  MR#:    [id number]  Account Number:                         YOB: 1950  Date of Evaluation:  2020  Time of Note:                          3:37 PM  Primary/Referring Physician:  BRIGETTE Robison   Consulting Physician:  BRIGETTE Kenny     FOLLOW UP    Chief Complaint   Patient presents with    Follow-up    Memory Loss       Patient is located at home  Also present during this call is patient's wife   Provider is located at [address]    HPI:    Lucita Shaffer (:  1950) has requested an audio/video evaluation for the following concern(s): For follow up of memory loss. Doing about the same since last visit, started on Namenda and seems to have been beneficial. First noted symptoms about 1 year ago, possibly longer. Primarily short term memory affected, no clear issues with long term. Repetition of questions has improved since adding Namenda. Word recall difficulty noted as well. Some irritability noted. No clear personality changes. Denies depression/anxiety. Denies falls. Has noted gait changes but correlates this with neuropathy, unsteady, using a cane now. No urinary incontinence. No concern for hallucinations. Doesn't sleep well at night but this is somewhat of a chronic issue. Appetite waxes and wanes. Memory loss does interfere with ADLs at times. His wife handles his medications. He is not driving. Patient reports that he has gotten lost before while driving. No tickets or accidents. No prior memory medications. No stroke history. No family history of dementia or memory loss. Follows with hematology for anemia, family history with mother having leukemia. REVIEW OF SYSTEMS    Constitutional: []? Fever []? Sweats []? Chills []? Recent Injury [x]?  Denies all unless marked  HEENT:[]? Headache  []? Head Injury/Hearing Loss  []? Sore Throat  []? Ear Ach/Dizziness  [x]? Denies all unless marked  Spine:  []? Neck pain  []? Back pain  []? Sciaticia  [x]? Denies all unless marked  Psychiatric/Behavioral:[]? Depression []? Anxiety [x]? Denies all unless marked  Extremities: []? Pain  []? Swelling  [x]? Denies all unless marked  Musculoskeletal: []? Myalgias  []? Joint Pain  []? Arthritis []? Muscle Cramps []? Muscle Twitches  [x]? Denies all unless marked  Sleep: []? Insomnia[]? Snoring []? Restless Legs  []? Sleep Apnea  []? Daytime Sleepiness  [x]? Denies all unless marked  Neurological:[]? Visual Disturbance/Memory Loss []? Loss of Balance []? Slurred Speech/Weakness []? Seizures  []? Vertigo/Dizziness [x]? Denies all unless marked    The MA has completed the ROS with the patient. I have reviewed it in its' entirety with the patient and agree with the documentation.      Past Medical History:   Diagnosis Date    CAD (coronary artery disease)     Chronic back pain     COPD (chronic obstructive pulmonary disease) (HCC)     Hypertension     Peripheral sensory neuropathy     Pleurisy     Type II or unspecified type diabetes mellitus without mention of complication, not stated as uncontrolled        Past Surgical History:   Procedure Laterality Date    APPENDECTOMY      CARDIAC CATHETERIZATION  11/04/2019    PIETER to LAD       Family History   Problem Relation Age of Onset    Cancer Mother     Heart Disease Father        Social History     Socioeconomic History    Marital status:      Spouse name: Not on file    Number of children: Not on file    Years of education: Not on file    Highest education level: Not on file   Occupational History    Not on file   Social Needs    Financial resource strain: Not on file    Food insecurity     Worry: Not on file     Inability: Not on file    Transportation needs     Medical: Not on file     Non-medical: Not on file   Tobacco Use    Smoking status: Former Smoker     Packs/day: 1.50     Years: 15.00     Pack years: 22.50     Types: Cigarettes     Last attempt to quit: 10/23/2000     Years since quittin.0    Smokeless tobacco: Never Used   Substance and Sexual Activity    Alcohol use: No    Drug use: No    Sexual activity: Not on file   Lifestyle    Physical activity     Days per week: Not on file     Minutes per session: Not on file    Stress: Not on file   Relationships    Social connections     Talks on phone: Not on file     Gets together: Not on file     Attends Anabaptist service: Not on file     Active member of club or organization: Not on file     Attends meetings of clubs or organizations: Not on file     Relationship status: Not on file    Intimate partner violence     Fear of current or ex partner: Not on file     Emotionally abused: Not on file     Physically abused: Not on file     Forced sexual activity: Not on file   Other Topics Concern    Not on file   Social History Narrative    Not on file       Current Outpatient Medications   Medication Sig Dispense Refill    meclizine (ANTIVERT) 25 MG tablet Take 25 mg by mouth      memantine (NAMENDA) 10 MG tablet Take 1 tablet by mouth 2 times daily 60 tablet 2    lisinopril (PRINIVIL;ZESTRIL) 40 MG tablet TAKE ONE TABLET BY MOUTH DAILY. 90 tablet 3    pravastatin (PRAVACHOL) 80 MG tablet TAKE ONE TABLET BY MOUTH EVERY EVENING. 90 tablet 3    Diabetic Shoe MISC by Does not apply route DISPENSE ONE PAIR DIABETIC SHOES AND THREE PAIRS OF HEAT MOLDED INSERTS 1 each 0    diclofenac sodium (VOLTAREN) 1 % GEL APPLY 2 G TO SKIN 2 TIMES DAILY 100 g 3    albuterol sulfate HFA (VENTOLIN HFA) 108 (90 Base) MCG/ACT inhaler INHALE 2 PUFFS INTO THE LUNGS EVERY 6 HOURS AS NEEDED.  18 g 3    azelastine (ASTELIN) 0.1 % nasal spray 1 spray by Nasal route 2 times daily Use in each nostril as directed 2 Bottle 1    aspirin 81 MG chewable tablet Take 1 tablet by mouth daily 30 tablet 3  cetirizine (ZYRTEC) 10 MG tablet TAKE 1 TABLET BY MOUTH DAILY 90 tablet 3    metFORMIN (GLUCOPHAGE) 1000 MG tablet TAKE ONE TABLET BY MOUTH TWO TIMES A DAY WITH MEALS 180 tablet 3    pantoprazole (PROTONIX) 40 MG tablet TAKE ONE TABLET BY MOUTH EVERY DAY 90 tablet 3    clopidogrel (PLAVIX) 75 MG tablet Take 1 tablet by mouth daily 90 tablet 3    triamcinolone (KENALOG) 0.1 % cream Apply topically 2 times daily. 1 Tube 1    tamsulosin (FLOMAX) 0.4 MG capsule Take 1 capsule by mouth daily 90 capsule 3    Misc. Devices (WALKER) MISC 1 each by Does not apply route daily 1 each 0    nitroGLYCERIN (NITROSTAT) 0.4 MG SL tablet up to max of 3 total doses. If no relief after 1 dose, call 911. 25 tablet 3    EPINEPHrine (EPIPEN) 0.3 MG/0.3ML SOAJ injection Use as directed for allergic reaction 0.3 mL 3    oxyCODONE-acetaminophen (PERCOCET)  MG per tablet Take 1 tablet by mouth every 8 hours as needed for Pain      gabapentin (NEURONTIN) 800 MG tablet Take 1 tablet by mouth 3 times daily for 30 days. 90 tablet 5     No current facility-administered medications for this visit.         Allergies   Allergen Reactions    Bee Venom Anaphylaxis    Pcn [Penicillins] Other (See Comments)     Made pt \"black out\"       PHYSICAL EXAMINATION:    Constitutional -   General appearance: No acute distress   EYES -   Conjunctiva normal  ENT-    No scars, masses, or lesions over external nose or ears  Cardiovascular -   No clubbing, cyanosis, or edema   Pulmonary-   Good expansion, normal effort without use of accessory muscles  Musculoskeletal -   No significant wasting of muscles noted  Gait as below, see gait exam in the neurologic exam  No gross bony deformities  Skin -   No rash, erythema, or pallor  Psychiatric -   Mood, affect, and behavior appear normal    Memory as below see mental status examination in the neurologic exam    NEUROLOGICAL EXAM    Mental status   [x]Awake, alert, oriented   []Affect attention and concentration appear appropriate  []Recent and remote memory appears unremarkable  []Speech normal without dysarthria or aphasia, comprehension and repetition intact. COMMENTS: MoCA 13/30- moderate loss noted     Cranial Nerves [x] PER, EOMI, no nystagmus, conjugate eye movements, no ptosis  [x]Face symmetric  [x]Tongue midline   [x]Shoulder shrug normal bilaterally  COMMENTS:   Motor   [x]Antigravity x 4 extremities  [x]Normal bulk and tone  [x]No tremor present  COMMENTS:       Coordination [x] HTS normal bilaterally   COMMENTS:       Gait                  []Normal steady gait    []Ataxic    []Spastic     []Magnetic     []Shuffling  COMMENTS: cautious, unsteady        [] OTHER:      Due to this being a TeleHealth encounter, evaluation of the following organ systems is limited: Vitals/Constitutional/EENT/Resp/CV/GI//MS/Neuro/Skin/Heme-Lymph-Imm. LABS RECORD AND IMAGING REVIEW (As below and per HPI)    Lab Results   Component Value Date    [id number] 783 06/03/2020     Lab Results   Component Value Date    WBC 8.88 09/30/2020    HGB 14.0 09/30/2020    HCT 42.5 09/30/2020    .4 (H) 09/30/2020    PLT 92 (L) 09/30/2020     Lab Results   Component Value Date     06/03/2020    K 4.5 06/03/2020     06/03/2020    CO2 25 06/03/2020    BUN 25 (H) 06/03/2020    CREATININE 1.5 (H) 06/23/2020    GLUCOSE 140 (H) 06/03/2020    CALCIUM 10.0 06/03/2020    PROT 7.1 06/03/2020    PROT 7.1 06/03/2020    LABALBU 4.55 06/03/2020    LABALBU 4.7 06/03/2020    BILITOT 0.7 06/03/2020    ALKPHOS 54 06/03/2020    AST 22 06/03/2020    ALT 18 06/03/2020    LABGLOM 46 (A) 06/23/2020    GLOB 2.5 05/27/2020       Mri Brain W Wo Contrast    Result Date: 6/23/2020  MRI BRAIN W WO CONTRAST - 6/23/2020 2:00 PM Indication: Memory loss, Comparison: 4/24/2015 Findings: No diffusion restriction to suggest acute infarction. No increased susceptibility to suggest intracranial hemorrhage.  The major physiologic flow voids are maintained. A few nonspecific scattered periventricular and subcortical T2 FLAIR hyperintense lesions are present. Most of these were present on a 2015 exam although a few appear slightly increased in size. No other abnormality of brain parenchyma signal intensity. No midline shift or mass effect. Mild global cerebral volume loss. No evidence of hydrocephalus. Cavum septum lucidum and vergae is again noted. Partially empty sella turcica. 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 Optic nerves appear unremarkable. Globes appear unremarkable. Mastoid air cells are clear. No paranasal sinus fluid levels or significant mucosal thickening. Following contrast administration, no abnormal foci of enhancement are identified. 1. No acute intracranial findings. 2. Chronic microvascular ischemic white matter change with only mild progression from 2015. 3. 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 Signed by Dr Pepper Murphy on 6/23/2020 4:48 PM    EEG (6/2020)- normal     ASSESSMENT:    Liz Stanley is a 72y.o. year old male evaluated via Telehealth encounter for follow up of memory loss. Doing about the same since last visit. Exam is unchanged. Suspect that gait changes are neuropathy related. Prior MoCA score 13/30. Secondary work up with MRI brain, EEG and lab work was unremarkable. No concern for NPH given imaging, exam/gait. Namenda has been helpful, will continue this. Will add Aricept on down the line. Diagnosis Orders   1. Memory loss          PLAN:  1. Continue Namenda 10mg BID. Discussed side effects with patient and wife   2. Discussed safety precautions, no driving, manage/monitor medications, increase supervision   3. Recommend 30 minutes daily exercise, daily mental stimulation, and healthy diet with fish, fruits, vegetables, fiber and water   4.  Follow up in 4 months, sooner with any worsening    An  electronic signature was used to authenticate this note.    BRIGETTE Da Silva       Pursuant to the emergency declaration under the Formerly named [facility] waiver authority and the [employer] Act, this Virtual  Visit was conducted, with patient's consent, to reduce the patient's risk of exposure to COVID-19 and provide continuity of care for an established patient. Services were provided through a video synchronous discussion virtually to substitute for in-person clinic visit.

## 2020-11-06 ENCOUNTER — TRANSCRIBE ORDERS (OUTPATIENT)
Dept: LAB | Facility: HOSPITAL | Age: 70
End: 2020-11-06

## 2020-11-06 ENCOUNTER — TELEPHONE (OUTPATIENT)
Dept: CARDIOLOGY | Age: 70
End: 2020-11-06

## 2020-11-06 DIAGNOSIS — Z01.818 PRE-OP TESTING: Primary | ICD-10-CM

## 2020-11-06 NOTE — TELEPHONE ENCOUNTER
Patient has a 6-month follow-up this month with Dr. Michael Booth. Keep that appointment.   Then we will decide if he can stop his Plavix and cardiac clearance

## 2020-11-13 ENCOUNTER — APPOINTMENT (OUTPATIENT)
Dept: LAB | Facility: HOSPITAL | Age: 70
End: 2020-11-13

## 2020-11-24 ENCOUNTER — HOSPITAL ENCOUNTER (OUTPATIENT)
Dept: MRI IMAGING | Age: 70
Discharge: HOME | End: 2020-11-24
Payer: MEDICARE

## 2020-11-24 LAB
GFR AFRICAN AMERICAN: >60
GFR NON-AFRICAN AMERICAN: 60
PERFORMED ON: ABNORMAL
POC CREATININE: 1.2 MG/DL (ref 0.3–1.3)
POC SAMPLE TYPE: ABNORMAL

## 2020-11-24 PROCEDURE — A9577 INJ MULTIHANCE: HCPCS | Performed by: PHYSICIAN ASSISTANT

## 2020-11-24 PROCEDURE — 82565 ASSAY OF CREATININE: CPT

## 2020-11-24 PROCEDURE — 74183 MRI ABD W/O CNTR FLWD CNTR: CPT

## 2020-11-24 PROCEDURE — 6360000004 HC RX CONTRAST MEDICATION: Performed by: PHYSICIAN ASSISTANT

## 2020-11-24 RX ADMIN — GADOBENATE DIMEGLUMINE 19 ML: 529 INJECTION, SOLUTION INTRAVENOUS at 11:01

## 2020-11-25 ENCOUNTER — OFFICE VISIT (OUTPATIENT)
Dept: CARDIOLOGY | Age: 70
End: 2020-11-25
Payer: MEDICARE

## 2020-11-25 VITALS
BODY MASS INDEX: 30.64 KG/M2 | HEIGHT: 70 IN | HEART RATE: 45 BPM | WEIGHT: 214 LBS | SYSTOLIC BLOOD PRESSURE: 148 MMHG | DIASTOLIC BLOOD PRESSURE: 80 MMHG

## 2020-11-25 PROCEDURE — 3017F COLORECTAL CA SCREEN DOC REV: CPT | Performed by: INTERNAL MEDICINE

## 2020-11-25 PROCEDURE — G8417 CALC BMI ABV UP PARAM F/U: HCPCS | Performed by: INTERNAL MEDICINE

## 2020-11-25 PROCEDURE — 1123F ACP DISCUSS/DSCN MKR DOCD: CPT | Performed by: INTERNAL MEDICINE

## 2020-11-25 PROCEDURE — 99214 OFFICE O/P EST MOD 30 MIN: CPT | Performed by: INTERNAL MEDICINE

## 2020-11-25 PROCEDURE — G8428 CUR MEDS NOT DOCUMENT: HCPCS | Performed by: INTERNAL MEDICINE

## 2020-11-25 PROCEDURE — 93000 ELECTROCARDIOGRAM COMPLETE: CPT | Performed by: INTERNAL MEDICINE

## 2020-11-25 PROCEDURE — 4040F PNEUMOC VAC/ADMIN/RCVD: CPT | Performed by: INTERNAL MEDICINE

## 2020-11-25 PROCEDURE — G8484 FLU IMMUNIZE NO ADMIN: HCPCS | Performed by: INTERNAL MEDICINE

## 2020-11-25 PROCEDURE — 1036F TOBACCO NON-USER: CPT | Performed by: INTERNAL MEDICINE

## 2020-11-25 ASSESSMENT — ENCOUNTER SYMPTOMS
WHEEZING: 0
DIARRHEA: 0
BLOOD IN STOOL: 0
ABDOMINAL PAIN: 0
ABDOMINAL DISTENTION: 0
SHORTNESS OF BREATH: 0
COUGH: 0
BACK PAIN: 1
VOMITING: 0

## 2020-11-25 NOTE — PROGRESS NOTES
[facility]  Cardiology Office Note  [phone] [id number]  Phone: [phone]  Fax: [phone]                            Date:  11/25/2020  Patient: Bairon Sims  Age:  72 y.o., 1950    Referral: BRIGETTE Moreira      PROBLEM LIST:    Patient Active Problem List    Diagnosis Date Noted    Diabetic peripheral neuropathy associated with type 2 diabetes mellitus ([facility] [address] 01/13/2020     Priority: Low    CAD (coronary artery disease)      Priority: Low    Chest pain, unspecified 11/04/2019     Priority: Low    Symptomatic bradycardia      Priority: Low    B12 deficiency 10/31/2019     Priority: Low    [facility] 09/19/2019     Priority: Low    Basal cell carcinoma of left cheek 09/18/2019     Priority: Low    Seborrheic keratosis 09/18/2019     Priority: Low    Epigastric pain 08/05/2019     Priority: Low     Overview Note:     Added automatically from request for surgery [id number]      Gastroesophageal reflux disease 08/05/2019     Priority: Low     Overview Note:     Added automatically from request for surgery [id number]      Heartburn 08/05/2019     Priority: Low     Overview Note:     Added automatically from request for surgery [id number]      Fall from slipping on ice, initial encounter 01/17/2018     Priority: Low    Basal cell carcinoma of chest 06/20/2017     Priority: Low    Benign hemangioma 03/23/2017     Priority: Low    Benign non-nodular prostatic hyperplasia with lower urinary tract symptoms 11/29/2016     Priority: Low    Family history of prostate cancer 11/29/2016     Priority: Low    Skin lesion of face 11/29/2016     Priority: Low    Hypertension      Priority: Low    Type 2 diabetes mellitus without complication (HCC)      Priority: Low    COPD (chronic obstructive pulmonary disease) (HCC)      Priority: Low    Chronic back pain      Priority: Low     1.   Coronary artery disease, PCI 11/4/2020 to mid LAD just after first diagonal with residual mid LAD 55% stenosis, normal LV ejection fraction. 2.  Diabetes mellitus. 3.  Ascending aortic moderate size aneurysm at 4.4 cm (11/2019 CT)  4. Mild thrombocytopenia. 5.  Chronic low back pain. 6.  COPD with prior tobacco use stopped 30 years ago. PRESENTATION: Scott Mathias is a 72y.o. year old male presents for follow-up evaluation. He has been doing well from a cardiac perspective with no chest pain or shortness of breath. He is limited by his back. He is able to walk on the treadmill as long as he holds on and does this daily without any difficulty. He has issues with his left shoulder and requires replacement. Surgery is being planned in this regard. REVIEW OF SYSTEMS:  Review of Systems   Constitutional: Negative for activity change, diaphoresis and fatigue. HENT: Negative for hearing loss, nosebleeds and tinnitus. Eyes: Negative for visual disturbance. Respiratory: Negative for cough, shortness of breath and wheezing. Cardiovascular: Negative for chest pain, palpitations and leg swelling. Gastrointestinal: Negative for abdominal distention, abdominal pain, blood in stool, diarrhea and vomiting. Endocrine: Negative for cold intolerance, heat intolerance, polydipsia, polyphagia and polyuria. Genitourinary: Negative for difficulty urinating, flank pain and hematuria. Musculoskeletal: Positive for arthralgias, back pain and gait problem. Negative for joint swelling and myalgias. Skin: Negative for pallor and rash. Neurological: Negative for dizziness, seizures, syncope and headaches. Psychiatric/Behavioral: Negative for behavioral problems and dysphoric mood. The patient is not nervous/anxious.         Past Medical History:      Diagnosis Date    CAD (coronary artery disease)     Chronic back pain     COPD (chronic obstructive pulmonary disease) (HCC)     Hypertension     Peripheral sensory neuropathy     Pleurisy     Type II or unspecified type diabetes mellitus without mention of complication, not stated as uncontrolled        Past Surgical History:      Procedure Laterality Date    APPENDECTOMY      CARDIAC CATHETERIZATION  11/04/2019    PIETER to LAD       Medications:  Current Outpatient Medications   Medication Sig Dispense Refill    meclizine (ANTIVERT) 25 MG tablet Take 25 mg by mouth      memantine (NAMENDA) 10 MG tablet Take 1 tablet by mouth 2 times daily 60 tablet 2    lisinopril (PRINIVIL;ZESTRIL) 40 MG tablet TAKE ONE TABLET BY MOUTH DAILY. 90 tablet 3    pravastatin (PRAVACHOL) 80 MG tablet TAKE ONE TABLET BY MOUTH EVERY EVENING. 90 tablet 3    Diabetic Shoe MISC by Does not apply route DISPENSE ONE PAIR DIABETIC SHOES AND THREE PAIRS OF HEAT MOLDED INSERTS 1 each 0    diclofenac sodium (VOLTAREN) 1 % GEL APPLY 2 G TO SKIN 2 TIMES DAILY 100 g 3    albuterol sulfate HFA (VENTOLIN HFA) 108 (90 Base) MCG/ACT inhaler INHALE 2 PUFFS INTO THE LUNGS EVERY 6 HOURS AS NEEDED. 18 g 3    azelastine (ASTELIN) 0.1 % nasal spray 1 spray by Nasal route 2 times daily Use in each nostril as directed 2 Bottle 1    aspirin 81 MG chewable tablet Take 1 tablet by mouth daily 30 tablet 3    cetirizine (ZYRTEC) 10 MG tablet TAKE 1 TABLET BY MOUTH DAILY 90 tablet 3    metFORMIN (GLUCOPHAGE) 1000 MG tablet TAKE ONE TABLET BY MOUTH TWO TIMES A DAY WITH MEALS 180 tablet 3    pantoprazole (PROTONIX) 40 MG tablet TAKE ONE TABLET BY MOUTH EVERY DAY 90 tablet 3    triamcinolone (KENALOG) 0.1 % cream Apply topically 2 times daily. 1 Tube 1    tamsulosin (FLOMAX) 0.4 MG capsule Take 1 capsule by mouth daily 90 capsule 3    Misc. Devices (WALKER) MISC 1 each by Does not apply route daily 1 each 0    nitroGLYCERIN (NITROSTAT) 0.4 MG SL tablet up to max of 3 total doses.  If no relief after 1 dose, call 911. 25 tablet 3    EPINEPHrine (EPIPEN) 0.3 MG/0.3ML SOAJ injection Use as directed for allergic reaction 0.3 mL 3    oxyCODONE-acetaminophen (PERCOCET)  MG per tablet Take 1 tablet by mouth every 8 hours as needed for Pain      gabapentin (NEURONTIN) 800 MG tablet Take 1 tablet by mouth 3 times daily for 30 days. 90 tablet 5     No current facility-administered medications for this visit.         Allergies:  Bee venom and Pcn [penicillins]    Past Social History:  Social History     Socioeconomic History    Marital status:      Spouse name: Not on file    Number of children: Not on file    Years of education: Not on file    Highest education level: Not on file   Occupational History    Not on file   Social Needs    Financial resource strain: Not on file    Food insecurity     Worry: Not on file     Inability: Not on file   [employer] needs     Medical: Not on file     Non-medical: Not on file   Tobacco Use    Smoking status: Former Smoker     Packs/day: 1.50     Years: 15.00     Pack years: 22.50     Types: Cigarettes     Last attempt to quit: 10/23/2000     Years since quittin.1    Smokeless tobacco: Never Used   Substance and Sexual Activity    Alcohol use: No    Drug use: No    Sexual activity: Not on file   Lifestyle    Physical activity     Days per week: Not on file     Minutes per session: Not on file    Stress: Not on file   Relationships    Social connections     Talks on phone: Not on file     Gets together: Not on file     Attends Confucianist service: Not on file     Active member of club or organization: Not on file     Attends meetings of clubs or organizations: Not on file     Relationship status: Not on file    Intimate partner violence     Fear of current or ex partner: Not on file     Emotionally abused: Not on file     Physically abused: Not on file     Forced sexual activity: Not on file   Other Topics Concern    Not on file   Social History Narrative    Not on file       Family History:       Problem Relation Age of Onset    Cancer Mother     Heart Disease Father Physical Examination:  BP (!) 148/80   Pulse (!) 45   Ht 5' 10\" (1.778 m)   Wt 214 lb (97.1 kg)   BMI 30.71 kg/m²   Physical Exam  Constitutional:       Appearance: He is well-developed. Comments: Mild truncal obesity  Difficulty with transfers with pain from low back  Blood pressure right arm sitting 140/70 mmHg, pulse 60 bpm regular   HENT:      Mouth/Throat:      Pharynx: No oropharyngeal exudate. Eyes:      General: No scleral icterus. Right eye: No discharge. Left eye: No discharge. Neck:      Thyroid: No thyromegaly. Vascular: No JVD. Cardiovascular:      Rate and Rhythm: Normal rate and regular rhythm. Heart sounds: No murmur. No friction rub. No gallop. Comments: No JVD  No edema  No significant systolic or diastolic murmurs appreciated    Pulmonary:      Effort: No respiratory distress. Breath sounds: No stridor. No wheezing or rales. Comments: No crepitations or wheezes noted  Abdominal:      General: Bowel sounds are normal. There is no distension. Palpations: Abdomen is soft. There is no mass. Tenderness: There is no abdominal tenderness. There is no guarding or rebound. Comments: No palpable organomegaly   Musculoskeletal:         General: No deformity. Skin:     General: Skin is warm. Coloration: Skin is not pale. Findings: No erythema or rash. Neurological:      Mental Status: He is alert and oriented to person, place, and time. Motor: No abnormal muscle tone. Coordination: Coordination normal.      Deep Tendon Reflexes: Reflexes normal.           Labs:   CBC: No results for input(s): WBC, HGB, HCT, PLT in the last 72 hours. BMP:  Recent Labs     11/24/20  1041   CREATININE 1.2   LABGLOM 60*     BNP: No results for input(s): BNP in the last 72 hours. PT/INR: No results for input(s): PROTIME, INR in the last 72 hours. APTT:No results for input(s): APTT in the last 72 hours.   CARDIAC ENZYMES:No results for input(s): CKTOTAL, CKMB, CKMBINDEX, TROPONINI in the last 72 hours. FASTING LIPID PANEL:  Lab Results   Component Value Date    HDL 71 01/13/2020    LDLDIRECT 131 10/07/2015    LDLCALC 63 01/13/2020    TRIG 59 11/04/2019     LIVER PROFILE:No results for input(s): AST, ALT, LABALBU in the last 72 hours. Imaging:          ASSESSMENT and PLAN:    20-year-old gentleman with past medical history of diabetes mellitus, mild thrombocytopenia, moderate size ascending aortic aneurysm (4.4 cm), coronary artery disease with PCI to mid LAD 11/4/2019, degenerative disc disease here for follow-up evaluation. 1.  He is doing well now 1 year out since his PCI. He has residual disease in the mid LAD which is managed medically and he is asymptomatic in this regard. He is exercising regularly. He may come off Plavix and continue aspirin as the sole antiplatelet agent. 2.  He may proceed with orthopedic shoulder replacement surgery as clinically indicated. 3.  A repeat CTA of his aorta has been requested as it has been 1 year. If stability is noted this can be done serially every 2 years. 4.  He will follow-up with me in 7 months. Orders:  Orders Placed This Encounter   Procedures    CTA RECONSTRUCTION AORTA    EKG 12 lead     No orders of the defined types were placed in this encounter. Return in about 7 months (around 6/25/2021). Electronically signed by Mendez Vieira MD on 11/25/2020 at 1:45 PM    [facility]      Thisdictation was generated by voice recognition computer software. Although all attempts are made to edit the dictation for accuracy, there may be errors in the transcription that are not intended.

## 2020-12-01 ENCOUNTER — HOSPITAL ENCOUNTER (INPATIENT)
Age: 70
LOS: 1 days | Discharge: HOME | DRG: 093 | End: 2020-12-02
Attending: EMERGENCY MEDICINE | Admitting: STUDENT IN AN ORGANIZED HEALTH CARE EDUCATION/TRAINING PROGRAM
Payer: MEDICARE

## 2020-12-01 ENCOUNTER — APPOINTMENT (OUTPATIENT)
Dept: CT IMAGING | Age: 70
DRG: 093 | End: 2020-12-01
Payer: MEDICARE

## 2020-12-01 ENCOUNTER — APPOINTMENT (OUTPATIENT)
Dept: MRI IMAGING | Age: 70
DRG: 093 | End: 2020-12-01
Payer: MEDICARE

## 2020-12-01 ENCOUNTER — APPOINTMENT (OUTPATIENT)
Dept: GENERAL RADIOLOGY | Age: 70
DRG: 093 | End: 2020-12-01
Payer: MEDICARE

## 2020-12-01 PROBLEM — R19.5 GUAIAC POSITIVE STOOLS: Status: ACTIVE | Noted: 2020-12-01

## 2020-12-01 PROBLEM — R29.90 STROKE-LIKE SYMPTOMS: Status: ACTIVE | Noted: 2020-12-01

## 2020-12-01 LAB
ALBUMIN SERPL-MCNC: 4.3 G/DL (ref 3.5–5.2)
ALP BLD-CCNC: 76 U/L (ref 40–130)
ALT SERPL-CCNC: 27 U/L (ref 5–41)
ANION GAP SERPL CALCULATED.3IONS-SCNC: 8 MMOL/L (ref 7–19)
AST SERPL-CCNC: 27 U/L (ref 5–40)
BACTERIA: NEGATIVE /HPF
BASOPHILS ABSOLUTE: 0 K/UL (ref 0–0.2)
BASOPHILS RELATIVE PERCENT: 0.4 % (ref 0–1)
BILIRUB SERPL-MCNC: 0.4 MG/DL (ref 0.2–1.2)
BILIRUBIN URINE: NEGATIVE
BLOOD, URINE: ABNORMAL
BUN BLDV-MCNC: 26 MG/DL (ref 8–23)
CALCIUM SERPL-MCNC: 9.2 MG/DL (ref 8.8–10.2)
CHLORIDE BLD-SCNC: 103 MMOL/L (ref 98–111)
CLARITY: CLEAR
CO2: 28 MMOL/L (ref 22–29)
COLOR: YELLOW
CREAT SERPL-MCNC: 1.2 MG/DL (ref 0.5–1.2)
CRYSTALS, UA: ABNORMAL /HPF
EOSINOPHILS ABSOLUTE: 0.2 K/UL (ref 0–0.6)
EOSINOPHILS RELATIVE PERCENT: 3.1 % (ref 0–5)
EPITHELIAL CELLS, UA: 1 /HPF (ref 0–5)
GFR AFRICAN AMERICAN: >59
GFR NON-AFRICAN AMERICAN: 60
GLUCOSE BLD-MCNC: 71 MG/DL (ref 70–99)
GLUCOSE BLD-MCNC: 84 MG/DL (ref 74–109)
GLUCOSE URINE: NEGATIVE MG/DL
HCT VFR BLD CALC: 41.2 % (ref 42–52)
HEMOGLOBIN: 12.8 G/DL (ref 14–18)
HYALINE CASTS: 1 /HPF (ref 0–8)
IMMATURE GRANULOCYTES #: 0 K/UL
KETONES, URINE: NEGATIVE MG/DL
LACTIC ACID: 1.3 MMOL/L (ref 0.5–1.9)
LEUKOCYTE ESTERASE, URINE: NEGATIVE
LYMPHOCYTES ABSOLUTE: 2.4 K/UL (ref 1.1–4.5)
LYMPHOCYTES RELATIVE PERCENT: 32.1 % (ref 20–40)
MCH RBC QN AUTO: 32.1 PG (ref 27–31)
MCHC RBC AUTO-ENTMCNC: 31.1 G/DL (ref 33–37)
MCV RBC AUTO: 103.3 FL (ref 80–94)
MONOCYTES ABSOLUTE: 0.6 K/UL (ref 0–0.9)
MONOCYTES RELATIVE PERCENT: 8.4 % (ref 0–10)
NEUTROPHILS ABSOLUTE: 4.1 K/UL (ref 1.5–7.5)
NEUTROPHILS RELATIVE PERCENT: 55.7 % (ref 50–65)
NITRITE, URINE: NEGATIVE
PDW BLD-RTO: 13.3 % (ref 11.5–14.5)
PERFORMED ON: NORMAL
PH UA: 8 (ref 5–8)
PLATELET # BLD: 103 K/UL (ref 130–400)
PMV BLD AUTO: 9.3 FL (ref 9.4–12.4)
POTASSIUM REFLEX MAGNESIUM: 5.8 MMOL/L (ref 3.5–5)
POTASSIUM SERPL-SCNC: 5.7 MMOL/L (ref 3.5–5)
PROTEIN UA: NEGATIVE MG/DL
RBC # BLD: 3.99 M/UL (ref 4.7–6.1)
RBC UA: 22 /HPF (ref 0–4)
SODIUM BLD-SCNC: 139 MMOL/L (ref 136–145)
SPECIFIC GRAVITY UA: 1.01 (ref 1–1.03)
TOTAL PROTEIN: 7 G/DL (ref 6.6–8.7)
TROPONIN: <0.01 NG/ML (ref 0–0.03)
UROBILINOGEN, URINE: 0.2 E.U./DL
WBC # BLD: 7.4 K/UL (ref 4.8–10.8)
WBC UA: 0 /HPF (ref 0–5)

## 2020-12-01 PROCEDURE — 6370000000 HC RX 637 (ALT 250 FOR IP): Performed by: PHYSICIAN ASSISTANT

## 2020-12-01 PROCEDURE — 80053 COMPREHEN METABOLIC PANEL: CPT

## 2020-12-01 PROCEDURE — 1210000000 HC MED SURG R&B

## 2020-12-01 PROCEDURE — 70450 CT HEAD/BRAIN W/O DYE: CPT

## 2020-12-01 PROCEDURE — 2580000003 HC RX 258: Performed by: PHYSICIAN ASSISTANT

## 2020-12-01 PROCEDURE — 96375 TX/PRO/DX INJ NEW DRUG ADDON: CPT

## 2020-12-01 PROCEDURE — 93005 ELECTROCARDIOGRAM TRACING: CPT | Performed by: EMERGENCY MEDICINE

## 2020-12-01 PROCEDURE — 6360000002 HC RX W HCPCS: Performed by: EMERGENCY MEDICINE

## 2020-12-01 PROCEDURE — 85025 COMPLETE CBC W/AUTO DIFF WBC: CPT

## 2020-12-01 PROCEDURE — 82947 ASSAY GLUCOSE BLOOD QUANT: CPT

## 2020-12-01 PROCEDURE — 84132 ASSAY OF SERUM POTASSIUM: CPT

## 2020-12-01 PROCEDURE — 83605 ASSAY OF LACTIC ACID: CPT

## 2020-12-01 PROCEDURE — 99283 EMERGENCY DEPT VISIT LOW MDM: CPT

## 2020-12-01 PROCEDURE — 6360000002 HC RX W HCPCS: Performed by: PHYSICIAN ASSISTANT

## 2020-12-01 PROCEDURE — 71045 X-RAY EXAM CHEST 1 VIEW: CPT

## 2020-12-01 PROCEDURE — 70551 MRI BRAIN STEM W/O DYE: CPT

## 2020-12-01 PROCEDURE — 96374 THER/PROPH/DIAG INJ IV PUSH: CPT

## 2020-12-01 PROCEDURE — 84484 ASSAY OF TROPONIN QUANT: CPT

## 2020-12-01 PROCEDURE — 81001 URINALYSIS AUTO W/SCOPE: CPT

## 2020-12-01 PROCEDURE — 36415 COLL VENOUS BLD VENIPUNCTURE: CPT

## 2020-12-01 PROCEDURE — C9113 INJ PANTOPRAZOLE SODIUM, VIA: HCPCS | Performed by: PHYSICIAN ASSISTANT

## 2020-12-01 RX ORDER — DEXTROSE MONOHYDRATE 50 MG/ML
100 INJECTION, SOLUTION INTRAVENOUS PRN
Status: DISCONTINUED | OUTPATIENT
Start: 2020-12-01 | End: 2020-12-02 | Stop reason: HOSPADM

## 2020-12-01 RX ORDER — PRAVASTATIN SODIUM 20 MG
80 TABLET ORAL NIGHTLY
Status: DISCONTINUED | OUTPATIENT
Start: 2020-12-01 | End: 2020-12-02 | Stop reason: HOSPADM

## 2020-12-01 RX ORDER — TAMSULOSIN HYDROCHLORIDE 0.4 MG/1
0.4 CAPSULE ORAL DAILY
Status: DISCONTINUED | OUTPATIENT
Start: 2020-12-01 | End: 2020-12-02 | Stop reason: HOSPADM

## 2020-12-01 RX ORDER — ASPIRIN 300 MG/1
300 SUPPOSITORY RECTAL DAILY
Status: DISCONTINUED | OUTPATIENT
Start: 2020-12-01 | End: 2020-12-02 | Stop reason: HOSPADM

## 2020-12-01 RX ORDER — ONDANSETRON 2 MG/ML
4 INJECTION INTRAMUSCULAR; INTRAVENOUS EVERY 6 HOURS PRN
Status: DISCONTINUED | OUTPATIENT
Start: 2020-12-01 | End: 2020-12-02 | Stop reason: HOSPADM

## 2020-12-01 RX ORDER — NICOTINE POLACRILEX 4 MG
15 LOZENGE BUCCAL PRN
Status: DISCONTINUED | OUTPATIENT
Start: 2020-12-01 | End: 2020-12-02 | Stop reason: HOSPADM

## 2020-12-01 RX ORDER — MEMANTINE HYDROCHLORIDE 5 MG/1
10 TABLET ORAL 2 TIMES DAILY
Status: DISCONTINUED | OUTPATIENT
Start: 2020-12-01 | End: 2020-12-02

## 2020-12-01 RX ORDER — OXYCODONE AND ACETAMINOPHEN 10; 325 MG/1; MG/1
1 TABLET ORAL EVERY 8 HOURS PRN
Status: DISCONTINUED | OUTPATIENT
Start: 2020-12-01 | End: 2020-12-02 | Stop reason: HOSPADM

## 2020-12-01 RX ORDER — POLYETHYLENE GLYCOL 3350 17 G/17G
17 POWDER, FOR SOLUTION ORAL DAILY PRN
Status: DISCONTINUED | OUTPATIENT
Start: 2020-12-01 | End: 2020-12-02 | Stop reason: HOSPADM

## 2020-12-01 RX ORDER — KETOROLAC TROMETHAMINE 30 MG/ML
15 INJECTION, SOLUTION INTRAMUSCULAR; INTRAVENOUS ONCE
Status: COMPLETED | OUTPATIENT
Start: 2020-12-01 | End: 2020-12-01

## 2020-12-01 RX ORDER — LORAZEPAM 2 MG/ML
1 INJECTION INTRAMUSCULAR ONCE
Status: COMPLETED | OUTPATIENT
Start: 2020-12-01 | End: 2020-12-01

## 2020-12-01 RX ORDER — PANTOPRAZOLE SODIUM 40 MG/10ML
40 INJECTION, POWDER, LYOPHILIZED, FOR SOLUTION INTRAVENOUS 2 TIMES DAILY
Status: DISCONTINUED | OUTPATIENT
Start: 2020-12-01 | End: 2020-12-02 | Stop reason: HOSPADM

## 2020-12-01 RX ORDER — PROMETHAZINE HYDROCHLORIDE 12.5 MG/1
12.5 TABLET ORAL EVERY 6 HOURS PRN
Status: DISCONTINUED | OUTPATIENT
Start: 2020-12-01 | End: 2020-12-02 | Stop reason: HOSPADM

## 2020-12-01 RX ORDER — SODIUM CHLORIDE 9 MG/ML
10 INJECTION INTRAVENOUS DAILY
Status: DISCONTINUED | OUTPATIENT
Start: 2020-12-01 | End: 2020-12-02 | Stop reason: HOSPADM

## 2020-12-01 RX ORDER — NITROGLYCERIN 0.4 MG/1
0.4 TABLET SUBLINGUAL EVERY 5 MIN PRN
Status: DISCONTINUED | OUTPATIENT
Start: 2020-12-01 | End: 2020-12-02 | Stop reason: HOSPADM

## 2020-12-01 RX ORDER — SODIUM CHLORIDE 0.9 % (FLUSH) 0.9 %
10 SYRINGE (ML) INJECTION PRN
Status: DISCONTINUED | OUTPATIENT
Start: 2020-12-01 | End: 2020-12-02 | Stop reason: HOSPADM

## 2020-12-01 RX ORDER — SODIUM POLYSTYRENE SULFONATE 15 G/60ML
15 SUSPENSION ORAL; RECTAL ONCE
Status: COMPLETED | OUTPATIENT
Start: 2020-12-01 | End: 2020-12-01

## 2020-12-01 RX ORDER — DEXTROSE MONOHYDRATE 25 G/50ML
12.5 INJECTION, SOLUTION INTRAVENOUS PRN
Status: DISCONTINUED | OUTPATIENT
Start: 2020-12-01 | End: 2020-12-02 | Stop reason: HOSPADM

## 2020-12-01 RX ORDER — LABETALOL HYDROCHLORIDE 5 MG/ML
10 INJECTION, SOLUTION INTRAVENOUS EVERY 10 MIN PRN
Status: DISCONTINUED | OUTPATIENT
Start: 2020-12-01 | End: 2020-12-02 | Stop reason: HOSPADM

## 2020-12-01 RX ORDER — AZELASTINE 1 MG/ML
1 SPRAY, METERED NASAL 2 TIMES DAILY
Status: DISCONTINUED | OUTPATIENT
Start: 2020-12-01 | End: 2020-12-01 | Stop reason: CLARIF

## 2020-12-01 RX ORDER — SODIUM CHLORIDE 0.9 % (FLUSH) 0.9 %
10 SYRINGE (ML) INJECTION EVERY 12 HOURS SCHEDULED
Status: DISCONTINUED | OUTPATIENT
Start: 2020-12-01 | End: 2020-12-02 | Stop reason: HOSPADM

## 2020-12-01 RX ORDER — ASPIRIN 81 MG/1
81 TABLET ORAL DAILY
Status: DISCONTINUED | OUTPATIENT
Start: 2020-12-01 | End: 2020-12-02 | Stop reason: HOSPADM

## 2020-12-01 RX ADMIN — PANTOPRAZOLE SODIUM 40 MG: 40 INJECTION, POWDER, FOR SOLUTION INTRAVENOUS at 23:00

## 2020-12-01 RX ADMIN — TAMSULOSIN HYDROCHLORIDE 0.4 MG: 0.4 CAPSULE ORAL at 23:00

## 2020-12-01 RX ADMIN — MEMANTINE 10 MG: 5 TABLET ORAL at 23:00

## 2020-12-01 RX ADMIN — Medication 10 ML: at 23:00

## 2020-12-01 RX ADMIN — SODIUM CHLORIDE 10 ML: 9 INJECTION, SOLUTION INTRAMUSCULAR; INTRAVENOUS; SUBCUTANEOUS at 23:00

## 2020-12-01 RX ADMIN — PRAVASTATIN SODIUM 80 MG: 20 TABLET ORAL at 23:00

## 2020-12-01 RX ADMIN — KETOROLAC TROMETHAMINE 15 MG: 30 INJECTION, SOLUTION INTRAMUSCULAR; INTRAVENOUS at 13:01

## 2020-12-01 RX ADMIN — Medication 15 G: at 23:00

## 2020-12-01 RX ADMIN — LORAZEPAM 1 MG: 2 INJECTION INTRAMUSCULAR; INTRAVENOUS at 13:01

## 2020-12-01 RX ADMIN — ASPIRIN 81 MG: 81 TABLET, COATED ORAL at 23:00

## 2020-12-01 ASSESSMENT — PAIN SCALES - GENERAL
PAINLEVEL_OUTOF10: 1
PAINLEVEL_OUTOF10: 0

## 2020-12-01 ASSESSMENT — ENCOUNTER SYMPTOMS
VISUAL CHANGE: 0
SHORTNESS OF BREATH: 0
VOMITING: 0

## 2020-12-01 NOTE — ED PROVIDER NOTES
[facility] 5 SURG SERVICES  eMERGENCY dEPARTMENT eNCOUnter      Pt Name: Bairon Sims  MRN: [id number]  [place] 1950  Date of evaluation: 12/1/2020  Provider: Gloria Tay MD    [address] [place] [address]       Chief Complaint   Patient presents with    Chest Pain    Numbness     to face and bilat arms         HISTORY OF PRESENT ILLNESS   (Location/Symptom, Timing/Onset,Context/Setting, Quality, Duration, Modifying Factors, Severity)  Note limiting factors. Bairon Sims is a 72 y.o. male who presents to the emergency department      The history is provided by the patient. Neurologic Problem   Primary symptoms include loss of sensation (\"Numb all over\", bilateral process). Primary symptoms include no focal weakness, no loss of balance, no speech change, no memory loss, no movement disorder, no visual change, no auditory change, and no dizziness. This is a new problem. The current episode started less than 1 hour ago. The problem has not changed since onset. There was no focality noted. There has been no fever. Associated symptoms include chest pain (\"sore\"). Pertinent negatives include no shortness of breath, no vomiting, no altered mental status, no confusion and no headaches. Associated medical issues comments: CAD - stent last year. Denies alchol use or being particularly anxious. .       NursingNotes were reviewed. REVIEW OF SYSTEMS    (2-9 systems for level 4, 10 or more for level 5)     Review of Systems   Respiratory: Negative for shortness of breath. Cardiovascular: Positive for chest pain (\"sore\"). Gastrointestinal: Negative for vomiting. Neurological: Negative for dizziness, speech change, focal weakness, headaches and loss of balance. Psychiatric/Behavioral: Negative for confusion and memory loss. All other systems reviewed and are negative. Except as noted above the remainder of the review of systems was reviewed and negative.        PAST MEDICAL HISTORY     Past Medical History: Diagnosis Date    CAD (coronary artery disease)     Chronic back pain     COPD [facility]ch[facility] obstructive pulmonary disease) ([facility])     Hypertension     Peripheral sensory neuropathy     Pleurisy     Type II or unspecified type diabetes mellitus without mention of complication, not stated as uncontrolled          SURGICALHISTORY       Past Surgical History:   Procedure Laterality Date    APPENDECTOMY      CARDIAC CATHETERIZATION  11/04/2019    PIETER to LAD         CURRENT MEDICATIONS       Discharge Medication List as of 12/2/2020  5:59 PM      CONTINUE these medications which have NOT CHANGED    Details   meclizine (ANTIVERT) 25 MG tablet Take 25 mg by mouthHistorical Med      memantine (NAMENDA) 10 MG tablet Take 1 tablet by mouth 2 times daily, Disp-60 tablet,R-2Normal      lisinopril (PRINIVIL;ZESTRIL) 40 MG tablet TAKE ONE TABLET BY MOUTH DAILY. , Disp-90 tablet,R-3Normal      pravastatin (PRAVACHOL) 80 MG tablet TAKE ONE TABLET BY MOUTH EVERY EVENING., Disp-90 tablet,R-3Normal      gabapentin (NEURONTIN) 800 MG tablet Take 1 tablet by mouth 3 times daily for 30 days. , Disp-90 tablet,R-5Normal      Diabetic Shoe MISC Starting Tue 9/22/2020, Disp-1 each,R-0, PrintDISPENSE ONE PAIR DIABETIC SHOES AND THREE PAIRS OF HEAT MOLDED INSERTS      diclofenac sodium (VOLTAREN) 1 % GEL APPLY 2 G TO SKIN 2 TIMES DAILY, Disp-100 g,R-3, Normal      albuterol sulfate HFA (VENTOLIN HFA) 108 (90 Base) MCG/ACT inhaler INHALE 2 PUFFS INTO THE LUNGS EVERY 6 HOURS AS NEEDED., Disp-18 g, R-3Normal      azelastine (ASTELIN) 0.1 % nasal spray 1 spray by Nasal route 2 times daily Use in each nostril as directed, Disp-2 Bottle, R-1Normal      aspirin 81 MG chewable tablet Take 1 tablet by mouth daily, Disp-30 tablet, R-3Normal      cetirizine (ZYRTEC) 10 MG tablet TAKE 1 TABLET BY MOUTH DAILY, Disp-90 tablet, R-3Normal      metFORMIN (GLUCOPHAGE) 1000 MG tablet TAKE ONE TABLET BY MOUTH TWO TIMES A DAY WITH MEALS, Disp-180 tablet, R-3Normal      pantoprazole (PROTONIX) 40 MG tablet TAKE ONE TABLET BY MOUTH EVERY DAY, Disp-90 tablet, R-3Normal      triamcinolone (KENALOG) 0.1 % cream Apply topically 2 times daily. , Disp-1 Tube, R-1, Normal      Misc. Devices [facility]) MISC DAILY Starting 2019, Disp-1 each, R-0, Print      tamsulosin (FLOMAX) 0.4 MG capsule Take 1 capsule by mouth daily, Disp-90 capsule, R-3Normal      nitroGLYCERIN (NITROSTAT) 0.4 MG SL tablet up to max of 3 total doses.  If no relief after 1 dose, call 911., Disp-25 tablet, R-3Normal      EPINEPHrine (EPIPEN) 0.3 MG/0.3ML SOAJ injection Use as directed for allergic reaction, Disp-0.3 mL, R-3Normal      oxyCODONE-acetaminophen (PERCOCET)  MG per tablet Take 1 tablet by mouth every 8 hours as needed for Pain             ALLERGIES     Bee venom and Pcn [penicillins]    FAMILY HISTORY       Family History   Problem Relation Age of Onset    Cancer Mother     Heart Disease Father           SOCIAL HISTORY       Social History     Socioeconomic History    Marital status:      Spouse name: None    Number of children: None    Years of education: None    Highest education level: None   Occupational History    None   Social Needs    Financial resource strain: None    Food insecurity     Worry: None     Inability: None    Transportation needs     Medical: None     Non-medical: None   Tobacco Use    Smoking status: Former Smoker     Packs/day: 1.50     Years: 15.00     Pack years: 22.50     Types: Cigarettes     Last attempt to quit: 10/23/2000     Years since quittin.1    Smokeless tobacco: Never Used   Substance and Sexual Activity    Alcohol use: No    Drug use: No    Sexual activity: None   Lifestyle    Physical activity     Days per week: None     Minutes per session: None    Stress: None   Relationships    Social connections     Talks on phone: None     Gets together: None     Attends Church service: None     Active member of club or organization: None     Attends meetings of clubs or organizations: None     Relationship status: None    Intimate partner violence     Fear of current or ex partner: None     Emotionally abused: None     Physically abused: None     Forced sexual activity: None   Other Topics Concern    None   Social History Narrative    None       SCREENINGS    [place] Coma Scale  Eye Opening: Spontaneous  Best Verbal Response: Oriented  Best Motor Response: Obeys commands  [place] Coma Scale Score: 15 @FLOW([id number])@      PHYSICAL EXAM    (up to 7 for level 4, 8 or more for level 5)     ED Triage Vitals   BP Temp Temp src Pulse Resp SpO2 Height Weight   12/01/20 1224 12/01/20 1224 -- 12/01/20 1224 12/01/20 1224 12/01/20 1224 12/01/20 1223 12/01/20 1223   (!) 205/87 97.8 °F (36.6 °C)  (!) 48 18 100 % 5' 10\" (1.778 m) 214 lb (97.1 kg)       Physical Exam  Vitals signs and nursing note reviewed. Constitutional:       General: He is not in acute distress. Appearance: Normal appearance. He is obese. He is not ill-appearing, toxic-appearing or diaphoretic. HENT:      Head: Normocephalic and atraumatic. Nose: Nose normal.      Mouth/Throat:      Mouth: Mucous membranes are moist.      Pharynx: Oropharynx is clear. Comments: Tongue midline  Eyes:      Extraocular Movements: Extraocular movements intact. Conjunctiva/sclera: Conjunctivae normal.      Pupils: Pupils are equal, round, and reactive to light. Neck:      Musculoskeletal: Normal range of motion and neck supple. Cardiovascular:      Rate and Rhythm: Regular rhythm. Bradycardia present. Heart sounds: Normal heart sounds. Pulmonary:      Effort: Pulmonary effort is normal.      Breath sounds: Normal breath sounds. Chest:      Chest wall: Tenderness (Tmod - reproduces \"soreness\") present. Abdominal:      Tenderness: There is no abdominal tenderness. Genitourinary:     Rectum: Guaiac result positive.    Musculoskeletal:         General: No swelling. Right lower leg: No edema. Left lower leg: No edema. Skin:     General: Skin is warm and dry. Neurological:      General: No focal deficit present. Mental Status: He is alert. Cranial Nerves: No cranial nerve deficit. Comments: Mild tremor, no drift, finger to nose intact bilaterally. Mumbles at times. Slow to answer orientation questions, but is able. Psychiatric:      Comments: Seems anxious         DIAGNOSTIC RESULTS     EKG: All EKG's are interpreted by the Emergency Department Physician who either signs or Co-signsthis chart in the absence of a cardiologist.    EKG 1:  Regular rate and rhythm. Normal P waves and FL interval. Normal QRS. Normal QT interval. No ST elevation or depression. This EKG was interpreted by me. EKG 2: sinus isabella, VR 48, no injury      RADIOLOGY:   Non-plain filmimages such as CT, Ultrasound and MRI are read by the radiologist. Plain radiographic images are visualized and preliminarily interpreted by the emergency physician with the below findings:        Interpretation per the Radiologist below, if available at the time ofthis note:    Vascular carotid duplex bilateral   Final Result      MRI Brain WO Contrast   Final Result   Impression:    1. There is no restricted diffusion to suggest acute ischemia or   infarct. 2. There is atrophy of the brain with prominence of the subarachnoid   spaces and ventricular enlargement. Dilated perivascular spaces are   present. 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 Small vessel ischemic changes are noted involving   the periventricular and higher white matter tracts. 3. Stable pineal cyst.    4. Stable appearance of the brain from previous MRI dated 6/23/2020. Signed by Dr Oc Garcia on 12/1/2020 9:25 PM      CT Head WO Contrast   Final Result   1. Subtle round hypodensity at the right thalamus. This could   represent artifact or infarct. Recommend MRI brain for further   evaluation. 2. Mild volume loss and chronic microvascular changes. 3. Stable 1.6 cm pineal cyst, better demonstrated on prior MRI   6/23/2020. Signed by Dr Tomas Gould on 12/1/2020 4:47 PM      XR CHEST PORTABLE   Final Result   1. No acute disease.    Signed by Dr Kathryn Zambrano on 12/1/2020 12:53 PM            ED BEDSIDE ULTRASOUND:   Performed by ED Physician - none    LABS:  Labs Reviewed   CBC WITH AUTO DIFFERENTIAL - Abnormal; Notable for the following components:       Result Value    RBC 3.99 (*)     Hemoglobin 12.8 (*)     Hematocrit 41.2 (*)     .3 (*)     MCH 32.1 (*)     MCHC 31.1 (*)     Platelets 760 (*)     MPV 9.3 (*)     All other components within normal limits   COMPREHENSIVE METABOLIC PANEL W/ REFLEX TO MG FOR LOW K - Abnormal; Notable for the following components:    Potassium reflex Magnesium 5.8 (*)     BUN 26 (*)     GFR Non- 60 (*)     All other components within normal limits   POTASSIUM - Abnormal; Notable for the following components:    Potassium 5.7 (*)     All other components within normal limits   URINE RT REFLEX TO CULTURE - Abnormal; Notable for the following components:    Blood, Urine SMALL (*)     All other components within normal limits   MICROSCOPIC URINALYSIS - Abnormal; Notable for the following components:    Bacteria, UA NEGATIVE (*)     Crystals, UA NEG (*)     RBC, UA 22 (*)     All other components within normal limits   LIPID PANEL - Abnormal; Notable for the following components:    Cholesterol, Total 141 (*)     All other components within normal limits   CBC - Abnormal; Notable for the following components:    RBC 3.80 (*)     Hemoglobin 12.4 (*)     Hematocrit 37.9 (*)     MCV 99.7 (*)     MCH 32.6 (*)     MCHC 32.7 (*)     Platelets 944 (*)     All other components within normal limits   TROPONIN   TROPONIN   LACTIC ACID, PLASMA   BASIC METABOLIC PANEL W/ REFLEX TO MG FOR LOW K   HEMOGLOBIN A1C   TSH WITH REFLEX TO FT4   TROPONIN   TROPONIN   HEMOGLOBIN AND HEMATOCRIT, BLOOD   POCT GLUCOSE   POCT GLUCOSE   POCT GLUCOSE   POCT GLUCOSE   POCT GLUCOSE   POCT GLUCOSE   POCT GLUCOSE   POCT GLUCOSE       All other labs were within normal range or not returned as of this dictation. EMERGENCY DEPARTMENT COURSE and DIFFERENTIAL DIAGNOSIS/MDM:   Vitals:    Vitals:    12/02/20 0203 12/02/20 0609 12/02/20 1035 12/02/20 1400   BP: 136/77 (!) 172/88 135/69 (!) 185/92   Pulse: 51 60 57 59   Resp: 16 16 16 18   Temp: 96.8 °F (36 °C) 96.5 °F (35.8 °C) 97.8 °F (36.6 °C) 97.9 °F (36.6 °C)   TempSrc: Temporal Temporal Temporal Temporal   SpO2: 94% 95% 96% 96%   Weight:       Height:               MDM  Number of Diagnoses or Management Options  Diagnosis management comments: Pt found staring at bed with bloody fluid on sheets. \"I didn't do it\". Heme positive with vault sweep. RN discussed with family - pt has had episodes of partial responsiveness of late. CRITICAL CARE TIME   Total Critical Care time was 0 minutes, excluding separately reportable procedures. There was a high probability of clinically significant/lifethreatening deterioration in the patient's condition which required my urgent intervention. CONSULTS:  IP CONSULT TO NEUROLOGY    PROCEDURES:  Unless otherwise noted below, none     Procedures    FINAL IMPRESSION      1. Altered mental status, unspecified altered mental status type    2. Gastrointestinal hemorrhage, unspecified gastrointestinal hemorrhage type    3. Hyperkalemia          DISPOSITION/PLAN   DISPOSITION        PATIENT REFERRED TO:  BRIGETTE Henderson  [facility]  [address]    Schedule an appointment as soon as possible for a visit in 1 week  Pt/ Family to call and make appt as soon as possible.      Ayaan Arrington MD  [address] [facility] [place] [phone]    Schedule an appointment as soon as possible for a visit in 1 week  Please call office tomorrow morning and make 1 week follow up appointment    Jeferson Head MD  [address] [facility] [address] [facility] [address] [address]  [phone]    Schedule an appointment as soon as possible for a visit in 1 week  Call office tomorrow morning and make a 1 week follow up appointment      DISCHARGE MEDICATIONS:  Discharge Medication List as of 12/2/2020  5:59 PM             (Please note that portions of this note were completed with a voice recognition program.  Efforts were made to edit the dictations but occasionally words are mis-transcribed.)    Sybil Phoenix, MD (electronically signed)  Attending Emergency Physician          Sybil Phoenix, MD  12/08/20 8601

## 2020-12-02 ENCOUNTER — TELEPHONE (OUTPATIENT)
Dept: GASTROENTEROLOGY | Facility: CLINIC | Age: 70
End: 2020-12-02

## 2020-12-02 VITALS
RESPIRATION RATE: 18 BRPM | HEIGHT: 70 IN | TEMPERATURE: 97.9 F | SYSTOLIC BLOOD PRESSURE: 185 MMHG | BODY MASS INDEX: 32.01 KG/M2 | HEART RATE: 59 BPM | WEIGHT: 223.6 LBS | OXYGEN SATURATION: 96 % | DIASTOLIC BLOOD PRESSURE: 92 MMHG

## 2020-12-02 LAB
ANION GAP SERPL CALCULATED.3IONS-SCNC: 8 MMOL/L (ref 7–19)
BUN BLDV-MCNC: 21 MG/DL (ref 8–23)
CALCIUM SERPL-MCNC: 9 MG/DL (ref 8.8–10.2)
CHLORIDE BLD-SCNC: 107 MMOL/L (ref 98–111)
CHOLESTEROL, TOTAL: 141 MG/DL (ref 160–199)
CO2: 25 MMOL/L (ref 22–29)
CREAT SERPL-MCNC: 1.1 MG/DL (ref 0.5–1.2)
EKG P AXIS: 13 DEGREES
EKG P AXIS: 16 DEGREES
EKG P-R INTERVAL: 168 MS
EKG P-R INTERVAL: 170 MS
EKG Q-T INTERVAL: 420 MS
EKG Q-T INTERVAL: 460 MS
EKG QRS DURATION: 88 MS
EKG QRS DURATION: 94 MS
EKG QTC CALCULATION (BAZETT): 403 MS
EKG QTC CALCULATION (BAZETT): 438 MS
EKG T AXIS: 26 DEGREES
EKG T AXIS: 43 DEGREES
GFR AFRICAN AMERICAN: >59
GFR NON-AFRICAN AMERICAN: >60
GLUCOSE BLD-MCNC: 73 MG/DL (ref 70–99)
GLUCOSE BLD-MCNC: 75 MG/DL (ref 74–109)
GLUCOSE BLD-MCNC: 79 MG/DL (ref 70–99)
GLUCOSE BLD-MCNC: 96 MG/DL (ref 70–99)
HBA1C MFR BLD: 5.3 % (ref 4–6)
HCT VFR BLD CALC: 37.9 % (ref 42–52)
HCT VFR BLD CALC: 43.8 % (ref 42–52)
HDLC SERPL-MCNC: 65 MG/DL (ref 55–121)
HEMOGLOBIN: 12.4 G/DL (ref 14–18)
HEMOGLOBIN: 14.1 G/DL (ref 14–18)
LDL CHOLESTEROL CALCULATED: 62 MG/DL
LV EF: 65 %
LVEF MODALITY: NORMAL
MCH RBC QN AUTO: 32.6 PG (ref 27–31)
MCHC RBC AUTO-ENTMCNC: 32.7 G/DL (ref 33–37)
MCV RBC AUTO: 99.7 FL (ref 80–94)
PDW BLD-RTO: 13.2 % (ref 11.5–14.5)
PERFORMED ON: NORMAL
PLATELET # BLD: 113 K/UL (ref 130–400)
PMV BLD AUTO: 9.4 FL (ref 9.4–12.4)
POTASSIUM REFLEX MAGNESIUM: 4.8 MMOL/L (ref 3.5–5)
RBC # BLD: 3.8 M/UL (ref 4.7–6.1)
SODIUM BLD-SCNC: 140 MMOL/L (ref 136–145)
TRIGL SERPL-MCNC: 69 MG/DL (ref 0–149)
TROPONIN: <0.01 NG/ML (ref 0–0.03)
TSH REFLEX FT4: 2.23 UIU/ML (ref 0.35–5.5)
WBC # BLD: 7.4 K/UL (ref 4.8–10.8)

## 2020-12-02 PROCEDURE — 2580000003 HC RX 258: Performed by: PHYSICIAN ASSISTANT

## 2020-12-02 PROCEDURE — 97165 OT EVAL LOW COMPLEX 30 MIN: CPT

## 2020-12-02 PROCEDURE — 80061 LIPID PANEL: CPT

## 2020-12-02 PROCEDURE — 92610 EVALUATE SWALLOWING FUNCTION: CPT

## 2020-12-02 PROCEDURE — 80048 BASIC METABOLIC PNL TOTAL CA: CPT

## 2020-12-02 PROCEDURE — 97116 GAIT TRAINING THERAPY: CPT

## 2020-12-02 PROCEDURE — 85027 COMPLETE CBC AUTOMATED: CPT

## 2020-12-02 PROCEDURE — 93880 EXTRACRANIAL BILAT STUDY: CPT

## 2020-12-02 PROCEDURE — 84443 ASSAY THYROID STIM HORMONE: CPT

## 2020-12-02 PROCEDURE — 6360000004 HC RX CONTRAST MEDICATION: Performed by: PHYSICIAN ASSISTANT

## 2020-12-02 PROCEDURE — C8929 TTE W OR WO FOL WCON,DOPPLER: HCPCS

## 2020-12-02 PROCEDURE — 83036 HEMOGLOBIN GLYCOSYLATED A1C: CPT

## 2020-12-02 PROCEDURE — 99223 1ST HOSP IP/OBS HIGH 75: CPT | Performed by: PSYCHIATRY & NEUROLOGY

## 2020-12-02 PROCEDURE — C9113 INJ PANTOPRAZOLE SODIUM, VIA: HCPCS | Performed by: PHYSICIAN ASSISTANT

## 2020-12-02 PROCEDURE — 85018 HEMOGLOBIN: CPT

## 2020-12-02 PROCEDURE — 6370000000 HC RX 637 (ALT 250 FOR IP): Performed by: PHYSICIAN ASSISTANT

## 2020-12-02 PROCEDURE — 92523 SPEECH SOUND LANG COMPREHEN: CPT

## 2020-12-02 PROCEDURE — 36415 COLL VENOUS BLD VENIPUNCTURE: CPT

## 2020-12-02 PROCEDURE — 84484 ASSAY OF TROPONIN QUANT: CPT

## 2020-12-02 PROCEDURE — 85014 HEMATOCRIT: CPT

## 2020-12-02 PROCEDURE — 93010 ELECTROCARDIOGRAM REPORT: CPT | Performed by: INTERNAL MEDICINE

## 2020-12-02 PROCEDURE — 97535 SELF CARE MNGMENT TRAINING: CPT

## 2020-12-02 PROCEDURE — 93229 REMOTE 30 DAY ECG TECH SUPP: CPT

## 2020-12-02 PROCEDURE — 97161 PT EVAL LOW COMPLEX 20 MIN: CPT

## 2020-12-02 PROCEDURE — 82947 ASSAY GLUCOSE BLOOD QUANT: CPT

## 2020-12-02 PROCEDURE — 6360000002 HC RX W HCPCS: Performed by: PHYSICIAN ASSISTANT

## 2020-12-02 PROCEDURE — 95819 EEG AWAKE AND ASLEEP: CPT | Performed by: PSYCHIATRY & NEUROLOGY

## 2020-12-02 PROCEDURE — 95819 EEG AWAKE AND ASLEEP: CPT

## 2020-12-02 RX ADMIN — ASPIRIN 81 MG: 81 TABLET, COATED ORAL at 09:03

## 2020-12-02 RX ADMIN — OXYCODONE HYDROCHLORIDE AND ACETAMINOPHEN 1 TABLET: 10; 325 TABLET ORAL at 15:05

## 2020-12-02 RX ADMIN — OXYCODONE HYDROCHLORIDE AND ACETAMINOPHEN 1 TABLET: 10; 325 TABLET ORAL at 09:03

## 2020-12-02 RX ADMIN — SODIUM CHLORIDE 10 ML: 9 INJECTION, SOLUTION INTRAMUSCULAR; INTRAVENOUS; SUBCUTANEOUS at 09:17

## 2020-12-02 RX ADMIN — PERFLUTREN 1.65 MG: 6.52 INJECTION, SUSPENSION INTRAVENOUS at 08:00

## 2020-12-02 RX ADMIN — PANTOPRAZOLE SODIUM 40 MG: 40 INJECTION, POWDER, FOR SOLUTION INTRAVENOUS at 09:03

## 2020-12-02 RX ADMIN — Medication 10 ML: at 09:17

## 2020-12-02 ASSESSMENT — PAIN SCALES - GENERAL
PAINLEVEL_OUTOF10: 9
PAINLEVEL_OUTOF10: 5
PAINLEVEL_OUTOF10: 10

## 2020-12-02 ASSESSMENT — PAIN DESCRIPTION - PAIN TYPE
TYPE: CHRONIC PAIN
TYPE: CHRONIC PAIN

## 2020-12-02 ASSESSMENT — ENCOUNTER SYMPTOMS
VOMITING: 0
SHORTNESS OF BREATH: 0
BACK PAIN: 1
NAUSEA: 0
COUGH: 0
PHOTOPHOBIA: 0

## 2020-12-02 ASSESSMENT — PAIN DESCRIPTION - LOCATION
LOCATION: BACK
LOCATION: BACK

## 2020-12-02 ASSESSMENT — PAIN SCALES - WONG BAKER: WONGBAKER_NUMERICALRESPONSE: 8

## 2020-12-02 NOTE — ED NOTES
Pt wife called and spoke with nurse. She states that the friend of the family is to have no updates. Wife states that the \"friend\"  Ronald Aragon is a drunk ex finance'. Phill Porter RN updated wife and informed her that no more results would be given to the \"friend\". She proceeded to tell nurse that the reason pt has been having MRI is due to his pancreas and being unable to have bowel movements. Wife states his baseline is usually talkative and stable and that he has not been having bouts of memory or balance issues.       Lupe Almendarez RN  12/01/20 8280

## 2020-12-02 NOTE — DISCHARGE INSTR - DIET

## 2020-12-02 NOTE — PROGRESS NOTES
Location: Back  Vital Signs  Patient Currently in Pain: Yes       Orientation  Orientation  Overall Orientation Status: Within Functional Limits  Social/Functional History     Cognition        Objective          AROM RLE (degrees)  RLE AROM: WFL  AROM LLE (degrees)  LLE AROM : WFL  Strength RLE  Comment: GROSSLY -4/5  Strength LLE  Comment: GROSSLY -4/5        Bed mobility  Supine to Sit: (NT: UP IN CHAIR)  Transfers  Sit to Stand: Minimal Assistance  Stand to sit: Minimal Assistance  Bed to Chair: Minimal assistance  Ambulation 1  Device: No Device(REACHES FOR BEDRAIL FOR SUPPORT)  Assistance: Minimal assistance  Quality of Gait: UNSTEADY OVERALL  Gait Deviations: Decreased step length;Decreased step height;Staggers  Distance: 12'     Balance  Sitting - Dynamic: Good;-  Standing - Dynamic: Fair;-        Plan   Plan  Times per week: AT LEAST 5-7  Current Treatment Recommendations: Strengthening, Balance Training, Functional Mobility Training, Transfer Training, Gait Training, Patient/Caregiver Education & Training, Safety Education & Training  Safety Devices  Type of devices: Call light within reach    G-Code       OutComes Score                                                  AM-PAC Score             Goals  Short term goals  Time Frame for Short term goals: 14 DAYS  Short term goal 1: BED MOB MOD IND  Short term goal 2: TRANSFERS SBA  Short term goal 3: ' AAD SBA       Therapy Time   Individual Concurrent Group Co-treatment   Time In           Time Out           Minutes                   Kiara Wilson, PT

## 2020-12-02 NOTE — DISCHARGE SUMMARY
Jess Armas  :  1950  MRN:  [id number]    Admit date:  2020  Discharge date:  2020    Discharging Physician: Noel Murcia MD    Advance Directive: Full Code    Consults: Dr. Amilcar Gustafson     Primary Care Physician:  Jolene Lai, BRIGETTE    Discharge Diagnoses:    Principal Problem:    Stroke-like symptoms  Active Problems:    Guaiac positive stools    Hypertension    Type 2 diabetes mellitus without complication (HCC)    COPD (chronic obstructive pulmonary disease) (HCC)    Benign non-nodular prostatic hyperplasia with lower urinary tract symptoms    CAD (coronary artery disease)  Resolved Problems:    * No resolved hospital problems. *    Portions of this note have been copied forward, however, changed to reflect the most current clinical status of this patient. Hospital Course: The patient is a 72 y.o. male with PMH HTN, BPH, DM II, COPD, CAD, memory loss who is a very poor historian and presented to [facility] ED complaining of full body numbness, tingling and episodes of a \"blank stare\". Patient had associated headache and dysarthria both of which have resolved since first arriving in ED. He denies changes in vision, lateralizing weakness or history of stroke. MRI was negative for acute ischemia. Troponin remained negative and Echocardiogram was unremarkable. Patient was seen by Neurology who recommends continue Aspirin 81 mg daily. A Zio patch was ordered for bradycardia and this PA-C discussed with RN that it should be placed prior to patient leaving. Patient improved more rapidly than expected and is back to baseline. He will follow up with Neurology as well as Cardiology and is stable for discharge at this time. Significant Diagnostic Studies:   Ct Head Wo Contrast  1. Subtle round hypodensity at the right thalamus. This could represent artifact or infarct. Recommend MRI brain for further evaluation. 2. Mild volume loss and chronic microvascular changes.  3. 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 Signed by Dr Jennifer Houser on 12/1/2020 4:47 PM    Xr Chest Portable  1. No acute disease. Signed by Dr Billy Oliva on 12/1/2020 12:53 PM    Vascular Carotid Duplex Bilateral   Impression   Duplex sonography with color flow enhancement was performed bilaterally on  the cervical carotid system. No evidence of hemodynamically significant  stenosis in the bilateral carotid and vertebral arteries. Mri Brain Wo Contrast  Impression: 1. There is no restricted diffusion to suggest acute ischemia or infarct. 2. There is atrophy of the brain with prominence of the subarachnoid spaces and ventricular enlargement. Dilated perivascular spaces are present. There is evidence of previous infarcts involving the cerebellar hemispheres bilaterally as well as the right parietal centrum semiovale. Small vessel ischemic changes are noted involving the periventricular and higher white matter tracts. 3. Stable pineal cyst. 4. Stable appearance of the brain from previous MRI dated 6/23/2020. Signed by Dr Helen Jackson on 12/1/2020 9:25 PM    Echocardiogram 12/02/2020  Summary   Sinus rhythm is noted. No obvious cardiac source for emboli seen. Left ventricular chamber size is mildly dilated . Mild asymmetric septal hypertrophy. No regional wall motion abnormalities. Left ventricular ejection fraction is visually estimated at 65%. Mild aortic regurgitation.   Pertinent Labs:   CBC:   Recent Labs     12/01/20  1226 12/02/20  0029   WBC 7.4 7.4   HGB 12.8* 12.4*   * 113*     BMP:    Recent Labs     12/01/20  1226 12/01/20  1330 12/02/20  0029     --  140   K 5.8* 5.7* 4.8     --  107   CO2 28  --  25   BUN 26*  --  21   CREATININE 1.2  --  1.1   GLUCOSE 84  --  75     Physical Exam:  Vital Signs: BP (!) 185/92   Pulse 59   Temp 97.9 °F (36.6 °C) (Temporal)   Resp 18   Ht 5' 10\" (1.778 m)   Wt 223 lb 9.6 oz (101.4 kg)   SpO2 96%   BMI 32.08 kg/m²   General appearance:. Alert and Cooperative   HEENT: Normocephalic. Chest: clear to auscultation bilaterally without wheezes or rhonchi. Cardiac: Normal heart tones with regular rate and rhythm, S1, S2 normal. No murmurs, gallops, or rubs auscultated. Abdomen:soft, non-tender; non-distended normal bowel sounds no masses, no organomegaly. Extremities: No clubbing or cyanosis. No peripheral edema. Peripheral pulses palpable. Neurologic: Grossly intact. Discharge Medications:       [facility]   Home Medication Instructions YRO:[id number]    Printed on:12/02/20 4458   Medication Information                      albuterol sulfate HFA (VENTOLIN HFA) 108 (90 Base) MCG/ACT inhaler  INHALE 2 PUFFS INTO THE LUNGS EVERY 6 HOURS AS NEEDED. aspirin 81 MG chewable tablet  Take 1 tablet by mouth daily             azelastine (ASTELIN) 0.1 % nasal spray  1 spray by Nasal route 2 times daily Use in each nostril as directed             cetirizine (ZYRTEC) 10 MG tablet  TAKE 1 TABLET BY MOUTH DAILY             Diabetic Shoe MISC  by Does not apply route DISPENSE ONE PAIR DIABETIC SHOES AND THREE PAIRS OF HEAT MOLDED INSERTS             diclofenac sodium (VOLTAREN) 1 % GEL  APPLY 2 G TO SKIN 2 TIMES DAILY             EPINEPHrine (EPIPEN) 0.3 MG/0.3ML SOAJ injection  Use as directed for allergic reaction             gabapentin (NEURONTIN) 800 MG tablet  Take 1 tablet by mouth 3 times daily for 30 days. lisinopril (PRINIVIL;ZESTRIL) 40 MG tablet  TAKE ONE TABLET BY MOUTH DAILY. meclizine (ANTIVERT) 25 MG tablet  Take 25 mg by mouth             memantine (NAMENDA) 10 MG tablet  Take 1 tablet by mouth 2 times daily             metFORMIN (GLUCOPHAGE) 1000 MG tablet  TAKE ONE TABLET BY MOUTH TWO TIMES A DAY WITH MEALS             Misc. Devices (WALKER) MISC  1 each by Does not apply route daily             nitroGLYCERIN (NITROSTAT) 0.4 MG SL tablet  up to max of 3 total doses.  If no

## 2020-12-02 NOTE — PROGRESS NOTES
PHARMACY NOTE  Roosevelt Potter was ordered Astelin Nasal Spray. Per the [address], this medication is non-formulary and not stocked by pharmacy. It has been discontinued. The medication can be reordered at discharge.      Electronically signed by Cheko Roberto [facility] on 12/1/2020 at 8:18 PM

## 2020-12-02 NOTE — PROGRESS NOTES
Occupational Therapy   Occupational Therapy Initial Assessment  Date: 2020   Patient Name: Jess Armas  MRN: [id number]     : 1950    Date of Service: 2020    Discharge Recommendations:  Patient would benefit from continued therapy after discharge       Assessment   Assessment: Evaluation completed and tx initiated. The patient is unsteady when up on his feet and will need assist with ADL/mobility upon discharge. Would benefit from further rehab. Treatment Diagnosis: Stroke like symptoms  History: dementia, diabetic polyneuropathy  REQUIRES OT FOLLOW UP: Yes  Activity Tolerance  Activity Tolerance: Patient limited by pain  Activity Tolerance: chronic back pain  Safety Devices  Safety Devices in place: Yes  Type of devices: Call light within reach; Chair alarm in place;Nurse notified           Patient Diagnosis(es): The primary encounter diagnosis was Altered mental status, unspecified altered mental status type. Diagnoses of Gastrointestinal hemorrhage, unspecified gastrointestinal hemorrhage type and Hyperkalemia were also pertinent to this visit. has a past medical history of CAD (coronary artery disease), Chronic back pain, COPD (chronic obstructive pulmonary disease) ([facility] [address] Hypertension, Peripheral sensory neuropathy, Pleurisy, and Type II or unspecified type diabetes mellitus without mention of complication, not stated as uncontrolled. has a past surgical history that includes Appendectomy and Cardiac catheterization (2019).     Treatment Diagnosis: Stroke like symptoms      Restrictions  Restrictions/Precautions  Restrictions/Precautions: Fall Risk    Subjective   General  Chart Reviewed: Yes  Patient assessed for rehabilitation services?: Yes  Family / Caregiver Present: No  Patient Currently in Pain: Yes  Pain Assessment  Pain Assessment: 0-10  Pain Level: 9  Belcher-Baker Pain Rating: Hurts whole lot  Pain Type: Chronic pain  Pain Location: Back  Non-Pharmaceutical Pain Intervention(s): Ambulation/Increased Activity;Repositioned  Response to Pain Intervention: Patient Satisfied  Vital Signs  Temp: 97.9 °F (36.6 °C)  Temp Source: Temporal  Pulse: 59  Heart Rate Source: Monitor  Resp: 18  BP: (!) 185/92  BP Location: Right Arm  Patient Currently in Pain: Yes  Oxygen Therapy  SpO2: 96 %  O2 Device: None (Room air)  Social/Functional History          Objective        Orientation  Orientation Level: Oriented to place;Oriented to time;Oriented to person     Functional Mobility  Assist Level: Minimal assistance  Functional Mobility Comments: Furniture trailing techniques  Toilet Transfers  Toilet Transfer: Minimal assistance  ADL  Feeding: Independent  Grooming: Independent;Setup  UE Bathing: Supervision  LE Bathing: Minimal assistance  UE Dressing: Supervision  LE Dressing: Minimal assistance  Toileting: Minimal assistance           Transfers  Stand Step Transfers: Minimal assistance     Cognition  Cognition Comment: Awake, alert, following directions, disgruntled over pain meds. Needs supervision for safety.         Sensation  Overall Sensation Status: Impaired(reports abnormal sensations all 4 extremities, but improved since admit)        LUE PROM (degrees)  LUE General PROM: Shoulder flexion 0-90, distally WNL  LUE AROM (degrees)  LUE General AROM: Shoulder flexion 0-90, distally WNL  RUE PROM (degrees)  RUE PROM: WFL  RUE AROM (degrees)  RUE AROM : WFL                   Tx initiated:  Balance activities (10 mins)     Plan   Plan  Times per week: 3-5    G-Code     OutComes Score                                                  AM-PAC Score             Goals  Short term goals  Time Frame for Short term goals: 1 week  Short term goal 1: Supervision for dressing  Short term goal 2: Supervision for toileting  Short term goal 3: Supervision for light ambulatory ADL  Short term goal 4: Supervision for transfers  Long term goals  Long term goal 1: Upgrade as appropriate       Therapy Time Individual Concurrent Group Co-treatment   Time In           Time Out           Minutes                   Natalia Kwok OT Electronically signed by Natalia Kwok OT on 12/2/2020 at 4:01 PM

## 2020-12-02 NOTE — CONSULTS
[facility]  [address] [place] [address] [address], [address]  [address]  Phone [phone]     Neurology Consultation     Date of Admission: 2020 12:22 PM  Date of Consultation: 20    Attending Provider: Jonas Scott MD  Consulting Provider: Chema Israel M.D. Patient: Marvin Mackenzie  :  1950  Age:  72 y.o. MRN:  [id number]    CHIEF COMPLAINT:  Stroke-like symptoms    History Source: History obtained from chart review and the patient. PCP: BRIGETTE Boo    HISTORY OF PRESENT ILLNESS:   Marvin Mackenzie is a 72y.o. year old man with a history of dementia and diabetic polyneuropath who was admitted yesterday with whole body numbness. He relates that he does have chronic numbness and tingling in his feet and intermittently in his hands. He had a coronary stent placed a year ago and was take off Plavix after his last cardiology clinica appointment on . He does take 81 mg ASA daily. Yesterday he developed numbness and tingling throughout his entire body. This included his head and face he says. It was bilateral.  Those symptoms resolved by this am.  He denies weakness, gait impairment, visual symptoms. PAST MEDICAL HISTORY:    Medical History:      Diagnosis Date    CAD (coronary artery disease)     Chronic back pain     COPD (chronic obstructive pulmonary disease) (HCC)     Hypertension     Peripheral sensory neuropathy     Pleurisy     Type II or unspecified type diabetes mellitus without mention of complication, not stated as uncontrolled        Surgical History:      Procedure Laterality Date    APPENDECTOMY      CARDIAC CATHETERIZATION  2019    PIETER to LAD       Medications Prior to Admission:    Prior to Admission medications    Medication Sig Start Date End Date Taking?  Authorizing Provider   meclizine (ANTIVERT) 25 MG tablet Take 25 mg by mouth 10/19/20   Historical Provider, MD   memantine (NAMENDA) 10 MG tablet Take 1 tablet by mouth 2 times daily 11/5/20   BRIGETTE Vasques   lisinopril (PRINIVIL;ZESTRIL) 40 MG tablet TAKE ONE TABLET BY MOUTH DAILY. 10/20/20   BRIGETTE Triana   pravastatin (PRAVACHOL) 80 MG tablet TAKE ONE TABLET BY MOUTH EVERY EVENING. 10/20/20   BRIGETTE Triana   gabapentin (NEURONTIN) 800 MG tablet Take 1 tablet by mouth 3 times daily for 30 days. 9/22/20 10/22/20  Lux Osborne MD   Diabetic Shoe MISC by Does not apply route DISPENSE ONE PAIR DIABETIC SHOES AND THREE PAIRS OF HEAT MOLDED INSERTS 9/22/20   Lux Osborne MD   diclofenac sodium (VOLTAREN) 1 % GEL APPLY 2 G TO SKIN 2 TIMES DAILY 8/8/20   BRIGETTE Alcantar   albuterol sulfate HFA (VENTOLIN HFA) 108 (90 Base) MCG/ACT inhaler INHALE 2 PUFFS INTO THE LUNGS EVERY 6 HOURS AS NEEDED. 4/6/20   BRIGETTE Redmond NP   azelastine (ASTELIN) 0.1 % nasal spray 1 spray by Nasal route 2 times daily Use in each nostril as directed 4/6/20   BRIGETTE Redmond NP   aspirin 81 MG chewable tablet Take 1 tablet by mouth daily 4/6/20   BRIGETTE Redmond NP   cetirizine (ZYRTEC) 10 MG tablet TAKE 1 TABLET BY MOUTH DAILY 4/6/20   BRIGETTE Redmond NP   metFORMIN (GLUCOPHAGE) 1000 MG tablet TAKE ONE TABLET BY MOUTH TWO TIMES A DAY WITH MEALS 4/6/20   BRIGETTE Redmond NP   pantoprazole (PROTONIX) 40 MG tablet TAKE ONE TABLET BY MOUTH EVERY DAY 4/6/20   BRIGETTE Redmond NP   triamcinolone (KENALOG) 0.1 % cream Apply topically 2 times daily. 1/13/20   IsaiasBRIGETTE Ojeda   tamsulosin (FLOMAX) 0.4 MG capsule Take 1 capsule by mouth daily 11/8/19   IsaiasBRIGETTE Danielson   Misc. Devices [employer]) MISC 1 each by Does not apply route daily 11/8/19   BRIGETTE Alcantar   nitroGLYCERIN (NITROSTAT) 0.4 MG SL tablet up to max of 3 total doses.  If no relief after 1 dose, call 911. 11/5/19   Ant Diop MD   EPINEPHrine (EPIPEN) 0.3 MG/0.3ML SOAJ injection Use as directed for allergic reaction 6/22/18   Lux Osborne MD 22.50 pack-year smoking history. He has never used smokeless tobacco.  ETOH:   reports no history of alcohol use. DRUGS:    Social History     Substance and Sexual Activity   Drug Use No       SOCIAL HISTORY:   Nida Jeffers is , lives in [address] [place] [address], and is retired. FAMILY HISTORY:       Problem Relation Age of Onset    Cancer Mother     Heart Disease Father        REVIEW OF SYSTEMS:  Review of Systems   Constitutional: Negative for chills and fever. HENT: Positive for hearing loss and tinnitus. Eyes: Negative for photophobia and visual disturbance. Respiratory: Negative for cough and shortness of breath. Cardiovascular: Negative for chest pain and palpitations. Gastrointestinal: Negative for nausea and vomiting. Endocrine: Negative for polydipsia and polyuria. Genitourinary: Positive for frequency and urgency. Musculoskeletal: Positive for back pain and neck pain. Skin: Negative for rash and wound. Allergic/Immunologic: Negative for environmental allergies and food allergies. Neurological: Positive for numbness. Negative for dizziness, tremors, seizures, syncope, facial asymmetry, speech difficulty, weakness, light-headedness and headaches. Hematological: Negative for adenopathy. Does not bruise/bleed easily. Psychiatric/Behavioral: Negative for dysphoric mood. The patient is not nervous/anxious. PHYSICAL EXAMINATION:  Vitals: /69   Pulse 57   Temp 97.8 °F (36.6 °C) (Temporal)   Resp 16   Ht 5' 10\" (1.778 m)   Wt 223 lb 9.6 oz (101.4 kg)   SpO2 96%   BMI 32.08 kg/m²   General appearance: He is in no distress but his memory and understanding is poor. Skin: Skin color, texture, turgor normal. No rashes or lesions  HEENT: Head: Normal, normocephalic, atraumatic.   Neck:no adenopathy, no carotid bruit, no JVD, supple, symmetrical, trachea midline and thyroid not enlarged, symmetric, no tenderness/mass/nodules  Lungs: clear to auscultation from knee  Right arm pinprick: normal  Left arm pinprick: normal  Right leg pinprick: decreased from knee  Left leg pinprick: decreased from knee    Gait, Coordination, and Reflexes     Coordination   Finger to nose coordination: normal    Reflexes   Right brachioradialis: 2+  Left brachioradialis: 2+  Right biceps: 2+  Left biceps: 2+  Right triceps: 2+  Left triceps: 2+  Right patellar: 2+  Left patellar: 2+  Right achilles: 2+  Left achilles: 2+  Right plantar: normal  Left plantar: normal  Right Amezquita: absent  Left Amezquita: absent  Right ankle clonus: absent  Left ankle clonus: absent      ADDITIONAL REVIEW:  CBC:    Recent Labs     12/01/20  1226 12/02/20  0029   WBC 7.4 7.4   HGB 12.8* 12.4*   * 113*     BMP:     Recent Labs     12/01/20  1226 12/01/20  1330 12/02/20  0029     --  140   K 5.8* 5.7* 4.8     --  107   CO2 28  --  25   BUN 26*  --  21   CREATININE 1.2  --  1.1   GLUCOSE 84  --  75     Hepatic:  Recent Labs     12/01/20  1226   AST 27   ALT 27   BILITOT 0.4   ALKPHOS 76     Troponin T:    Recent Labs     12/01/20  1425 12/01/20  2125 12/02/20  0029   TROPONINI <0.01 <0.01 <0.01     Lipids:    Recent Labs     12/02/20  0029   CHOL 141*   HDL 65     Narrative    MRI BRAIN WO CONTRAST 12/1/2020 5:07 PM    HISTORY: Abnormal CT    Comparison: 6/23/2020      Technique: Multiplanar imaging of the brain was performed in a routine    fashion. Findings:    Midline structures are nondisplaced. Ventricular system is normal. 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 . Basilar cisterns are preserved. There is    atrophy of the brain with prominence of the subarachnoid spaces and    ventricular enlargement. . There are scattered foci of T2 abnormality    involving the periventricular and higher white matter tracts    suggesting small vessel disease.  Remote lacunar infarcts involving the    cerebellar hemisphere bilaterally are present as well as a previous    infarct involving the right parietal centrum semiovale. No abnormal    extra axial fluid collections are noted. No restriction of diffusion    is present. Dilated perivascular spaces are present. Proximal cervical spinal cord, brainstem, and cerebellum are    unremarkable. Normal cerebrovascular flow voids are seen. Bilateral    globes and orbits are normal in appearance. No abnormal signal is noted in the mastoid air cells or paranasal    sinuses.         Impression    Impression:    1. There is no restricted diffusion to suggest acute ischemia or    infarct. 2. There is atrophy of the brain with prominence of the subarachnoid    spaces and ventricular enlargement. Dilated perivascular spaces are    present. There is evidence of previous infarcts involving the    cerebellar hemispheres bilaterally as well as the right parietal    centrum semiovale. Small vessel ischemic changes are noted involving    the periventricular and higher white matter tracts. 3. Stable pineal cyst.    4. Stable appearance of the brain from previous MRI dated 6/23/2020. Signed by Dr Smith Nguyễn on 12/1/2020 9:25 PM      Narrative & Impression      Transthoracic Echocardiography Report (TTE)      Demographics      Patient Name  Isabela Cotton Date of Study         12/02/2020      MRN           [id number]           Gender                Male      Date of Birth 1950       Room Number           [facility]      Age           72 year(s)      Height:       70 inches        Referring Physician   Abdullahi Serrano      Weight:       214 pounds       Sonographer           Shirley Vazquez      BSA:          2.15 m^2         Interpreting          Lavell Matias DO                                  Physician      BMI:          30.71 kg/m^2     Procedure     Type of Study      TTE procedure:ECHO 2D W/DOPPLER/COLOR/CONTRAST.      Study Location: Echo Lab  Technical Quality: Limited visualization     Patient Status: Inpatient     Contrast Medium: Bubble Study.     BP: 152/80 mmHg     Indications:TIA.      Conclusions      Summary   Sinus rhythm is noted. No obvious cardiac source for emboli seen. Left ventricular chamber size is mildly dilated . Mild asymmetric septal hypertrophy. No regional wall motion abnormalities. Left ventricular ejection fraction is visually estimated at 65%. Mild aortic regurgitation. Signature      ----------------------------------------------------------------   Electronically signed by Barb Silva DO(Interpreting   physician) on 12/02/2020 09:30 AM     Vascular lab preliminary results. Bilateral carotid duplex ultrasound performed. Bilateral < 50% stenosis. Final report pending. IMPRESSION:  1. Transient numbness entire body and head. Consider anxiety, hyperventilation. Considering he was taken of Plavix recently, could be rebound hypercoagulability with TIA and poor symptom relay due to dementia. 2. Diabetic polyneuropathy  3. Alzheimer's disease  4. Bradycardia    PLAN:  1. ASA 81 mg daily if not already taking. He says that he takes a blood thinner at home but does not know the name. It appears cardiology just took him off plavix a week ago. 2. Consider home with a Zio patch. He appears on no medications that would cause bradycardia.   3. Neuro FU as previously plannted    Nelsy Hilliard M.D.

## 2020-12-02 NOTE — H&P
[address] [facility] - History & Physical      PCP: BRIGETTE Bedolla    Date of Admission: 12/1/2020    Date of Service: 12/1/2020    Chief Complaint:  Stroke/seizure like symptoms    History Of Present Illness: The patient is a 72 y.o. male with PMH HTN, BPH, DM II, COPD, CAD, memory loss who is a very poor historian and presented to [address] [place] [address] ED complaining of full body numbness, tingling and episodes of a \"blank stare\". Patient has associated headache and dysarthria which has improved since first arriving in ED. He denies changes in vision, lateralizing weakness or history of stroke. Additionally a \"friend\" of the patient reported he complained of chest pain radiating to bilateral upper extremities. Initial troponin is negative, however, patient adamantly denies chest pain at this time or in recent history. Patient's RN in ED state's initially he was unable to speak in full sentences which has slowly improved. She found him standing at the end of the bed staring blankly at the wall and had an episode of urinary incontinence. She noticed at that time mental status worsened and urine was blood tinged as well as the sheets. A cisneros catheter was placed which has produced clear, yellow urine. Patient states he has known history of hemorrhoids, ED Physician reports positive guaiac stool. Past Medical History:        Diagnosis Date    CAD (coronary artery disease)     Chronic back pain     COPD (chronic obstructive pulmonary disease) (HCC)     Hypertension     Peripheral sensory neuropathy     Pleurisy     Type II or unspecified type diabetes mellitus without mention of complication, not stated as uncontrolled      Past Surgical History:        Procedure Laterality Date    APPENDECTOMY      CARDIAC CATHETERIZATION  11/04/2019    PIETER to LAD     Home Medications:  Prior to Admission medications    Medication Sig Start Date End Date Taking?  Authorizing Provider   meclizine (ANTIVERT) 25 MG tablet Take 25 mg by mouth 10/19/20   Historical Provider, MD   memantine (NAMENDA) 10 MG tablet Take 1 tablet by mouth 2 times daily 11/5/20   BRIGETTE Scott   lisinopril (PRINIVIL;ZESTRIL) 40 MG tablet TAKE ONE TABLET BY MOUTH DAILY. 10/20/20   BRIGETTE Triana   pravastatin (PRAVACHOL) 80 MG tablet TAKE ONE TABLET BY MOUTH EVERY EVENING. 10/20/20   BRIGETTE Triana   gabapentin (NEURONTIN) 800 MG tablet Take 1 tablet by mouth 3 times daily for 30 days. 9/22/20 10/22/20  Karlos Chapa MD   Diabetic Shoe MISC by Does not apply route DISPENSE ONE PAIR DIABETIC SHOES AND THREE PAIRS OF HEAT MOLDED INSERTS 9/22/20   Karlos Chapa MD   diclofenac sodium (VOLTAREN) 1 % GEL APPLY 2 G TO SKIN 2 TIMES DAILY 8/8/20   BRIGETTE Vazquez   albuterol sulfate HFA (VENTOLIN HFA) 108 (90 Base) MCG/ACT inhaler INHALE 2 PUFFS INTO THE LUNGS EVERY 6 HOURS AS NEEDED. 4/6/20   Sandra Moulding, APRN - NP   azelastine (ASTELIN) 0.1 % nasal spray 1 spray by Nasal route 2 times daily Use in each nostril as directed 4/6/20   Sandra Moulding, APRN - NP   aspirin 81 MG chewable tablet Take 1 tablet by mouth daily 4/6/20   Sandra Moulding, APRN - NP   cetirizine (ZYRTEC) 10 MG tablet TAKE 1 TABLET BY MOUTH DAILY 4/6/20   Sandra Moulding, APRN - NP   metFORMIN (GLUCOPHAGE) 1000 MG tablet TAKE ONE TABLET BY MOUTH TWO TIMES A DAY WITH MEALS 4/6/20   Sandra Moulding, APRN - NP   pantoprazole (PROTONIX) 40 MG tablet TAKE ONE TABLET BY MOUTH EVERY DAY 4/6/20   Sandra Moulding, APRN - NP   triamcinolone (KENALOG) 0.1 % cream Apply topically 2 times daily. 1/13/20   BRIGETTE Vazquez   tamsulosin (FLOMAX) 0.4 MG capsule Take 1 capsule by mouth daily 11/8/19   BRIGETTE Vazquez   Misc. Devices [facility]) MISC 1 each by Does not apply route daily 11/8/19   BRIGETTE Vazquez   nitroGLYCERIN (NITROSTAT) 0.4 MG SL tablet up to max of 3 total doses.  If no relief after 1 dose, call 911. 11/5/19   Paola Mckeon 159/59   Pulse (!) 47   Temp 97.8 °F (36.6 °C)   Resp 16   Ht 5' 10\" (1.778 m)   Wt 214 lb (97.1 kg)   SpO2 100%   BMI 30.71 kg/m²   General appearance: 72year old male, ill appearing no acute distress   Head: Normocephalic, without obvious abnormality, atraumatic  Eyes: conjunctivae/corneas clear. PERRL, EOM's intact. Ears: normal external ears and nose, throat without exudate  Neck: no adenopathy, no carotid bruit, no JVD, supple, symmetrical, trachea midline   Lungs: decreased air entry throughout with coarse air exchange, no wheezing or rhonchi   Heart: bradycardic, regular rhythm, S1, S2 normal, no murmur  Abdomen:soft, mild epigastric tenderness; non-distended, normal bowel sounds no masses, no organomegaly  Extremities:No lower extremity edema,  No erythema, no tenderness to palpation  Skin: Skin color, texture, turgor normal. No rashes or lesions  Lymphatic: No palpable lymph node enlargment  Neurologic: Alert and oriented X 3, generalized weakness, abnormal tone with stiff appearance. Answers questions appropriately though with slow stuttering speech at times. Uncoordinated BUE's, worse on right  Psychiatric: Calm, flat affect     Diagnostic Data:  CBC:  Recent Labs     12/01/20  1226   WBC 7.4   HGB 12.8*   HCT 41.2*   *     BMP:  Recent Labs     12/01/20  1226 12/01/20  1330     --    K 5.8* 5.7*     --    CO2 28  --    BUN 26*  --    CREATININE 1.2  --    CALCIUM 9.2  --      Recent Labs     12/01/20  1226   AST 27   ALT 27   BILITOT 0.4   ALKPHOS 76     Cardiac Enzymes:   Recent Labs     12/01/20  1226 12/01/20  1425   TROPONINI <0.01 <0.01     Urinalysis:  Lab Results   Component Value Date    NITRU Negative 12/01/2020    WBCUA 0 12/01/2020    BACTERIA NEGATIVE 12/01/2020    RBCUA 22 12/01/2020    BLOODU SMALL 12/01/2020    SPECGRAV 1.011 12/01/2020    GLUCOSEU Negative 12/01/2020     Ct Head Wo Contrast  1. Subtle round hypodensity at the right thalamus.  This could represent artifact or infarct. Recommend MRI brain for further evaluation. 2. Mild volume loss and chronic microvascular changes. 3. 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 Signed by Dr Merline Lolling on 12/1/2020 4:47 PM    Xr Chest Portable  1. No acute disease. Signed by Dr Carmen Javier on 12/1/2020 12:53 PM    Assessment/Plan:  Principal Problem:    Stroke-like symptoms with questionable seizure activity described as a \"blank stare. \" MRI pending, check Echo, carotids, EEG, consult Neurology, continue Aspirin/Statin for now. PT/OT/SLP eval     Active Problems:    Questionable Chest pain w/ hx CAD-trend troponin, check Echo w/ Bubble.  Plavix discontinued after follow up on 11/25/2020 with Cardiology       Guaiac positive stools-serial hemoglobin, patient does have hemorrhoids, hold Aspirin if MRI negative, IV PPI, consider GI consult if decline in hemoglobin       Hyperkalemia-Kayexalate, monitor, no EKG changes      Hypertension-permissive HTN, monitor      Type 2 diabetes mellitus without complication (HCC)-SSI, accuchecks, hypoglycemia tx orders, check A1C      Bradycardia-monitor on telemetry      COPD (chronic obstructive pulmonary disease) w/ hx tobacco use (HCC)-no exacerbation      Benign non-nodular prostatic hyperplasia with lower urinary tract symptoms-Flomax continued      Ascending aortic aneurysm --chronic, noted       Thrombocytopenia-chronic, followed as OP by Hematology      Pancreatic lesion-chronic, followed as OP    Further orders per clinical course/attending     Signed:  Brenda Angel PA-C

## 2020-12-02 NOTE — TELEPHONE ENCOUNTER
"We received the ok for pt to hold Plavix for 5 days prior to procedure. I called pt and sw his wife and she states that Dr. Dixon stopped his Plavix when he was in the office so pt is only taking 81 mg aspirin. She states that pt is in the hospital now for a possible \"mini stroke\" and that if anything changes between now and 12/17/20 she will call and let us know. She did not want to cancel at this time.   "

## 2020-12-02 NOTE — TELEPHONE ENCOUNTER
Noted.  When you call back later to confirm appt, make sure to see if the plavix was restarted.    Angy Syed MD

## 2020-12-02 NOTE — PROGRESS NOTES
Speech Language Pathology  Facility/Department: [facility] SURG SERVICES  Initial Speech/Language/Cognitive/Swallow Assessment    NAME: Marvin Mackenzie  : 1950   MRN: [id number]  ADMISSION DATE: 2020  ADMITTING DIAGNOSIS: has Hypertension; Type 2 diabetes mellitus without complication ([facility] [address] COPD (chronic obstructive pulmonary disease) ([facility] [address] Chronic back pain; Benign non-nodular prostatic hyperplasia with lower urinary tract symptoms; Family history of prostate cancer; Skin lesion of face; Basal cell carcinoma of chest; Basal cell carcinoma of left cheek; Benign hemangioma; Epigastric pain; Fall from slipping on ice, initial encounter; Gastroesophageal reflux disease; Heartburn; Seborrheic keratosis; Thrombocytopenia ([facility] [address] B12 deficiency; Chest pain, unspecified; Symptomatic bradycardia; CAD (coronary artery disease); Diabetic peripheral neuropathy associated with type 2 diabetes mellitus ([facility] [address] Stroke-like symptoms; and Guaiac positive stools on their problem list.    Date of Eval: 2020   Evaluating Therapist: RAMILA Harrington    Assessment:  Completed assessment. Patient exhibited slowed speech rate with slowed, decreased lingual movements noted (present family member verbalized baseline). SLP still ranked functional intelligibility for unfamiliar listeners at 100% in utterances with background noise present. Patient also exhibited decreased select and sustained attention, slow processing, delayed auditory comprehension, delayed verbalizations with mild fragmentations observed where the patient started expressions but then discontinued, decreased immediate/short-term memory, and decreased reasoning/problem solving. When asked, present family member verbalized back at baseline cognitive-linguistic functioning. Also evaluated patient's swallowing function.  Patient exhibits slow oral prep of more solid consistencies as well as sluggish, mild-moderately decreased laryngeal elevation for swallow airway protection. Even so, no outward S/S penetration/aspiration was noted with any puree consistency trial, regular solid consistency trial, or thin H2O trial administered during assessment this date. At this time, would recommend regular solid consistency and thin liquids. Self-feed. Thank you for this consult. Will continue to follow. Subjective:  General  Chart Reviewed: Yes  Patient assessed for rehabilitation services?: Yes  Family / Caregiver Present: Yes    Objective:  Oral Motor:    Labial ROM: Adequate during labial retraction trials and labial protrusion trials. Labial Strength: Adequate during labial compression trials. Labial Coordination: Adequate movements were noted. Lingual ROM: Adequate during lingual extension trials with full point achieved; decreased during lingual elevation trials without use of accessory jaw movement; adequate movements noted bilaterally. Lingual Symmetry: Deviation was noted, to the left, during lingual extension trials. Lingual Strength: Adequate  Lingual Coordination: Slowed movements noted. Auditory Comprehension  Comprehension:  (While delayed, patient demonstrated ability to answer simple yes/no questions regarding immediate environment and current state of being at independent level. While delayed, patient demonstrated ability to follow simple 1 step commands independently. While delayed, patient demonstrated ability to answer yes/no questions of increased complexity and to follow complex 1 and simple 2 step commands at independent level.)    Expression  Primary Mode of Expression:  (Confrontation naming of items in room was considered to be delayed but appropriate. Structured responsive speech was considered to be frequently delayed with short length of utterances noted.  Responses in natural conversation were considered to be frequently delayed and mildly fragmented where the patient started expressions but then discontinued.)    Motor Speech:  (Patient exhibited slowed speech rate with slowed, decreased lingual movements noted; present family member verbalized baseline. SLP still ranked functional intelligibility for unfamiliar listeners at 100% in utterances with background noise present.)    Overall Orientation Status:  (Patient demonstrated ability to verbalize name, birthday, age, phone number, city, state, hospital, and month at independent level. Patient did not verbalize address, MARIELA, date, or year independently.)    Attention:  (Patient demonstrated decreased select and sustained attention during assessment.)    Memory:  (Patient demonstrated decreased immediate memory with sequences of unrelated numbers/words set up to 5 items without repetitions provided. Patient demonstrated decreased short-term/working memory with less than 5 minute delay+distractions present during assessment.)    Problem Solving:  (Patient did not verbalize PCP or pharmacy at independent level. Patient did not verbalize conditions in which he takes medications nor verbalized names of medications he currently takes independently. Patient did not verbalize medication schedule at independent level. Patient did not verbalize appropriate solutions to situations that could occur during activities of daily living independently. )    Additional Assessments:  Assessed patient's swallowing function. Oral transit of puree consistency trials, presented independently, primarily measured 1-2 seconds in length and no oral cavity residue was noted post swallows. With regular solid consistency trials presented independently, patient exhibited slow oral prep. Oral transit of regular solid consistency primarily measured 1-2 seconds in length and min oral cavity residue was observed post swallows; residue cleared from the mouth with additional dry swallows. Oral transit of thin H2O trials, presented independently via cup, primarily measured 1-2 seconds in length.  Laryngeal elevation during swallow initiation was considered to be sluggish and mild-moderately decreased for swallow airway protection. Even so, no outward S/S penetration/aspiration was noted with any puree consistency trial, regular solid consistency trial, or thin H2O trial administered during evaluation this date. At this time, would recommend regular solid consistency and thin liquids. Self-feed. Will continue to follow.     Electronically signed by RAMILA Kamara on 12/2/2020 at 1:07 PM

## 2020-12-02 NOTE — PROGRESS NOTES
Patient isabella cardia HR in the 40's. MD notified and new orders received for patient to be on telemetry. Telemetry box MX-28 obtained and placed on patient. Patient is resting in no distress noted. Patient is alert and orientated x 4 at this time.

## 2020-12-03 ENCOUNTER — TELEPHONE (OUTPATIENT)
Dept: ADMINISTRATIVE | Age: 70
End: 2020-12-03

## 2020-12-03 ENCOUNTER — TELEPHONE (OUTPATIENT)
Dept: INTERNAL MEDICINE | Age: 70
End: 2020-12-03

## 2020-12-03 ENCOUNTER — TELEPHONE (OUTPATIENT)
Dept: NEUROLOGY | Age: 70
End: 2020-12-03

## 2020-12-03 RX ORDER — TAMSULOSIN HYDROCHLORIDE 0.4 MG/1
0.4 CAPSULE ORAL DAILY
Qty: 90 CAPSULE | Refills: 3 | Status: SHIPPED | OUTPATIENT
Start: 2020-12-03 | End: 2021-01-18 | Stop reason: SDUPTHER

## 2020-12-03 NOTE — TELEPHONE ENCOUNTER
[facility] 45 Transitions Initial Follow Up Call    Outreach made within 2 business days of discharge: Yes    Patient: Nida Jeffers   Patient : 1950     MRN: [id number]   Reason for Admission: Admitted 20 for stroke like symptoms   Discharge Date: 20    Discharge Diagnoses:    Principal Problem:    Stroke-like symptoms  Active Problems:    Guaiac positive stools    Hypertension    Type 2 diabetes mellitus without complication (HCC)    COPD (chronic obstructive pulmonary disease) ([facility] [address]    Benign non-nodular prostatic hyperplasia with lower urinary tract symptoms    CAD (coronary artery disease)     Spoke with: attempted to reach patient, no answer. Message left with intent of my call. I will reach out again next business day.      Discharge department/facility: [facility]     Scheduled appointment with PCP within 7-14 days    Follow Up  Future Appointments   Date Time Provider Sera Moss   12/10/2020  9:15 AM BRIGETTE Cotton LPS Cardio [facility]   12/10/2020  1:00 PM BRIGETTE Reeves LPS Neursurg [facility]   2021 10:40 AM [facility] CT RM 2 [facility] CT [facility] Hos Rad   2021  2:00 PM Roland Ott PA-C P[facility]UC [facility]   2021  2:20 PM SCHEDULE, L MED ONC MA [facility] MED ONC [facility] HOD   3/8/2021  1:45 PM BRIGETTE Reeves LPS Neursurg [facility]   2021  3:30 PM Mallory English MD LPS Cardio [address] [place] [address]

## 2020-12-03 NOTE — TELEPHONE ENCOUNTER
Miguel Perez wife called to schedule a 1 wk hfu, with randal. Discharged from [facility] 12/2, TIA    Please be advised that the best time to call him to accommodate their needs is Anytime. Thank you.

## 2020-12-03 NOTE — TELEPHONE ENCOUNTER
Called Naima for appointment [facility]. Pt family stated they are noticing more agitation and confusion.

## 2020-12-04 ENCOUNTER — TELEPHONE (OUTPATIENT)
Dept: INTERNAL MEDICINE | Age: 70
End: 2020-12-04

## 2020-12-04 NOTE — TELEPHONE ENCOUNTER
[facility] 45 Transitions Initial Follow Up Call    Outreach made within 2 business days of discharge: Yes    Patient: Oscar Toledo         Patient : 1950     MRN: [id number]   Reason for Admission: Admitted 20 for stroke like symptoms   Discharge Date: 20          Discharge Diagnoses:    Principal Problem:    Stroke-like symptoms  Active Problems:    Guaiac positive stools    Hypertension    Type 2 diabetes mellitus without complication (HCC)    COPD (chronic obstructive pulmonary disease) ([address]    Benign non-nodular prostatic hyperplasia with lower urinary tract symptoms    CAD (coronary artery disease)                Spoke with: patient's wife      Discharge department/facility: [facility]     TCM Interactive Patient Contact:  Was patient able to fill all prescriptions: Yes  Was patient instructed to bring all medications to the follow-up visit: Yes  Is patient taking all medications as directed in the discharge summary? Yes  Does patient understand their discharge instructions: Yes  Does patient have questions or concerns that need addressed prior to 7-14 day follow up office visit: no    I spoke with Mr. Lázaro Romero wife. She states he is home and doing well. She states he went to the ED with numbness of his face and arms. She reports his symptoms were gone the next day. She states they told him it was a TIA. He does not have any residual effects. He is feeling like himself today. His appetite is good. Bowels and bladder are moving ok. He is taking all of his medications as directed. She denies any needs for him at this time. We do need to schedule him for a hospital follow up. He requests to see only Audrey Ortega or Dr. Andrew Sharp. I was unable to find an opening and have reached out to Malu Horvath to schedule and notify patient.        Follow Up  Future Appointments   Date Time Provider Sera Moss   12/10/2020  9:15 AM BRIGETTE Palmer Cardio [facility]   12/10/2020  1:00 PM BRIGETTE Reeves LPS Neursurg [facility]   1/7/2021 10:40 AM MHL CT RM 2 MHL CT [facility] Hos Rad   1/25/2021  2:00 PM Orr OLGA Friedman HEMONC [facility]   1/25/2021  2:20 PM SCHEDULE, MHL MED ONC MA L MED ONC [facility] HOD   3/8/2021  1:45 PM BRIGETTE Reeves LPS Neursurg [facility]   8/4/2021  3:30 PM Kannan Fine MD LPS Cardio [address], [place]

## 2020-12-07 ENCOUNTER — VIRTUAL VISIT (OUTPATIENT)
Dept: PRIMARY CARE CLINIC | Age: 70
End: 2020-12-07
Payer: MEDICARE

## 2020-12-07 PROCEDURE — 1111F DSCHRG MED/CURRENT MED MERGE: CPT | Performed by: NURSE PRACTITIONER

## 2020-12-07 PROCEDURE — 99496 TRANSJ CARE MGMT HIGH F2F 7D: CPT | Performed by: NURSE PRACTITIONER

## 2020-12-07 RX ORDER — TIZANIDINE 4 MG/1
4 TABLET ORAL EVERY 8 HOURS PRN
Qty: 30 TABLET | Refills: 0 | Status: SHIPPED | OUTPATIENT
Start: 2020-12-07 | End: 2021-01-18 | Stop reason: SDUPTHER

## 2020-12-07 ASSESSMENT — ENCOUNTER SYMPTOMS
NAUSEA: 0
BACK PAIN: 0
PHOTOPHOBIA: 0
COLOR CHANGE: 0
RHINORRHEA: 0
VOMITING: 0
SHORTNESS OF BREATH: 0
VOICE CHANGE: 0
COUGH: 0

## 2020-12-07 NOTE — TELEPHONE ENCOUNTER
Pt wife called back and reschedule pt to 2/24/21 due to having shoulder surgery.  She states that pt is no longer taking Plavix. I instructed pt to call us back is pt has any changes and starts taking a blood thinner again.

## 2020-12-08 ENCOUNTER — CARE COORDINATION (OUTPATIENT)
Dept: CARE COORDINATION | Age: 70
End: 2020-12-08

## 2020-12-08 NOTE — CARE COORDINATION
Ambulatory Care Coordination Note  12/8/2020  CM Risk Score: 3  Charlson 10 Year Mortality Risk Score: 100%     ACC: Basil William RN    Summary Note: ACM spoke with patient and his wife via telephone for [employer] outreach. ACM spoke with patient and his wife. Patient states he is doing well since discharge from the hospital. Patient was admitted for stroke like symptoms and continues with numbness at times. Patient saw PCP yesterday for follow up. Patient denies fever, headache, increased SOB, increased cough, or changes in sputum. Patient states that he has no symptoms which are causing concern at this time. Wife states that patient has all of his prescriptions and is taking them as prescribed without side effects. Wife states that patient is exercising regularly on his treadmill and his glucose readings have been around 100. Patient's blood pressure yesterday was 130/79 and heart rate was 54 per wife. Lab Results   Component Value Date    LABA1C 5.3 12/02/2020    LABA1C 5.2 01/13/2020    LABA1C 5.2 09/04/2019     Wife states at this time patient has no new or worsening symptoms and has no needs for community resources. ACM will graduate patient from Care Coordination services at this time due to good control of DM and COPD. Wife agrees with plan to graduate and agrees to contact [facility] if future needs arise. [facility] provided contact information. PLAN:  Patient will continue to monitor symptoms. Patient agrees to call PCP if experiencing new or worsening symptoms or will call 911 for severe or life threatening symptoms. ACM will graduate patient from Care Coordination services at this time. Patient and his wife agree to contact [facility] if future needs arise. Kraig Jones RN  Ambulatory Care Manager                  Prior to Admission medications    Medication Sig Start Date End Date Taking?  Authorizing Provider   tiZANidine (ZANAFLEX) 4 MG tablet Take 1 tablet by mouth every 8 hours as needed (back pain) 23/2/34   BRIGETTE Ge   tamsulosin (FLOMAX) 0.4 MG capsule TAKE 1 CAPSULE BY MOUTH DAILY 12/3/20   BRIGETTE Baca   meclizine (ANTIVERT) 25 MG tablet Take 25 mg by mouth 10/19/20   Historical Provider, MD   memantine (NAMENDA) 10 MG tablet Take 1 tablet by mouth 2 times daily 11/5/20   BRIGETTE Mireles   lisinopril (PRINIVIL;ZESTRIL) 40 MG tablet TAKE ONE TABLET BY MOUTH DAILY. 10/20/20   BRIGETTE Triana   pravastatin (PRAVACHOL) 80 MG tablet TAKE ONE TABLET BY MOUTH EVERY EVENING. 10/20/20   BRIGETTE Triana   gabapentin (NEURONTIN) 800 MG tablet Take 1 tablet by mouth 3 times daily for 30 days. 9/22/20 10/22/20  Ciara Morales MD   Diabetic Shoe MISC by Does not apply route DISPENSE ONE PAIR DIABETIC SHOES AND THREE PAIRS OF HEAT MOLDED INSERTS 9/22/20   Ciara Morales MD   diclofenac sodium (VOLTAREN) 1 % GEL APPLY 2 G TO SKIN 2 TIMES DAILY 8/8/20   BRIGETTE Baca   albuterol sulfate HFA (VENTOLIN HFA) 108 (90 Base) MCG/ACT inhaler INHALE 2 PUFFS INTO THE LUNGS EVERY 6 HOURS AS NEEDED. 4/6/20   Tylyasmin Cowart APRN - NP   azelastine (ASTELIN) 0.1 % nasal spray 1 spray by Nasal route 2 times daily Use in each nostril as directed 4/6/20   Tylene Supa APRN - NP   aspirin 81 MG chewable tablet Take 1 tablet by mouth daily 4/6/20   Tylene Supa APRN - NP   cetirizine (ZYRTEC) 10 MG tablet TAKE 1 TABLET BY MOUTH DAILY 4/6/20   Tylene Supa APRN - NP   metFORMIN (GLUCOPHAGE) 1000 MG tablet TAKE ONE TABLET BY MOUTH TWO TIMES A DAY WITH MEALS 4/6/20   Burke Cowart APRHUI - NP   pantoprazole (PROTONIX) 40 MG tablet TAKE ONE TABLET BY MOUTH EVERY DAY 4/6/20   Tylene Supa APRHUI - NP   triamcinolone (KENALOG) 0.1 % cream Apply topically 2 times daily. 1/13/20   BRIGETTE Baca   Misc.  Devices [facility]) MISC 1 each by Does not apply route daily 11/8/19   BRIGETTE Baca   nitroGLYCERIN (NITROSTAT) 0.4 MG SL tablet up to max of 3 total doses.  If no relief after 1 dose, call 911. 11/5/19   Amie Escobar MD   EPINEPHrine (EPIPEN) 0.3 MG/0.3ML SOAJ injection Use as directed for allergic reaction 6/22/18   Brandie Ayala MD   oxyCODONE-acetaminophen (PERCOCET)  MG per tablet Take 1 tablet by mouth every 8 hours as needed for Pain    Historical Provider, MD   omega-3 acid ethyl esters (LOVAZA) 1 G capsule Take 2 capsules by mouth 2 times daily 5/11/15 12/20/19  Levon Benites PA-C       Future Appointments   Date Time Provider Sera Moss   12/10/2020  9:15 AM BRIGETTE Gray LPS Cardio [place]   12/10/2020  1:00 PM BRIGETTE Robison LPS Neursurg [facility]   1/7/2021 10:40 AM L CT RM 2 L CT [facility] Hos Rad   1/25/2021  2:00 PM Adilia Farooq PA-C PADUC [facility]EMONC [facility]   1/25/2021  2:20 PM SCHEDULE, L MED ONC MA L MED ONC [facility] HOD   3/8/2021  1:45 PM BRIGETTE Robison LPS Neursurg [facility]   9/59/1120 08:45 AM [id number], APRN LPS [facility]   8/4/2021  3:30 PM Светлана Tinoco MD LPS Cardio [facility]

## 2020-12-10 ENCOUNTER — OFFICE VISIT (OUTPATIENT)
Dept: NEUROSURGERY | Age: 70
End: 2020-12-10
Payer: MEDICARE

## 2020-12-10 ENCOUNTER — OFFICE VISIT (OUTPATIENT)
Dept: CARDIOLOGY | Age: 70
End: 2020-12-10
Payer: MEDICARE

## 2020-12-10 VITALS
DIASTOLIC BLOOD PRESSURE: 66 MMHG | OXYGEN SATURATION: 97 % | SYSTOLIC BLOOD PRESSURE: 113 MMHG | HEART RATE: 62 BPM | TEMPERATURE: 98.6 F | WEIGHT: 220 LBS | BODY MASS INDEX: 31.57 KG/M2

## 2020-12-10 VITALS
SYSTOLIC BLOOD PRESSURE: 124 MMHG | HEART RATE: 64 BPM | DIASTOLIC BLOOD PRESSURE: 62 MMHG | WEIGHT: 220 LBS | HEIGHT: 70 IN | BODY MASS INDEX: 31.5 KG/M2

## 2020-12-10 PROCEDURE — 1123F ACP DISCUSS/DSCN MKR DOCD: CPT | Performed by: NURSE PRACTITIONER

## 2020-12-10 PROCEDURE — G8484 FLU IMMUNIZE NO ADMIN: HCPCS | Performed by: CLINICAL NURSE SPECIALIST

## 2020-12-10 PROCEDURE — G8417 CALC BMI ABV UP PARAM F/U: HCPCS | Performed by: NURSE PRACTITIONER

## 2020-12-10 PROCEDURE — G8427 DOCREV CUR MEDS BY ELIG CLIN: HCPCS | Performed by: NURSE PRACTITIONER

## 2020-12-10 PROCEDURE — 1111F DSCHRG MED/CURRENT MED MERGE: CPT | Performed by: NURSE PRACTITIONER

## 2020-12-10 PROCEDURE — G8417 CALC BMI ABV UP PARAM F/U: HCPCS | Performed by: CLINICAL NURSE SPECIALIST

## 2020-12-10 PROCEDURE — 1036F TOBACCO NON-USER: CPT | Performed by: NURSE PRACTITIONER

## 2020-12-10 PROCEDURE — 99213 OFFICE O/P EST LOW 20 MIN: CPT | Performed by: CLINICAL NURSE SPECIALIST

## 2020-12-10 PROCEDURE — 1123F ACP DISCUSS/DSCN MKR DOCD: CPT | Performed by: CLINICAL NURSE SPECIALIST

## 2020-12-10 PROCEDURE — 4040F PNEUMOC VAC/ADMIN/RCVD: CPT | Performed by: NURSE PRACTITIONER

## 2020-12-10 PROCEDURE — G8484 FLU IMMUNIZE NO ADMIN: HCPCS | Performed by: NURSE PRACTITIONER

## 2020-12-10 PROCEDURE — 4040F PNEUMOC VAC/ADMIN/RCVD: CPT | Performed by: CLINICAL NURSE SPECIALIST

## 2020-12-10 PROCEDURE — G8427 DOCREV CUR MEDS BY ELIG CLIN: HCPCS | Performed by: CLINICAL NURSE SPECIALIST

## 2020-12-10 PROCEDURE — 3017F COLORECTAL CA SCREEN DOC REV: CPT | Performed by: CLINICAL NURSE SPECIALIST

## 2020-12-10 PROCEDURE — 1036F TOBACCO NON-USER: CPT | Performed by: CLINICAL NURSE SPECIALIST

## 2020-12-10 PROCEDURE — 99214 OFFICE O/P EST MOD 30 MIN: CPT | Performed by: NURSE PRACTITIONER

## 2020-12-10 PROCEDURE — 1111F DSCHRG MED/CURRENT MED MERGE: CPT | Performed by: CLINICAL NURSE SPECIALIST

## 2020-12-10 PROCEDURE — 3017F COLORECTAL CA SCREEN DOC REV: CPT | Performed by: NURSE PRACTITIONER

## 2020-12-10 RX ORDER — DONEPEZIL HYDROCHLORIDE 5 MG/1
5 TABLET, FILM COATED ORAL NIGHTLY
Qty: 30 TABLET | Refills: 3 | Status: SHIPPED | OUTPATIENT
Start: 2020-12-10 | End: 2021-04-22 | Stop reason: SDUPTHER

## 2020-12-10 NOTE — PROGRESS NOTES
REVIEW OF SYSTEMS    Constitutional: []Fever []Sweats []Chills [] Recent Injury [x] Denies all unless marked  HEENT:[x]Headache  [] Head Injury [] Hearing Loss  [] Sore Throat  [] Ear Ache [x] Denies all unless marked  Spine:  [] Neck pain  [] Back pain  [] Sciaticia  [x] Denies all unless marked  Cardiovascular:[]Heart Disease []Palpitations [] Chest Pain   [x] Denies all unless marked  Pulmonary: []Shortness of Breath []Cough   [x] Denies all unless marked  Psychiatric/Behavioral:[] Depression [] Anxiety [x] Denies all unless marked  Gastrointestinal: []Nausea  []Vomiting  []Abdominal Pain  [x]Constipation  []Diarrhea  [] Denies all unless marked  Genitourinary:   [] Frequency  [] Urgency  [] Dysuria [] Incontinence  [x] Denies all unless marked  Extremities: []Pain  []Swelling  [x] Denies all unless marked  Musculoskeletal: [] Myalgias  [x] Joint Pain  [x] Arthritis [x] Muscle Cramps [x] Muscle Twitches  [] Denies all unless marked  Sleep: [x]Insomnia[]Snoring []Restless Legs  []Sleep Apnea  []Daytime Sleepiness  [x] Denies all unless marked  Skin:[] Rash [] Color Change [x] Denies all unless marked   Neurological:[]Visual Disturbance [] Memory Loss [x]Loss of Balance []Slurred Speech [x]Weakness []Seizures  [x] Dizziness [] Denies all unless marked

## 2020-12-10 NOTE — PROGRESS NOTES
[facility]  [place] [address]  Phone: [phone]  Fax: [phone]    OFFICE VISIT:  12/10/2020    Roosevelt Potter - : 1950    Reason For Visit:  Prince vance is a 72 y.o. male who is here for Follow-up (1 wk hfu  no cardiac symptoms) and Coronary Artery Disease  History of diabetes, COPD, hypertension and coronary disease with PCI 2020 to mid LAD just after first diagonal with residual mid LAD 55% stenosis, normal LV ejection fraction. .    Establish care with Dr. Flynn Mei in November. Also has history of ascending aortic aneurysm. Recommended repeat imaging    Patient presented to the emergency room 2020 with numbness and episodes of blank stare. No acute findings thought to have a TIA-MRI showed old previous infarcts with no acute findings. Carotids were okay  Recommend ongoing aspirin therapy and was sent home with Zio patch monitor    Patient returns today accompanied with his wife. He reports he still has significant neuropathy with intermittent numbness and pain in all extremities. Has significant pain in left shoulder with upcoming anticipated left shoulder reverse replacement on the . No recent falls. At home uses cane or walker    He denies any cardiac complaints. His wife states has been doing well with his biggest problem related to neuropathy and imbalance      Subjective  Prince vance denies exertional chest pain, shortness of breath, orthopnea, paroxysmal nocturnal dyspnea, syncope, presyncope, arrhythmia.some mild peripheral edema by the end of the day. Resolves by morning. The patient denies any new numbness or weakness to suggest cerebrovascular accident or transient ischemic attack. BRIGETTE Duarte is PCP and follows labs .   Roosevelt Potter has the following history as recorded in [facility]:    Patient Active Problem List    Diagnosis Date Noted    Transient alteration of awareness     TIA (transient ischemic attack)     Numbness and tingling     Diabetic polyneuropathy associated with type 2 diabetes mellitus ([address]     Late onset Alzheimer's disease without behavioral disturbance ([address]     Bradycardia     Stroke-like symptoms 12/01/2020    Guaiac positive stools 12/01/2020    Diabetic peripheral neuropathy associated with type 2 diabetes mellitus ([address] 01/13/2020    CAD (coronary artery disease)     Chest pain, unspecified 11/04/2019    Symptomatic bradycardia     B12 deficiency 10/31/2019    Thrombocytopenia ([address] 09/19/2019    Basal cell carcinoma of left cheek 09/18/2019    Seborrheic keratosis 09/18/2019    Epigastric pain 08/05/2019    Gastroesophageal reflux disease 08/05/2019    Heartburn 08/05/2019    Fall from slipping on ice, initial encounter 01/17/2018    Basal cell carcinoma of chest 06/20/2017    Benign hemangioma 03/23/2017    Benign non-nodular prostatic hyperplasia with lower urinary tract symptoms 11/29/2016    Family history of prostate cancer 11/29/2016    Skin lesion of face 11/29/2016    Hypertension     Type 2 diabetes mellitus without complication ([address]     COPD (chronic obstructive pulmonary disease) (HCC)     Chronic back pain      Past Medical History:   Diagnosis Date    CAD (coronary artery disease)     Chronic back pain     COPD (chronic obstructive pulmonary disease) (HCC)     Hypertension     Peripheral sensory neuropathy     Pleurisy     TIA (transient ischemic attack)     Type II or unspecified type diabetes mellitus without mention of complication, not stated as uncontrolled      Past Surgical History:   Procedure Laterality Date    APPENDECTOMY      CARDIAC CATHETERIZATION  11/04/2019    PIETER to LAD     Family History   Problem Relation Age of Onset    Cancer Mother     Heart Disease Father      Social History     Tobacco Use    Smoking status: Former Smoker     Packs/day: 1.50     Years: 15.00     Pack years: 22.50     Types: Cigarettes     Last attempt to quit: 10/23/2000     Years since quittin.1    Smokeless tobacco: Never Used   Substance Use Topics    Alcohol use: No      Current Outpatient Medications   Medication Sig Dispense Refill    tiZANidine (ZANAFLEX) 4 MG tablet Take 1 tablet by mouth every 8 hours as needed (back pain) 30 tablet 0    tamsulosin (FLOMAX) 0.4 MG capsule TAKE 1 CAPSULE BY MOUTH DAILY 90 capsule 3    meclizine (ANTIVERT) 25 MG tablet Take 25 mg by mouth      memantine (NAMENDA) 10 MG tablet Take 1 tablet by mouth 2 times daily 60 tablet 2    lisinopril (PRINIVIL;ZESTRIL) 40 MG tablet TAKE ONE TABLET BY MOUTH DAILY. 90 tablet 3    pravastatin (PRAVACHOL) 80 MG tablet TAKE ONE TABLET BY MOUTH EVERY EVENING. 90 tablet 3    gabapentin (NEURONTIN) 800 MG tablet Take 1 tablet by mouth 3 times daily for 30 days. 90 tablet 5    Diabetic Shoe MISC by Does not apply route DISPENSE ONE PAIR DIABETIC SHOES AND THREE PAIRS OF HEAT MOLDED INSERTS 1 each 0    diclofenac sodium (VOLTAREN) 1 % GEL APPLY 2 G TO SKIN 2 TIMES DAILY 100 g 3    albuterol sulfate HFA (VENTOLIN HFA) 108 (90 Base) MCG/ACT inhaler INHALE 2 PUFFS INTO THE LUNGS EVERY 6 HOURS AS NEEDED. 18 g 3    azelastine (ASTELIN) 0.1 % nasal spray 1 spray by Nasal route 2 times daily Use in each nostril as directed 2 Bottle 1    aspirin 81 MG chewable tablet Take 1 tablet by mouth daily 30 tablet 3    cetirizine (ZYRTEC) 10 MG tablet TAKE 1 TABLET BY MOUTH DAILY 90 tablet 3    metFORMIN (GLUCOPHAGE) 1000 MG tablet TAKE ONE TABLET BY MOUTH TWO TIMES A DAY WITH MEALS 180 tablet 3    pantoprazole (PROTONIX) 40 MG tablet TAKE ONE TABLET BY MOUTH EVERY DAY 90 tablet 3    triamcinolone (KENALOG) 0.1 % cream Apply topically 2 times daily. 1 Tube 1    Misc. Devices (WALKER) MISC 1 each by Does not apply route daily 1 each 0    nitroGLYCERIN (NITROSTAT) 0.4 MG SL tablet up to max of 3 total doses.  If no relief after 1 dose, call 911. 25 tablet 3    EPINEPHrine (EPIPEN) 0.3 MG/0.3ML SOAJ injection Use as directed for allergic reaction 0.3 mL 3    oxyCODONE-acetaminophen (PERCOCET)  MG per tablet Take 1 tablet by mouth every 8 hours as needed for Pain       No current facility-administered medications for this visit. Allergies: Bee venom and Pcn [penicillins]    Review of Systems  Constitutional - no significant activity change, appetite change, or unexpected weight change. No fever, chills or diaphoresis. No fatigue. HEENT - no significant rhinorrhea or epistaxis. No tinnitus or significant hearing loss. Eyes - no sudden vision change or amaurosis. Respiratory - no significant wheezing, stridor, apnea or cough. No dyspnea on exertion or shortness of breath. Cardiovascular - no exertional chest pain, orthopnea or PND. No sensation of arrhythmia or slow heart rate. No claudication or leg edema. Gastrointestinal - no abdominal swelling or pain. No blood in stool. No severe constipation, diarrhea, nausea, or vomiting. Genitourinary - no difficulty urinating, dysuria, frequency, or urgency. No flank pain or hematuria. Musculoskeletal - no back pain, gait -unsteady, no recent falls . Skin - no color change or rash. No pallor. No new surgical incision. Neurologic - no speech difficulty, facial asymmetry or lateralizing weakness. No seizures, presyncope, syncope, + significant dizziness.+ Numbness in feet and hands  Hematologic - no easy bruising or excessive bleeding. Psychiatric - no severe anxiety or insomnia. No confusion. All other review of systems are negative. Objective  Vital Signs - /62   Pulse 64   Ht 5' 10\" (1.778 m)   Wt 220 lb (99.8 kg)   BMI 31.57 kg/m²   General - Miriam Leung is alert, cooperative, and pleasant. Well groomed. No acute distress. Body habitus is obese. HEENT - The head is normocephalic. No circumoral cyanosis.   Dentition is normal.   EYES -  No Xanthelasma, no arcus senilis, no conjunctival hemorrhages or discharge. Neck - Supple, without increased jugular venous pressures. No carotid bruits. No mass. Respiratory - Lungs are clear bilaterally. No wheezes or rales. Normal effort without use of accessory muscles. Cardiovascular - Heart has regular rhythm and rate. No murmurs, rubs or gallops. + pedal pulses and no varicosities. Abdominal -  Soft, nontender, nondistended. Bowel sounds are intact. Extremities - No clubbing, cyanosis, or  edema. Musculoskeletal -  No clubbing . No Osler's nodes. Gait -unsteady, uses assist of 1 person for stability  No kyphosis or scoliosis. Skin -  no statis ulcers or dermatitis. Neurological - No focal signs are identified. Oriented to person, place and time. Psychiatric -  Appropriate affect and mood. Assessment:     Diagnosis Orders   1. Coronary artery disease involving native coronary artery of native heart without angina pectoris     2. TIA (transient ischemic attack)     3. Essential hypertension       Data:  BP Readings from Last 3 Encounters:   12/10/20 124/62   12/02/20 (!) 185/92   11/25/20 (!) 148/80    Pulse Readings from Last 3 Encounters:   12/10/20 64   12/02/20 59   11/25/20 (!) 45        Wt Readings from Last 3 Encounters:   12/10/20 220 lb (99.8 kg)   12/01/20 223 lb 9.6 oz (101.4 kg)   11/25/20 214 lb (97.1 kg)     History of coronary disease with recent hospitalization for TIA. Has residual numbness but no significant symptoms that took him to the hospital.    Patient was sent home wearing Zio patch monitor. As of today is still processing-we will look later today and tomorrow for those results. If those are okay we will let primary care as well as surgeon know-dissipated left shoulder surgery next week    2D echo 12/2/2020 showed mildly dilated LV size with mild asymmetric septal hypertrophy. EF 65%. Mild AI.   Negative bubble study -no source of emboli identified    Current medical management includes ACE inhibitor    States taking medications as prescribed  Stable cardiovascular status. No evidence of overt heart failure, angina or dysrhythmia. Plan    Will look for monitor results and if ok then will send to DR Martinez  Follow up in as planned With Dr. Edwar Pathak   Call with any questions or concerns  Follow up with BRIGETTE Yousif for non cardiac problems  Report any new problems  Cardiovascular Fitness-Exercise as tolerated. Fall precautions   Cardiac / Healthy Diet  Continue current medications as directed  Continue plan of treatment  It is always recommended that you bring your medications bottles with you to each visit - this is for your safety! BRIGETTE Perez    EMR dragon/transcription disclaimer: Much of this encounter note is electronic transcription/translation of spoken language to printed tach. Electronic translation of spoken language may be erroneous, or at times, nonsensical words or phrases may be inadvertently transcribed.  Although, I have reviewed the note for such errors, some may still exist.

## 2020-12-10 NOTE — PROGRESS NOTES
[facility] Neurology Office Note      Patient:   Roosevelt Potter  MR#:    [id number]  Account Number:                         YOB: 1950  Date of Evaluation:  12/10/2020  Time of Note:                          2:01 PM  Primary/Referring Physician:  BRIGETTE Duarte   Consulting Physician:  BRIGETTE Jackson    FOLLOW UP    Chief Complaint   Patient presents with    Follow-Up from Hospital    Transient Ischemic Attack     C/O confusion      HISTORY OF PRESENT ILLNESS    Roosevelt Pottre is a 72y.o. year old male here for hospital follow up. He was admitted after experiencing whole body numbness, weakness and dizziness. He denies vision changes, denies focal symptoms. He does not recall much of the event. His wife was not home at the time of symptoms, friends took him to the ER. By the next morning his symptoms resolved. Work up in the hospital was unremarkable. He continues to note hand and foot numbness, chronic in nature. Memory loss is about the same, no clear progression. Primarily short term in nature. Noting repetition of questions and stories. Noting word recall difficulty. He will go into a room and forget why he went in there or he will forget which room he was trying to go to. Noting more episodes of anger. This is atypical for patient. Denies crying spells. Noting some depression related to memory loss. Notes unsteady gait, c/w neuropathy. No urinary incontinence. No hallucinations. Appetite waxes and wanes. Memory loss interfering with ADLs at times. His wife is handling all his medications. He is not driving. No stroke history. No family history of dementia or memory loss.  Follows with hematology for anemia, family history with mother having leukemia.     Past Medical History:   Diagnosis Date    CAD (coronary artery disease)     Chronic back pain     COPD (ch[facility] obstructive [facility] disease) [facility]     Hypertension     Peripheral sensory neuropathy     Pleurisy     TIA Subcutaneous tissues within normal limits. 1. Subtle round hypodensity at the right thalamus. This could represent artifact or infarct. Recommend MRI brain for further evaluation. 2. Mild volume loss and chronic microvascular changes. 3. 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 Signed by Dr Tatiana Rajan on 12/1/2020 4:47 PM    Xr Chest Portable    Result Date: 12/1/2020  XR CHEST PORTABLE 12/1/2020 12:53 PM History: Chest pain. Portable chest x-ray compared with 10 February 2020. The heart size is normal. The mediastinum is within normal limits. The lungs are adequately expanded with no pneumonia or pneumothorax. There is no significant pleural fluid. No congestive failure changes. 1. No acute disease. Signed by Dr Keith Ruiz on 12/1/2020 12:53 PM    Vascular Carotid Duplex Bilateral    Result Date: 12/2/2020  Vascular Carotid Procedure  Demographics   Patient Name    Mayra Erickson Age                   72   Patient Number  [id number]           Gender                Male   Visit Number    [id number]        Interpreting          Khadijah Wilhelm MD                                   Physician   Date of Birth   1950       Referring Physician   [facility]   Accession       [id number]       [address] [place] [address], [facility]  Number  Procedure Type of Study:   Cerebral:Carotid, VL CAROTID BILATERAL. Indications for Study:CVA. Risk Factors   - The patient's risk factor(s) include: diabetes mellitus, arterial     hypertension and prior MI .   - The patient has a former tobacco history. Allergies   - Penicillins.   - Bee/Wasp stings. Impression   Duplex sonography with color flow enhancement was performed bilaterally on  the cervical carotid system. No evidence of hemodynamically significant  stenosis in the bilateral carotid and vertebral arteries.    Signature ----------------------------------------------------------------  Electronically signed by Melissa Benjamin MD(Interpreting  physician) on 12/02/2020 01:49 PM      Mri Brain Wo Contrast    Result Date: 12/1/2020  MRI BRAIN WO CONTRAST 12/1/2020 5:07 PM HISTORY: Abnormal CT Comparison: 6/23/2020  Technique: Multiplanar imaging of the brain was performed in a routine fashion. Findings: Midline structures are nondisplaced. Ventricular system is normal. A 1.6 cm pineal cyst is again demonstrated stable in size and appearance from the previous study. . Basilar cisterns are preserved. There is atrophy of the brain with prominence of the subarachnoid spaces and ventricular enlargement. . There are scattered foci of T2 abnormality involving the periventricular and higher white matter tracts suggesting small vessel disease. Remote lacunar infarcts involving the cerebellar hemisphere bilaterally are present as well as a previous infarct involving the right parietal centrum semiovale. No abnormal extra axial fluid collections are noted. No restriction of diffusion is present. Dilated perivascular spaces are present. Proximal cervical spinal cord, brainstem, and cerebellum are unremarkable. Normal cerebrovascular flow voids are seen. Bilateral globes and orbits are normal in appearance. No abnormal signal is noted in the mastoid air cells or paranasal sinuses. Impression: 1. There is no restricted diffusion to suggest acute ischemia or infarct. 2. There is atrophy of the brain with prominence of the subarachnoid spaces and ventricular enlargement. Dilated perivascular spaces are present. There is evidence of previous infarcts involving the cerebellar hemispheres bilaterally as well as the right parietal centrum semiovale. Small vessel ischemic changes are noted involving the periventricular and higher white matter tracts. 3. Stable pineal cyst. 4. Stable appearance of the brain from previous MRI dated 6/23/2020.  Signed by  Consider Nuedexta on down the line. Gait changes are likely related to underlying neuropathy. ICD-10-CM    1. Memory loss  R41.3    2. Numbness  R20.0        PLAN:  1. Continue stroke risk factor maximization- ASA, statin, anti hypertensive   2. Continue Namenda 10mg BID. Discussed side effects with patient and wife  3. Add Aricept 5mg nightly. Discussed side effects with patient and wife. Further titration limited related to heart rate. Seeing cardiology for bradycardia   4. Consider Nuedexta if PBA symptoms persist   5. Discussed safety precautions, no driving, manage/monitor medications, increase supervision   6. Maximize treatment of depression through PCP   7. Keep previously scheduled follow up in March. Return for follow up, sooner if worsening. Trey Diaz DNP, APRN    Note:  A total of >50% (>13 minutes) of 25 minutes was spent discussing the pathophysiology and treatment and/or coordination of care of the above diagnoses.

## 2020-12-10 NOTE — PATIENT INSTRUCTIONS
Will look for monitor results and if ok then will send to DR Martinez  Follow up in as planned With Dr. Mariola Cao   Call with any questions or concerns  Follow up with BRIGETTE Woodard for non cardiac problems  Report any new problems  Cardiovascular Fitness-Exercise as tolerated. Fall precautions   Cardiac / Healthy Diet  Continue current medications as directed  Continue plan of treatment  It is always recommended that you bring your medications bottles with you to each visit - this is for your safety!

## 2020-12-14 ENCOUNTER — OFFICE VISIT (OUTPATIENT)
Age: 70
End: 2020-12-14

## 2020-12-14 VITALS — OXYGEN SATURATION: 99 % | TEMPERATURE: 97.5 F | HEART RATE: 62 BPM

## 2020-12-15 ENCOUNTER — HOSPITAL ENCOUNTER (OUTPATIENT)
Dept: GENERAL RADIOLOGY | Age: 70
Discharge: HOME | DRG: 483 | End: 2020-12-15
Attending: ORTHOPAEDIC SURGERY
Payer: MEDICARE

## 2020-12-15 ENCOUNTER — HOSPITAL ENCOUNTER (OUTPATIENT)
Dept: PREADMISSION TESTING | Age: 70
Discharge: HOME | DRG: 483 | End: 2020-12-19
Payer: MEDICARE

## 2020-12-15 VITALS — HEIGHT: 71 IN | WEIGHT: 215 LBS | BODY MASS INDEX: 30.1 KG/M2

## 2020-12-15 LAB
ABO/RH: NORMAL
ALBUMIN SERPL-MCNC: 4.4 G/DL (ref 3.5–5.2)
ALP BLD-CCNC: 62 U/L (ref 40–130)
ALT SERPL-CCNC: 16 U/L (ref 5–41)
ANION GAP SERPL CALCULATED.3IONS-SCNC: 8 MMOL/L (ref 7–19)
ANTIBODY SCREEN: NORMAL
AST SERPL-CCNC: 20 U/L (ref 5–40)
BASOPHILS ABSOLUTE: 0 K/UL (ref 0–0.2)
BASOPHILS RELATIVE PERCENT: 0.4 % (ref 0–1)
BILIRUB SERPL-MCNC: 0.5 MG/DL (ref 0.2–1.2)
BILIRUBIN URINE: NEGATIVE
BLOOD, URINE: NEGATIVE
BUN BLDV-MCNC: 22 MG/DL (ref 8–23)
CALCIUM SERPL-MCNC: 9.7 MG/DL (ref 8.8–10.2)
CHLORIDE BLD-SCNC: 104 MMOL/L (ref 98–111)
CLARITY: CLEAR
CO2: 27 MMOL/L (ref 22–29)
COLOR: YELLOW
CREAT SERPL-MCNC: 1.4 MG/DL (ref 0.5–1.2)
EOSINOPHILS ABSOLUTE: 0.2 K/UL (ref 0–0.6)
EOSINOPHILS RELATIVE PERCENT: 2.5 % (ref 0–5)
GFR AFRICAN AMERICAN: >59
GFR NON-AFRICAN AMERICAN: 50
GLUCOSE BLD-MCNC: 95 MG/DL (ref 74–109)
GLUCOSE URINE: NEGATIVE MG/DL
HCT VFR BLD CALC: 43.4 % (ref 42–52)
HEMOGLOBIN: 13.8 G/DL (ref 14–18)
IMMATURE GRANULOCYTES #: 0 K/UL
INR BLD: 0.99 (ref 0.88–1.18)
KETONES, URINE: NEGATIVE MG/DL
LEUKOCYTE ESTERASE, URINE: NEGATIVE
LYMPHOCYTES ABSOLUTE: 1.8 K/UL (ref 1.1–4.5)
LYMPHOCYTES RELATIVE PERCENT: 24.2 % (ref 20–40)
MCH RBC QN AUTO: 32.2 PG (ref 27–31)
MCHC RBC AUTO-ENTMCNC: 31.8 G/DL (ref 33–37)
MCV RBC AUTO: 101.2 FL (ref 80–94)
MONOCYTES ABSOLUTE: 0.4 K/UL (ref 0–0.9)
MONOCYTES RELATIVE PERCENT: 5.7 % (ref 0–10)
NEUTROPHILS ABSOLUTE: 5 K/UL (ref 1.5–7.5)
NEUTROPHILS RELATIVE PERCENT: 66.8 % (ref 50–65)
NITRITE, URINE: NEGATIVE
PDW BLD-RTO: 12.8 % (ref 11.5–14.5)
PH UA: 7 (ref 5–8)
PLATELET # BLD: 122 K/UL (ref 130–400)
PMV BLD AUTO: 9.9 FL (ref 9.4–12.4)
POTASSIUM SERPL-SCNC: 4.7 MMOL/L (ref 3.5–5)
PROTEIN UA: NEGATIVE MG/DL
PROTHROMBIN TIME: 13 SEC (ref 12–14.6)
RBC # BLD: 4.29 M/UL (ref 4.7–6.1)
SARS-COV-2, NAA: NOT DETECTED
SODIUM BLD-SCNC: 139 MMOL/L (ref 136–145)
SPECIFIC GRAVITY UA: 1.02 (ref 1–1.03)
TOTAL PROTEIN: 7 G/DL (ref 6.6–8.7)
UROBILINOGEN, URINE: 0.2 E.U./DL
WBC # BLD: 7.5 K/UL (ref 4.8–10.8)

## 2020-12-15 PROCEDURE — 93005 ELECTROCARDIOGRAM TRACING: CPT | Performed by: ORTHOPAEDIC SURGERY

## 2020-12-15 PROCEDURE — 87081 CULTURE SCREEN ONLY: CPT

## 2020-12-15 PROCEDURE — 80053 COMPREHEN METABOLIC PANEL: CPT

## 2020-12-15 PROCEDURE — 71046 X-RAY EXAM CHEST 2 VIEWS: CPT

## 2020-12-15 PROCEDURE — 86901 BLOOD TYPING SEROLOGIC RH(D): CPT

## 2020-12-15 PROCEDURE — 86850 RBC ANTIBODY SCREEN: CPT

## 2020-12-15 PROCEDURE — 85610 PROTHROMBIN TIME: CPT

## 2020-12-15 PROCEDURE — 85025 COMPLETE CBC W/AUTO DIFF WBC: CPT

## 2020-12-15 PROCEDURE — 86900 BLOOD TYPING SEROLOGIC ABO: CPT

## 2020-12-15 PROCEDURE — 81003 URINALYSIS AUTO W/O SCOPE: CPT

## 2020-12-15 RX ORDER — PANTOPRAZOLE SODIUM 40 MG/1
40 TABLET, DELAYED RELEASE ORAL DAILY
COMMUNITY
End: 2021-06-29 | Stop reason: SDUPTHER

## 2020-12-15 RX ORDER — ALBUTEROL SULFATE 90 UG/1
2 AEROSOL, METERED RESPIRATORY (INHALATION) EVERY 6 HOURS PRN
COMMUNITY

## 2020-12-16 ENCOUNTER — ANESTHESIA EVENT (OUTPATIENT)
Dept: OPERATING ROOM | Age: 70
DRG: 483 | End: 2020-12-16
Payer: MEDICARE

## 2020-12-16 PROBLEM — M75.102 LEFT ROTATOR CUFF TEAR ARTHROPATHY: Status: ACTIVE | Noted: 2020-12-16

## 2020-12-16 PROBLEM — M12.812 LEFT ROTATOR CUFF TEAR ARTHROPATHY: Status: ACTIVE | Noted: 2020-12-16

## 2020-12-16 LAB
EKG P AXIS: 33 DEGREES
EKG P-R INTERVAL: 160 MS
EKG Q-T INTERVAL: 420 MS
EKG QRS DURATION: 90 MS
EKG QTC CALCULATION (BAZETT): 412 MS
EKG T AXIS: 50 DEGREES
MRSA CULTURE ONLY: NORMAL

## 2020-12-16 PROCEDURE — 93010 ELECTROCARDIOGRAM REPORT: CPT | Performed by: INTERNAL MEDICINE

## 2020-12-16 NOTE — OP NOTE
again the informed consent was reviewed with the patient and signed. The site of surgery was marked with the patient's agreement. After being transported to the operating room, a timeout was performed identifying the correct patient as well as the operative site. Dose appropriate IV antibiotics were given prior to incision. The patient was positioned in the beach chair position, all bony prominences were protected and a sterile prep and drape was performed. The surgical site was draped with ioban dressing. A deltopectoral approach to the shoulder joint was utilized as soft tissue was dissected down the level of the cephalic vein which was taken laterally along with the deltoid. The biceps tendon was located, tenodesed to the superior border of the pectoralis major tendon insertion. The rotator interval was opened. A tagging stitch was placed in the subscapularis and with progressive external rotation of the shoulder, the tendon and underlying capsule were peeled from the less tuberosity. The humeral head was dislocated from the glenoid and strategic retractors were placed to protect the surrounding soft tissue. The axillary nerve was palpated and protected, verified with a \"tug test.\"    Beginning a the apex of the humeral head, a starting reamer was introduced into the shaft of the humerus. The proximal humeral osteotomy guide was inserted and an osteotomy was performed and 135 degrees in 20 degrees of retroversion. A protective plate was placed on the osteotomy site and attention was turned to the glenoid. Again, strategic retractors were placed surround the glenoid, the axillary nerve was protected, and a complete capsulectomy was performed. The labrum was excised. A guidepin was placed in the inferior-central aspect of the glenoid, following by a reaming the central peg hole and the surrounding bone. The baseplate was impacted. The central screw path was tapped and measured.  A central locking screw was placed. Superior and inferior locking screws were inserted. The glenosphere was then impacted without complication. Attention was turned back to the humeral side where progressively sized broaches were inserted until a stable fit was achieved. A trial cup and polyethylenes were placed until range of motion and stability were adequate. The conjoint tendon showed increased tension. With traction of the shoulder, the entire scapula was translating without dissociation of the polyethylene. Trial implants were removed, final implants impacted, and the shoulder was once again reduced showing excellent stability and range of motion. The incision was thoroughly irrigated, followed by closure in layers. The skin was closed with adhesive glue. A sterile dressing and sling were placed. Counts were correct. The patient was awakened by anesthesia, transported to the recovery room in stable condition.     POSTOPERATIVE PLAN:  1) Admit inpatient for pain control, neurovascular monitoring  2) Discharge home once pain is controlled  3) Reverse total shoulder protocol     Electronically signed by Aurora Sauer MD on 12/17/2020 at 4:13 PM

## 2020-12-17 ENCOUNTER — ANESTHESIA (OUTPATIENT)
Dept: OPERATING ROOM | Age: 70
DRG: 483 | End: 2020-12-17
Payer: MEDICARE

## 2020-12-17 ENCOUNTER — APPOINTMENT (OUTPATIENT)
Dept: GENERAL RADIOLOGY | Age: 70
DRG: 483 | End: 2020-12-17
Attending: ORTHOPAEDIC SURGERY
Payer: MEDICARE

## 2020-12-17 ENCOUNTER — HOSPITAL ENCOUNTER (INPATIENT)
Age: 70
LOS: 1 days | Discharge: HOME | DRG: 483 | End: 2020-12-18
Attending: ORTHOPAEDIC SURGERY | Admitting: ORTHOPAEDIC SURGERY
Payer: MEDICARE

## 2020-12-17 VITALS
TEMPERATURE: 97.3 F | RESPIRATION RATE: 3 BRPM | OXYGEN SATURATION: 100 % | DIASTOLIC BLOOD PRESSURE: 56 MMHG | SYSTOLIC BLOOD PRESSURE: 106 MMHG

## 2020-12-17 LAB
ABO/RH: NORMAL
ANTIBODY SCREEN: NORMAL
GLUCOSE BLD-MCNC: 73 MG/DL (ref 70–99)
PERFORMED ON: NORMAL

## 2020-12-17 PROCEDURE — 73030 X-RAY EXAM OF SHOULDER: CPT

## 2020-12-17 PROCEDURE — 2580000003 HC RX 258: Performed by: ANESTHESIOLOGY

## 2020-12-17 PROCEDURE — 86900 BLOOD TYPING SEROLOGIC ABO: CPT

## 2020-12-17 PROCEDURE — 7100000001 HC PACU RECOVERY - ADDTL 15 MIN: Performed by: ORTHOPAEDIC SURGERY

## 2020-12-17 PROCEDURE — 82947 ASSAY GLUCOSE BLOOD QUANT: CPT

## 2020-12-17 PROCEDURE — 0RRK00Z REPLACEMENT OF LEFT SHOULDER JOINT WITH REVERSE BALL AND SOCKET SYNTHETIC SUBSTITUTE, OPEN APPROACH: ICD-10-PCS | Performed by: ORTHOPAEDIC SURGERY

## 2020-12-17 PROCEDURE — C1713 ANCHOR/SCREW BN/BN,TIS/BN: HCPCS | Performed by: ORTHOPAEDIC SURGERY

## 2020-12-17 PROCEDURE — C1776 JOINT DEVICE (IMPLANTABLE): HCPCS | Performed by: ORTHOPAEDIC SURGERY

## 2020-12-17 PROCEDURE — 36415 COLL VENOUS BLD VENIPUNCTURE: CPT

## 2020-12-17 PROCEDURE — 7100000000 HC PACU RECOVERY - FIRST 15 MIN: Performed by: ORTHOPAEDIC SURGERY

## 2020-12-17 PROCEDURE — 3700000001 HC ADD 15 MINUTES (ANESTHESIA): Performed by: ORTHOPAEDIC SURGERY

## 2020-12-17 PROCEDURE — 3600000005 HC SURGERY LEVEL 5 BASE: Performed by: ORTHOPAEDIC SURGERY

## 2020-12-17 PROCEDURE — 2580000003 HC RX 258: Performed by: ORTHOPAEDIC SURGERY

## 2020-12-17 PROCEDURE — 6370000000 HC RX 637 (ALT 250 FOR IP): Performed by: ORTHOPAEDIC SURGERY

## 2020-12-17 PROCEDURE — 3700000000 HC ANESTHESIA ATTENDED CARE: Performed by: ORTHOPAEDIC SURGERY

## 2020-12-17 PROCEDURE — 3600000015 HC SURGERY LEVEL 5 ADDTL 15MIN: Performed by: ORTHOPAEDIC SURGERY

## 2020-12-17 PROCEDURE — C9290 INJ, BUPIVACAINE LIPOSOME: HCPCS | Performed by: NURSE ANESTHETIST, CERTIFIED REGISTERED

## 2020-12-17 PROCEDURE — 2500000003 HC RX 250 WO HCPCS: Performed by: ORTHOPAEDIC SURGERY

## 2020-12-17 PROCEDURE — 2500000003 HC RX 250 WO HCPCS: Performed by: NURSE ANESTHETIST, CERTIFIED REGISTERED

## 2020-12-17 PROCEDURE — 64415 NJX AA&/STRD BRCH PLXS IMG: CPT | Performed by: ANESTHESIOLOGY

## 2020-12-17 PROCEDURE — 6360000002 HC RX W HCPCS: Performed by: NURSE ANESTHETIST, CERTIFIED REGISTERED

## 2020-12-17 PROCEDURE — 86901 BLOOD TYPING SEROLOGIC RH(D): CPT

## 2020-12-17 PROCEDURE — 1210000000 HC MED SURG R&B

## 2020-12-17 PROCEDURE — 2709999900 HC NON-CHARGEABLE SUPPLY: Performed by: ORTHOPAEDIC SURGERY

## 2020-12-17 PROCEDURE — 2720000010 HC SURG SUPPLY STERILE: Performed by: ORTHOPAEDIC SURGERY

## 2020-12-17 PROCEDURE — 86850 RBC ANTIBODY SCREEN: CPT

## 2020-12-17 PROCEDURE — 6360000002 HC RX W HCPCS: Performed by: ANESTHESIOLOGY

## 2020-12-17 PROCEDURE — 2580000003 HC RX 258: Performed by: NURSE ANESTHETIST, CERTIFIED REGISTERED

## 2020-12-17 PROCEDURE — L3670 SO ACRO/CLAV CAN WEB PRE OTS: HCPCS | Performed by: ORTHOPAEDIC SURGERY

## 2020-12-17 DEVICE — SCREW BNE L36MM DIA4.5MM UNIV SHLDR TI PERIPH LOK UNIVERSE: Type: IMPLANTABLE DEVICE | Status: FUNCTIONAL

## 2020-12-17 DEVICE — STEM HUM SZ 11 SHLDR UNIVERS REVERS: Type: IMPLANTABLE DEVICE | Status: FUNCTIONAL

## 2020-12-17 DEVICE — SCREW BNE L30MM DIA4.5MM UNIV SHLDR TI PERIPH LOK UNIVERSE: Type: IMPLANTABLE DEVICE | Status: FUNCTIONAL

## 2020-12-17 DEVICE — SCREW BNE L20MM DIA6.5MM UNIV SHLDR CTRL UNIVERSE REVERS: Type: IMPLANTABLE DEVICE | Status: FUNCTIONAL

## 2020-12-17 DEVICE — LINER HUM M DIA39MM +3MM OFFSET MED SHLDR UHMWPE UNIVERS: Type: IMPLANTABLE DEVICE | Site: SHOULDER | Status: FUNCTIONAL

## 2020-12-17 DEVICE — CUP HUM OD39MM +2MM OFFSET R SHLDR SUT UNIVERS REVERS: Type: IMPLANTABLE DEVICE | Status: FUNCTIONAL

## 2020-12-17 DEVICE — SPHERE GLEN M DIA39MM +4MM OFFSET LAT SHLDR UNIVERS REVERS: Type: IMPLANTABLE DEVICE | Status: FUNCTIONAL

## 2020-12-17 DEVICE — BASEPLATE GLEN MED SHLDR POROUS COAT REVERS: Type: IMPLANTABLE DEVICE | Status: FUNCTIONAL

## 2020-12-17 RX ORDER — ONDANSETRON 2 MG/ML
INJECTION INTRAMUSCULAR; INTRAVENOUS PRN
Status: DISCONTINUED | OUTPATIENT
Start: 2020-12-17 | End: 2020-12-17 | Stop reason: SDUPTHER

## 2020-12-17 RX ORDER — MECLIZINE HYDROCHLORIDE 25 MG/1
25 TABLET ORAL 3 TIMES DAILY PRN
Status: DISCONTINUED | OUTPATIENT
Start: 2020-12-17 | End: 2020-12-18 | Stop reason: HOSPADM

## 2020-12-17 RX ORDER — ACETAMINOPHEN 325 MG/1
650 TABLET ORAL EVERY 6 HOURS
Status: DISCONTINUED | OUTPATIENT
Start: 2020-12-17 | End: 2020-12-18 | Stop reason: HOSPADM

## 2020-12-17 RX ORDER — SODIUM CHLORIDE, SODIUM LACTATE, POTASSIUM CHLORIDE, CALCIUM CHLORIDE 600; 310; 30; 20 MG/100ML; MG/100ML; MG/100ML; MG/100ML
INJECTION, SOLUTION INTRAVENOUS CONTINUOUS PRN
Status: DISCONTINUED | OUTPATIENT
Start: 2020-12-17 | End: 2020-12-17 | Stop reason: SDUPTHER

## 2020-12-17 RX ORDER — MORPHINE SULFATE 4 MG/ML
2 INJECTION, SOLUTION INTRAMUSCULAR; INTRAVENOUS EVERY 5 MIN PRN
Status: DISCONTINUED | OUTPATIENT
Start: 2020-12-17 | End: 2020-12-17 | Stop reason: HOSPADM

## 2020-12-17 RX ORDER — OXYCODONE HYDROCHLORIDE 5 MG/1
10 TABLET ORAL EVERY 4 HOURS PRN
Status: DISCONTINUED | OUTPATIENT
Start: 2020-12-17 | End: 2020-12-18 | Stop reason: HOSPADM

## 2020-12-17 RX ORDER — PROMETHAZINE HYDROCHLORIDE 12.5 MG/1
12.5 TABLET ORAL EVERY 6 HOURS PRN
Status: DISCONTINUED | OUTPATIENT
Start: 2020-12-17 | End: 2020-12-18 | Stop reason: HOSPADM

## 2020-12-17 RX ORDER — SODIUM CHLORIDE 0.9 % (FLUSH) 0.9 %
10 SYRINGE (ML) INJECTION EVERY 12 HOURS SCHEDULED
Status: DISCONTINUED | OUTPATIENT
Start: 2020-12-17 | End: 2020-12-17 | Stop reason: HOSPADM

## 2020-12-17 RX ORDER — PRAVASTATIN SODIUM 20 MG
80 TABLET ORAL NIGHTLY
Status: DISCONTINUED | OUTPATIENT
Start: 2020-12-17 | End: 2020-12-18 | Stop reason: HOSPADM

## 2020-12-17 RX ORDER — SUCCINYLCHOLINE/SOD CL,ISO/PF 100 MG/5ML
SYRINGE (ML) INTRAVENOUS PRN
Status: DISCONTINUED | OUTPATIENT
Start: 2020-12-17 | End: 2020-12-17 | Stop reason: SDUPTHER

## 2020-12-17 RX ORDER — METOCLOPRAMIDE HYDROCHLORIDE 5 MG/ML
10 INJECTION INTRAMUSCULAR; INTRAVENOUS
Status: DISCONTINUED | OUTPATIENT
Start: 2020-12-17 | End: 2020-12-17 | Stop reason: HOSPADM

## 2020-12-17 RX ORDER — BUPIVACAINE HYDROCHLORIDE 5 MG/ML
INJECTION, SOLUTION EPIDURAL; INTRACAUDAL
Status: COMPLETED | OUTPATIENT
Start: 2020-12-17 | End: 2020-12-17

## 2020-12-17 RX ORDER — MIDAZOLAM HYDROCHLORIDE 1 MG/ML
2 INJECTION INTRAMUSCULAR; INTRAVENOUS
Status: DISCONTINUED | OUTPATIENT
Start: 2020-12-17 | End: 2020-12-17 | Stop reason: HOSPADM

## 2020-12-17 RX ORDER — MORPHINE SULFATE 4 MG/ML
4 INJECTION, SOLUTION INTRAMUSCULAR; INTRAVENOUS EVERY 5 MIN PRN
Status: DISCONTINUED | OUTPATIENT
Start: 2020-12-17 | End: 2020-12-17 | Stop reason: HOSPADM

## 2020-12-17 RX ORDER — SODIUM CHLORIDE 9 MG/ML
INJECTION, SOLUTION INTRAVENOUS CONTINUOUS PRN
Status: DISCONTINUED | OUTPATIENT
Start: 2020-12-17 | End: 2020-12-17 | Stop reason: SDUPTHER

## 2020-12-17 RX ORDER — DIPHENHYDRAMINE HCL 25 MG
25 TABLET ORAL EVERY 6 HOURS PRN
Status: DISCONTINUED | OUTPATIENT
Start: 2020-12-17 | End: 2020-12-18 | Stop reason: HOSPADM

## 2020-12-17 RX ORDER — MORPHINE SULFATE 4 MG/ML
2 INJECTION, SOLUTION INTRAMUSCULAR; INTRAVENOUS
Status: DISCONTINUED | OUTPATIENT
Start: 2020-12-17 | End: 2020-12-18 | Stop reason: HOSPADM

## 2020-12-17 RX ORDER — OXYCODONE HCL 20 MG/1
20 TABLET, FILM COATED, EXTENDED RELEASE ORAL EVERY 12 HOURS PRN
Status: DISCONTINUED | OUTPATIENT
Start: 2020-12-17 | End: 2020-12-18 | Stop reason: HOSPADM

## 2020-12-17 RX ORDER — DONEPEZIL HYDROCHLORIDE 5 MG/1
5 TABLET, FILM COATED ORAL NIGHTLY
Status: DISCONTINUED | OUTPATIENT
Start: 2020-12-17 | End: 2020-12-18 | Stop reason: HOSPADM

## 2020-12-17 RX ORDER — GABAPENTIN 400 MG/1
800 CAPSULE ORAL 3 TIMES DAILY
Status: DISCONTINUED | OUTPATIENT
Start: 2020-12-17 | End: 2020-12-18 | Stop reason: HOSPADM

## 2020-12-17 RX ORDER — ENALAPRILAT 2.5 MG/2ML
1.25 INJECTION INTRAVENOUS
Status: DISCONTINUED | OUTPATIENT
Start: 2020-12-17 | End: 2020-12-17 | Stop reason: HOSPADM

## 2020-12-17 RX ORDER — HYDRALAZINE HYDROCHLORIDE 20 MG/ML
5 INJECTION INTRAMUSCULAR; INTRAVENOUS EVERY 10 MIN PRN
Status: DISCONTINUED | OUTPATIENT
Start: 2020-12-17 | End: 2020-12-17 | Stop reason: HOSPADM

## 2020-12-17 RX ORDER — FENTANYL CITRATE 50 UG/ML
INJECTION, SOLUTION INTRAMUSCULAR; INTRAVENOUS PRN
Status: DISCONTINUED | OUTPATIENT
Start: 2020-12-17 | End: 2020-12-17 | Stop reason: SDUPTHER

## 2020-12-17 RX ORDER — NITROGLYCERIN 0.4 MG/1
0.4 TABLET SUBLINGUAL EVERY 5 MIN PRN
Status: DISCONTINUED | OUTPATIENT
Start: 2020-12-17 | End: 2020-12-18 | Stop reason: HOSPADM

## 2020-12-17 RX ORDER — NALOXONE HYDROCHLORIDE 0.4 MG/ML
0.4 INJECTION, SOLUTION INTRAMUSCULAR; INTRAVENOUS; SUBCUTANEOUS PRN
Status: DISCONTINUED | OUTPATIENT
Start: 2020-12-17 | End: 2020-12-18 | Stop reason: HOSPADM

## 2020-12-17 RX ORDER — CLINDAMYCIN PHOSPHATE 900 MG/50ML
900 INJECTION INTRAVENOUS EVERY 8 HOURS
Status: COMPLETED | OUTPATIENT
Start: 2020-12-17 | End: 2020-12-18

## 2020-12-17 RX ORDER — OXYCODONE HYDROCHLORIDE 5 MG/1
5 TABLET ORAL EVERY 4 HOURS PRN
Status: DISCONTINUED | OUTPATIENT
Start: 2020-12-17 | End: 2020-12-18 | Stop reason: HOSPADM

## 2020-12-17 RX ORDER — SENNA AND DOCUSATE SODIUM 50; 8.6 MG/1; MG/1
1 TABLET, FILM COATED ORAL 2 TIMES DAILY
Status: DISCONTINUED | OUTPATIENT
Start: 2020-12-17 | End: 2020-12-18 | Stop reason: HOSPADM

## 2020-12-17 RX ORDER — SODIUM CHLORIDE, SODIUM LACTATE, POTASSIUM CHLORIDE, CALCIUM CHLORIDE 600; 310; 30; 20 MG/100ML; MG/100ML; MG/100ML; MG/100ML
INJECTION, SOLUTION INTRAVENOUS CONTINUOUS
Status: DISCONTINUED | OUTPATIENT
Start: 2020-12-17 | End: 2020-12-17

## 2020-12-17 RX ORDER — LISINOPRIL 20 MG/1
40 TABLET ORAL DAILY
Status: DISCONTINUED | OUTPATIENT
Start: 2020-12-17 | End: 2020-12-18 | Stop reason: HOSPADM

## 2020-12-17 RX ORDER — MEMANTINE HYDROCHLORIDE 5 MG/1
10 TABLET ORAL 2 TIMES DAILY
Status: DISCONTINUED | OUTPATIENT
Start: 2020-12-17 | End: 2020-12-18 | Stop reason: HOSPADM

## 2020-12-17 RX ORDER — M-VIT,TX,IRON,MINS/CALC/FOLIC 27MG-0.4MG
1 TABLET ORAL DAILY
Status: DISCONTINUED | OUTPATIENT
Start: 2020-12-17 | End: 2020-12-18 | Stop reason: HOSPADM

## 2020-12-17 RX ORDER — DIAZEPAM 5 MG/1
5 TABLET ORAL EVERY 6 HOURS PRN
Status: DISCONTINUED | OUTPATIENT
Start: 2020-12-17 | End: 2020-12-18 | Stop reason: HOSPADM

## 2020-12-17 RX ORDER — FENTANYL CITRATE 50 UG/ML
50 INJECTION, SOLUTION INTRAMUSCULAR; INTRAVENOUS
Status: COMPLETED | OUTPATIENT
Start: 2020-12-17 | End: 2020-12-17

## 2020-12-17 RX ORDER — SODIUM CHLORIDE, SODIUM LACTATE, POTASSIUM CHLORIDE, CALCIUM CHLORIDE 600; 310; 30; 20 MG/100ML; MG/100ML; MG/100ML; MG/100ML
INJECTION, SOLUTION INTRAVENOUS CONTINUOUS
Status: DISCONTINUED | OUTPATIENT
Start: 2020-12-17 | End: 2020-12-18 | Stop reason: HOSPADM

## 2020-12-17 RX ORDER — EPHEDRINE SULFATE 50 MG/ML
INJECTION, SOLUTION INTRAVENOUS PRN
Status: DISCONTINUED | OUTPATIENT
Start: 2020-12-17 | End: 2020-12-17 | Stop reason: SDUPTHER

## 2020-12-17 RX ORDER — DEXAMETHASONE SODIUM PHOSPHATE 10 MG/ML
INJECTION, SOLUTION INTRAMUSCULAR; INTRAVENOUS PRN
Status: DISCONTINUED | OUTPATIENT
Start: 2020-12-17 | End: 2020-12-17 | Stop reason: SDUPTHER

## 2020-12-17 RX ORDER — LIDOCAINE HYDROCHLORIDE 10 MG/ML
1 INJECTION, SOLUTION EPIDURAL; INFILTRATION; INTRACAUDAL; PERINEURAL
Status: DISCONTINUED | OUTPATIENT
Start: 2020-12-17 | End: 2020-12-17 | Stop reason: HOSPADM

## 2020-12-17 RX ORDER — LIDOCAINE HYDROCHLORIDE 10 MG/ML
INJECTION, SOLUTION INFILTRATION; PERINEURAL
Status: COMPLETED | OUTPATIENT
Start: 2020-12-17 | End: 2020-12-17

## 2020-12-17 RX ORDER — LABETALOL HYDROCHLORIDE 5 MG/ML
5 INJECTION, SOLUTION INTRAVENOUS EVERY 10 MIN PRN
Status: DISCONTINUED | OUTPATIENT
Start: 2020-12-17 | End: 2020-12-17 | Stop reason: HOSPADM

## 2020-12-17 RX ORDER — HYDROMORPHONE HYDROCHLORIDE 1 MG/ML
0.5 INJECTION, SOLUTION INTRAMUSCULAR; INTRAVENOUS; SUBCUTANEOUS EVERY 5 MIN PRN
Status: DISCONTINUED | OUTPATIENT
Start: 2020-12-17 | End: 2020-12-17 | Stop reason: HOSPADM

## 2020-12-17 RX ORDER — MEPERIDINE HYDROCHLORIDE 50 MG/ML
12.5 INJECTION INTRAMUSCULAR; INTRAVENOUS; SUBCUTANEOUS EVERY 5 MIN PRN
Status: DISCONTINUED | OUTPATIENT
Start: 2020-12-17 | End: 2020-12-17 | Stop reason: HOSPADM

## 2020-12-17 RX ORDER — TAMSULOSIN HYDROCHLORIDE 0.4 MG/1
0.4 CAPSULE ORAL DAILY
Status: DISCONTINUED | OUTPATIENT
Start: 2020-12-17 | End: 2020-12-18 | Stop reason: HOSPADM

## 2020-12-17 RX ORDER — HYDROMORPHONE HYDROCHLORIDE 1 MG/ML
0.25 INJECTION, SOLUTION INTRAMUSCULAR; INTRAVENOUS; SUBCUTANEOUS EVERY 5 MIN PRN
Status: DISCONTINUED | OUTPATIENT
Start: 2020-12-17 | End: 2020-12-17 | Stop reason: HOSPADM

## 2020-12-17 RX ORDER — MORPHINE SULFATE 4 MG/ML
4 INJECTION, SOLUTION INTRAMUSCULAR; INTRAVENOUS
Status: DISCONTINUED | OUTPATIENT
Start: 2020-12-17 | End: 2020-12-18 | Stop reason: HOSPADM

## 2020-12-17 RX ORDER — CLINDAMYCIN PHOSPHATE 900 MG/50ML
900 INJECTION INTRAVENOUS ONCE
Status: COMPLETED | OUTPATIENT
Start: 2020-12-17 | End: 2020-12-17

## 2020-12-17 RX ORDER — SODIUM CHLORIDE 0.9 % (FLUSH) 0.9 %
10 SYRINGE (ML) INJECTION PRN
Status: DISCONTINUED | OUTPATIENT
Start: 2020-12-17 | End: 2020-12-17 | Stop reason: HOSPADM

## 2020-12-17 RX ORDER — ONDANSETRON 2 MG/ML
4 INJECTION INTRAMUSCULAR; INTRAVENOUS EVERY 6 HOURS PRN
Status: DISCONTINUED | OUTPATIENT
Start: 2020-12-17 | End: 2020-12-18 | Stop reason: HOSPADM

## 2020-12-17 RX ORDER — SCOLOPAMINE TRANSDERMAL SYSTEM 1 MG/1
1 PATCH, EXTENDED RELEASE TRANSDERMAL ONCE
Status: DISCONTINUED | OUTPATIENT
Start: 2020-12-17 | End: 2020-12-17 | Stop reason: HOSPADM

## 2020-12-17 RX ORDER — SODIUM CHLORIDE 0.9 % (FLUSH) 0.9 %
10 SYRINGE (ML) INJECTION PRN
Status: DISCONTINUED | OUTPATIENT
Start: 2020-12-17 | End: 2020-12-18 | Stop reason: HOSPADM

## 2020-12-17 RX ORDER — ROCURONIUM BROMIDE 10 MG/ML
INJECTION, SOLUTION INTRAVENOUS PRN
Status: DISCONTINUED | OUTPATIENT
Start: 2020-12-17 | End: 2020-12-17 | Stop reason: SDUPTHER

## 2020-12-17 RX ORDER — PROPOFOL 10 MG/ML
INJECTION, EMULSION INTRAVENOUS PRN
Status: DISCONTINUED | OUTPATIENT
Start: 2020-12-17 | End: 2020-12-17 | Stop reason: SDUPTHER

## 2020-12-17 RX ORDER — DIPHENHYDRAMINE HYDROCHLORIDE 50 MG/ML
12.5 INJECTION INTRAMUSCULAR; INTRAVENOUS
Status: DISCONTINUED | OUTPATIENT
Start: 2020-12-17 | End: 2020-12-17 | Stop reason: HOSPADM

## 2020-12-17 RX ORDER — SODIUM CHLORIDE 0.9 % (FLUSH) 0.9 %
10 SYRINGE (ML) INJECTION EVERY 12 HOURS SCHEDULED
Status: DISCONTINUED | OUTPATIENT
Start: 2020-12-17 | End: 2020-12-18 | Stop reason: HOSPADM

## 2020-12-17 RX ORDER — PROMETHAZINE HYDROCHLORIDE 25 MG/ML
6.25 INJECTION, SOLUTION INTRAMUSCULAR; INTRAVENOUS
Status: DISCONTINUED | OUTPATIENT
Start: 2020-12-17 | End: 2020-12-17 | Stop reason: HOSPADM

## 2020-12-17 RX ORDER — ASPIRIN 81 MG/1
81 TABLET, CHEWABLE ORAL DAILY
Status: DISCONTINUED | OUTPATIENT
Start: 2020-12-17 | End: 2020-12-18 | Stop reason: HOSPADM

## 2020-12-17 RX ORDER — ALBUTEROL SULFATE 2.5 MG/3ML
2.5 SOLUTION RESPIRATORY (INHALATION) EVERY 4 HOURS PRN
Status: DISCONTINUED | OUTPATIENT
Start: 2020-12-17 | End: 2020-12-18 | Stop reason: HOSPADM

## 2020-12-17 RX ORDER — MORPHINE SULFATE 4 MG/ML
4 INJECTION, SOLUTION INTRAMUSCULAR; INTRAVENOUS
Status: DISCONTINUED | OUTPATIENT
Start: 2020-12-17 | End: 2020-12-17 | Stop reason: HOSPADM

## 2020-12-17 RX ORDER — CYCLOBENZAPRINE HCL 10 MG
10 TABLET ORAL 3 TIMES DAILY PRN
Status: DISCONTINUED | OUTPATIENT
Start: 2020-12-17 | End: 2020-12-18 | Stop reason: HOSPADM

## 2020-12-17 RX ADMIN — DONEPEZIL HYDROCHLORIDE 5 MG: 5 TABLET, FILM COATED ORAL at 21:09

## 2020-12-17 RX ADMIN — SODIUM CHLORIDE, SODIUM LACTATE, POTASSIUM CHLORIDE, AND CALCIUM CHLORIDE: 600; 310; 30; 20 INJECTION, SOLUTION INTRAVENOUS at 15:00

## 2020-12-17 RX ADMIN — PRAVASTATIN SODIUM 80 MG: 20 TABLET ORAL at 21:09

## 2020-12-17 RX ADMIN — PROPOFOL 120 MG: 10 INJECTION, EMULSION INTRAVENOUS at 15:05

## 2020-12-17 RX ADMIN — ASPIRIN 81 MG: 81 TABLET, CHEWABLE ORAL at 18:32

## 2020-12-17 RX ADMIN — LISINOPRIL 40 MG: 20 TABLET ORAL at 18:04

## 2020-12-17 RX ADMIN — DEXAMETHASONE SODIUM PHOSPHATE 10 MG: 10 INJECTION, SOLUTION INTRAMUSCULAR; INTRAVENOUS at 15:05

## 2020-12-17 RX ADMIN — MEMANTINE HYDROCHLORIDE 10 MG: 5 TABLET ORAL at 21:09

## 2020-12-17 RX ADMIN — SUGAMMADEX 200 MG: 100 INJECTION, SOLUTION INTRAVENOUS at 15:55

## 2020-12-17 RX ADMIN — BUPIVACAINE HYDROCHLORIDE 10 ML: 5 INJECTION, SOLUTION EPIDURAL; INTRACAUDAL; PERINEURAL at 15:05

## 2020-12-17 RX ADMIN — TAMSULOSIN HYDROCHLORIDE 0.4 MG: 0.4 CAPSULE ORAL at 18:04

## 2020-12-17 RX ADMIN — LIDOCAINE HYDROCHLORIDE 5 ML: 10 INJECTION, SOLUTION INFILTRATION; PERINEURAL at 15:04

## 2020-12-17 RX ADMIN — SODIUM CHLORIDE, POTASSIUM CHLORIDE, SODIUM LACTATE AND CALCIUM CHLORIDE: 600; 310; 30; 20 INJECTION, SOLUTION INTRAVENOUS at 18:32

## 2020-12-17 RX ADMIN — EPHEDRINE SULFATE 10 MG: 50 INJECTION INTRAMUSCULAR; INTRAVENOUS; SUBCUTANEOUS at 15:50

## 2020-12-17 RX ADMIN — OXYCODONE 10 MG: 5 TABLET ORAL at 18:05

## 2020-12-17 RX ADMIN — Medication 100 MG: at 15:05

## 2020-12-17 RX ADMIN — ROCURONIUM BROMIDE 45 MG: 10 INJECTION, SOLUTION INTRAVENOUS at 15:11

## 2020-12-17 RX ADMIN — BUPIVACAINE 10 ML: 13.3 INJECTION, SUSPENSION, LIPOSOMAL INFILTRATION at 15:05

## 2020-12-17 RX ADMIN — LIDOCAINE HYDROCHLORIDE 1 ML: 10 INJECTION, SOLUTION INFILTRATION; PERINEURAL at 15:05

## 2020-12-17 RX ADMIN — ONDANSETRON HYDROCHLORIDE 4 MG: 2 INJECTION, SOLUTION INTRAMUSCULAR; INTRAVENOUS at 15:55

## 2020-12-17 RX ADMIN — EPHEDRINE SULFATE 10 MG: 50 INJECTION INTRAMUSCULAR; INTRAVENOUS; SUBCUTANEOUS at 15:24

## 2020-12-17 RX ADMIN — ACETAMINOPHEN 650 MG: 325 TABLET ORAL at 18:04

## 2020-12-17 RX ADMIN — GABAPENTIN 800 MG: 400 CAPSULE ORAL at 21:08

## 2020-12-17 RX ADMIN — MULTIPLE VITAMINS W/ MINERALS TAB 1 TABLET: TAB at 18:04

## 2020-12-17 RX ADMIN — SODIUM CHLORIDE, SODIUM LACTATE, POTASSIUM CHLORIDE, AND CALCIUM CHLORIDE: 600; 310; 30; 20 INJECTION, SOLUTION INTRAVENOUS at 11:42

## 2020-12-17 RX ADMIN — PHENYLEPHRINE HYDROCHLORIDE 100 MCG: 10 INJECTION INTRAVENOUS at 15:53

## 2020-12-17 RX ADMIN — CLINDAMYCIN PHOSPHATE 900 MG: 900 INJECTION, SOLUTION INTRAVENOUS at 15:02

## 2020-12-17 RX ADMIN — DOCUSATE SODIUM 50 MG AND SENNOSIDES 8.6 MG 1 TABLET: 8.6; 5 TABLET, FILM COATED ORAL at 21:09

## 2020-12-17 RX ADMIN — METFORMIN HYDROCHLORIDE 1000 MG: 500 TABLET ORAL at 18:03

## 2020-12-17 RX ADMIN — FENTANYL CITRATE 50 MCG: 0.05 INJECTION, SOLUTION INTRAMUSCULAR; INTRAVENOUS at 14:17

## 2020-12-17 RX ADMIN — ROCURONIUM BROMIDE 5 MG: 10 INJECTION, SOLUTION INTRAVENOUS at 15:05

## 2020-12-17 RX ADMIN — EPHEDRINE SULFATE 10 MG: 50 INJECTION INTRAMUSCULAR; INTRAVENOUS; SUBCUTANEOUS at 15:39

## 2020-12-17 RX ADMIN — SODIUM CHLORIDE: 9 INJECTION, SOLUTION INTRAVENOUS at 15:46

## 2020-12-17 RX ADMIN — SODIUM CHLORIDE, PRESERVATIVE FREE 10 ML: 5 INJECTION INTRAVENOUS at 21:09

## 2020-12-17 RX ADMIN — FENTANYL CITRATE 150 MCG: 50 INJECTION INTRAMUSCULAR; INTRAVENOUS at 15:05

## 2020-12-17 ASSESSMENT — PAIN SCALES - GENERAL: PAINLEVEL_OUTOF10: 7

## 2020-12-17 ASSESSMENT — PAIN SCALES - WONG BAKER
WONGBAKER_NUMERICALRESPONSE: 0
WONGBAKER_NUMERICALRESPONSE: 0

## 2020-12-17 ASSESSMENT — LIFESTYLE VARIABLES: SMOKING_STATUS: 0

## 2020-12-17 NOTE — ANESTHESIA PRE PROCEDURE
2 g topically 2 times daily  8/8/20  Yes BRIGETTE Triana   azelastine (ASTELIN) 0.1 % nasal spray 1 spray by Nasal route 2 times daily Use in each nostril as directed 4/6/20  Yes BRIGETTE Espinoza NP   aspirin 81 MG chewable tablet Take 1 tablet by mouth daily 4/6/20  Yes BRIGETTE Espinoza NP   cetirizine (ZYRTEC) 10 MG tablet TAKE 1 TABLET BY MOUTH DAILY  Patient taking differently: Take 10 mg by mouth daily TAKE 1 TABLET BY MOUTH DAILY 4/6/20  Yes BRIGETTE Espinoza NP   Misc. Devices [facility]) MISC 1 each by Does not apply route daily 11/8/19  Yes BRIGETTE Triana   oxyCODONE-acetaminophen (PERCOCET)  MG per tablet Take 1 tablet by mouth every 8 hours as needed for Pain   Yes Historical Provider, MD   albuterol sulfate HFA (VENTOLIN HFA) 108 (90 Base) MCG/ACT inhaler Inhale 2 puffs into the lungs every 6 hours as needed for Wheezing    Historical Provider, MD   meclizine (ANTIVERT) 25 MG tablet Take 25 mg by mouth 3 times daily as needed for Dizziness  10/19/20   Historical Provider, MD   triamcinolone (KENALOG) 0.1 % cream Apply topically 2 times daily. Patient taking differently: Apply topically 2 times daily Apply topically 2 times daily. 1/13/20   BRIGETTE Triana   nitroGLYCERIN (NITROSTAT) 0.4 MG SL tablet up to max of 3 total doses.  If no relief after 1 dose, call 911. 11/5/19   Beverly Rice MD   EPINEPHrine (EPIPEN) 0.3 MG/0.3ML SOAJ injection Use as directed for allergic reaction 6/22/18   Keshia Beal MD   omega-3 acid ethyl esters (LOVAZA) 1 G capsule Take 2 capsules by mouth 2 times daily 5/11/15 12/20/19  Ian Archuleta PA-C       Current medications:    Current Facility-Administered Medications   Medication Dose Route Frequency Provider Last Rate Last Admin    ceFAZolin (ANCEF) 2 g in 0.9% sodium chloride 50 mL IVPB  2 g Intravenous Once Myrtle Tate MD        lactated ringers infusion   Intravenous Continuous Nancy Concepcion MD Allergies: Allergies   Allergen Reactions    Bee Venom Anaphylaxis    Pcn [Penicillins] Other (See Comments)     Made pt \"black out\"       Problem List:    Patient Active Problem List   Diagnosis Code    Hypertension I10    Type 2 diabetes mellitus without complication ([facility]) A87.1    COPD (chronic obstructive pulmonary disease) ([facility]) J44.9    Chronic back pain M54.9, G89.29    Benign non-nodular prostatic hyperplasia with lower urinary tract symptoms N40.1    Family history of prostate cancer Z80.42    Skin lesion of face L98.9    Basal cell carcinoma of chest C44.519    Basal cell carcinoma of left cheek C44.319    Benign hemangioma D18.00    Epigastric pain R10.13    Fall from slipping on ice, initial encounter W00. 9XXA    Gastroesophageal reflux disease K21.9    Heartburn R12    Seborrheic keratosis L82.1    Thrombocytopenia ([facility]) D69.6    B12 deficiency E53.8    Chest pain, unspecified R07.9    Symptomatic bradycardia R00.1    CAD (coronary artery disease) I25.10    Diabetic peripheral neuropathy associated with type 2 diabetes mellitus ([facility]) E11.42    Stroke-like symptoms R29.90    Guaiac positive stools R19.5    Transient alteration of awareness R40.4    TIA (transient ischemic attack) G45.9    Numbness and tingling R20.0, R20.2    Diabetic polyneuropathy associated with type 2 diabetes mellitus ([facility]) E11.42    Late onset Alzheimer's disease without behavioral disturbance ([facility]) G30.1, F02.80    Bradycardia R00.1    Left rotator cuff tear arthropathy M75.102, V48.547       Past Medical History:        Diagnosis Date    Arthritis     CAD (coronary artery disease)     sees dr. Hernan Tay Chronic back pain     COPD (chronic obstructive pulmonary disease) ([facility])     Hypertension     Peripheral sensory neuropathy     Pleurisy     Shoulder pain     TIA (transient ischemic attack)     Type II or unspecified type diabetes mellitus without mention of complication, not stated as uncontrolled        Past Surgical History:        Procedure Laterality Date    APPENDECTOMY      CARDIAC CATHETERIZATION  2019    PIETER to LAD       Social History:    Social History     Tobacco Use    Smoking status: Former Smoker     Packs/day: 1.50     Years: 15.00     Pack years: 22.50     Types: Cigarettes     Quit date: 10/23/2000     Years since quittin.1    Smokeless tobacco: Never Used   Substance Use Topics    Alcohol use: No                                Counseling given: Not Answered      Vital Signs (Current):   Vitals:    20 1122 20 1129   BP: (!) 152/79    Pulse: 73    Resp: 18    Temp: 98.5 °F (36.9 °C)    TempSrc: Temporal    SpO2: 97%    Weight:  215 lb (97.5 kg)   Height:  5' 11\" (1.803 m)                                              BP Readings from Last 3 Encounters:   20 (!) 152/79   12/10/20 113/66   12/10/20 124/62       NPO Status: Time of last liquid consumption:                         Time of last solid consumption:                         Date of last liquid consumption: 20                        Date of last solid food consumption: 20    BMI:   Wt Readings from Last 3 Encounters:   20 215 lb (97.5 kg)   12/15/20 215 lb (97.5 kg)   12/10/20 220 lb (99.8 kg)     Body mass index is 29.99 kg/m².     CBC:   Lab Results   Component Value Date    WBC 7.5 12/15/2020    RBC 4.29 12/15/2020    HGB 13.8 12/15/2020    HCT 43.4 12/15/2020    HCT 31.3 2012    .2 12/15/2020    RDW 12.8 12/15/2020     12/15/2020     2012       CMP:   Lab Results   Component Value Date     12/15/2020    K 4.7 12/15/2020    K 4.8 2020     12/15/2020    CO2 27 12/15/2020    BUN 22 12/15/2020    CREATININE 1.4 12/15/2020    GFRAA >59 12/15/2020    LABGLOM 50 12/15/2020    GLUCOSE 95 12/15/2020    PROT 7.0 12/15/2020    PROT 6.8 2013    CALCIUM 9.7 12/15/2020    BILITOT 0.5 12/15/2020    ALKPHOS 62 12/15/2020    AST 20 12/15/2020    ALT 16 12/15/2020       POC Tests:   Recent Labs     12/17/20  1119   POCGLU 73       Coags:   Lab Results   Component Value Date    PROTIME 13.0 12/15/2020    INR 0.99 12/15/2020    APTT 28.8 02/10/2020       HCG (If Applicable): No results found for: PREGTESTUR, PREGSERUM, HCG, HCGQUANT     ABGs: No results found for: PHART, PO2ART, HAZ7MBR, GAS7KQI, BEART, H7UBCMDW     Type & Screen (If Applicable):  No results found for: LABABO, LABRH    Drug/Infectious Status (If Applicable):  No results found for: HIV, HEPCAB    COVID-19 Screening (If Applicable):   Lab Results   Component Value Date    COVID19 NOT DETECTED 12/14/2020         Anesthesia Evaluation  Patient summary reviewed and Nursing notes reviewed no history of anesthetic complications:   Airway: Mallampati: II  TM distance: >3 FB   Neck ROM: full  Mouth opening: < 3 FB Dental:          Pulmonary:normal exam    (+) COPD:      (-) sleep apnea and not a current smoker                           Cardiovascular:    (+) hypertension:, CABG/stent:,       ECG reviewed               Beta Blocker:  Not on Beta Blocker         Neuro/Psych:   (+) TIA, psychiatric history: stable with treatment   (-) seizures            ROS comment: dementia GI/Hepatic/Renal:   (+) GERD: well controlled,           Endo/Other:    (+) DiabetesType II DM, , .                 Abdominal:           Vascular: negative vascular ROS. Anesthesia Plan      general and regional     ASA 3     (Brachial plexus block)  Induction: intravenous. MIPS: Postoperative opioids intended and Prophylactic antiemetics administered. Anesthetic plan and risks discussed with patient.                       Ross Swanson MD   12/17/2020

## 2020-12-17 NOTE — BRIEF OP NOTE
1620   Urine Color Yellow 12/01/20 1620   Urine Appearance Clear 12/01/20 1620       Findings: see op note    Electronically signed by Mario Trivedi MD on 12/17/2020 at 4:12 PM

## 2020-12-17 NOTE — H&P
Pt Name: Nida Jeffers  MRN: [id number]  YOB: 1950  Date of evaluation: 12/17/2020    H&P including current review of systems was updated in the paper chart and/or the document previously scanned into the record. There have been no significant changes or new problems since the original evaluation. The patient's problems continue and indications for contemplated procedure have not changed.     Electronically signed by Tyler Durham MD on 12/17/2020 at 1:48 PM

## 2020-12-17 NOTE — ANESTHESIA PROCEDURE NOTES
Peripheral Block    Patient location during procedure: holding area  Staffing  Performed: anesthesiologist   Anesthesiologist: Lalito Rajput MD  Preanesthetic Checklist  Completed: patient identified, IV checked, site marked, risks and benefits discussed, surgical consent, monitors and equipment checked, pre-op evaluation, timeout performed, anesthesia consent given, oxygen available and patient being monitored  Peripheral Block  Patient position: sitting  Prep: ChloraPrep  Patient monitoring: cardiac monitor, continuous pulse ox, frequent blood pressure checks and IV access  Block type: Brachial plexus  Laterality: left  Injection technique: single-shot  Guidance: ultrasound guided  Local infiltration: lidocaine  Infiltration strength: 1 %  Dose: 1 mL  Interscalene  Provider prep: mask and sterile gloves  Local infiltration: lidocaine  Needle  Needle type: combined needle/nerve stimulator   Needle gauge: 20 G  Needle length: 5 cm  Needle localization: ultrasound guidance  Assessment  Injection assessment: negative aspiration for heme, no paresthesia on injection and local visualized surrounding nerve on ultrasound  Paresthesia pain: none  Slow fractionated injection: yes  Hemodynamics: stable  Additional Notes  20 cc 0.5% Ropivacaine injected    Under ultrasound (\"US\") guidance, a 22 gauge needle was inserted and placed in close proximity to the brachial plexus nerves. Ultrasound was also used to visualize the spread of the anesthetic in close proximity to the nerve being blocked. The nerve appeared anatomically normal, and there were no abnormal pathological findings. A permanent image was recorded.    Medications Administered  Lidocaine injection 1%, 1 mL  bupivacaine (MARCAINE) PF injection 0.5%, 10 mL  bupivacaine liposome (EXPAREL) injection 1.3%, 10 mL  Reason for block: post-op pain management

## 2020-12-18 VITALS
HEIGHT: 71 IN | OXYGEN SATURATION: 96 % | SYSTOLIC BLOOD PRESSURE: 102 MMHG | RESPIRATION RATE: 18 BRPM | HEART RATE: 66 BPM | WEIGHT: 215 LBS | BODY MASS INDEX: 30.1 KG/M2 | DIASTOLIC BLOOD PRESSURE: 61 MMHG | TEMPERATURE: 96.6 F

## 2020-12-18 LAB
ANION GAP SERPL CALCULATED.3IONS-SCNC: 16 MMOL/L (ref 7–19)
BUN BLDV-MCNC: 26 MG/DL (ref 8–23)
CALCIUM SERPL-MCNC: 8.8 MG/DL (ref 8.8–10.2)
CHLORIDE BLD-SCNC: 104 MMOL/L (ref 98–111)
CO2: 22 MMOL/L (ref 22–29)
CREAT SERPL-MCNC: 1.3 MG/DL (ref 0.5–1.2)
GFR AFRICAN AMERICAN: >59
GFR NON-AFRICAN AMERICAN: 55
GLUCOSE BLD-MCNC: 133 MG/DL (ref 74–109)
HCT VFR BLD CALC: 33.1 % (ref 42–52)
HEMOGLOBIN: 10.6 G/DL (ref 14–18)
MCH RBC QN AUTO: 32.6 PG (ref 27–31)
MCHC RBC AUTO-ENTMCNC: 32 G/DL (ref 33–37)
MCV RBC AUTO: 101.8 FL (ref 80–94)
PDW BLD-RTO: 13 % (ref 11.5–14.5)
PLATELET # BLD: 88 K/UL (ref 130–400)
PMV BLD AUTO: 9.3 FL (ref 9.4–12.4)
POTASSIUM SERPL-SCNC: 5 MMOL/L (ref 3.5–5)
RBC # BLD: 3.25 M/UL (ref 4.7–6.1)
SODIUM BLD-SCNC: 142 MMOL/L (ref 136–145)
WBC # BLD: 12.5 K/UL (ref 4.8–10.8)

## 2020-12-18 PROCEDURE — 6370000000 HC RX 637 (ALT 250 FOR IP): Performed by: ORTHOPAEDIC SURGERY

## 2020-12-18 PROCEDURE — 80048 BASIC METABOLIC PNL TOTAL CA: CPT

## 2020-12-18 PROCEDURE — 36415 COLL VENOUS BLD VENIPUNCTURE: CPT

## 2020-12-18 PROCEDURE — 97165 OT EVAL LOW COMPLEX 30 MIN: CPT

## 2020-12-18 PROCEDURE — 85027 COMPLETE CBC AUTOMATED: CPT

## 2020-12-18 PROCEDURE — 2580000003 HC RX 258: Performed by: ORTHOPAEDIC SURGERY

## 2020-12-18 PROCEDURE — 2500000003 HC RX 250 WO HCPCS: Performed by: ORTHOPAEDIC SURGERY

## 2020-12-18 PROCEDURE — 97530 THERAPEUTIC ACTIVITIES: CPT

## 2020-12-18 PROCEDURE — 97535 SELF CARE MNGMENT TRAINING: CPT

## 2020-12-18 PROCEDURE — 6360000002 HC RX W HCPCS: Performed by: ORTHOPAEDIC SURGERY

## 2020-12-18 RX ORDER — DOCUSATE SODIUM 250 MG
250 CAPSULE ORAL 2 TIMES DAILY
Qty: 60 CAPSULE | Refills: 0 | Status: SHIPPED | OUTPATIENT
Start: 2020-12-18 | End: 2021-03-03 | Stop reason: ALTCHOICE

## 2020-12-18 RX ORDER — OXYCODONE AND ACETAMINOPHEN 10; 325 MG/1; MG/1
1 TABLET ORAL EVERY 6 HOURS PRN
Qty: 25 TABLET | Refills: 0 | Status: SHIPPED | OUTPATIENT
Start: 2020-12-18 | End: 2020-12-23

## 2020-12-18 RX ADMIN — ACETAMINOPHEN 650 MG: 325 TABLET ORAL at 10:11

## 2020-12-18 RX ADMIN — OXYCODONE 5 MG: 5 TABLET ORAL at 10:09

## 2020-12-18 RX ADMIN — ENOXAPARIN SODIUM 40 MG: 40 INJECTION SUBCUTANEOUS at 10:14

## 2020-12-18 RX ADMIN — OXYCODONE 10 MG: 5 TABLET ORAL at 02:48

## 2020-12-18 RX ADMIN — OXYCODONE HYDROCHLORIDE 20 MG: 20 TABLET, FILM COATED, EXTENDED RELEASE ORAL at 12:21

## 2020-12-18 RX ADMIN — DOCUSATE SODIUM 50 MG AND SENNOSIDES 8.6 MG 1 TABLET: 8.6; 5 TABLET, FILM COATED ORAL at 10:09

## 2020-12-18 RX ADMIN — LISINOPRIL 40 MG: 20 TABLET ORAL at 10:10

## 2020-12-18 RX ADMIN — CLINDAMYCIN PHOSPHATE 900 MG: 900 INJECTION, SOLUTION INTRAVENOUS at 11:00

## 2020-12-18 RX ADMIN — MEMANTINE HYDROCHLORIDE 10 MG: 5 TABLET ORAL at 10:10

## 2020-12-18 RX ADMIN — METFORMIN HYDROCHLORIDE 1000 MG: 500 TABLET ORAL at 10:25

## 2020-12-18 RX ADMIN — ACETAMINOPHEN 650 MG: 325 TABLET ORAL at 00:30

## 2020-12-18 RX ADMIN — ASPIRIN 81 MG: 81 TABLET, CHEWABLE ORAL at 10:11

## 2020-12-18 RX ADMIN — SODIUM CHLORIDE, PRESERVATIVE FREE 10 ML: 5 INJECTION INTRAVENOUS at 11:01

## 2020-12-18 RX ADMIN — GABAPENTIN 800 MG: 400 CAPSULE ORAL at 10:10

## 2020-12-18 RX ADMIN — CLINDAMYCIN PHOSPHATE 900 MG: 900 INJECTION, SOLUTION INTRAVENOUS at 00:00

## 2020-12-18 RX ADMIN — MULTIPLE VITAMINS W/ MINERALS TAB 1 TABLET: TAB at 10:11

## 2020-12-18 ASSESSMENT — PAIN DESCRIPTION - LOCATION: LOCATION: SHOULDER

## 2020-12-18 ASSESSMENT — PAIN SCALES - GENERAL
PAINLEVEL_OUTOF10: 2
PAINLEVEL_OUTOF10: 6
PAINLEVEL_OUTOF10: 7
PAINLEVEL_OUTOF10: 8

## 2020-12-18 ASSESSMENT — PAIN DESCRIPTION - ORIENTATION: ORIENTATION: LEFT

## 2020-12-18 ASSESSMENT — PAIN SCALES - WONG BAKER: WONGBAKER_NUMERICALRESPONSE: 6

## 2020-12-18 ASSESSMENT — PAIN DESCRIPTION - PAIN TYPE: TYPE: ACUTE PAIN

## 2020-12-18 ASSESSMENT — PAIN DESCRIPTION - DESCRIPTORS: DESCRIPTORS: ACHING

## 2020-12-18 NOTE — PROGRESS NOTES
Physical Therapy    Pt supervision with mobility, will have spouse to assist. OT reviewed HEP/precautions. No PT needed at this time.     Electronically signed by Opal Romo PT on 12/18/2020 at 1:21 PM

## 2020-12-18 NOTE — DISCHARGE SUMMARY
memantine (NAMENDA) 10 MG tablet Take 1 tablet by mouth 2 times daily 11/5/20  Yes BRIGETTE Torres   lisinopril (PRINIVIL;ZESTRIL) 40 MG tablet TAKE ONE TABLET BY MOUTH DAILY. Patient taking differently: Take 40 mg by mouth daily TAKE ONE TABLET BY MOUTH DAILY. 10/20/20  Yes BRIGETTE Triana   pravastatin (PRAVACHOL) 80 MG tablet TAKE ONE TABLET BY MOUTH EVERY EVENING. Patient taking differently: Take 80 mg by mouth nightly TAKE ONE TABLET BY MOUTH EVERY EVENING. 10/20/20  Yes BRIGETTE Triana   gabapentin (NEURONTIN) 800 MG tablet Take 1 tablet by mouth 3 times daily for 30 days. 9/22/20 12/17/20 Yes Nancy Jones MD   Diabetic Shoe MISC by Does not apply route DISPENSE ONE PAIR DIABETIC SHOES AND THREE PAIRS OF HEAT MOLDED INSERTS 9/22/20  Yes Nancy Jones MD   diclofenac sodium (VOLTAREN) 1 % GEL APPLY 2 G TO SKIN 2 TIMES DAILY  Patient taking differently: Apply 2 g topically 2 times daily  8/8/20  Yes BRIGETTE Triana   azelastine (ASTELIN) 0.1 % nasal spray 1 spray by Nasal route 2 times daily Use in each nostril as directed 4/6/20  Yes BRIGETTE Flores NP   aspirin 81 MG chewable tablet Take 1 tablet by mouth daily 4/6/20  Yes BRIGETTE Flores NP   cetirizine (ZYRTEC) 10 MG tablet TAKE 1 TABLET BY MOUTH DAILY  Patient taking differently: Take 10 mg by mouth daily TAKE 1 TABLET BY MOUTH DAILY 4/6/20  Yes BRIGETTE Flores NP   [facility]  Devices [facility]) MISC 1 each by Does not apply route daily 11/8/19  Yes BRIGETTE Triana   oxyCODONE-acetaminophen (PERCOCET)  MG per tablet Take 1 tablet by mouth every 8 hours as needed for Pain   Yes Historical Provider, MD   albuterol sulfate HFA (VENTOLIN HFA) 108 (90 Base) MCG/ACT inhaler Inhale 2 puffs into the lungs every 6 hours as needed for Wheezing    Historical Provider, MD   meclizine (ANTIVERT) 25 MG tablet Take 25 mg by mouth 3 times daily as needed for Dizziness  10/19/20   Historical Provider, MD triamcinolone (KENALOG) 0.1 % cream Apply topically 2 times daily. Patient taking differently: Apply topically 2 times daily Apply topically 2 times daily. 1/13/20   BRIGETTE Triana   nitroGLYCERIN (NITROSTAT) 0.4 MG SL tablet up to max of 3 total doses. If no relief after 1 dose, call 911. 11/5/19   Adriano Calero MD   EPINEPHrine (EPIPEN) 0.3 MG/0.3ML SOAJ injection Use as directed for allergic reaction 6/22/18   Patricia Jose MD   omega-3 acid ethyl esters (LOVAZA) 1 G capsule Take 2 capsules by mouth 2 times daily 5/11/15 12/20/19  Nathan Birch PA-C       Prescriptions for:  Percocet 10/325, #25    Return to Clinic: 1 week    Xrays:  Yes     Electronically signed by Liya Piedra MD on 12/18/2020 at 7:10 AM

## 2020-12-18 NOTE — PROGRESS NOTES
Occupational Therapy   Occupational Therapy Initial Assessment  Date: 2020   Patient Name: Carleen Castillo  MRN: [id number]     : 1950    Date of Service: 2020    Discharge Recommendations:          Assessment   Performance deficits / Impairments: Decreased ADL status; Decreased ROM; Decreased strength  Assessment: Will progress as tolerated  Treatment Diagnosis: L reverse TSR  Prognosis: Good  REQUIRES OT FOLLOW UP: Yes  Activity Tolerance  Activity Tolerance: Patient Tolerated treatment well           Patient Diagnosis(es): The encounter diagnosis was Left rotator cuff tear arthropathy. has a past medical history of Arthritis, CAD (coronary artery disease), Chronic back pain, COPD (chronic obstructive pulmonary disease) ([facility] [address] Hypertension, Peripheral sensory neuropathy, Pleurisy, Shoulder pain, TIA (transient ischemic attack), and Type II or unspecified type diabetes mellitus without mention of complication, not stated as uncontrolled. has a past surgical history that includes Appendectomy; Cardiac catheterization (2019); and shoulder surgery (Left, 2020).     Treatment Diagnosis: L reverse TSR      Restrictions  Restrictions/Precautions  Restrictions/Precautions: General Precautions(LUE:  NWB, no shld ext., no shld ER past neutral, no A/PROM in shld flex or AB)  Required Braces or Orthoses?: Yes  Required Braces or Orthoses  Left Upper Extremity Brace/Splint: Sling    Subjective   General  Chart Reviewed: Yes  Patient assessed for rehabilitation services?: Yes  Diagnosis: L reverse TSR  Patient Currently in Pain: Yes  Pain Assessment  Pain Assessment: Faces  Pain Level: 8  Belcher-Argueta Pain Rating: Hurts even more  Pain Type: Acute pain  Pain Location: Shoulder  Pain Orientation: Left  Pain Descriptors: Aching  Vital Signs  Temp: 96.6 °F (35.9 °C)  Temp Source: Temporal  Pulse: 66  Heart Rate Source: Monitor  Resp: 18  BP: 102/61  BP Location: Right Arm  Patient Position: Sitting  Patient Currently in Pain: Yes  Oxygen Therapy  SpO2: 96 %  O2 Device: None (Room air)  Social/Functional History  Social/Functional History  Lives With: Spouse  Type of Home: House  ADL Assistance: Independent  Ambulation Assistance: Independent  Transfer Assistance: Independent       Objective   Vision: Within Functional Limits  Hearing: Within functional limits    Orientation  Overall Orientation Status: Within Normal Limits        ADL  Feeding: Setup  Grooming: Supervision  UE Bathing: Minimal assistance  LE Bathing: Supervision  UE Dressing: Moderate assistance  LE Dressing: Minimal assistance  Toileting: Minimal assistance  Additional Comments: Wife will help as needed           Transfers  Stand Step Transfers: Stand by assistance  Sit to stand: Stand by assistance     Cognition  Overall Cognitive Status: WFL                 LUE AROM (degrees)  LUE AROM : Exceptions  LUE General AROM: no shld AROM, elbow WFL  RUE AROM (degrees)  RUE AROM : WNL  LUE Strength  Gross LUE Strength: Exceptions to [facility]  L Shoulder Flex: 3-/5  L Shoulder ABduction: 3-/5  L Elbow Flex: 3/5  L Elbow Ext: 3/5  RUE Strength  Gross RUE Strength:  WNL                   Plan   Plan  Times per week: 3-5x/week  Times per day: Daily    G-Code     OutComes Score                                                  AM-PAC Score             Goals  Short term goals  Time Frame for Short term goals: 1 week  Short term goal 1: Pt/spouse independent with HEP  Short term goal 2: Pt/spouse independent with donnning/doffing sling  Long term goals  Long term goal 1: Return to PLOF       Therapy Time   Individual Concurrent Group Co-treatment   Time In           Time Out           Minutes                   Marly Gardiner, OT

## 2020-12-19 ENCOUNTER — CARE COORDINATION (OUTPATIENT)
Dept: CASE MANAGEMENT | Age: 70
End: 2020-12-19

## 2020-12-19 PROCEDURE — 1111F DSCHRG MED/CURRENT MED MERGE: CPT | Performed by: NURSE PRACTITIONER

## 2020-12-19 NOTE — CARE COORDINATION
[facility] Transitions Initial Follow Up Call    Call within 2 business days of discharge: Yes    Patient: Scott Mathias Patient : 1950   MRN: [id number]  Reason for Admission:   Discharge Date: 20 RARS: Readmission Risk Score: 21      Last Discharge [facility]       Complaint Diagnosis Description Type Department Provider    20  Left rotator cuff tear arthropathy Admission (Discharged) Anne-Marie Perez MD             Non-face-to-face services provided:  Obtained and reviewed discharge summary and/or continuity of care documents  1111F       Challenges to be reviewed by the provider   Additional needs identified to be addressed with provider No  none    Discussed COVID-19 related testing which was not done at this time. Test results were not done. Patient informed of results, if available? Not done during hospital stay         Method of communication with provider : none    Advance Care Planning:   Does patient have an Advance Directive:  not on file; education provided. Was this a readmission? No  Patient stated reason for admission: scheduled surgery  Patients top risk factors for readmission: medical condition    Care Transition Nurse (CTN) contacted the patient by telephone to perform post hospital discharge assessment. Verified name and  with patient as identifiers. Provided introduction to self, and explanation of the CTN role. CTN reviewed discharge instructions, medical action plan and red flags with patient who verbalized understanding. Patient given an opportunity to ask questions and does not have any further questions or concerns at this time. Were discharge instructions available to patient? Yes. Reviewed appropriate site of care based on symptoms and resources available to patient including: When to call 911. The patient agrees to contact the PCP office for questions related to their healthcare.      Medication reconciliation was performed with patient, who verbalizes understanding of administration of home medications. Advised obtaining a 90-day supply of all daily and as-needed medications. Covid Risk Education    Patient has following risk factors of: COPD, immunocompromised and diabetes. Education provided regarding infection prevention, and signs and symptoms of COVID-19 and when to seek medical attention with patient who verbalized understanding. Discussed exposure protocols and quarantine From CDC: Are you at higher risk for severe illness?   and given an opportunity for questions and concerns. The patient agrees to contact the COVID-19 hotline [phone] or PCP office for questions related to COVID-19. For more information on steps you can take to protect yourself, see CDC's How to Protect Yourself       Discussed follow-up appointments. If no appointment was previously scheduled, appointment scheduling offered: Yes. Is follow up appointment scheduled within 7 days of discharge? Yes  Non-[facility] follow up appointment(s): no    Plan for follow-up call in 3-5 days based on severity of symptoms and risk factors. Plan for next call: self management-post surgery care     Pt states doing well, no issues or concerns. Dressing is CDI, pain mild, taking pain med as needed. Agreed to more CTC f/u calls. CTN provided contact information for future needs.       Care Transitions 24 Hour Call    Do you have any ongoing symptoms?: No  Do you have a copy of your discharge instructions?: Yes  Do you have all of your prescriptions and are they filled?: Yes  Have you been contacted by a [employer] [address]?: No  Have you scheduled your follow up appointment?: Yes  How are you going to get to your appointment?: Car - family or friend to transport  Were you discharged with any Home Care or Post Acute Services: No  Patient DME: [employer]  Patient Home Equipment: Nebulizer  Do you feel like you have everything you need to keep you well at home?: Yes  Are you an active caregiver in your home?: No  Care Transitions Interventions  No Identified Needs         Follow Up  Future Appointments   Date Time Provider Sera Isa   12/30/2020  1:30 PM BRIGETTE Bob [facility]   1/7/2021 10:40 AM [facility] CT RM 2 [facility]   1/25/2021  2:00 PM OLGA Rodriguez PAD [facility]EM[facility]   1/25/2021  2:20 PM SCHEDULE, L MED ONC MA L MED ONC [facility]   3/8/2021  1:45 PM BRIGETTE Cohen [facility]   3/04/1645 48:51 AM BRIGETTE Rowey PC [facility]   8/4/2021  3:30 PM MD HUI Johnson LPS Cardio [facility]       Yaritza Grier RN

## 2020-12-21 ENCOUNTER — TELEPHONE (OUTPATIENT)
Dept: PRIMARY CARE CLINIC | Age: 70
End: 2020-12-21

## 2020-12-23 ENCOUNTER — CARE COORDINATION (OUTPATIENT)
Dept: CASE MANAGEMENT | Age: 70
End: 2020-12-23

## 2020-12-23 NOTE — CARE COORDINATION
[facility] Transitions Follow Up Call    2020    Patient: Ramsey Leblanc  Patient : 1950   MRN: [id number]  Reason for Admission: Left Rotator Cuff Repair  Discharge Date: 20 RARS: Readmission Risk Score: 21         Spoke with: Mrs. Ferro Primus and Final Call    Schedule Follow Up Appointment with PCP: Completed  Subsequent and Final Calls  Have your medications changed?: No  Do you have any questions related to your medications?: No  Do you have any needs or concerns that I can assist you with?: No  Identified Barriers: None  Care Transitions Interventions  Other Interventions:         Placed a call to the number listed for patient. He answered and then hand the phone to his wife. I spoke with her for a follow up call for Care Transitions post discharge. She reported that from a surgical standpoint, he is doing well. She said that he is having some pain, mostly in his back though, moreso than his shoulder. She said he is back on his routine pain meds rather than what he had after surgery. She said the incision looks good, no redness, inflammation, edema, bruising, etc.  She said he is wearing his sling. She said that she has helped him some with ADLS, personal care, etc., but that he is doing well overall. She did say that she is having a hard time getting him to do his exercises though. He was scheduled to see the surgeon this am, but he was having issues with his blood pressure, so she cancelled that appointment and they are supposed to call back with a new one. He is scheduled for a hospital follow up with his PCP next week. He has all of his meds and is taking them as ordered. She said she did hold his blood pressure pill this am though. She said he woke up this am to go to the BR and got dizzy and weak with a blood pressure of 74/49. She said she got him back in bed, put his feet up.   She said she kept checking it for awhile and finally, it came up to 102/61 about 2 hours later. She said then after another 2 hours, it was 117/65. Then 2 more hours, 127/68. She said initially, he was pale, sweaty and staggering. She said she held the medication. She said she has checked it some off and on since and it has been ok. She said he did not have any pain medication during the night. She said he is drinking decent, getting enough fluids. She said that they are both on a low sodium diet and use No Salt and do not add any salt to their foods. Did advise her to give him something just a tad salty if this happens again, to help bring the BP up quickly, something such us a little bit of pickle juice, a handful of chips or even a little bit of salt. Informed to watch it closely occasionally over the next few days and if it continues to be low, to let CTN or PCP know what it is doing. Instructed to check it about 3 times a day or when symptomatic. Discussed falling precautions, making sure he is close to furniture so he can sit down, having someone close by, holding to walls, etc.  She is not aware of any other issues. Verba Canavan he is sleeping well. She denied any bowel or bladder issues. Informed of CTC process, purpose, future calls, etc.  Ensured to have CTN contact information to be used as needed. Encouraged to call as needed for new issues, questions, etc.  Given the opportunity to ask questions and answered appropriately. CTN to follow up as indicated. Needs to be reviewed by the provider   None    Care Transition Nurse (CTN) contacted the caregiver by telephone to follow up after admission. Verified name and  with caregiver as identifiers. Addressed changes since last contact: BP issues today, had to cancel follow up with surgeon to a later date  Discharged needs reviewed: none  Follow up appointment completed? Scheduled for next week. Advance Care Planning:   Does patient have an Advance Directive:  not on file.      CTN reviewed discharge instructions, medical action plan and red flags with caregiver and discussed any barriers to care and/or understanding of plan of care after discharge. Discussed appropriate site of care based on symptoms and resources available to patient including: PCP, Specialist, Urgent care clinics, Home health and When to call 911. The caregiver agrees to contact the PCP office for questions related to their healthcare. Patients top risk factors for readmission: functional physical ability and medical condition  Interventions to address risk factors: Education of patient/family/caregiver/guardian to support self-management-symptom management, BP management, hydration needs, pain control needs,  falling precautions, etc.     Discussed follow-up appointments. Plan for follow-up call in 1-2 days based on severity of symptoms and risk factors. Plan for next call: - disease process mgmt, symptom mgmt, diet/hydration, pain control needs, medication mgmt, activity level, home safety needs, infection control, fall precautions, seeking medical attention, who/when to call prn any needs, etc.    CTN provided contact information for future needs.             Follow Up  Future Appointments   Date Time Provider Sera Moss   12/30/2020  1:30 PM BRIGETTE Ochoa [facility]   1/7/2021 10:40 AM MHL CT RM 2 MHL CT [facility] Hos Rad   1/25/2021  2:00 PM OLGA Emmanuel PAD [facility]   1/25/2021  2:20 PM SCHEDULE, L MED ONC MA [facility] MED ONC Laura [facility]OD   3/8/2021  1:45 PM BRIGETTE Leonard Ra [facility]   9/72/7109 22:70 AM Maritza Schilder, APRN [facility]   8/4/2021  3:30 PM MD HUI Cortes LPS Cardio [facility]       Allean Cogan, RN

## 2020-12-24 ENCOUNTER — CARE COORDINATION (OUTPATIENT)
Dept: CASE MANAGEMENT | Age: 70
End: 2020-12-24

## 2020-12-24 NOTE — CARE COORDINATION
[facility] Transitions Follow Up Call    2020    Patient: Sedrick Pallas  Patient : 1950   MRN: [id number]    Discharge Date: 20 RARS: Readmission Risk Score: 21         Spoke with: N/A    Care Transitions Subsequent and Final Call    Subsequent and Final Calls  Care Transitions Interventions  Other Interventions:         Attempted to make contact with patient for a routine follow up call without success. Unable to leave a message regarding intent of call and call back information. Call went directly to an unidentifiable voice mail. Will try again at a later time.          Follow Up  Future Appointments   Date Time Provider Sera Moss   2020  1:30 PM Arthurine Patient, BRIGETTE BRYANT [facility]   2021 10:40 AM MHL CT RM 2 MHL CT MHL Hos Rad   2021  2:00 PM [facility]   2021  2:20 PM SCHEDULE, L MED ONC MA L MED ONC Laura HOD   3/8/2021  1:45 PM BRIGETTE VasquesNeursur [facility]-[place]    05:60 AM [facility]   2021  3:30 PM MD HUI Juares LPS Cardio [facility]       Mary Fritz RN

## 2020-12-28 ENCOUNTER — CARE COORDINATION (OUTPATIENT)
Dept: CASE MANAGEMENT | Age: 70
End: 2020-12-28

## 2020-12-28 NOTE — CARE COORDINATION
[facility] 45 Transitions Follow Up Call    2020    Patient: Roosevelt Potter  Patient : 1950   MRN: [id number]  Reason for Admission:   Discharge Date: 20 RARS: Readmission Risk Score: 21         Spoke with: Roosevelt Potter and wife in background    Care Transitions Subsequent and Final Call    Subsequent and Final Calls  Do you have any ongoing symptoms?: No  Have your medications changed?: No  Do you have any questions related to your medications?: No  Do you currently have any active services?: No  Do you have any needs or concerns that I can assist you with?: No  Identified Barriers: None  Care Transitions Interventions  Other Interventions: Follow Up : Spoke with patient for follow up CTC call. He says he is doing good, appetite good. Says he has been constipated ,discussed fresh fruits and vegetables, Miralax, stool softeners. His bp is better, wife yells from background that last reading was 112/63 which was much better than it had been. She had been holding his bp meds, but is now giving them. Says he has his HFU with PCP and seems to be doing better. Encouraged to call with prn issues. Will follow up at a later time.    Future Appointments   Date Time Provider Sera Moss   2021 10:40 AM MHL CT RM 2 MHL CT MHL Hos Rad   [id number]  8:60 PM BRIGETTE Duartey PC [facility]   2021  2:00 PM OLGA Kang PAD [facility]ONC [facility]   2021  2:20 PM SCHEDULE, MHL MED ONC MA [facility]HL MED ONC Laura HOD   3/8/2021  1:45 PM BRIGETTE BatresNeursur [facility]    13:62 AM BRIGETTE Duartey PC [facility]   2021  3:30 PM MD HUI Rosas LPS Cardio [facility]       Nicol Frost RN

## 2021-01-04 ENCOUNTER — CARE COORDINATION (OUTPATIENT)
Dept: CASE MANAGEMENT | Age: 71
End: 2021-01-04

## 2021-01-04 NOTE — CARE COORDINATION
[facility] 45 Transitions Follow Up Call    2021    Patient: Amy Asencio  Patient : 1950   MRN: [id number]  Reason for Admission: Left Rotator Cuff Arthroplasty  Discharge Date: 20 RARS: Readmission Risk Score: 21         Spoke with: [address] [place] [address] Drive Transitions Subsequent and Final Call    Subsequent and Final Calls  Care Transitions Interventions  Other Interventions:         Placed a call to the number listed for patient and spoke with her for a follow up call for Care Transitions. He reported that he is doing fair. Di Challenger he is still having a great deal of pain and soreness in his shoulder and arm. He said he is also having some swelling off and on. He is using ice packs to it often. He said he is doing his own therapy. Encouraged to do enough, but not over do it. He said he is not going to that extreme. He is still wearing the sling. He said he is eating and drinking well. Denied any bladder or bowel issues. Said that his blood pressure has been a little better, but does occasionally get low. His wife is monitoring it closely. Informed him to have her call CTN or PCP if it continues to run low. He is not aware of the numbers and she is on her other phone he said. No new issues noted. Discussed COVID 19 risks, self monitoring, infection control, etc.  He reported that they are doing all that they can to avoid getting it. Informed of CTC process, purpose, future calls, etc.  Ensured to have CTN contact information to be used as needed. Encouraged to call as needed for new issues, questions, etc.  Given the opportunity to ask questions and answered appropriately. CTN to follow up as indicated.      Follow Up  Future Appointments   Date Time Provider Sera Moss   2021 10:40 AM MHL CT RM 2 MHL CT MHL Hos Rad     8:70 PM Dallas Laming, APRN [facility]   2021  2:00 PM OLGA AcostaC [facility]   2021  2:20 PM SCHEDULE, [facility] MED ONC MA [facility] MED ONC [facility]   3/8/2021  1:45 PM BRIGETTE Greenfield [facility]   9/80/3044 47:02 AM BRIGETTE Turner [facility]   8/4/2021  3:30 PM MD HUI Hurt LPS Cardio [facility]       Kenny Ames, RN

## 2021-01-07 ENCOUNTER — TELEPHONE (OUTPATIENT)
Dept: CARDIAC REHAB | Age: 71
End: 2021-01-07

## 2021-01-07 NOTE — TELEPHONE ENCOUNTER
Spoke to patient's wife Kennyth [place]) on 10/30/20 in respect to Mr. Ramirez participating in Cardiac Rehab per coronary stenting on 11/14/19 and subsequently being placed in the CR workqueue in 10/2020. Wife stated she would speak with Mr. Calle and call back with a decision. Having not received said call, a follow-up call was made 1/7/21. Mrs. Calle was not available. Spoke to patient, who recently underwent rotator cuff surgery on 12/17/20, saying he'll let his wife know Cardiac Rehab called.

## 2021-01-11 ENCOUNTER — CARE COORDINATION (OUTPATIENT)
Dept: CASE MANAGEMENT | Age: 71
End: 2021-01-11

## 2021-01-11 NOTE — CARE COORDINATION
[facility] 45 Transitions Follow Up Call    2021    Patient: Jaswinder Trivedi  Patient : 1950   MRN: [id number]    Discharge Date: 20 RARS: Readmission Risk Score: 21         Spoke with: [facility] Drive Transitions Subsequent and Final Call    Subsequent and Final Calls  Have your medications changed?: No  Do you have any questions related to your medications?: No  Do you currently have any active services?: No  Do you have any needs or concerns that I can assist you with?: No  Identified Barriers: None  Care Transitions Interventions  Other Interventions:       Placed a call to the home and spoke with patient. He reported that he is doing fair. He said that his shoulder is bothering him a little bit today. He said he thinks some of it is the cold weather today. He said he has not been doing a lot of exercise with it so he does not think he has overdone it with that. He said it is not anything that he can't handle. He said that his blood pressure is good. He has had a few episodes of it dropping some, but not too much. Not like before. He is eating, drinking, sleeping well. He is not aware of any abnormal issues. Will sign off at this time, as patient feels he is stable and does not feel that he needs anything else at this time.         Follow Up  Future Appointments   Date Time Provider Sera Moss     9:00 PM [facility]TE [facility]ell [facility]   2021  2:10 PM SCHEDULE, L MED ONC MA [facility] MED ONC [facility]   2021  2:30 PM OLGA Yousif [facility]C [place]   3/8/2021  1:45 PM March BRIGETTE BriggsNeursur [facility]    09:42 AM [facility]TE Waddell [facility]   2021  3:30 PM MD HUI Menendez Card[facility]       Marlene Strickland RN

## 2021-01-18 RX ORDER — TIZANIDINE 4 MG/1
4 TABLET ORAL EVERY 8 HOURS PRN
Qty: 30 TABLET | Refills: 0 | Status: SHIPPED | OUTPATIENT
Start: 2021-01-18 | End: 2021-02-26

## 2021-01-18 RX ORDER — TAMSULOSIN HYDROCHLORIDE 0.4 MG/1
0.4 CAPSULE ORAL DAILY
Qty: 90 CAPSULE | Refills: 3 | Status: SHIPPED | OUTPATIENT
Start: 2021-01-18 | End: 2022-09-28 | Stop reason: SDUPTHER

## 2021-01-21 ENCOUNTER — VIRTUAL VISIT (OUTPATIENT)
Dept: PRIMARY CARE CLINIC | Age: 71
End: 2021-01-21
Payer: MEDICARE

## 2021-01-21 DIAGNOSIS — E11.9 TYPE 2 DIABETES MELLITUS WITHOUT COMPLICATION, WITHOUT LONG-TERM CURRENT USE OF INSULIN (HCC): Primary | ICD-10-CM

## 2021-01-21 DIAGNOSIS — J44.9 CHRONIC OBSTRUCTIVE PULMONARY DISEASE, UNSPECIFIED COPD TYPE (HCC): ICD-10-CM

## 2021-01-21 PROCEDURE — 99443 PR PHYS/QHP TELEPHONE EVALUATION 21-30 MIN: CPT | Performed by: NURSE PRACTITIONER

## 2021-01-21 NOTE — PROGRESS NOTES
[facility]   [address] [place] [address]            Phone:  [phone]  Fax:  [phone]    Jing Leyva is a 72 y.o. male evaluated via telephone on 1/21/2021. Consent:    He and/or health care decision maker is aware that that he may receive a bill for this telephone service, depending on his insurance coverage, and has provided verbal consent to proceed: Yes      HPI  Chief Complaint   Patient presents with    Shoulder Pain       I communicated with the patient and/or health care decision maker about follow-up; he had left rotator cuff repair per Dr Princess Fonseca last month. He states he is doing better; often does not do his exercises but improving. He denies concerns today. Blood pressure has been stable. No episodes of hypoglycemia; diabetes controlled. Review of Systems   Constitutional: Negative for chills and fever. HENT: Negative for ear pain, hearing loss, rhinorrhea and voice change. Eyes: Negative for photophobia and visual disturbance. Respiratory: Negative for cough and shortness of breath. Cardiovascular: Negative for chest pain and palpitations. Gastrointestinal: Negative for nausea and vomiting. Endocrine: Negative. Negative for cold intolerance and heat intolerance. Genitourinary: Negative for difficulty urinating and flank pain. Musculoskeletal: Negative for back pain and neck pain. Skin: Negative for color change and rash. Allergic/Immunologic: Negative for environmental allergies and food allergies. Neurological: Negative for dizziness, speech difficulty and headaches. Hematological: Does not bruise/bleed easily. Psychiatric/Behavioral: Negative for sleep disturbance and suicidal ideas. Patient location: home    PLAN    Details of this discussion including any medical advice provided:     1.  Type 2 diabetes mellitus without complication, without long-term current use of insulin (HCC)  - Stable; will continue to monitor blood glucose. 2. Chronic obstructive pulmonary disease, unspecified COPD type ([facility] [address]  - Stable. I affirm this is a Patient Initiated Episode with an Established Patient who has not had a related appointment within my department in the past 7 days or scheduled within the next 24 hours. Total Time: minutes: 21-30 minutes      Note: not billable if this call serves to triage the patient into an appointment for the relevant concern      [address] to the emergency declaration under the [address] [place] [address], [facility] waiver authority and the [employer] Act, this Virtual  Visit was conducted, with patient's consent, to reduce the patient's risk of exposure to COVID-19 and provide continuity of care for an established patient.

## 2021-01-26 ENCOUNTER — HOSPITAL ENCOUNTER (OUTPATIENT)
Dept: INFUSION THERAPY | Age: 71
End: 2021-01-26
Payer: MEDICARE

## 2021-01-26 ENCOUNTER — TRANSCRIBE ORDERS (OUTPATIENT)
Dept: ADMINISTRATIVE | Facility: HOSPITAL | Age: 71
End: 2021-01-26

## 2021-01-26 DIAGNOSIS — Z01.818 PREOP TESTING: Primary | ICD-10-CM

## 2021-01-28 ENCOUNTER — LAB (OUTPATIENT)
Dept: LAB | Facility: HOSPITAL | Age: 71
End: 2021-01-28

## 2021-01-28 LAB — SARS-COV-2 ORF1AB RESP QL NAA+PROBE: NOT DETECTED

## 2021-01-28 PROCEDURE — U0004 COV-19 TEST NON-CDC HGH THRU: HCPCS | Performed by: PAIN MEDICINE

## 2021-01-28 PROCEDURE — C9803 HOPD COVID-19 SPEC COLLECT: HCPCS | Performed by: PAIN MEDICINE

## 2021-02-01 ASSESSMENT — PATIENT HEALTH QUESTIONNAIRE - PHQ9
SUM OF ALL RESPONSES TO PHQ QUESTIONS 1-9: 0
SUM OF ALL RESPONSES TO PHQ QUESTIONS 1-9: 0
1. LITTLE INTEREST OR PLEASURE IN DOING THINGS: 0
2. FEELING DOWN, DEPRESSED OR HOPELESS: 0

## 2021-02-01 ASSESSMENT — ENCOUNTER SYMPTOMS
SHORTNESS OF BREATH: 0
COLOR CHANGE: 0
BACK PAIN: 0
NAUSEA: 0
RHINORRHEA: 0
COUGH: 0
VOMITING: 0
PHOTOPHOBIA: 0
VOICE CHANGE: 0

## 2021-02-18 ENCOUNTER — TRANSCRIBE ORDERS (OUTPATIENT)
Dept: LAB | Facility: HOSPITAL | Age: 71
End: 2021-02-18

## 2021-02-18 DIAGNOSIS — Z01.818 PRE-OP TESTING: Primary | ICD-10-CM

## 2021-02-23 ENCOUNTER — TRANSCRIBE ORDERS (OUTPATIENT)
Dept: LAB | Facility: HOSPITAL | Age: 71
End: 2021-02-23

## 2021-02-23 ENCOUNTER — LAB (OUTPATIENT)
Dept: LAB | Facility: HOSPITAL | Age: 71
End: 2021-02-23

## 2021-02-23 DIAGNOSIS — Z01.818 PRE-OP TESTING: Primary | ICD-10-CM

## 2021-02-23 LAB — SARS-COV-2 RNA PNL SPEC NAA+PROBE: NOT DETECTED

## 2021-02-23 PROCEDURE — 87635 SARS-COV-2 COVID-19 AMP PRB: CPT | Performed by: INTERNAL MEDICINE

## 2021-02-23 PROCEDURE — C9803 HOPD COVID-19 SPEC COLLECT: HCPCS | Performed by: INTERNAL MEDICINE

## 2021-02-24 ENCOUNTER — TELEPHONE (OUTPATIENT)
Dept: GASTROENTEROLOGY | Facility: CLINIC | Age: 71
End: 2021-02-24

## 2021-02-24 ENCOUNTER — ANESTHESIA (OUTPATIENT)
Dept: GASTROENTEROLOGY | Facility: HOSPITAL | Age: 71
End: 2021-02-24

## 2021-02-24 ENCOUNTER — ANESTHESIA EVENT (OUTPATIENT)
Dept: GASTROENTEROLOGY | Facility: HOSPITAL | Age: 71
End: 2021-02-24

## 2021-02-24 ENCOUNTER — HOSPITAL ENCOUNTER (OUTPATIENT)
Facility: HOSPITAL | Age: 71
Setting detail: HOSPITAL OUTPATIENT SURGERY
Discharge: HOME | End: 2021-02-24
Attending: INTERNAL MEDICINE | Admitting: INTERNAL MEDICINE

## 2021-02-24 VITALS
SYSTOLIC BLOOD PRESSURE: 140 MMHG | HEIGHT: 70 IN | TEMPERATURE: 98.2 F | OXYGEN SATURATION: 99 % | HEART RATE: 54 BPM | DIASTOLIC BLOOD PRESSURE: 78 MMHG | RESPIRATION RATE: 16 BRPM | BODY MASS INDEX: 30.61 KG/M2 | WEIGHT: 213.8 LBS

## 2021-02-24 LAB — GLUCOSE BLDC GLUCOMTR-MCNC: 74 MG/DL (ref 70–130)

## 2021-02-24 PROCEDURE — 25010000002 PROPOFOL 10 MG/ML EMULSION: Performed by: NURSE ANESTHETIST, CERTIFIED REGISTERED

## 2021-02-24 PROCEDURE — G0105 COLORECTAL SCRN; HI RISK IND: HCPCS | Performed by: INTERNAL MEDICINE

## 2021-02-24 PROCEDURE — 82962 GLUCOSE BLOOD TEST: CPT

## 2021-02-24 RX ORDER — MIDAZOLAM HYDROCHLORIDE 1 MG/ML
0.5 INJECTION, SOLUTION INTRAMUSCULAR; INTRAVENOUS
Status: CANCELLED | OUTPATIENT
Start: 2021-02-24

## 2021-02-24 RX ORDER — MIDAZOLAM HYDROCHLORIDE 1 MG/ML
1 INJECTION, SOLUTION INTRAMUSCULAR; INTRAVENOUS
Status: CANCELLED | OUTPATIENT
Start: 2021-02-24

## 2021-02-24 RX ORDER — SODIUM CHLORIDE 0.9 % (FLUSH) 0.9 %
10 SYRINGE (ML) INJECTION AS NEEDED
Status: CANCELLED | OUTPATIENT
Start: 2021-02-24

## 2021-02-24 RX ORDER — PROPOFOL 10 MG/ML
VIAL (ML) INTRAVENOUS AS NEEDED
Status: DISCONTINUED | OUTPATIENT
Start: 2021-02-24 | End: 2021-02-24 | Stop reason: SURG

## 2021-02-24 RX ORDER — LIDOCAINE HYDROCHLORIDE 10 MG/ML
0.5 INJECTION, SOLUTION EPIDURAL; INFILTRATION; INTRACAUDAL; PERINEURAL ONCE AS NEEDED
Status: CANCELLED | OUTPATIENT
Start: 2021-02-24

## 2021-02-24 RX ORDER — SODIUM CHLORIDE 9 MG/ML
100 INJECTION, SOLUTION INTRAVENOUS CONTINUOUS
Status: CANCELLED | OUTPATIENT
Start: 2021-02-24

## 2021-02-24 RX ORDER — SODIUM CHLORIDE 0.9 % (FLUSH) 0.9 %
10 SYRINGE (ML) INJECTION EVERY 12 HOURS SCHEDULED
Status: CANCELLED | OUTPATIENT
Start: 2021-02-24

## 2021-02-24 RX ORDER — SODIUM CHLORIDE 9 MG/ML
500 INJECTION, SOLUTION INTRAVENOUS CONTINUOUS PRN
Status: DISCONTINUED | OUTPATIENT
Start: 2021-02-24 | End: 2021-02-24 | Stop reason: HOSPADM

## 2021-02-24 RX ORDER — SODIUM CHLORIDE 0.9 % (FLUSH) 0.9 %
10 SYRINGE (ML) INJECTION AS NEEDED
Status: DISCONTINUED | OUTPATIENT
Start: 2021-02-24 | End: 2021-02-24 | Stop reason: HOSPADM

## 2021-02-24 RX ADMIN — SODIUM CHLORIDE 500 ML: 9 INJECTION, SOLUTION INTRAVENOUS at 08:49

## 2021-02-24 RX ADMIN — PROPOFOL 325 MG: 10 INJECTION, EMULSION INTRAVENOUS at 09:45

## 2021-02-24 NOTE — ANESTHESIA POSTPROCEDURE EVALUATION
Patient: Manolo Sagastume    Procedure Summary     Date: 02/24/21 Room / Location: [facility] ENDOSCOPY 6 / [facility] ENDOSCOPY    Anesthesia Start: 0938 Anesthesia Stop: 1008    Procedure: COLONOSCOPY WITH ANESTHESIA (N/A ) Diagnosis:       History of adenomatous polyp of colon      (History of adenomatous polyp of colon [Z86.010])    Surgeon: Angy Syed MD Provider: Daren Carrizales CRNA    Anesthesia Type: MAC ASA Status: 3          Anesthesia Type: MAC    Vitals  No vitals data found for the desired time range.          Post Anesthesia Care and Evaluation    Patient location during evaluation: PHASE II  Patient participation: complete - patient participated  Level of consciousness: awake and alert  Pain management: adequate  Airway patency: patent  Anesthetic complications: No anesthetic complications    Cardiovascular status: acceptable  Respiratory status: acceptable  Hydration status: acceptable

## 2021-02-24 NOTE — ANESTHESIA PREPROCEDURE EVALUATION
Anesthesia Evaluation     Patient summary reviewed   no history of anesthetic complications:  NPO Solid Status: > 8 hours  NPO Liquid Status: > 8 hours           Airway   Mallampati: II  TM distance: >3 FB  Neck ROM: full  No difficulty expected  Dental    (+) poor dentition    Comment: Extremely poor dentition, denies loose teeth      Pulmonary    (+) a smoker Former, COPD,   (-) sleep apnea  Cardiovascular   Exercise tolerance: good (4-7 METS)    (+) hypertension, cardiac stents Drug eluting stent hyperlipidemia,       Neuro/Psych- negative ROS  GI/Hepatic/Renal/Endo    (+)  GERD,  diabetes mellitus,   (-) liver disease, no renal disease    Musculoskeletal     Abdominal    Substance History      OB/GYN          Other                        Anesthesia Plan    ASA 3     MAC     intravenous induction     Anesthetic plan, all risks, benefits, and alternatives have been provided, discussed and informed consent has been obtained with: patient.

## 2021-02-26 RX ORDER — TIZANIDINE 4 MG/1
4 TABLET ORAL EVERY 8 HOURS PRN
Qty: 30 TABLET | Refills: 1 | Status: SHIPPED | OUTPATIENT
Start: 2021-02-26 | End: 2021-04-29

## 2021-02-26 NOTE — TELEPHONE ENCOUNTER
Cathy Brewster called to request a refill on his medication.       Last office visit : 1/21/2021   Next office visit : 3/16/2021     Requested Prescriptions     Signed Prescriptions Disp Refills    tiZANidine (ZANAFLEX) 4 MG tablet 30 tablet 1     Sig: TAKE 1 TABLET BY MOUTH EVERY 8 HOURS AS NEEDED (BACK PAIN)     Authorizing Provider: Ardean Sacks     Ordering User: Brinda Avendano MA

## 2021-03-02 NOTE — PROGRESS NOTES
Progress Note      Pt Name: Marisabel Givens: 1950  MRN: [id number]    Date of evaluation: 3/3/2021  History Obtained From:  patient, spouse, electronic medical record    CHIEF COMPLAINT:    Chief Complaint   Patient presents with    Follow-up     Thrombocytopenia ([address]     Current active problems  Thrombocytopenia  B12 deficiency  Pancreatic lesion    HISTORY OF PRESENT ILLNESS:    Dl Lazcano is a 72 y.o.  male with B12 deficiency and mild to moderate thrombocytopenia followed in the office since 9/19/2019. He continues taking oral B12 daily. He continues on aspirin 81 and Plavix 75 mg daily for cardiac stent. He denies any new chest pain. He does bruise mildly on his hands but nothing significantly worse. He does not have any bleeding whatsoever. He did have a left reverse total shoulder arthroplasty by Dr. Tennille Lawrence on 12/17/2020 and he is starting to recover much better. He did not go for any of his rehab. He was having so much pain with his shoulder prior to surgery he cannot sleep and is sleeping better. He did have a cyst noted in the pancreas and a CT of the abdomen had been requested however he did not go for it because of COVID-19 concerns. He denies any midepigastric pain. He is a diabetic but his A1c has been running in 5.3. He does have diabetic neuropathy and is on Neurontin. He has COPD without any exacerbations. He has hypertension with blood pressure stable with current medication. HEMATOLOGY HISTORY: Thrombocytopenia, B12 deficiency  Yoanna Vega was seen in initial hematology consultation on 9/19/2019 referred [facility] for evaluation of thrombocytopenia.     Yoanna Vega presented to his initial visit with his wife. I asked him if he had ever had a prior issues with low platelets and he denied knowing any history. He did report that he bruised easily and bleeds easily at times, \"as long as I can remember\".   He denied taking aspirin or oral nonsteroidals.     CBCs obtained from epic:          Abdominal ultrasound 10/7/2015 (ordered by Dr. Laura Robins) revealed a \"normal spleen\"-no measurement given. I have asked him if he remembered previously being seen by Dr. Laura Robins, and he reported that he did not.     He denied any prior history of liver dysfunction. He has chronic back pain and gets injections by Dr. Jacqulin Closs, and denied having any significant bleeding issues except for superficial bleeding.     He had been experiencing thrombocytopenia per records dating back to at least 2014. Recent serology on 9/4/2019 revealed that his LFTs were normal.  A PT/INR on 9/4/2019 was normal as well.     I discussed the abnormalities on his CBC that was limited to his platelets, number 1 we will maintain his wife on his initial visit. I discussed the role of platelets. He does have ecchymosis mostly on his arms. CBC on 9/19/2019 revealed a WBC of 9.94 with normal percent differential, Hgb 14 with MCV 98.8, PLT 91,000.     Serology will be requested. I discussed potential need for bone marrow aspiration and biopsy, described the procedure as well today. We will await findings from the initial serology. Serology 9/19/2019  DIC screen - negative  B12 - 295  Folate - 9.4  Copper - 80  Zinc - 70  Antiplatelet ab - Ia/IIa -negative,  IIb/IIIa POSITIVE  HIV - Non reactive  SPEP - No M spike  QI - IgG 1030, IgA 322, IgM 50 - all WNL  MMA - 253  FATOUMATA - negative    Bone marrow aspiration and biopsy 2/13/2020  · Normocellular bone marrow for age (~30 to 35%) with trilineage hematopoiesis, no significant dyspoiesis and no increase in blasts (~1%)  · Adequate megakaryocytes  · No diagnostic evidence of lymphoproliferative or myelodysplastic process or metastatic malignancy or granulomas  · Normal male karyotype    CTA abdomen 11/3/2019 at [facility] revealed that the liver and spleen were \"normal\".     Serology 5/27/2020  LDH - 307  Retic - 0.8%  Haptoglobin - 102  B12 - 867  CMP - crt 1.6 with GFR 43      TREATMENT SUMMARY  B12 1000 mcg IM then changed to p.o. TUMOR HISTORY: Pancreatic lesion    CTA of the abdomen performed on 11/3/2019 at [facility] which revealed that his liver and spleen were \"normal\". It did show some rectal wall thickening, and a tiny density in the pancreatic tail. He and his wife declined MRI-MRCP. His insurance would not cover a CA-19-9 to be drawn. CT abdomen with contrast 6/15/2020 at [facility] was compared to 11/3/2019 revealed  · 4 mm hypodense nodule in tail of the pancreas possibly representing cyst or pseudocyst  · Nondilated pancreatic duct  · Another 6-month follow-up CT recommended to ensure stability.   · Bilateral renal cysts and nonobstructing renal calculi        Past Medical History:   Diagnosis Date    Arthritis     CAD (coronary artery disease)     sees dr. Kelly Chambers Chronic back pain     COPD ([facility]ronic obstructive [facility] disease) [facility]     Hypertension     Peripheral sensory neuropathy     Pleurisy     Shoulder pain     TIA (transient ischemic attack)     Type II or unspecified type diabetes mellitus without mention of complication, not stated as uncontrolled         Past Surgical History:   Procedure Laterality Date    APPENDECTOMY      CARDIAC CATHETERIZATION  11/04/2019    PIETER to LAD    SHOULDER SURGERY Left 12/17/2020    LEFT REVERSE TOTAL SHOULDER ARTHROPLASTY performed by Emile Morse MD at [facility] OR       Current Outpatient Medications:     tiZANidine (ZANAFLEX) 4 MG tablet, TAKE 1 TABLET BY MOUTH EVERY 8 HOURS AS NEEDED (BACK PAIN), Disp: 30 tablet, Rfl: 1    tamsulosin (FLOMAX) 0.4 MG capsule, Take 1 capsule by mouth daily, Disp: 90 capsule, Rfl: 3    pantoprazole (PROTONIX) 40 MG tablet, Take 40 mg by mouth daily Indications: Reflux Gastritis, Disp: , Rfl:     metFORMIN (GLUCOPHAGE) 1000 MG tablet, Take 1,000 mg by mouth 2 times daily (with meals) Indications: Diabetes, Disp: , Rfl:     albuterol sulfate HFA (VENTOLIN HFA) 108 (90 Base) MCG/ACT inhaler, Inhale 2 puffs into the lungs every 6 hours as needed for Wheezing, Disp: , Rfl:     donepezil (ARICEPT) 5 MG tablet, Take 1 tablet by mouth nightly, Disp: 30 tablet, Rfl: 3    meclizine (ANTIVERT) 25 MG tablet, Take 25 mg by mouth 3 times daily as needed for Dizziness , Disp: , Rfl:     memantine (NAMENDA) 10 MG tablet, Take 1 tablet by mouth 2 times daily, Disp: 60 tablet, Rfl: 2    lisinopril (PRINIVIL;ZESTRIL) 40 MG tablet, TAKE ONE TABLET BY MOUTH DAILY. (Patient taking differently: Take 40 mg by mouth daily TAKE ONE TABLET BY MOUTH DAILY.), Disp: 90 tablet, Rfl: 3    pravastatin (PRAVACHOL) 80 MG tablet, TAKE ONE TABLET BY MOUTH EVERY EVENING. (Patient taking differently: Take 80 mg by mouth nightly TAKE ONE TABLET BY MOUTH EVERY EVENING.), Disp: 90 tablet, Rfl: 3    Diabetic Shoe MISC, by Does not apply route DISPENSE ONE PAIR DIABETIC SHOES AND THREE PAIRS OF HEAT MOLDED INSERTS, Disp: 1 each, Rfl: 0    diclofenac sodium (VOLTAREN) 1 % GEL, APPLY 2 G TO SKIN 2 TIMES DAILY (Patient taking differently: Apply 2 g topically 2 times daily ), Disp: 100 g, Rfl: 3    azelastine (ASTELIN) 0.1 % nasal spray, 1 spray by Nasal route 2 times daily Use in each nostril as directed, Disp: 2 Bottle, Rfl: 1    aspirin 81 MG chewable tablet, Take 1 tablet by mouth daily, Disp: 30 tablet, Rfl: 3    cetirizine (ZYRTEC) 10 MG tablet, TAKE 1 TABLET BY MOUTH DAILY (Patient taking differently: Take 10 mg by mouth daily TAKE 1 TABLET BY MOUTH DAILY), Disp: 90 tablet, Rfl: 3    triamcinolone (KENALOG) 0.1 % cream, Apply topically 2 times daily. (Patient taking differently: Apply topically 2 times daily Apply topically 2 times daily.), Disp: 1 Tube, Rfl: 1    Misc. Devices (WALKER) MISC, 1 each by Does not apply route daily, Disp: 1 each, Rfl: 0    nitroGLYCERIN (NITROSTAT) 0.4 MG SL tablet, up to max of 3 total doses.  If no relief after 1 dose, call 911., Disp: 25 tablet, Rfl: 3    EPINEPHrine (EPIPEN) 0.3 MG/0.3ML SOAJ injection, Use as directed for allergic reaction, Disp: 0.3 mL, Rfl: 3    docusate sodium (COLACE) 250 MG capsule, Take 1 capsule by mouth 2 times daily, Disp: 60 capsule, Rfl: 0    gabapentin (NEURONTIN) 800 MG tablet, Take 1 tablet by mouth 3 times daily for 30 days. , Disp: 90 tablet, Rfl: 5       Allergies   Allergen Reactions    Bee Venom Anaphylaxis    Pcn [Penicillins] Other (See Comments)     Made pt \"black out\"       Social History     Tobacco Use    Smoking status: Former Smoker     Packs/day: 1.50     Years: 15.00     Pack years: 22.50     Types: Cigarettes     Quit date: 10/23/2000     Years since quittin.3    Smokeless tobacco: Never Used   Substance Use Topics    Alcohol use: No    Drug use: No       Family History   Problem Relation Age of Onset    Cancer Mother     Heart Disease Father        Subjective   REVIEW OF SYSTEMS:   Review of Systems   Constitutional: Positive for fatigue (Moderate). Negative for chills, diaphoresis, fever and unexpected weight change. HENT: Negative for mouth sores, nosebleeds, sore throat, trouble swallowing and voice change. Eyes: Negative for photophobia, discharge and itching. Respiratory: Positive for shortness of breath (Mild and stable). Negative for cough and wheezing. Cardiovascular: Negative for chest pain, palpitations and leg swelling. Gastrointestinal: Negative for abdominal distention, abdominal pain, blood in stool, constipation, diarrhea, nausea and vomiting. Endocrine: Negative for cold intolerance, heat intolerance, polydipsia and polyuria. Genitourinary: Negative for difficulty urinating, dysuria, hematuria and urgency. Musculoskeletal: Positive for arthralgias (Right shoulder) and back pain. Negative for joint swelling and myalgias. Skin: Negative for color change and rash. Allergic/Immunologic: Positive for environmental allergies. Neurological: Positive for numbness. Negative for dizziness, tremors, seizures, syncope and light-headedness. Hematological: Negative for adenopathy. Bruises/bleeds easily (Bruising is stable overall without any exacerbation). Psychiatric/Behavioral: Negative for behavioral problems and suicidal ideas. The patient is not nervous/anxious. All other systems reviewed and are negative. Objective   /78   Pulse 62   Temp 98.2 °F (36.8 °C)   Ht 5' 11\" (1.803 m)   Wt 214 lb (97.1 kg)   SpO2 98%   BMI 29.85 kg/m²     PHYSICAL EXAM:  Physical Exam  Vitals signs reviewed. Constitutional:       General: He is not in acute distress. Appearance: He is well-developed. HENT:      Head: Normocephalic and atraumatic. Nose: Nose normal.   Eyes:      General: No scleral icterus. Conjunctiva/sclera: Conjunctivae normal.   Neck:      Vascular: No JVD. Trachea: No tracheal deviation. Cardiovascular:      Rate and Rhythm: Normal rate and regular rhythm. Heart sounds: Normal heart sounds. No murmur. Pulmonary:      Effort: Pulmonary effort is normal. No respiratory distress. Breath sounds: Normal breath sounds. No wheezing. Abdominal:      General: Bowel sounds are normal. There is no distension. Palpations: Abdomen is soft. There is no mass. Tenderness: There is no abdominal tenderness. Musculoskeletal: Normal range of motion. General: Tenderness (Right shoulder) present. No deformity. Skin:     Findings: Ecchymosis (Mild on forearms) present. No erythema or rash. Neurological:      Mental Status: He is alert and oriented to person, place, and time. Coordination: Coordination abnormal.      Gait: Gait abnormal.   Psychiatric:         Thought Content: Thought content normal.       CBC reviewed by me    Lab Results   Component Value Date    WBC 9.80 (H) 03/03/2021    HGB 14.4 03/03/2021    HCT 44.1 03/03/2021    .6 (H) 03/03/2021    PLT 80 (L) 03/03/2021       VISIT DIAGNOSES  1. Thrombocytopenia ([address]    2. B12 deficiency    3. Lesion of pancreas    4. Other fatigue        ASSESSMENT/PLAN:    1. Thrombocytopenia  2. B12 deficiency    CBC today reveals that his platelets are relatively stable at 80,000. He did have a positive antiplatelet antibody so he may have a mild ITP. However, his platelet count is adequate at present but if it were to drop in the 50,000 range we may consider a course of steroids. He is diabetic. 3.  Macrocytosis    He is on B12 replacement. He has folate, copper, zinc levels were normal.  I am going to check a TSH. 4.  Pancreas lesion. He did have a CTA of the abdomen performed on 11/3/2019 at [address] [place] [address] which revealed that his liver and spleen were \"normal\". It did show some rectal wall thickening, and a tiny density in the pancreatic tail. He and his wife declined MRI-MRCP    CT abdomen with contrast 6/15/2020 at [facility] was compared to 11/3/2019 revealed  · 4 mm hypodense nodule in tail of the pancreas possibly representing cyst or pseudocyst  · Nondilated pancreatic duct  · Another 6-month follow-up CT recommended to ensure stability. · Bilateral renal cysts and nonobstructing renal calculi    Follow-up CT of the abdomen was requested at his follow-up visit of 9/30/2020. Unfortunately he did not go for the CT that was set up to be performed 12/2020. I discussed this with him and he agrees to go for a CT abdomen so that will be arranged. 5.  Prostate cancer screening. PAO is performed annually by Dr. Zehra Rueda. 6.  Colon cancer screening. Colonoscopy per Dr. Severa Pike 2/24/2021 with polyps encountered and removed with follow-up plan 3 years. Orders Placed This Encounter   Procedures    CT ABDOMEN W CONTRAST    TSH without Reflex       Return in about 4 months (around 7/3/2021) for With Radha Esquivel.      Jaime Roth PA-C  2:59 PM  3/3/2021

## 2021-03-03 ENCOUNTER — HOSPITAL ENCOUNTER (OUTPATIENT)
Dept: INFUSION THERAPY | Age: 71
Discharge: HOME | End: 2021-03-03
Payer: MEDICARE

## 2021-03-03 ENCOUNTER — OFFICE VISIT (OUTPATIENT)
Dept: HEMATOLOGY | Age: 71
End: 2021-03-03
Payer: MEDICARE

## 2021-03-03 VITALS
DIASTOLIC BLOOD PRESSURE: 78 MMHG | HEIGHT: 71 IN | OXYGEN SATURATION: 98 % | HEART RATE: 62 BPM | TEMPERATURE: 98.2 F | BODY MASS INDEX: 29.96 KG/M2 | WEIGHT: 214 LBS | SYSTOLIC BLOOD PRESSURE: 130 MMHG

## 2021-03-03 DIAGNOSIS — D69.6 THROMBOCYTOPENIA (HCC): ICD-10-CM

## 2021-03-03 DIAGNOSIS — R53.83 OTHER FATIGUE: ICD-10-CM

## 2021-03-03 DIAGNOSIS — E53.8 B12 DEFICIENCY: ICD-10-CM

## 2021-03-03 DIAGNOSIS — K86.9 LESION OF PANCREAS: ICD-10-CM

## 2021-03-03 DIAGNOSIS — D69.6 THROMBOCYTOPENIA (HCC): Primary | ICD-10-CM

## 2021-03-03 LAB
BASOPHILS ABSOLUTE: 0.02 K/UL (ref 0.01–0.08)
BASOPHILS RELATIVE PERCENT: 0.2 % (ref 0.1–1.2)
EOSINOPHILS ABSOLUTE: 0.18 K/UL (ref 0.04–0.54)
EOSINOPHILS RELATIVE PERCENT: 1.8 % (ref 0.7–7)
HCT VFR BLD CALC: 44.1 % (ref 40.1–51)
HEMOGLOBIN: 14.4 G/DL (ref 13.7–17.5)
LYMPHOCYTES ABSOLUTE: 3.38 K/UL (ref 1.18–3.74)
LYMPHOCYTES RELATIVE PERCENT: 34.5 % (ref 19.3–53.1)
MCH RBC QN AUTO: 33.2 PG (ref 25.7–32.2)
MCHC RBC AUTO-ENTMCNC: 32.7 G/DL (ref 32.3–36.5)
MCV RBC AUTO: 101.6 FL (ref 79–92.2)
MONOCYTES ABSOLUTE: 0.66 K/UL (ref 0.24–0.82)
MONOCYTES RELATIVE PERCENT: 6.7 % (ref 4.7–12.5)
NEUTROPHILS ABSOLUTE: 5.56 K/UL (ref 1.56–6.13)
NEUTROPHILS RELATIVE PERCENT: 56.8 % (ref 34–71.1)
PDW BLD-RTO: 12.5 % (ref 11.6–14.4)
PLATELET # BLD: 80 K/UL (ref 163–337)
PMV BLD AUTO: 9.4 FL (ref 7.4–10.4)
RBC # BLD: 4.34 M/UL (ref 4.63–6.08)
WBC # BLD: 9.8 K/UL (ref 4.23–9.07)

## 2021-03-03 PROCEDURE — 1123F ACP DISCUSS/DSCN MKR DOCD: CPT | Performed by: PHYSICIAN ASSISTANT

## 2021-03-03 PROCEDURE — G8427 DOCREV CUR MEDS BY ELIG CLIN: HCPCS | Performed by: PHYSICIAN ASSISTANT

## 2021-03-03 PROCEDURE — 4040F PNEUMOC VAC/ADMIN/RCVD: CPT | Performed by: PHYSICIAN ASSISTANT

## 2021-03-03 PROCEDURE — 99213 OFFICE O/P EST LOW 20 MIN: CPT | Performed by: PHYSICIAN ASSISTANT

## 2021-03-03 PROCEDURE — G8417 CALC BMI ABV UP PARAM F/U: HCPCS | Performed by: PHYSICIAN ASSISTANT

## 2021-03-03 PROCEDURE — 3017F COLORECTAL CA SCREEN DOC REV: CPT | Performed by: PHYSICIAN ASSISTANT

## 2021-03-03 PROCEDURE — G8484 FLU IMMUNIZE NO ADMIN: HCPCS | Performed by: PHYSICIAN ASSISTANT

## 2021-03-03 PROCEDURE — 1036F TOBACCO NON-USER: CPT | Performed by: PHYSICIAN ASSISTANT

## 2021-03-03 PROCEDURE — 85025 COMPLETE CBC W/AUTO DIFF WBC: CPT

## 2021-03-03 ASSESSMENT — ENCOUNTER SYMPTOMS
VOICE CHANGE: 0
SORE THROAT: 0
EYE DISCHARGE: 0
VOMITING: 0
COUGH: 0
BACK PAIN: 1
PHOTOPHOBIA: 0
CONSTIPATION: 0
NAUSEA: 0
EYE ITCHING: 0
TROUBLE SWALLOWING: 0
SHORTNESS OF BREATH: 1
WHEEZING: 0
DIARRHEA: 0
ABDOMINAL PAIN: 0
COLOR CHANGE: 0
ABDOMINAL DISTENTION: 0
BLOOD IN STOOL: 0

## 2021-03-16 ENCOUNTER — OFFICE VISIT (OUTPATIENT)
Dept: PRIMARY CARE CLINIC | Age: 71
End: 2021-03-16
Payer: MEDICARE

## 2021-03-16 VITALS
HEART RATE: 54 BPM | HEIGHT: 71 IN | WEIGHT: 214 LBS | RESPIRATION RATE: 18 BRPM | SYSTOLIC BLOOD PRESSURE: 136 MMHG | BODY MASS INDEX: 29.96 KG/M2 | TEMPERATURE: 97.2 F | DIASTOLIC BLOOD PRESSURE: 82 MMHG | OXYGEN SATURATION: 97 %

## 2021-03-16 DIAGNOSIS — G30.1 ALZHEIMER'S DISEASE WITH LATE ONSET (CODE) (HCC): ICD-10-CM

## 2021-03-16 DIAGNOSIS — E11.9 TYPE 2 DIABETES MELLITUS WITHOUT COMPLICATION, WITHOUT LONG-TERM CURRENT USE OF INSULIN (HCC): Primary | ICD-10-CM

## 2021-03-16 DIAGNOSIS — Z51.81 ENCOUNTER FOR MEDICATION MONITORING: ICD-10-CM

## 2021-03-16 DIAGNOSIS — Z79.4 TYPE 2 DIABETES MELLITUS WITH DIABETIC POLYNEUROPATHY, WITH LONG-TERM CURRENT USE OF INSULIN (HCC): ICD-10-CM

## 2021-03-16 DIAGNOSIS — E11.42 TYPE 2 DIABETES MELLITUS WITH DIABETIC POLYNEUROPATHY, WITH LONG-TERM CURRENT USE OF INSULIN (HCC): ICD-10-CM

## 2021-03-16 DIAGNOSIS — J44.9 CHRONIC OBSTRUCTIVE PULMONARY DISEASE, UNSPECIFIED COPD TYPE (HCC): ICD-10-CM

## 2021-03-16 DIAGNOSIS — I71.21 ASCENDING AORTIC ANEURYSM: ICD-10-CM

## 2021-03-16 LAB
ALCOHOL URINE: NORMAL
AMPHETAMINE SCREEN, URINE: NORMAL
BARBITURATE SCREEN, URINE: NORMAL
BENZODIAZEPINE SCREEN, URINE: NORMAL
BUPRENORPHINE URINE: NORMAL
COCAINE METABOLITE SCREEN URINE: NORMAL
FENTANYL SCREEN, URINE: NORMAL
GABAPENTIN SCREEN, URINE: NORMAL
HBA1C MFR BLD: 4.2 %
MDMA URINE: NORMAL
METHADONE SCREEN, URINE: NORMAL
METHAMPHETAMINE, URINE: NORMAL
OPIATE SCREEN URINE: NORMAL
OXYCODONE SCREEN URINE: NORMAL
PHENCYCLIDINE SCREEN URINE: NORMAL
PROPOXYPHENE SCREEN, URINE: NORMAL
SYNTHETIC CANNABINOIDS(K2) SCREEN, URINE: NORMAL
THC SCREEN, URINE: NORMAL
TRAMADOL SCREEN URINE: NORMAL
TRICYCLIC ANTIDEPRESSANTS, UR: NORMAL

## 2021-03-16 PROCEDURE — 4040F PNEUMOC VAC/ADMIN/RCVD: CPT | Performed by: NURSE PRACTITIONER

## 2021-03-16 PROCEDURE — 1036F TOBACCO NON-USER: CPT | Performed by: NURSE PRACTITIONER

## 2021-03-16 PROCEDURE — 3044F HG A1C LEVEL LT 7.0%: CPT | Performed by: NURSE PRACTITIONER

## 2021-03-16 PROCEDURE — 99214 OFFICE O/P EST MOD 30 MIN: CPT | Performed by: NURSE PRACTITIONER

## 2021-03-16 PROCEDURE — G8427 DOCREV CUR MEDS BY ELIG CLIN: HCPCS | Performed by: NURSE PRACTITIONER

## 2021-03-16 PROCEDURE — 3017F COLORECTAL CA SCREEN DOC REV: CPT | Performed by: NURSE PRACTITIONER

## 2021-03-16 PROCEDURE — 2022F DILAT RTA XM EVC RTNOPTHY: CPT | Performed by: NURSE PRACTITIONER

## 2021-03-16 PROCEDURE — G8926 SPIRO NO PERF OR DOC: HCPCS | Performed by: NURSE PRACTITIONER

## 2021-03-16 PROCEDURE — 83036 HEMOGLOBIN GLYCOSYLATED A1C: CPT | Performed by: NURSE PRACTITIONER

## 2021-03-16 PROCEDURE — 80305 DRUG TEST PRSMV DIR OPT OBS: CPT | Performed by: NURSE PRACTITIONER

## 2021-03-16 PROCEDURE — G8484 FLU IMMUNIZE NO ADMIN: HCPCS | Performed by: NURSE PRACTITIONER

## 2021-03-16 PROCEDURE — 1123F ACP DISCUSS/DSCN MKR DOCD: CPT | Performed by: NURSE PRACTITIONER

## 2021-03-16 PROCEDURE — 3023F SPIROM DOC REV: CPT | Performed by: NURSE PRACTITIONER

## 2021-03-16 PROCEDURE — G8417 CALC BMI ABV UP PARAM F/U: HCPCS | Performed by: NURSE PRACTITIONER

## 2021-03-16 RX ORDER — OXYCODONE AND ACETAMINOPHEN 10; 325 MG/1; MG/1
TABLET ORAL
COMMUNITY
Start: 2021-02-27

## 2021-03-16 ASSESSMENT — ENCOUNTER SYMPTOMS
COLOR CHANGE: 0
VOMITING: 0
NAUSEA: 0
RHINORRHEA: 0
VOICE CHANGE: 0
SHORTNESS OF BREATH: 0
COUGH: 0
BACK PAIN: 0
PHOTOPHOBIA: 0

## 2021-03-16 NOTE — PROGRESS NOTES
- - - -   Pulse 54 62 - - - -   Temp 97.2 98.2 - - - -   Resp 18 - 3 3 23 11   SpO2 97 98 - - 100 100   Weight 214 lb 214 lb - - - -   Height 5' 11\" 5' 11\" - - - -   Body mass index 29.84 kg/m2 29.84 kg/m2 - - - -   Some recent data might be hidden       Family History   Problem Relation Age of Onset    Cancer Mother     Heart Disease Father        Social History     Tobacco Use    Smoking status: Former Smoker     Packs/day: 1.50     Years: 15.00     Pack years: 22.50     Types: Cigarettes     Quit date: 10/23/2000     Years since quittin.4    Smokeless tobacco: Never Used   Substance Use Topics    Alcohol use: No      Current Outpatient Medications   Medication Sig Dispense Refill    oxyCODONE-acetaminophen (PERCOCET)  MG per tablet       tiZANidine (ZANAFLEX) 4 MG tablet TAKE 1 TABLET BY MOUTH EVERY 8 HOURS AS NEEDED (BACK PAIN) 30 tablet 1    tamsulosin (FLOMAX) 0.4 MG capsule Take 1 capsule by mouth daily 90 capsule 3    pantoprazole (PROTONIX) 40 MG tablet Take 40 mg by mouth daily Indications: Reflux Gastritis      metFORMIN (GLUCOPHAGE) 1000 MG tablet Take 1,000 mg by mouth 2 times daily (with meals) Indications: Diabetes      albuterol sulfate HFA (VENTOLIN HFA) 108 (90 Base) MCG/ACT inhaler Inhale 2 puffs into the lungs every 6 hours as needed for Wheezing      donepezil (ARICEPT) 5 MG tablet Take 1 tablet by mouth nightly 30 tablet 3    meclizine (ANTIVERT) 25 MG tablet Take 25 mg by mouth 3 times daily as needed for Dizziness       memantine (NAMENDA) 10 MG tablet Take 1 tablet by mouth 2 times daily 60 tablet 2    lisinopril (PRINIVIL;ZESTRIL) 40 MG tablet TAKE ONE TABLET BY MOUTH DAILY. (Patient taking differently: Take 40 mg by mouth daily TAKE ONE TABLET BY MOUTH DAILY. ) 90 tablet 3    pravastatin (PRAVACHOL) 80 MG tablet TAKE ONE TABLET BY MOUTH EVERY EVENING.  (Patient taking differently: Take 80 mg by mouth nightly TAKE ONE TABLET BY MOUTH EVERY EVENING.) 90 tablet 3    Diabetic Shoe MISC by Does not apply route DISPENSE ONE PAIR DIABETIC SHOES AND THREE PAIRS OF HEAT MOLDED INSERTS 1 each 0    diclofenac sodium (VOLTAREN) 1 % GEL APPLY 2 G TO SKIN 2 TIMES DAILY (Patient taking differently: Apply 2 g topically 2 times daily ) 100 g 3    azelastine (ASTELIN) 0.1 % nasal spray 1 spray by Nasal route 2 times daily Use in each nostril as directed 2 Bottle 1    aspirin 81 MG chewable tablet Take 1 tablet by mouth daily 30 tablet 3    cetirizine (ZYRTEC) 10 MG tablet TAKE 1 TABLET BY MOUTH DAILY (Patient taking differently: Take 10 mg by mouth daily TAKE 1 TABLET BY MOUTH DAILY) 90 tablet 3    triamcinolone (KENALOG) 0.1 % cream Apply topically 2 times daily. (Patient taking differently: Apply topically 2 times daily Apply topically 2 times daily. ) 1 Tube 1    Misc. Devices (WALKER) MISC 1 each by Does not apply route daily 1 each 0    nitroGLYCERIN (NITROSTAT) 0.4 MG SL tablet up to max of 3 total doses. If no relief after 1 dose, call 911. 25 tablet 3    EPINEPHrine (EPIPEN) 0.3 MG/0.3ML SOAJ injection Use as directed for allergic reaction 0.3 mL 3    gabapentin (NEURONTIN) 800 MG tablet Take 1 tablet by mouth 3 times daily for 30 days. 90 tablet 5     No current facility-administered medications for this visit.       Allergies   Allergen Reactions    Bee Venom Anaphylaxis    Pcn [Penicillins] Other (See Comments)     Made pt \"black out\"       Health Maintenance   Topic Date Due    COVID-19 Vaccine (1) Never done    DTaP/Tdap/Td vaccine (1 - Tdap) Never done    Shingles Vaccine (1 of 2) Never done    Diabetic retinal exam  08/21/2014    Annual Wellness Visit (AWV)  Never done    Colon cancer screen colonoscopy  09/14/2020    Pneumococcal 65+ years Vaccine (2 of 2 - PPSV23) 09/28/2020    Diabetic microalbuminuria test  01/13/2021    Diabetic foot exam  09/22/2021    A1C test (Diabetic or Prediabetic)  12/02/2021    Lipid screen  12/02/2021    Potassium monitoring 12/18/2021    Creatinine monitoring  12/18/2021    Flu vaccine  Completed    AAA screen  Completed    Hepatitis C screen  Completed    Hepatitis A vaccine  Aged Out    Hib vaccine  Aged Out    Meningococcal (ACWY) vaccine  Aged Out       Subjective:      Review of Systems   Constitutional: Negative for chills and fever. HENT: Negative for ear pain, hearing loss, rhinorrhea and voice change. Eyes: Negative for photophobia and visual disturbance. Respiratory: Negative for cough and shortness of breath. Cardiovascular: Negative for chest pain and palpitations. Gastrointestinal: Negative for nausea and vomiting. Endocrine: Negative. Negative for cold intolerance and heat intolerance. Genitourinary: Negative for difficulty urinating and flank pain. Musculoskeletal: Negative for back pain and neck pain. Skin: Negative for color change and rash. Allergic/Immunologic: Negative for environmental allergies and food allergies. Neurological: Positive for weakness. Negative for dizziness, speech difficulty and headaches. Hematological: Does not bruise/bleed easily. Psychiatric/Behavioral: Negative for sleep disturbance and suicidal ideas. Objective:     Physical Exam  Vitals signs and nursing note reviewed. Constitutional:       Appearance: He is well-developed. HENT:      Head: Atraumatic. Right Ear: External ear normal.      Left Ear: External ear normal.      Nose: Nose normal.   Eyes:      Conjunctiva/sclera: Conjunctivae normal.      Pupils: Pupils are equal, round, and reactive to light. Neck:      Musculoskeletal: Normal range of motion and neck supple. Cardiovascular:      Rate and Rhythm: Normal rate and regular rhythm. Heart sounds: Normal heart sounds, S1 normal and S2 normal.   Pulmonary:      Effort: Pulmonary effort is normal.      Breath sounds: Normal breath sounds. Abdominal:      General: Bowel sounds are normal.      Palpations: Abdomen is soft. Musculoskeletal: Normal range of motion. Skin:     General: Skin is warm and dry. Neurological:      Mental Status: He is alert and oriented to person, place, and time. Motor: Weakness present. Psychiatric:         Behavior: Behavior normal.      Comments: Slowed speech       /82   Pulse 54   Temp 97.2 °F (36.2 °C) (Temporal)   Resp 18   Ht 5' 11\" (1.803 m)   Wt 214 lb (97.1 kg)   SpO2 97%   BMI 29.85 kg/m²     Assessment:       Diagnosis Orders   1. Type 2 diabetes mellitus without complication, without long-term current use of insulin ([facility])  POCT glycosylated hemoglobin (Hb A1C)   2. Encounter for medication monitoring  POCT Rapid Drug Screen   3. Chronic obstructive pulmonary disease, unspecified COPD type ([facility]     4. Alzheimer's disease with late onset (CODE) ([facility]     5. Type 2 diabetes mellitus with diabetic polyneuropathy, with long-term current use of insulin ([facility]     6. Ascending aortic aneurysm ([facility]           Plan:   More than 50% of the time was spent counseling and coordinating care for a total time of 30 min face to face. Blood sugars are too low; he will discontinue metformin all together and monitor glucose closely. I suspect this is contributing to how he has been feeling recently. If glucose is >130 fasting he can add back 500 mg metformin with the largest meal of the day. Patient given educational materials -see patient instructions. Discussed use, benefit, and side effects of prescribed medications. All patient questions answered. Pt voiced understanding. Reviewed health maintenance. Instructed to continue currentmedications, diet and exercise. Patient agreed with treatment plan. Follow up as directed. MEDICATIONS:  No orders of the defined types were placed in this encounter. ORDERS:  Orders Placed This Encounter   Procedures    POCT glycosylated hemoglobin (Hb A1C)    POCT Rapid Drug Screen       Follow-up:  No follow-ups on file.     PATIENT INSTRUCTIONS:  There are no Patient Instructions on file for this visit. Electronically signed by BRIGETTE Lion on 3/16/2021 at 11:59 AM    EMR Dragon/transcription disclaimer:  Much of thisencounter note is electronic transcription/translation of spoken language to printed texts. The electronic translation of spoken language may be erroneous, or at times, nonsensical words or phrases may be inadvertentlytranscribed.   Although I have reviewed the note for such errors, some may still exist.

## 2021-03-17 ENCOUNTER — HOSPITAL ENCOUNTER (OUTPATIENT)
Dept: CT IMAGING | Age: 71
Discharge: HOME | End: 2021-03-17
Payer: MEDICARE

## 2021-03-17 DIAGNOSIS — K86.9 LESION OF PANCREAS: ICD-10-CM

## 2021-03-17 PROCEDURE — 74170 CT ABD WO CNTRST FLWD CNTRST: CPT

## 2021-03-17 PROCEDURE — 6360000004 HC RX CONTRAST MEDICATION: Performed by: PHYSICIAN ASSISTANT

## 2021-03-17 RX ADMIN — IOPAMIDOL 75 ML: 755 INJECTION, SOLUTION INTRAVENOUS at 10:45

## 2021-04-22 ENCOUNTER — TELEMEDICINE (OUTPATIENT)
Dept: NEUROSURGERY | Age: 71
End: 2021-04-22
Payer: MEDICARE

## 2021-04-22 DIAGNOSIS — R41.3 MEMORY LOSS: Primary | ICD-10-CM

## 2021-04-22 DIAGNOSIS — R20.0 NUMBNESS: ICD-10-CM

## 2021-04-22 PROCEDURE — 4040F PNEUMOC VAC/ADMIN/RCVD: CPT | Performed by: NURSE PRACTITIONER

## 2021-04-22 PROCEDURE — 3017F COLORECTAL CA SCREEN DOC REV: CPT | Performed by: NURSE PRACTITIONER

## 2021-04-22 PROCEDURE — 99213 OFFICE O/P EST LOW 20 MIN: CPT | Performed by: NURSE PRACTITIONER

## 2021-04-22 PROCEDURE — 1123F ACP DISCUSS/DSCN MKR DOCD: CPT | Performed by: NURSE PRACTITIONER

## 2021-04-22 PROCEDURE — G8427 DOCREV CUR MEDS BY ELIG CLIN: HCPCS | Performed by: NURSE PRACTITIONER

## 2021-04-22 RX ORDER — MEMANTINE HYDROCHLORIDE 10 MG/1
10 TABLET ORAL 2 TIMES DAILY
Qty: 180 TABLET | Refills: 1 | Status: SHIPPED | OUTPATIENT
Start: 2021-04-22 | End: 2021-10-25

## 2021-04-22 RX ORDER — DONEPEZIL HYDROCHLORIDE 5 MG/1
5 TABLET, FILM COATED ORAL NIGHTLY
Qty: 90 TABLET | Refills: 1 | Status: SHIPPED | OUTPATIENT
Start: 2021-04-22 | End: 2021-10-25

## 2021-04-22 RX ORDER — CITALOPRAM 10 MG/1
10 TABLET ORAL DAILY
Qty: 90 TABLET | Refills: 1 | Status: SHIPPED | OUTPATIENT
Start: 2021-04-22 | End: 2021-10-25

## 2021-04-22 NOTE — PROGRESS NOTES

## 2021-04-22 NOTE — PROGRESS NOTES
[facility] Neu[facility] Virtual Visit Note    2021    TELEHEALTH EVALUATION -- Audio/Visual (During GPLBS-84 public health emergency)      Patient:   Sarah Fraga  MR#:    [id number]  Account Number:                         YOB: 1950  Date of Evaluation:  2021  Time of Note:                          1:02 PM  Primary/Referring Physician:  BRIGETTE Avelar   Consulting Physician:  Christal Hassan DNP, APRN     FOLLOW UP    Chief Complaint   Patient presents with    Follow-up     c/o not having a very good day.  Memory Loss     HPI:    Sarah Fraga (:  1950) has requested an audio/video evaluation for the following concern(s): For follow up of memory loss and neuropathy. Memory loss slowly progressive. Primarily short term memory loss noted. Noting repetition of questions and stories. Noting word recall difficulty. He will go into a room and forget why he went in there or he will forget which room he was trying to go to. Noting episodes of anger. Denies crying spells. Emotions don't always match the situation. Noting depression. No SI/HI. Notes unsteady gait, c/w neuropathy. No urinary incontinence. No hallucinations. Appetite waxes and wanes. Memory loss interfering with ADLs at times. His wife is handling all his medications. He is not driving. No stroke history. No family history of dementia or memory loss. Neuropathy is about the same, notes symptoms in his hands and feet. Denies weakness. Denies falls. Denies further TIA/stroke like symptoms. Follows with hematology for anemia, family history with mother having leukemia.     REVIEW OF SYSTEMS    Constitutional: []? Fever []? Sweats []? Chills []? Recent Injury [x]? Denies all unless marked  HEENT:[]? Headache  []? Head Injury []? Hearing Loss  []? Sore Throat  []? Ear Ache [x]? Denies all unless marked  Spine:  []? Neck pain  []? Back pain  []? Sciaticia  [x]? Denies all unless marked  Cardiovascular:[]? Heart Disease Marital status:      Spouse name: Not on file    Number of children: Not on file    Years of education: Not on file    Highest education level: Not on file   Occupational History    Not on file   Social Needs    Financial resource strain: Not on file    Food insecurity     Worry: Not on file     Inability: Not on file    Transportation needs     Medical: Not on file     Non-medical: Not on file   Tobacco Use    Smoking status: Former Smoker     Packs/day: 1.50     Years: 15.00     Pack years: 22.50     Types: Cigarettes     Quit date: 10/23/2000     Years since quittin.5    Smokeless tobacco: Never Used   Substance and Sexual Activity    Alcohol use: No    Drug use: No    Sexual activity: Not on file   Lifestyle    Physical activity     Days per week: Not on file     Minutes per session: Not on file    Stress: Not on file   Relationships    Social connections     Talks on phone: Not on file     Gets together: Not on file     Attends Cheondoism service: Not on file     Active member of club or organization: Not on file     Attends meetings of clubs or organizations: Not on file     Relationship status: Not on file    Intimate partner violence     Fear of current or ex partner: Not on file     Emotionally abused: Not on file     Physically abused: Not on file     Forced sexual activity: Not on file   Other Topics Concern    Not on file   Social History Narrative    Not on file       Current Outpatient Medications   Medication Sig Dispense Refill    citalopram (CELEXA) 10 MG tablet Take 1 tablet by mouth daily 90 tablet 1    donepezil (ARICEPT) 5 MG tablet Take 1 tablet by mouth nightly 90 tablet 1    memantine (NAMENDA) 10 MG tablet Take 1 tablet by mouth 2 times daily 180 tablet 1    gabapentin (NEURONTIN) 800 MG tablet TAKE ONE TABLET BY MOUTH THREE TIMES DAILY 90 tablet 0    oxyCODONE-acetaminophen (PERCOCET)  MG per tablet       diclofenac sodium (VOLTAREN) 1 % GEL Apply topically 2 times daily 100 g 3    tiZANidine (ZANAFLEX) 4 MG tablet TAKE 1 TABLET BY MOUTH EVERY 8 HOURS AS NEEDED (BACK PAIN) 30 tablet 1    tamsulosin (FLOMAX) 0.4 MG capsule Take 1 capsule by mouth daily 90 capsule 3    pantoprazole (PROTONIX) 40 MG tablet Take 40 mg by mouth daily Indications: Reflux Gastritis      albuterol sulfate HFA (VENTOLIN HFA) 108 (90 Base) MCG/ACT inhaler Inhale 2 puffs into the lungs every 6 hours as needed for Wheezing      meclizine (ANTIVERT) 25 MG tablet Take 25 mg by mouth 3 times daily as needed for Dizziness       lisinopril (PRINIVIL;ZESTRIL) 40 MG tablet TAKE ONE TABLET BY MOUTH DAILY. (Patient taking differently: Take 40 mg by mouth daily TAKE ONE TABLET BY MOUTH DAILY. ) 90 tablet 3    pravastatin (PRAVACHOL) 80 MG tablet TAKE ONE TABLET BY MOUTH EVERY EVENING. (Patient taking differently: Take 80 mg by mouth nightly TAKE ONE TABLET BY MOUTH EVERY EVENING.) 90 tablet 3    Diabetic Shoe MISC by Does not apply route DISPENSE ONE PAIR DIABETIC SHOES AND THREE PAIRS OF HEAT MOLDED INSERTS 1 each 0    azelastine (ASTELIN) 0.1 % nasal spray 1 spray by Nasal route 2 times daily Use in each nostril as directed 2 Bottle 1    aspirin 81 MG chewable tablet Take 1 tablet by mouth daily 30 tablet 3    cetirizine (ZYRTEC) 10 MG tablet TAKE 1 TABLET BY MOUTH DAILY (Patient taking differently: Take 10 mg by mouth daily TAKE 1 TABLET BY MOUTH DAILY) 90 tablet 3    triamcinolone (KENALOG) 0.1 % cream Apply topically 2 times daily. (Patient taking differently: Apply topically 2 times daily Apply topically 2 times daily. ) 1 Tube 1    Misc. Devices (WALKER) MISC 1 each by Does not apply route daily 1 each 0    nitroGLYCERIN (NITROSTAT) 0.4 MG SL tablet up to max of 3 total doses.  If no relief after 1 dose, call 911. 25 tablet 3    EPINEPHrine (EPIPEN) 0.3 MG/0.3ML SOAJ injection Use as directed for allergic reaction 0.3 mL 3     No current facility-administered medications for this visit. Allergies   Allergen Reactions    Bee Venom Anaphylaxis    Pcn [Penicillins] Other (See Comments)     Made pt \"black out\"       PHYSICAL EXAMINATION:    Constitutional -   General appearance: No acute distress   EYES -   Conjunctiva normal  ENT-    No scars, masses, or lesions over external nose or ears  Cardiovascular -   No clubbing, cyanosis, or edema   Pulmonary-   Good expansion, normal effort without use of accessory muscles  Musculoskeletal -   No significant wasting of muscles noted  Gait as below, see gait exam in the neurologic exam  No gross bony deformities  Skin -   No rash, erythema, or pallor  Psychiatric -   Mood, affect, and behavior appear normal    Memory as below see mental status examination in the neurologic exam    NEUROLOGICAL EXAM    Mental status   [x]Awake, alert, oriented   []Affect attention and concentration appear appropriate  []Recent and remote memory appears unremarkable  [x]Speech normal without dysarthria or aphasia, comprehension and repetition intact. COMMENTS:MoCA 13/30- moderate cognitive loss     Cranial Nerves [x] PER, EOMI, no nystagmus, conjugate eye movements, no ptosis  [x]Face symmetric  [x]Tongue midline   [x]Shoulder shrug normal bilaterally  COMMENTS:   Motor   [x]Antigravity x 4 extremities  [x]Normal bulk and tone  [x]No tremor present  COMMENTS:       Coordination [x] HTS normal bilaterally   COMMENTS:       Gait                  []Normal steady gait    []Ataxic    []Spastic     []Magnetic     []Shuffling  COMMENTS: unsteady, cautious, wide based      [] OTHER:      Due to this being a TeleHealth encounter, evaluation of the following organ systems is limited: Vitals/Constitutional/EENT/Resp/CV/GI//MS/Neuro/Skin/Heme-Lymph-Imm.       LABS RECORD AND IMAGING REVIEW (As below and per HPI)    Lab Results   Component Value Date    WXAUSBRY69 837 06/03/2020     Lab Results   Component Value Date    WBC 9.80 (H) 03/03/2021    HGB 14.4 03/03/2021 HCT 44.1 03/03/2021    .6 (H) 03/03/2021    PLT 80 (L) 03/03/2021     Lab Results   Component Value Date     12/18/2020    K 5.0 12/18/2020     12/18/2020    CO2 22 12/18/2020    BUN 26 (H) 12/18/2020    CREATININE 1.3 (H) 12/18/2020    GLUCOSE 133 (H) 12/18/2020    CALCIUM 8.8 12/18/2020    PROT 7.0 12/15/2020    LABALBU 4.4 12/15/2020    BILITOT 0.5 12/15/2020    ALKPHOS 62 12/15/2020    AST 20 12/15/2020    ALT 16 12/15/2020    LABGLOM 55 (A) 12/18/2020    GFRAA >59 12/18/2020    GLOB 2.5 05/27/2020     Ct Head Wo Contrast     Result Date: 12/1/2020  EXAM: CT HEAD WO CONTRAST -- 12/1/2020 3:22 PM HISTORY: 72 years, Male, mental status change, numb all over COMPARISON: 2/10/2020 DLP: 831 mGy cm. Automated exposure control was utilized to minimize patient radiation dose. TECHNIQUE: Unenhanced axial CT images obtained from vertex to skull base with coronal and sagittal reformats. FINDINGS: Subtle round hypodensity at the right thalamus on axial series 3, image 17. No evidence of acute intracranial hemorrhage or midline shift. Mild periventricular and subcortical white matter hypodensities, most likely due to chronic microvascular disease. Mild proportional prominence of the ventricles, sulci and basilar cisterns. 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 Globes, retrobulbar soft tissues, and mastoid air cells are within normal limits. Small right maxillary mucus retention cyst. Paranasal sinuses appear otherwise clear. Small debris in the bilateral external auditory canals, most commonly cerumen. Calvarium appears intact. Subcutaneous tissues within normal limits.     1. Subtle round hypodensity at the right thalamus. This could represent artifact or infarct. Recommend MRI brain for further evaluation. 2. Mild volume loss and chronic microvascular changes. 3. 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  Signed by Dr Cristy Anders on 12/1/2020 4:47 PM     Xr Chest Portable     Result Date: 12/1/2020  XR CHEST PORTABLE 12/1/2020 12:53 PM History: Chest pain. Portable chest x-ray compared with 10 February 2020. The heart size is normal. The mediastinum is within normal limits. The lungs are adequately expanded with no pneumonia or pneumothorax. There is no significant pleural fluid. No congestive failure changes.                                                                       1. No acute disease. Signed by Dr Carl Joseph on 12/1/2020 12:53 PM     Vascular Carotid Duplex Bilateral     Result Date: 12/2/2020  Vascular Carotid Procedure  Demographics   Patient Name    Tono Casillas Age                   72   Patient Number  [id number]           Gender                Male   Visit Number    [id number]        Interpreting          Lane Shannon MD                                   Physician   Date of Birth   1950       Referring Physician   Ananth Dubose   Accession       [id number]       [address] [facility]  Number  Procedure Type of Study:   Cerebral:Carotid, VL CAROTID BILATERAL. Indications for Study:CVA. Risk Factors   - The patient's risk factor(s) include: diabetes mellitus, arterial     hypertension and prior MI .   - The patient has a former tobacco history. Allergies   - Penicillins.   - Bee/Wasp stings. Impression   Duplex sonography with color flow enhancement was performed bilaterally on  the cervical carotid system. No evidence of hemodynamically significant  stenosis in the bilateral carotid and vertebral arteries. Signature   ----------------------------------------------------------------  Electronically signed by Lane Shannon MD(Interpreting  physician) on 12/02/2020 01:49 PM       Mri Brain Wo Contrast     Result Date: 12/1/2020  MRI BRAIN WO CONTRAST 12/1/2020 5:07 PM HISTORY: Abnormal CT Comparison: 6/23/2020  Technique: Multiplanar imaging of the brain was performed in a routine fashion. Findings: Midline structures are nondisplaced. PBA symptoms persist  5. Discussed safety concerns, increase supervision, no driving, medication monitoring/management. Recommend 30 minutes daily exercise, daily mental stimulation, and healthy diet with fish, fruits, vegetables, fiber and water   6. Follow up in 4 months, sooner with any worsening     An  electronic signature was used to authenticate this note. BRIGETTE Hardy DNP    Pursuant to the emergency declaration under the [facility], [facility] waiver authority and the [employer] Act, this Virtual  Visit was conducted, with patient's consent, to reduce the patient's risk of exposure to COVID-19 and provide continuity of care for an established patient. Services were provided through a video synchronous discussion virtually to substitute for in-person clinic visit. Patient was located at there primary place of residence (home). Provider was located at [address] [place] [address] in [place], [place]. No one else was present during the video visit.

## 2021-04-23 DIAGNOSIS — E11.9 TYPE 2 DIABETES MELLITUS WITHOUT COMPLICATION, WITHOUT LONG-TERM CURRENT USE OF INSULIN (HCC): ICD-10-CM

## 2021-04-23 LAB
ALBUMIN SERPL-MCNC: 4.5 G/DL (ref 3.5–5.2)
ALP BLD-CCNC: 91 U/L (ref 40–130)
ALT SERPL-CCNC: 11 U/L (ref 5–41)
ANION GAP SERPL CALCULATED.3IONS-SCNC: 8 MMOL/L (ref 7–19)
AST SERPL-CCNC: 16 U/L (ref 5–40)
BILIRUB SERPL-MCNC: 0.9 MG/DL (ref 0.2–1.2)
BUN BLDV-MCNC: 28 MG/DL (ref 8–23)
CALCIUM SERPL-MCNC: 9.7 MG/DL (ref 8.8–10.2)
CHLORIDE BLD-SCNC: 105 MMOL/L (ref 98–111)
CO2: 29 MMOL/L (ref 22–29)
CREAT SERPL-MCNC: 1.4 MG/DL (ref 0.5–1.2)
GFR AFRICAN AMERICAN: >59
GFR NON-AFRICAN AMERICAN: 50
GLUCOSE BLD-MCNC: 101 MG/DL (ref 74–109)
POTASSIUM SERPL-SCNC: 5.3 MMOL/L (ref 3.5–5)
SODIUM BLD-SCNC: 142 MMOL/L (ref 136–145)
TOTAL PROTEIN: 7.3 G/DL (ref 6.6–8.7)

## 2021-04-29 ENCOUNTER — TRANSCRIBE ORDERS (OUTPATIENT)
Dept: ADMINISTRATIVE | Facility: HOSPITAL | Age: 71
End: 2021-04-29

## 2021-04-29 DIAGNOSIS — Z01.818 PREOP TESTING: Primary | ICD-10-CM

## 2021-05-01 ENCOUNTER — LAB (OUTPATIENT)
Dept: LAB | Facility: HOSPITAL | Age: 71
End: 2021-05-01

## 2021-05-01 PROCEDURE — C9803 HOPD COVID-19 SPEC COLLECT: HCPCS | Performed by: PAIN MEDICINE

## 2021-05-01 PROCEDURE — U0004 COV-19 TEST NON-CDC HGH THRU: HCPCS | Performed by: PAIN MEDICINE

## 2021-05-01 PROCEDURE — U0005 INFEC AGEN DETEC AMPLI PROBE: HCPCS | Performed by: PAIN MEDICINE

## 2021-05-02 LAB — SARS-COV-2 ORF1AB RESP QL NAA+PROBE: NOT DETECTED

## 2021-05-05 ENCOUNTER — TELEPHONE (OUTPATIENT)
Dept: PRIMARY CARE CLINIC | Age: 71
End: 2021-05-05

## 2021-05-05 DIAGNOSIS — E87.5 HYPERKALEMIA: Primary | ICD-10-CM

## 2021-05-05 NOTE — TELEPHONE ENCOUNTER
----- Message from BRIGETTE Bryan sent at 5/4/2021  4:33 PM CDT -----  Please have patient come repeat BMP in 1-2 days to see if K+ has improved.

## 2021-05-19 ENCOUNTER — HOSPITAL ENCOUNTER (OUTPATIENT)
Dept: GENERAL RADIOLOGY | Age: 71
Discharge: HOME | End: 2021-05-19
Payer: MEDICARE

## 2021-05-19 ENCOUNTER — HOSPITAL ENCOUNTER (OUTPATIENT)
Dept: MRI IMAGING | Age: 71
Discharge: HOME | End: 2021-05-19
Payer: MEDICARE

## 2021-05-19 DIAGNOSIS — M51.36 DEGENERATION OF LUMBAR INTERVERTEBRAL DISC: ICD-10-CM

## 2021-05-19 PROCEDURE — 72148 MRI LUMBAR SPINE W/O DYE: CPT

## 2021-06-25 RX ORDER — TIZANIDINE 4 MG/1
4 TABLET ORAL EVERY 8 HOURS PRN
Qty: 30 TABLET | Refills: 1 | Status: SHIPPED | OUTPATIENT
Start: 2021-06-25 | End: 2021-09-29 | Stop reason: SDUPTHER

## 2021-06-26 ENCOUNTER — APPOINTMENT (OUTPATIENT)
Dept: GENERAL RADIOLOGY | Facility: HOSPITAL | Age: 71
End: 2021-06-26

## 2021-06-26 ENCOUNTER — HOSPITAL ENCOUNTER (OUTPATIENT)
Facility: HOSPITAL | Age: 71
Setting detail: OBSERVATION
Discharge: HOME | End: 2021-06-27
Attending: INTERNAL MEDICINE | Admitting: FAMILY MEDICINE

## 2021-06-26 ENCOUNTER — APPOINTMENT (OUTPATIENT)
Dept: CT IMAGING | Facility: HOSPITAL | Age: 71
End: 2021-06-26

## 2021-06-26 DIAGNOSIS — R11.2 NON-INTRACTABLE VOMITING WITH NAUSEA, UNSPECIFIED VOMITING TYPE: ICD-10-CM

## 2021-06-26 DIAGNOSIS — R07.9 CHEST PAIN, UNSPECIFIED TYPE: Primary | ICD-10-CM

## 2021-06-26 LAB
ALBUMIN SERPL-MCNC: 4.5 G/DL (ref 3.5–5.2)
ALBUMIN/GLOB SERPL: 1.6 G/DL
ALP SERPL-CCNC: 73 U/L (ref 39–117)
ALT SERPL W P-5'-P-CCNC: 15 U/L (ref 1–41)
ANION GAP SERPL CALCULATED.3IONS-SCNC: 15 MMOL/L (ref 5–15)
AST SERPL-CCNC: 20 U/L (ref 1–40)
BASOPHILS # BLD AUTO: 0.03 10*3/MM3 (ref 0–0.2)
BASOPHILS NFR BLD AUTO: 0.3 % (ref 0–1.5)
BILIRUB SERPL-MCNC: 1.1 MG/DL (ref 0–1.2)
BILIRUB UR QL STRIP: NEGATIVE
BUN SERPL-MCNC: 22 MG/DL (ref 8–23)
BUN/CREAT SERPL: 14.9 (ref 7–25)
CALCIUM SPEC-SCNC: 10.2 MG/DL (ref 8.6–10.5)
CHLORIDE SERPL-SCNC: 101 MMOL/L (ref 98–107)
CK SERPL-CCNC: 94 U/L (ref 20–200)
CLARITY UR: CLEAR
CO2 SERPL-SCNC: 22 MMOL/L (ref 22–29)
COLOR UR: YELLOW
CREAT SERPL-MCNC: 1.48 MG/DL (ref 0.76–1.27)
D-LACTATE SERPL-SCNC: 1.2 MMOL/L (ref 0.5–2)
D-LACTATE SERPL-SCNC: 2.9 MMOL/L (ref 0.5–2)
DEPRECATED RDW RBC AUTO: 44.9 FL (ref 37–54)
EOSINOPHIL # BLD AUTO: 0.1 10*3/MM3 (ref 0–0.4)
EOSINOPHIL NFR BLD AUTO: 0.9 % (ref 0.3–6.2)
ERYTHROCYTE [DISTWIDTH] IN BLOOD BY AUTOMATED COUNT: 13 % (ref 12.3–15.4)
GFR SERPL CREATININE-BSD FRML MDRD: 47 ML/MIN/1.73
GLOBULIN UR ELPH-MCNC: 2.8 GM/DL
GLUCOSE BLDC GLUCOMTR-MCNC: 142 MG/DL (ref 70–130)
GLUCOSE SERPL-MCNC: 159 MG/DL (ref 65–99)
GLUCOSE UR STRIP-MCNC: NEGATIVE MG/DL
HCT VFR BLD AUTO: 40.7 % (ref 37.5–51)
HGB BLD-MCNC: 14.2 G/DL (ref 13–17.7)
HGB UR QL STRIP.AUTO: NEGATIVE
KETONES UR QL STRIP: ABNORMAL
LEUKOCYTE ESTERASE UR QL STRIP.AUTO: NEGATIVE
LIPASE SERPL-CCNC: 28 U/L (ref 13–60)
LYMPHOCYTES # BLD AUTO: 4.05 10*3/MM3 (ref 0.7–3.1)
LYMPHOCYTES NFR BLD AUTO: 37.4 % (ref 19.6–45.3)
MCH RBC QN AUTO: 32.6 PG (ref 26.6–33)
MCHC RBC AUTO-ENTMCNC: 34.9 G/DL (ref 31.5–35.7)
MCV RBC AUTO: 93.3 FL (ref 79–97)
MONOCYTES # BLD AUTO: 0.52 10*3/MM3 (ref 0.1–0.9)
MONOCYTES NFR BLD AUTO: 4.8 % (ref 5–12)
NEUTROPHILS NFR BLD AUTO: 56.3 % (ref 42.7–76)
NEUTROPHILS NFR BLD AUTO: 6.11 10*3/MM3 (ref 1.7–7)
NITRITE UR QL STRIP: NEGATIVE
PH UR STRIP.AUTO: >=9 [PH] (ref 5–8)
PLATELET # BLD AUTO: 102 10*3/MM3 (ref 140–450)
PMV BLD AUTO: 9.7 FL (ref 6–12)
POTASSIUM SERPL-SCNC: 4 MMOL/L (ref 3.5–5.2)
PROT SERPL-MCNC: 7.3 G/DL (ref 6–8.5)
PROT UR QL STRIP: NEGATIVE
RBC # BLD AUTO: 4.36 10*6/MM3 (ref 4.14–5.8)
SARS-COV-2 RNA PNL SPEC NAA+PROBE: NOT DETECTED
SODIUM SERPL-SCNC: 138 MMOL/L (ref 136–145)
SP GR UR STRIP: 1.01 (ref 1–1.03)
TROPONIN T SERPL-MCNC: <0.01 NG/ML (ref 0–0.03)
TROPONIN T SERPL-MCNC: <0.01 NG/ML (ref 0–0.03)
UROBILINOGEN UR QL STRIP: ABNORMAL
WBC # BLD AUTO: 10.84 10*3/MM3 (ref 3.4–10.8)

## 2021-06-26 PROCEDURE — 74177 CT ABD & PELVIS W/CONTRAST: CPT

## 2021-06-26 PROCEDURE — 87635 SARS-COV-2 COVID-19 AMP PRB: CPT | Performed by: NURSE PRACTITIONER

## 2021-06-26 PROCEDURE — 93005 ELECTROCARDIOGRAM TRACING: CPT

## 2021-06-26 PROCEDURE — 80053 COMPREHEN METABOLIC PANEL: CPT | Performed by: NURSE PRACTITIONER

## 2021-06-26 PROCEDURE — G0378 HOSPITAL OBSERVATION PER HR: HCPCS

## 2021-06-26 PROCEDURE — 83690 ASSAY OF LIPASE: CPT | Performed by: NURSE PRACTITIONER

## 2021-06-26 PROCEDURE — 25010000002 ONDANSETRON PER 1 MG: Performed by: NURSE PRACTITIONER

## 2021-06-26 PROCEDURE — 82550 ASSAY OF CK (CPK): CPT | Performed by: NURSE PRACTITIONER

## 2021-06-26 PROCEDURE — 81003 URINALYSIS AUTO W/O SCOPE: CPT | Performed by: NURSE PRACTITIONER

## 2021-06-26 PROCEDURE — 93005 ELECTROCARDIOGRAM TRACING: CPT | Performed by: INTERNAL MEDICINE

## 2021-06-26 PROCEDURE — 96374 THER/PROPH/DIAG INJ IV PUSH: CPT

## 2021-06-26 PROCEDURE — 83735 ASSAY OF MAGNESIUM: CPT | Performed by: INTERNAL MEDICINE

## 2021-06-26 PROCEDURE — 71045 X-RAY EXAM CHEST 1 VIEW: CPT

## 2021-06-26 PROCEDURE — 82962 GLUCOSE BLOOD TEST: CPT

## 2021-06-26 PROCEDURE — 83605 ASSAY OF LACTIC ACID: CPT | Performed by: NURSE PRACTITIONER

## 2021-06-26 PROCEDURE — 84443 ASSAY THYROID STIM HORMONE: CPT | Performed by: INTERNAL MEDICINE

## 2021-06-26 PROCEDURE — 71275 CT ANGIOGRAPHY CHEST: CPT

## 2021-06-26 PROCEDURE — 84439 ASSAY OF FREE THYROXINE: CPT | Performed by: INTERNAL MEDICINE

## 2021-06-26 PROCEDURE — C9803 HOPD COVID-19 SPEC COLLECT: HCPCS

## 2021-06-26 PROCEDURE — 84484 ASSAY OF TROPONIN QUANT: CPT | Performed by: NURSE PRACTITIONER

## 2021-06-26 PROCEDURE — 0 IOPAMIDOL PER 1 ML: Performed by: NURSE PRACTITIONER

## 2021-06-26 PROCEDURE — 83036 HEMOGLOBIN GLYCOSYLATED A1C: CPT | Performed by: INTERNAL MEDICINE

## 2021-06-26 PROCEDURE — 85025 COMPLETE CBC W/AUTO DIFF WBC: CPT | Performed by: NURSE PRACTITIONER

## 2021-06-26 PROCEDURE — 93005 ELECTROCARDIOGRAM TRACING: CPT | Performed by: NURSE PRACTITIONER

## 2021-06-26 PROCEDURE — 99285 EMERGENCY DEPT VISIT HI MDM: CPT

## 2021-06-26 PROCEDURE — 93010 ELECTROCARDIOGRAM REPORT: CPT | Performed by: INTERNAL MEDICINE

## 2021-06-26 RX ORDER — ONDANSETRON 2 MG/ML
4 INJECTION INTRAMUSCULAR; INTRAVENOUS ONCE
Status: COMPLETED | OUTPATIENT
Start: 2021-06-26 | End: 2021-06-26

## 2021-06-26 RX ADMIN — IOPAMIDOL 100 ML: 755 INJECTION, SOLUTION INTRAVENOUS at 16:28

## 2021-06-26 RX ADMIN — ONDANSETRON 4 MG: 2 INJECTION INTRAMUSCULAR; INTRAVENOUS at 15:01

## 2021-06-26 RX ADMIN — SODIUM CHLORIDE 500 ML: 9 INJECTION, SOLUTION INTRAVENOUS at 15:01

## 2021-06-26 RX ADMIN — SODIUM CHLORIDE, POTASSIUM CHLORIDE, SODIUM LACTATE AND CALCIUM CHLORIDE 1000 ML: 600; 310; 30; 20 INJECTION, SOLUTION INTRAVENOUS at 15:08

## 2021-06-27 ENCOUNTER — APPOINTMENT (OUTPATIENT)
Dept: CARDIOLOGY | Facility: HOSPITAL | Age: 71
End: 2021-06-27

## 2021-06-27 VITALS
BODY MASS INDEX: 31.38 KG/M2 | RESPIRATION RATE: 16 BRPM | DIASTOLIC BLOOD PRESSURE: 63 MMHG | WEIGHT: 219.19 LBS | OXYGEN SATURATION: 98 % | HEIGHT: 70 IN | HEART RATE: 56 BPM | TEMPERATURE: 98.6 F | SYSTOLIC BLOOD PRESSURE: 135 MMHG

## 2021-06-27 PROBLEM — R00.1 BRADYCARDIA: Status: ACTIVE | Noted: 2021-06-27

## 2021-06-27 PROBLEM — G30.1 LATE ONSET ALZHEIMER'S DISEASE WITHOUT BEHAVIORAL DISTURBANCE (HCC): Status: ACTIVE | Noted: 2021-06-27

## 2021-06-27 PROBLEM — F02.80 LATE ONSET ALZHEIMER'S DISEASE WITHOUT BEHAVIORAL DISTURBANCE (HCC): Status: ACTIVE | Noted: 2021-06-27

## 2021-06-27 PROBLEM — E53.8 B12 DEFICIENCY: Status: ACTIVE | Noted: 2019-10-31

## 2021-06-27 PROBLEM — R07.89 CHEST PAIN, ATYPICAL: Status: ACTIVE | Noted: 2021-06-26

## 2021-06-27 PROBLEM — N18.31 STAGE 3A CHRONIC KIDNEY DISEASE (HCC): Status: ACTIVE | Noted: 2021-06-27

## 2021-06-27 PROBLEM — J44.9 COPD (CHRONIC OBSTRUCTIVE PULMONARY DISEASE): Status: ACTIVE | Noted: 2021-06-27

## 2021-06-27 PROBLEM — E11.9 TYPE 2 DIABETES MELLITUS WITHOUT COMPLICATION (HCC): Status: ACTIVE | Noted: 2021-06-27

## 2021-06-27 PROBLEM — E11.42 DIABETIC POLYNEUROPATHY ASSOCIATED WITH TYPE 2 DIABETES MELLITUS (HCC): Status: ACTIVE | Noted: 2020-01-13

## 2021-06-27 PROBLEM — I25.10 CAD (CORONARY ARTERY DISEASE): Status: ACTIVE | Noted: 2021-06-27

## 2021-06-27 PROBLEM — I71.21 ASCENDING AORTIC ANEURYSM (HCC): Status: ACTIVE | Noted: 2021-03-16

## 2021-06-27 LAB
ALBUMIN SERPL-MCNC: 3.7 G/DL (ref 3.5–5.2)
ALBUMIN/GLOB SERPL: 1.5 G/DL
ALP SERPL-CCNC: 61 U/L (ref 39–117)
ALT SERPL W P-5'-P-CCNC: 12 U/L (ref 1–41)
ANION GAP SERPL CALCULATED.3IONS-SCNC: 9 MMOL/L (ref 5–15)
AST SERPL-CCNC: 18 U/L (ref 1–40)
BASOPHILS # BLD AUTO: 0.02 10*3/MM3 (ref 0–0.2)
BASOPHILS NFR BLD AUTO: 0.3 % (ref 0–1.5)
BH CV STRESS BP STAGE 1: NORMAL
BH CV STRESS BP STAGE 2: NORMAL
BH CV STRESS BP STAGE 3: NORMAL
BH CV STRESS DOSE DOBUTAMINE STAGE 1: 10
BH CV STRESS DOSE DOBUTAMINE STAGE 2: 20
BH CV STRESS DOSE DOBUTAMINE STAGE 3: 30
BH CV STRESS DURATION MIN STAGE 1: 3
BH CV STRESS DURATION MIN STAGE 2: 3
BH CV STRESS DURATION MIN STAGE 3: 1
BH CV STRESS DURATION SEC STAGE 1: 0
BH CV STRESS DURATION SEC STAGE 2: 0
BH CV STRESS DURATION SEC STAGE 3: 21
BH CV STRESS ECHO POST STRESS EJECTION FRACTION EF: 65 %
BH CV STRESS HR STAGE 1: 66
BH CV STRESS HR STAGE 2: 118
BH CV STRESS HR STAGE 3: 131
BH CV STRESS PROTOCOL 1: NORMAL
BH CV STRESS RECOVERY BP: NORMAL MMHG
BH CV STRESS RECOVERY HR: 86 BPM
BH CV STRESS STAGE 1: 1
BH CV STRESS STAGE 2: 2
BH CV STRESS STAGE 3: 3
BILIRUB SERPL-MCNC: 1 MG/DL (ref 0–1.2)
BUN SERPL-MCNC: 21 MG/DL (ref 8–23)
BUN/CREAT SERPL: 16.2 (ref 7–25)
CALCIUM SPEC-SCNC: 9.4 MG/DL (ref 8.6–10.5)
CHLORIDE SERPL-SCNC: 107 MMOL/L (ref 98–107)
CHOLEST SERPL-MCNC: 133 MG/DL (ref 0–200)
CO2 SERPL-SCNC: 25 MMOL/L (ref 22–29)
CREAT SERPL-MCNC: 1.3 MG/DL (ref 0.76–1.27)
DEPRECATED RDW RBC AUTO: 47 FL (ref 37–54)
EOSINOPHIL # BLD AUTO: 0.15 10*3/MM3 (ref 0–0.4)
EOSINOPHIL NFR BLD AUTO: 2 % (ref 0.3–6.2)
ERYTHROCYTE [DISTWIDTH] IN BLOOD BY AUTOMATED COUNT: 13.2 % (ref 12.3–15.4)
GFR SERPL CREATININE-BSD FRML MDRD: 55 ML/MIN/1.73
GLOBULIN UR ELPH-MCNC: 2.5 GM/DL
GLUCOSE SERPL-MCNC: 93 MG/DL (ref 65–99)
HBA1C MFR BLD: 5.2 % (ref 4.8–5.6)
HCT VFR BLD AUTO: 37.7 % (ref 37.5–51)
HDLC SERPL-MCNC: 54 MG/DL (ref 40–60)
HGB BLD-MCNC: 12.4 G/DL (ref 13–17.7)
LDLC SERPL CALC-MCNC: 65 MG/DL (ref 0–100)
LDLC/HDLC SERPL: 1.21 {RATIO}
LV EF 2D ECHO EST: 55 %
LYMPHOCYTES # BLD AUTO: 1.41 10*3/MM3 (ref 0.7–3.1)
LYMPHOCYTES NFR BLD AUTO: 18.4 % (ref 19.6–45.3)
MAGNESIUM SERPL-MCNC: 2.2 MG/DL (ref 1.6–2.4)
MAXIMAL PREDICTED HEART RATE: 151 BPM
MCH RBC QN AUTO: 31.7 PG (ref 26.6–33)
MCHC RBC AUTO-ENTMCNC: 32.9 G/DL (ref 31.5–35.7)
MCV RBC AUTO: 96.4 FL (ref 79–97)
MONOCYTES # BLD AUTO: 0.47 10*3/MM3 (ref 0.1–0.9)
MONOCYTES NFR BLD AUTO: 6.1 % (ref 5–12)
NEUTROPHILS NFR BLD AUTO: 5.59 10*3/MM3 (ref 1.7–7)
NEUTROPHILS NFR BLD AUTO: 72.9 % (ref 42.7–76)
PERCENT MAX PREDICTED HR: 86.75 %
PLATELET # BLD AUTO: 77 10*3/MM3 (ref 140–450)
PMV BLD AUTO: 10.1 FL (ref 6–12)
POTASSIUM SERPL-SCNC: 4.5 MMOL/L (ref 3.5–5.2)
PROT SERPL-MCNC: 6.2 G/DL (ref 6–8.5)
QT INTERVAL: 422 MS
QT INTERVAL: 444 MS
QT INTERVAL: 462 MS
QTC INTERVAL: 410 MS
QTC INTERVAL: 438 MS
QTC INTERVAL: 454 MS
RBC # BLD AUTO: 3.91 10*6/MM3 (ref 4.14–5.8)
SODIUM SERPL-SCNC: 141 MMOL/L (ref 136–145)
STRESS BASELINE BP: NORMAL MMHG
STRESS BASELINE HR: 53 BPM
STRESS PERCENT HR: 102 %
STRESS POST EXERCISE DUR MIN: 7 MIN
STRESS POST EXERCISE DUR SEC: 21 SEC
STRESS POST PEAK BP: NORMAL MMHG
STRESS POST PEAK HR: 131 BPM
STRESS TARGET HR: 128 BPM
T4 FREE SERPL-MCNC: 1.35 NG/DL (ref 0.93–1.7)
TRIGL SERPL-MCNC: 68 MG/DL (ref 0–150)
TROPONIN T SERPL-MCNC: <0.01 NG/ML (ref 0–0.03)
TSH SERPL DL<=0.05 MIU/L-ACNC: 1.61 UIU/ML (ref 0.27–4.2)
VLDLC SERPL-MCNC: 14 MG/DL (ref 5–40)
WBC # BLD AUTO: 7.66 10*3/MM3 (ref 3.4–10.8)

## 2021-06-27 PROCEDURE — 94799 UNLISTED PULMONARY SVC/PX: CPT

## 2021-06-27 PROCEDURE — 85025 COMPLETE CBC W/AUTO DIFF WBC: CPT | Performed by: INTERNAL MEDICINE

## 2021-06-27 PROCEDURE — 96372 THER/PROPH/DIAG INJ SC/IM: CPT

## 2021-06-27 PROCEDURE — 93350 STRESS TTE ONLY: CPT | Performed by: INTERNAL MEDICINE

## 2021-06-27 PROCEDURE — 80061 LIPID PANEL: CPT | Performed by: INTERNAL MEDICINE

## 2021-06-27 PROCEDURE — 93352 ADMIN ECG CONTRAST AGENT: CPT | Performed by: INTERNAL MEDICINE

## 2021-06-27 PROCEDURE — 80053 COMPREHEN METABOLIC PANEL: CPT | Performed by: INTERNAL MEDICINE

## 2021-06-27 PROCEDURE — 25010000002 PERFLUTREN 6.52 MG/ML SUSPENSION: Performed by: INTERNAL MEDICINE

## 2021-06-27 PROCEDURE — 96361 HYDRATE IV INFUSION ADD-ON: CPT

## 2021-06-27 PROCEDURE — 25010000002 ENOXAPARIN PER 10 MG: Performed by: INTERNAL MEDICINE

## 2021-06-27 PROCEDURE — 93350 STRESS TTE ONLY: CPT

## 2021-06-27 PROCEDURE — 93010 ELECTROCARDIOGRAM REPORT: CPT | Performed by: INTERNAL MEDICINE

## 2021-06-27 PROCEDURE — 84484 ASSAY OF TROPONIN QUANT: CPT | Performed by: INTERNAL MEDICINE

## 2021-06-27 PROCEDURE — 94640 AIRWAY INHALATION TREATMENT: CPT

## 2021-06-27 PROCEDURE — 93017 CV STRESS TEST TRACING ONLY: CPT

## 2021-06-27 PROCEDURE — 93018 CV STRESS TEST I&R ONLY: CPT | Performed by: INTERNAL MEDICINE

## 2021-06-27 PROCEDURE — G0378 HOSPITAL OBSERVATION PER HR: HCPCS

## 2021-06-27 PROCEDURE — 51798 US URINE CAPACITY MEASURE: CPT

## 2021-06-27 PROCEDURE — 25010000003 DOBUTAMINE PER 250 MG: Performed by: INTERNAL MEDICINE

## 2021-06-27 PROCEDURE — 93005 ELECTROCARDIOGRAM TRACING: CPT | Performed by: INTERNAL MEDICINE

## 2021-06-27 RX ORDER — ACETAMINOPHEN 160 MG/5ML
650 SOLUTION ORAL EVERY 4 HOURS PRN
Status: DISCONTINUED | OUTPATIENT
Start: 2021-06-27 | End: 2021-06-27 | Stop reason: HOSPADM

## 2021-06-27 RX ORDER — ASPIRIN 81 MG/1
81 TABLET, CHEWABLE ORAL DAILY
Status: DISCONTINUED | OUTPATIENT
Start: 2021-06-27 | End: 2021-06-27 | Stop reason: HOSPADM

## 2021-06-27 RX ORDER — DEXTROSE MONOHYDRATE 25 G/50ML
25 INJECTION, SOLUTION INTRAVENOUS
Status: DISCONTINUED | OUTPATIENT
Start: 2021-06-27 | End: 2021-06-27 | Stop reason: HOSPADM

## 2021-06-27 RX ORDER — NITROGLYCERIN 0.4 MG/1
0.4 TABLET SUBLINGUAL
Status: DISCONTINUED | OUTPATIENT
Start: 2021-06-27 | End: 2021-06-27 | Stop reason: HOSPADM

## 2021-06-27 RX ORDER — ONDANSETRON 2 MG/ML
4 INJECTION INTRAMUSCULAR; INTRAVENOUS EVERY 6 HOURS PRN
Status: DISCONTINUED | OUTPATIENT
Start: 2021-06-27 | End: 2021-06-27 | Stop reason: HOSPADM

## 2021-06-27 RX ORDER — ACETAMINOPHEN 650 MG/1
650 SUPPOSITORY RECTAL EVERY 4 HOURS PRN
Status: DISCONTINUED | OUTPATIENT
Start: 2021-06-27 | End: 2021-06-27 | Stop reason: HOSPADM

## 2021-06-27 RX ORDER — SODIUM CHLORIDE 0.9 % (FLUSH) 0.9 %
10 SYRINGE (ML) INJECTION EVERY 12 HOURS SCHEDULED
Status: DISCONTINUED | OUTPATIENT
Start: 2021-06-27 | End: 2021-06-27 | Stop reason: HOSPADM

## 2021-06-27 RX ORDER — GABAPENTIN 300 MG/1
600 CAPSULE ORAL EVERY 8 HOURS SCHEDULED
Status: DISCONTINUED | OUTPATIENT
Start: 2021-06-27 | End: 2021-06-27 | Stop reason: HOSPADM

## 2021-06-27 RX ORDER — MEMANTINE HYDROCHLORIDE 5 MG/1
5 TABLET ORAL EVERY 12 HOURS SCHEDULED
Status: DISCONTINUED | OUTPATIENT
Start: 2021-06-27 | End: 2021-06-27 | Stop reason: HOSPADM

## 2021-06-27 RX ORDER — ACETAMINOPHEN 325 MG/1
650 TABLET ORAL EVERY 4 HOURS PRN
Status: DISCONTINUED | OUTPATIENT
Start: 2021-06-27 | End: 2021-06-27 | Stop reason: HOSPADM

## 2021-06-27 RX ORDER — NICOTINE POLACRILEX 4 MG
15 LOZENGE BUCCAL
Status: DISCONTINUED | OUTPATIENT
Start: 2021-06-27 | End: 2021-06-27 | Stop reason: HOSPADM

## 2021-06-27 RX ORDER — NITROGLYCERIN 0.4 MG/1
0.4 TABLET SUBLINGUAL TAKE AS DIRECTED
Status: DISCONTINUED | OUTPATIENT
Start: 2021-06-27 | End: 2021-06-27 | Stop reason: SDUPTHER

## 2021-06-27 RX ORDER — SODIUM CHLORIDE 9 MG/ML
75 INJECTION, SOLUTION INTRAVENOUS CONTINUOUS
Status: DISCONTINUED | OUTPATIENT
Start: 2021-06-27 | End: 2021-06-27 | Stop reason: HOSPADM

## 2021-06-27 RX ORDER — DOBUTAMINE HYDROCHLORIDE 100 MG/100ML
10-50 INJECTION INTRAVENOUS CONTINUOUS
Status: DISCONTINUED | OUTPATIENT
Start: 2021-06-27 | End: 2021-06-27

## 2021-06-27 RX ORDER — ALBUTEROL SULFATE 2.5 MG/3ML
2.5 SOLUTION RESPIRATORY (INHALATION) EVERY 6 HOURS PRN
Status: DISCONTINUED | OUTPATIENT
Start: 2021-06-27 | End: 2021-06-27 | Stop reason: HOSPADM

## 2021-06-27 RX ORDER — PRAVASTATIN SODIUM 20 MG
80 TABLET ORAL NIGHTLY
Status: DISCONTINUED | OUTPATIENT
Start: 2021-06-27 | End: 2021-06-27 | Stop reason: HOSPADM

## 2021-06-27 RX ORDER — OXYCODONE AND ACETAMINOPHEN 10; 325 MG/1; MG/1
1 TABLET ORAL EVERY 6 HOURS PRN
Status: DISCONTINUED | OUTPATIENT
Start: 2021-06-27 | End: 2021-06-27 | Stop reason: HOSPADM

## 2021-06-27 RX ORDER — ONDANSETRON 4 MG/1
4 TABLET, FILM COATED ORAL EVERY 6 HOURS PRN
Status: DISCONTINUED | OUTPATIENT
Start: 2021-06-27 | End: 2021-06-27 | Stop reason: HOSPADM

## 2021-06-27 RX ORDER — CETIRIZINE HYDROCHLORIDE 10 MG/1
10 TABLET ORAL DAILY
Status: DISCONTINUED | OUTPATIENT
Start: 2021-06-27 | End: 2021-06-27 | Stop reason: HOSPADM

## 2021-06-27 RX ORDER — SODIUM CHLORIDE 0.9 % (FLUSH) 0.9 %
10 SYRINGE (ML) INJECTION AS NEEDED
Status: DISCONTINUED | OUTPATIENT
Start: 2021-06-27 | End: 2021-06-27 | Stop reason: HOSPADM

## 2021-06-27 RX ORDER — LISINOPRIL 20 MG/1
40 TABLET ORAL DAILY
Status: DISCONTINUED | OUTPATIENT
Start: 2021-06-27 | End: 2021-06-27 | Stop reason: HOSPADM

## 2021-06-27 RX ORDER — TAMSULOSIN HYDROCHLORIDE 0.4 MG/1
0.4 CAPSULE ORAL DAILY
Status: DISCONTINUED | OUTPATIENT
Start: 2021-06-27 | End: 2021-06-27 | Stop reason: HOSPADM

## 2021-06-27 RX ADMIN — OXYCODONE AND ACETAMINOPHEN 1 TABLET: 325; 10 TABLET ORAL at 07:33

## 2021-06-27 RX ADMIN — PERFLUTREN 8.48 MG: 6.52 INJECTION, SUSPENSION INTRAVENOUS at 10:01

## 2021-06-27 RX ADMIN — DICLOFENAC 4 G: 10 GEL TOPICAL at 07:33

## 2021-06-27 RX ADMIN — IPRATROPIUM BROMIDE 0.5 MG: 0.5 SOLUTION RESPIRATORY (INHALATION) at 07:19

## 2021-06-27 RX ADMIN — TAMSULOSIN HYDROCHLORIDE 0.4 MG: 0.4 CAPSULE ORAL at 12:23

## 2021-06-27 RX ADMIN — Medication 10 MCG/KG/MIN: at 10:02

## 2021-06-27 RX ADMIN — MEMANTINE HYDROCHLORIDE 5 MG: 5 TABLET, FILM COATED ORAL at 12:23

## 2021-06-27 RX ADMIN — ACETAMINOPHEN 650 MG: 325 TABLET, FILM COATED ORAL at 12:22

## 2021-06-27 RX ADMIN — GABAPENTIN 600 MG: 300 CAPSULE ORAL at 06:05

## 2021-06-27 RX ADMIN — SODIUM CHLORIDE 75 ML/HR: 9 INJECTION, SOLUTION INTRAVENOUS at 04:44

## 2021-06-27 RX ADMIN — LISINOPRIL 40 MG: 20 TABLET ORAL at 12:22

## 2021-06-27 RX ADMIN — CETIRIZINE HYDROCHLORIDE 10 MG: 10 TABLET ORAL at 12:23

## 2021-06-27 RX ADMIN — IPRATROPIUM BROMIDE 0.5 MG: 0.5 SOLUTION RESPIRATORY (INHALATION) at 11:19

## 2021-06-27 RX ADMIN — ENOXAPARIN SODIUM 40 MG: 40 INJECTION SUBCUTANEOUS at 04:28

## 2021-06-27 RX ADMIN — PRAVASTATIN SODIUM 80 MG: 20 TABLET ORAL at 04:28

## 2021-06-27 RX ADMIN — ASPIRIN 81 MG: 81 TABLET, CHEWABLE ORAL at 12:22

## 2021-06-28 ENCOUNTER — READMISSION MANAGEMENT (OUTPATIENT)
Dept: CALL CENTER | Facility: HOSPITAL | Age: 71
End: 2021-06-28

## 2021-06-28 ENCOUNTER — TELEPHONE (OUTPATIENT)
Dept: PRIMARY CARE CLINIC | Age: 71
End: 2021-06-28

## 2021-06-28 LAB
QT INTERVAL: 448 MS
QTC INTERVAL: 424 MS

## 2021-06-28 NOTE — OUTREACH NOTE
Prep Survey      Responses   [facility] facility patient discharged from?  [place]   Is LACE score < 7 ?  No   Emergency Room discharge w/ pulse ox?  No   Eligibility  Readm Mgmt   Discharge diagnosis  Chest pain, atypical   Does the patient have one of the following disease processes/diagnoses(primary or secondary)?  Other   Does the patient have Home health ordered?  No   Is there a DME ordered?  No   Prep survey completed?  Yes          Serenity Summers RN

## 2021-06-28 NOTE — TELEPHONE ENCOUNTER
[facility] Transitions Initial Follow Up Call    Outreach made within 2 business days of discharge: Yes    Patient: Lucie Sadler   Patient : 1950   MRN: [id number]    Reason for Admission:Chest pain, atypical   Discharge Date: 20       Spoke with: Spouse    Discharge department/facility: [facility]    TCM Interactive Patient Contact:  Was patient able to fill all prescriptions: Yes  Was patient instructed to bring all medications to the follow-up visit: Yes  Is patient taking all medications as directed in the discharge summary? Yes  Does patient understand their discharge instructions: Yes  Does patient have questions or concerns that need addressed prior to 7-14 day follow up office visit: no  Has f/u with PCP tomorrrow.     Scheduled appointment with PCP within 7-14 days    Follow Up  Future Appointments   Date Time Provider Sera Moss   3/40/7107  9:39 PM BRIGETTE Márquez [facility]   2021 11:00 AM OLGA Meehan PAD HEMONC [facility]   2021 11:20 AM SCHEDULE, L MED ONC [place] L MED ONC [facility] [place]   2021  3:30 PM Dennis Ramirez MD N LPS Cardio [facility]   2021 10:15 AM BRIGETTE Molina [address] [place], [place]

## 2021-06-29 ENCOUNTER — OFFICE VISIT (OUTPATIENT)
Dept: PRIMARY CARE CLINIC | Age: 71
End: 2021-06-29
Payer: MEDICARE

## 2021-06-29 ENCOUNTER — READMISSION MANAGEMENT (OUTPATIENT)
Dept: CALL CENTER | Facility: HOSPITAL | Age: 71
End: 2021-06-29

## 2021-06-29 VITALS
DIASTOLIC BLOOD PRESSURE: 82 MMHG | SYSTOLIC BLOOD PRESSURE: 132 MMHG | RESPIRATION RATE: 18 BRPM | WEIGHT: 216 LBS | OXYGEN SATURATION: 98 % | TEMPERATURE: 97.6 F | BODY MASS INDEX: 30.92 KG/M2 | HEIGHT: 70 IN | HEART RATE: 55 BPM

## 2021-06-29 DIAGNOSIS — G30.1 LATE ONSET ALZHEIMER'S DISEASE WITHOUT BEHAVIORAL DISTURBANCE (HCC): ICD-10-CM

## 2021-06-29 DIAGNOSIS — F33.1 MODERATE EPISODE OF RECURRENT MAJOR DEPRESSIVE DISORDER (HCC): ICD-10-CM

## 2021-06-29 DIAGNOSIS — E11.9 TYPE 2 DIABETES MELLITUS WITHOUT COMPLICATION, WITHOUT LONG-TERM CURRENT USE OF INSULIN (HCC): ICD-10-CM

## 2021-06-29 DIAGNOSIS — N28.9 RENAL LESION: ICD-10-CM

## 2021-06-29 DIAGNOSIS — F02.80 LATE ONSET ALZHEIMER'S DISEASE WITHOUT BEHAVIORAL DISTURBANCE (HCC): ICD-10-CM

## 2021-06-29 DIAGNOSIS — I10 ESSENTIAL HYPERTENSION: ICD-10-CM

## 2021-06-29 DIAGNOSIS — Z09 HOSPITAL DISCHARGE FOLLOW-UP: Primary | ICD-10-CM

## 2021-06-29 DIAGNOSIS — F33.1 MAJOR DEPRESSIVE DISORDER, RECURRENT, MODERATE (HCC): ICD-10-CM

## 2021-06-29 DIAGNOSIS — F33.0 MAJOR DEPRESSIVE DISORDER, RECURRENT, MILD (HCC): ICD-10-CM

## 2021-06-29 PROBLEM — F33.9 MAJOR DEPRESSIVE DISORDER, RECURRENT, UNSPECIFIED (HCC): Status: ACTIVE | Noted: 2021-06-29

## 2021-06-29 PROCEDURE — 1111F DSCHRG MED/CURRENT MED MERGE: CPT | Performed by: NURSE PRACTITIONER

## 2021-06-29 PROCEDURE — 99496 TRANSJ CARE MGMT HIGH F2F 7D: CPT | Performed by: NURSE PRACTITIONER

## 2021-06-29 RX ORDER — TRAZODONE HYDROCHLORIDE 50 MG/1
50 TABLET ORAL NIGHTLY
Qty: 30 TABLET | Refills: 3 | Status: SHIPPED | OUTPATIENT
Start: 2021-06-29 | End: 2021-09-29 | Stop reason: SDUPTHER

## 2021-06-29 RX ORDER — PANTOPRAZOLE SODIUM 40 MG/1
40 TABLET, DELAYED RELEASE ORAL DAILY
Qty: 90 TABLET | Refills: 1 | Status: SHIPPED | OUTPATIENT
Start: 2021-06-29 | End: 2022-01-02

## 2021-06-29 SDOH — ECONOMIC STABILITY: FOOD INSECURITY: WITHIN THE PAST 12 MONTHS, THE FOOD YOU BOUGHT JUST DIDN'T LAST AND YOU DIDN'T HAVE MONEY TO GET MORE.: NEVER TRUE

## 2021-06-29 SDOH — ECONOMIC STABILITY: FOOD INSECURITY: WITHIN THE PAST 12 MONTHS, YOU WORRIED THAT YOUR FOOD WOULD RUN OUT BEFORE YOU GOT MONEY TO BUY MORE.: NEVER TRUE

## 2021-06-29 ASSESSMENT — ENCOUNTER SYMPTOMS
COUGH: 0
SHORTNESS OF BREATH: 0
COLOR CHANGE: 0
BACK PAIN: 0
NAUSEA: 0
PHOTOPHOBIA: 0
VOICE CHANGE: 0
VOMITING: 0
RHINORRHEA: 0

## 2021-06-29 ASSESSMENT — SOCIAL DETERMINANTS OF HEALTH (SDOH): HOW HARD IS IT FOR YOU TO PAY FOR THE VERY BASICS LIKE FOOD, HOUSING, MEDICAL CARE, AND HEATING?: NOT HARD AT ALL

## 2021-06-29 NOTE — PROGRESS NOTES
Post-Discharge Transitional Care Management Services or Hospital Follow Up      Zaid Montiel   YOB: 1950    Date of Office Visit:  6/29/2021  Date of Hospital Admission: 6/26/2021  Date of Hospital Discharge: 6/27/2021  Readmission Risk Score(high >=14%.  Medium >=10%):Readmission Risk Score: 21      Care management risk score Rising risk (score 2-5) and Complex Care (Scores >=6): 7     Non face to face  following discharge, date last encounter closed (first attempt may have been earlier): 6/28/2021  3:08 PM 6/28/2021  3:08 PM    Call initiated 2 business days of discharge: Yes     Patient Active Problem List   Diagnosis    Hypertension    Type 2 diabetes mellitus without complication ([address]    COPD (chronic obstructive pulmonary disease) ([address]    Chronic back pain    Benign non-nodular prostatic hyperplasia with lower urinary tract symptoms    Family history of prostate cancer    Skin lesion of face    Basal cell carcinoma of chest    Basal cell carcinoma of left cheek    Benign hemangioma    Epigastric pain    Fall from slipping on ice, initial encounter    Gastroesophageal reflux disease    Heartburn    Seborrheic keratosis    Thrombocytopenia ([address]    B12 deficiency    Chest pain, unspecified    Symptomatic bradycardia    CAD (coronary artery disease)    Diabetic peripheral neuropathy associated with type 2 diabetes mellitus (HCC)    Stroke-like symptoms    Guaiac positive stools    Transient alteration of awareness    TIA (transient ischemic attack)    Numbness and tingling    Diabetic polyneuropathy associated with type 2 diabetes mellitus ([address]    Late onset Alzheimer's disease without behavioral disturbance (HCC)    Bradycardia    Left rotator cuff tear arthropathy    Ascending aortic aneurysm (HCC)       Allergies   Allergen Reactions    Bee Venom Anaphylaxis    Pcn [Penicillins] Other (See Comments)     Made pt \"black out\"       Medications listed as ordered at the time of [facility] [address] [facility] Medication Instructions DYLAN:    Printed on:06/29/21 4315   Medication Information                      albuterol sulfate HFA (VENTOLIN HFA) 108 (90 Base) MCG/ACT inhaler  Inhale 2 puffs into the lungs every 6 hours as needed for Wheezing             aspirin 81 MG chewable tablet  Take 1 tablet by mouth daily             azelastine (ASTELIN) 0.1 % nasal spray  1 spray by Nasal route 2 times daily Use in each nostril as directed             cetirizine (ZYRTEC) 10 MG tablet  TAKE 1 TABLET BY MOUTH DAILY             citalopram (CELEXA) 10 MG tablet  Take 1 tablet by mouth daily             Diabetic Shoe MISC  by Does not apply route DISPENSE ONE PAIR DIABETIC SHOES AND THREE PAIRS OF HEAT MOLDED INSERTS             diclofenac sodium (VOLTAREN) 1 % GEL  Apply topically 2 times daily             donepezil (ARICEPT) 5 MG tablet  Take 1 tablet by mouth nightly             EPINEPHrine (EPIPEN) 0.3 MG/0.3ML SOAJ injection  Use as directed for allergic reaction             gabapentin (NEURONTIN) 800 MG tablet  TAKE ONE TABLET BY MOUTH THREE TIMES DAILY             lisinopril (PRINIVIL;ZESTRIL) 40 MG tablet  TAKE ONE TABLET BY MOUTH DAILY. meclizine (ANTIVERT) 25 MG tablet  Take 25 mg by mouth 3 times daily as needed for Dizziness              memantine (NAMENDA) 10 MG tablet  Take 1 tablet by mouth 2 times daily             Misc. Devices (WALKER) MISC  1 each by Does not apply route daily             nitroGLYCERIN (NITROSTAT) 0.4 MG SL tablet  up to max of 3 total doses. If no relief after 1 dose, call 911.              oxyCODONE-acetaminophen (PERCOCET)  MG per tablet               pantoprazole (PROTONIX) 40 MG tablet  Take 40 mg by mouth daily Indications: Reflux Gastritis             pravastatin (PRAVACHOL) 80 MG tablet  TAKE ONE TABLET BY MOUTH EVERY EVENING.             tamsulosin (FLOMAX) 0.4 MG capsule  Take 1 capsule by mouth daily             tiZANidine (ZANAFLEX) 4 MG tablet  TAKE 1 TABLET BY MOUTH EVERY 8 HOURS AS NEEDED (BACK PAIN)             triamcinolone (KENALOG) 0.1 % cream  Apply topically 2 times daily. Medications marked \"taking\" at this time  Outpatient Medications Marked as Taking for the 6/29/21 encounter (Office Visit) with BRIGETTE Corbett   Medication Sig Dispense Refill    tiZANidine (ZANAFLEX) 4 MG tablet TAKE 1 TABLET BY MOUTH EVERY 8 HOURS AS NEEDED (BACK PAIN) 30 tablet 1    gabapentin (NEURONTIN) 800 MG tablet TAKE ONE TABLET BY MOUTH THREE TIMES DAILY 90 tablet 0    citalopram (CELEXA) 10 MG tablet Take 1 tablet by mouth daily 90 tablet 1    donepezil (ARICEPT) 5 MG tablet Take 1 tablet by mouth nightly 90 tablet 1    memantine (NAMENDA) 10 MG tablet Take 1 tablet by mouth 2 times daily 180 tablet 1    oxyCODONE-acetaminophen (PERCOCET)  MG per tablet       diclofenac sodium (VOLTAREN) 1 % GEL Apply topically 2 times daily 100 g 3    tamsulosin (FLOMAX) 0.4 MG capsule Take 1 capsule by mouth daily 90 capsule 3    pantoprazole (PROTONIX) 40 MG tablet Take 40 mg by mouth daily Indications: Reflux Gastritis      albuterol sulfate HFA (VENTOLIN HFA) 108 (90 Base) MCG/ACT inhaler Inhale 2 puffs into the lungs every 6 hours as needed for Wheezing      meclizine (ANTIVERT) 25 MG tablet Take 25 mg by mouth 3 times daily as needed for Dizziness       lisinopril (PRINIVIL;ZESTRIL) 40 MG tablet TAKE ONE TABLET BY MOUTH DAILY. (Patient taking differently: Take 40 mg by mouth daily TAKE ONE TABLET BY MOUTH DAILY. ) 90 tablet 3    pravastatin (PRAVACHOL) 80 MG tablet TAKE ONE TABLET BY MOUTH EVERY EVENING.  (Patient taking differently: Take 80 mg by mouth nightly TAKE ONE TABLET BY MOUTH EVERY EVENING.) 90 tablet 3    Diabetic Shoe MISC by Does not apply route DISPENSE ONE PAIR DIABETIC SHOES AND THREE PAIRS OF HEAT MOLDED INSERTS 1 each 0    azelastine (ASTELIN) 0.1 % nasal spray 1 spray by Nasal route 2 times daily Use in each nostril as directed 2 Bottle 1    aspirin 81 MG chewable tablet Take 1 tablet by mouth daily 30 tablet 3    cetirizine (ZYRTEC) 10 MG tablet TAKE 1 TABLET BY MOUTH DAILY (Patient taking differently: Take 10 mg by mouth daily TAKE 1 TABLET BY MOUTH DAILY) 90 tablet 3    triamcinolone (KENALOG) 0.1 % cream Apply topically 2 times daily. (Patient taking differently: Apply topically 2 times daily Apply topically 2 times daily. ) 1 Tube 1    Misc. Devices (WALKER) MISC 1 each by Does not apply route daily 1 each 0    nitroGLYCERIN (NITROSTAT) 0.4 MG SL tablet up to max of 3 total doses. If no relief after 1 dose, call 911. 25 tablet 3    EPINEPHrine (EPIPEN) 0.3 MG/0.3ML SOAJ injection Use as directed for allergic reaction 0.3 mL 3        Medications patient taking as of now reconciled against medications ordered at time of hospital discharge: Yes    Chief Complaint   Patient presents with   [address] from Hospital     pt wife reports pt went to hospital due to chest pain, they did not determine cause to the chest pain, but pt wife reports he has not had any chest pain since being discharged. Pt is very weak on presentation today and had a hard time ambulating to room. Pt wife states that he has neuropathy that causes him to walk poorly combined with the weakness from his hospitalization. HPI    Inpatient course: Discharge summary reviewed- see chart. Interval history/Current status:     Patient presents today for hospital discharge follow-up. Patient was admitted to [facility] for chest pain. Patient underwent cardiac work-up with EKG, serial troponins, CT chest, stress echo. Labs inconclusive. There was an incidental finding on CT that revealed right renal lesion 5mm that is recommended for follow-up with renal u/s. Patient's symptoms resolved and he was discharged in stable condition. He reports no further episodes of chest pain since admission.  Vitals signs remain stable in clinic today. Review of Systems   Constitutional: Negative for chills and fever. HENT: Negative for ear pain, hearing loss, rhinorrhea and voice change. Eyes: Negative for photophobia and visual disturbance. Respiratory: Negative for cough and shortness of breath. Cardiovascular: Negative for chest pain and palpitations. Gastrointestinal: Negative for nausea and vomiting. Endocrine: Negative. Negative for cold intolerance and heat intolerance. Genitourinary: Negative for difficulty urinating and flank pain. Musculoskeletal: Negative for back pain and neck pain. Skin: Negative for color change and rash. Allergic/Immunologic: Negative for environmental allergies and food allergies. Neurological: Negative for dizziness, speech difficulty and headaches. Hematological: Does not bruise/bleed easily. Psychiatric/Behavioral: Negative for sleep disturbance and suicidal ideas. Vitals:    06/29/21 1256   BP: 132/82   Pulse: 55   Resp: 18   Temp: 97.6 °F (36.4 °C)   TempSrc: Temporal   SpO2: 98%   Weight: 216 lb (98 kg)   Height: 5' 10\" (1.778 m)     Body mass index is 30.99 kg/m². Wt Readings from Last 3 Encounters:   06/29/21 216 lb (98 kg)   03/16/21 214 lb (97.1 kg)   03/03/21 214 lb (97.1 kg)     BP Readings from Last 3 Encounters:   06/29/21 132/82   03/16/21 136/82   03/03/21 130/78       Physical Exam  Vitals and nursing note reviewed. Constitutional:       Appearance: He is well-developed. HENT:      Head: Atraumatic. Right Ear: External ear normal.      Left Ear: External ear normal.      Nose: Nose normal.   Eyes:      Conjunctiva/sclera: Conjunctivae normal.      Pupils: Pupils are equal, round, and reactive to light. Cardiovascular:      Rate and Rhythm: Normal rate and regular rhythm. Heart sounds: Normal heart sounds, S1 normal and S2 normal.   Pulmonary:      Effort: Pulmonary effort is normal.      Breath sounds: Normal breath sounds. Abdominal:      General: Bowel sounds are normal.      Palpations: Abdomen is soft. Musculoskeletal:         General: Normal range of motion. Cervical back: Normal range of motion and neck supple. Skin:     General: Skin is warm and dry. Neurological:      Mental Status: He is alert and oriented to person, place, and time. Psychiatric:         Behavior: Behavior normal.             Assessment/Plan:  1. Hospital discharge follow-up    - CT DISCHARGE MEDS RECONCILED W/ CURRENT OUTPATIENT MED LIST    2. Renal lesion    - US RENAL COMPLETE; Future    3. Type 2 diabetes mellitus without complication, without long-term current use of insulin ([facility])    - Comprehensive Metabolic Panel; Future  - Hemoglobin A1C; Future  - Lipid Panel; Future  - Microalbumin / Creatinine Urine Ratio; Future    4. Late onset Alzheimer's disease without behavioral disturbance ([facility]  - Progressing. 5. Essential hypertension  - Stable. - Comprehensive Metabolic Panel; Future  - Hemoglobin A1C; Future  - Lipid Panel; Future  - Microalbumin / Creatinine Urine Ratio;  Future        Medical Decision Making: high complexity

## 2021-06-29 NOTE — OUTREACH NOTE
Medical Week 1 Survey      Responses   [facility] [place] patient discharged from?  [place]   Does the patient have one of the following disease processes/diagnoses(primary or secondary)?  Other   Week 1 attempt successful?  Yes   Call start time  1141   Call end time  1142   Discharge diagnosis  Chest pain, atypical   Is patient permission given to speak with other caregiver?  No   Meds reviewed with patient/caregiver?  Yes   Is the patient having any side effects they believe may be caused by any medication additions or changes?  No   Does the patient have all medications ordered at discharge?  Yes   Is the patient taking all medications as directed (includes completed medication regime)?  Yes   Comments regarding appointments  He has an appt on 06/29/2021 today    Does the patient have a primary care provider?   Yes   Does the patient have an appointment with their PCP within 7 days of discharge?  Yes   Has the patient kept scheduled appointments due by today?  Yes   Psychosocial issues?  No   Did the patient receive a copy of their discharge instructions?  Yes   Nursing interventions  Reviewed instructions with patient   What is the patient's perception of their health status since discharge?  Improving   Is the patient/caregiver able to teach back signs and symptoms related to disease process for when to call PCP?  Yes   Is the patient/caregiver able to teach back signs and symptoms related to disease process for when to call 911?  Yes   Is the patient/caregiver able to teach back the hierarchy of who to call/visit for symptoms/problems? PCP, Specialist, Home health nurse, Urgent Care, ED, 911  Yes   If the patient is a current smoker, are they able to teach back resources for cessation?  Not a smoker   Week 1 call completed?  Yes          Nanci Mooney RN

## 2021-06-30 ENCOUNTER — TELEPHONE (OUTPATIENT)
Dept: PRIMARY CARE CLINIC | Age: 71
End: 2021-06-30

## 2021-06-30 DIAGNOSIS — C44.519 BASAL CELL CARCINOMA OF CHEST: Primary | ICD-10-CM

## 2021-06-30 NOTE — TELEPHONE ENCOUNTER
Called patient,notified of Renal U/S scheduled for 7-19 at [phone] and left message on identified voice mail with date,time and NPO for 8 hours

## 2021-07-01 NOTE — PROGRESS NOTES
Progress Note      Pt Name: Edouard Neves: 1950  MRN: [id number]    Date of evaluation: 7/2/2021  History Obtained From:  patient, spouse, electronic medical record    CHIEF COMPLAINT:    Chief Complaint   Patient presents with    Follow-up     Thrombocytopenia ([address]     Current active problems  Thrombocytopenia  B12 deficiency  Pancreatic lesion    HISTORY OF PRESENT ILLNESS:    Josy Cole is a 72 y.o.  male with B12 deficiency and mild to moderate thrombocytopenia followed in the office since 9/19/2019. He continues taking oral B12 daily. He continues on aspirin 81 daily-only for cardiac stent. He denies any new chest pain. He does bruise mildly on his hands but nothing significantly worse. He does not have any bleeding whatsoever. He continues to recover from his prior left reverse total shoulder arthroplasty by Dr. Diego Castillo on 12/17/2020. He did have his follow-up imaging of a small pancreatic lesion at [facility]. Since he had that imaging he went to [facility] because of weakness and had further CT imaging there that revealed a small renal lesion with ultrasound planned. He is diabetic and his A1c has been doing well running in the 5-6 range. He is now off of Metformin and controlling it with diet only. He has significant diabetic neuropathy however he continues on Neurontin. He denies any COPD exacerbation. Blood pressure is doing well with his lisinopril. Seasonal allergies are more of an issue he is on his Zyrtec. He also takes Astelin nasal spray. He continues taking his Aricept. HEMATOLOGY HISTORY: Thrombocytopenia, B12 deficiency  Bhakti Calderon was seen in initial hematology consultation on 9/19/2019 referred [address] [place] [address] for evaluation of thrombocytopenia.     Bhakti Calderon presented to his initial visit with his wife. I asked him if he had ever had a prior issues with low platelets and he denied knowing any history.   He did report that he bruised easily and bleeds easily at times, \"as long as I can remember\". He denied taking aspirin or oral nonsteroidals.     CBCs obtained from epic:          Abdominal ultrasound 10/7/2015 (ordered by Dr. Tin Loyola) revealed a \"normal spleen\"-no measurement given. I have asked him if he remembered previously being seen by Dr. Tin Loyola, and he reported that he did not.     He denied any prior history of liver dysfunction. He has chronic back pain and gets injections by Dr. Tran Cummings, and denied having any significant bleeding issues except for superficial bleeding.     He had been experiencing thrombocytopenia per records dating back to at least 2014. Recent serology on 9/4/2019 revealed that his LFTs were normal.  A PT/INR on 9/4/2019 was normal as well.     I discussed the abnormalities on his CBC that was limited to his platelets, number 1 we will maintain his wife on his initial visit. I discussed the role of platelets. He does have ecchymosis mostly on his arms. CBC on 9/19/2019 revealed a WBC of 9.94 with normal percent differential, Hgb 14 with MCV 98.8, PLT 91,000.     Serology will be requested. I discussed potential need for bone marrow aspiration and biopsy, described the procedure as well today. We will await findings from the initial serology.     Serology 9/19/2019  DIC screen - negative  B12 - 295  Folate - 9.4  Copper - 80  Zinc - 70  Antiplatelet ab - Ia/IIa -negative,  IIb/IIIa POSITIVE  HIV - Non reactive  SPEP - No M spike  QI - IgG 1030, IgA 322, IgM 50 - all WNL  MMA - 253  FATOUMATA - negative    Bone marrow aspiration and biopsy 2/13/2020  · Normocellular bone marrow for age (~30 to 35%) with trilineage hematopoiesis, no significant dyspoiesis and no increase in blasts (~1%)  · Adequate megakaryocytes  · No diagnostic evidence of lymphoproliferative or myelodysplastic process or metastatic malignancy or granulomas  · Normal male karyotype    CTA abdomen 11/3/2019 at [facility] revealed that the liver and spleen were \"normal\". Serology 5/27/2020  LDH - 307  Retic - 0.8%  Haptoglobin - 102  B12 - 867  CMP - crt 1.6 with GFR 43      TREATMENT SUMMARY  B12 1000 mcg IM then changed to p.o. TUMOR HISTORY: Pancreatic lesion    CTA of the abdomen performed on 11/3/2019 at [facility] which revealed that his liver and spleen were \"normal\". It did show some rectal wall thickening, and a tiny density in the pancreatic tail. He and his wife declined MRI-MRCP. His insurance would not cover a CA-19-9 to be drawn. CT abdomen with contrast 6/15/2020 at [facility] was compared to 11/3/2019 revealed  · 4 mm hypodense nodule in tail of the pancreas possibly representing cyst or pseudocyst  · Nondilated pancreatic duct  · Another 6-month follow-up CT recommended to ensure stability. · Bilateral renal cysts and nonobstructing renal calculi    MRI abdomen with and without contrast MRCP at [facility] 11/24/2020 was compared to CT 6/15/2020 and 11/3/2019  · No pancreatic mass or cyst identified. No MR correlate for the tiny pancreatic tail lesion identified on prior CT    CT abdomen with and without contrast 3/17/2021 at [facility]  · Stable 3 mm pancreatic lesion suggesting benign etiology.     Past Medical History:   Diagnosis Date    Arthritis     CAD (coronary artery disease)     sees dr. Gatito Casillas Chronic back pain     COPD (chronic obstructive pulmonary disease) [address]     Hypertension     Peripheral sensory neuropathy     Pleurisy     Shoulder pain     TIA (transient ischemic attack)     Type II or unspecified type diabetes mellitus without mention of complication, not stated as uncontrolled         Past Surgical History:   Procedure Laterality Date    APPENDECTOMY      CARDIAC CATHETERIZATION  11/04/2019    PIETER to LAD    SHOULDER SURGERY Left 12/17/2020    LEFT REVERSE TOTAL SHOULDER ARTHROPLASTY performed by Diane Jimenez MD at [facility] OR       Current Outpatient Medications:     traZODone (DESYREL) 50 MG tablet, Take 1 tablet by mouth nightly, Disp: 30 tablet, Rfl: 3    pantoprazole (PROTONIX) 40 MG tablet, Take 1 tablet by mouth daily Indications: Reflux Gastritis, Disp: 90 tablet, Rfl: 1    tiZANidine (ZANAFLEX) 4 MG tablet, TAKE 1 TABLET BY MOUTH EVERY 8 HOURS AS NEEDED (BACK PAIN), Disp: 30 tablet, Rfl: 1    gabapentin (NEURONTIN) 800 MG tablet, TAKE ONE TABLET BY MOUTH THREE TIMES DAILY, Disp: 90 tablet, Rfl: 0    citalopram (CELEXA) 10 MG tablet, Take 1 tablet by mouth daily, Disp: 90 tablet, Rfl: 1    donepezil (ARICEPT) 5 MG tablet, Take 1 tablet by mouth nightly, Disp: 90 tablet, Rfl: 1    memantine (NAMENDA) 10 MG tablet, Take 1 tablet by mouth 2 times daily, Disp: 180 tablet, Rfl: 1    oxyCODONE-acetaminophen (PERCOCET)  MG per tablet, , Disp: , Rfl:     diclofenac sodium (VOLTAREN) 1 % GEL, Apply topically 2 times daily, Disp: 100 g, Rfl: 3    tamsulosin (FLOMAX) 0.4 MG capsule, Take 1 capsule by mouth daily, Disp: 90 capsule, Rfl: 3    albuterol sulfate HFA (VENTOLIN HFA) 108 (90 Base) MCG/ACT inhaler, Inhale 2 puffs into the lungs every 6 hours as needed for Wheezing, Disp: , Rfl:     meclizine (ANTIVERT) 25 MG tablet, Take 25 mg by mouth 3 times daily as needed for Dizziness , Disp: , Rfl:     lisinopril (PRINIVIL;ZESTRIL) 40 MG tablet, TAKE ONE TABLET BY MOUTH DAILY. (Patient taking differently: Take 40 mg by mouth daily TAKE ONE TABLET BY MOUTH DAILY.), Disp: 90 tablet, Rfl: 3    pravastatin (PRAVACHOL) 80 MG tablet, TAKE ONE TABLET BY MOUTH EVERY EVENING.  (Patient taking differently: Take 80 mg by mouth nightly TAKE ONE TABLET BY MOUTH EVERY EVENING.), Disp: 90 tablet, Rfl: 3    Diabetic Shoe MISC, by Does not apply route DISPENSE ONE PAIR DIABETIC SHOES AND THREE PAIRS OF HEAT MOLDED INSERTS, Disp: 1 each, Rfl: 0    azelastine (ASTELIN) 0.1 % nasal spray, 1 spray by Nasal route 2 times daily Use in each nostril as directed, Disp: 2 Bottle, Rfl: 1    aspirin 81 MG chewable tablet, Take 1 tablet by mouth daily, Disp: 30 tablet, Rfl: 3    cetirizine (ZYRTEC) 10 MG tablet, TAKE 1 TABLET BY MOUTH DAILY (Patient taking differently: Take 10 mg by mouth daily TAKE 1 TABLET BY MOUTH DAILY), Disp: 90 tablet, Rfl: 3    triamcinolone (KENALOG) 0.1 % cream, Apply topically 2 times daily. (Patient taking differently: Apply topically 2 times daily Apply topically 2 times daily.), Disp: 1 Tube, Rfl: 1    Misc. Devices (WALKER) MISC, 1 each by Does not apply route daily, Disp: 1 each, Rfl: 0    nitroGLYCERIN (NITROSTAT) 0.4 MG SL tablet, up to max of 3 total doses. If no relief after 1 dose, call 911., Disp: 25 tablet, Rfl: 3    EPINEPHrine (EPIPEN) 0.3 MG/0.3ML SOAJ injection, Use as directed for allergic reaction, Disp: 0.3 mL, Rfl: 3       Allergies   Allergen Reactions    Bee Venom Anaphylaxis    Pcn [Penicillins] Other (See Comments)     Made pt \"black out\"       Social History     Tobacco Use    Smoking status: Former Smoker     Packs/day: 1.50     Years: 15.00     Pack years: 22.50     Types: Cigarettes     Quit date: 10/23/2000     Years since quittin.7    Smokeless tobacco: Never Used   Substance Use Topics    Alcohol use: No    Drug use: No       Family History   Problem Relation Age of Onset    Cancer Mother     Heart Disease Father        Subjective   REVIEW OF SYSTEMS:   Review of Systems   Constitutional: Positive for fatigue (Moderate). Negative for chills, diaphoresis, fever and unexpected weight change. HENT: Negative for mouth sores, nosebleeds, sore throat, trouble swallowing and voice change. Eyes: Negative for photophobia, discharge and itching. Respiratory: Positive for shortness of breath (Mild and stable). Negative for cough and wheezing. Cardiovascular: Negative for chest pain, palpitations and leg swelling. Gastrointestinal: Negative for abdominal distention, abdominal pain, blood in stool, constipation, diarrhea, nausea and vomiting.    Endocrine: Negative for cold intolerance, heat intolerance, polydipsia and polyuria. Genitourinary: Negative for difficulty urinating, dysuria, hematuria and urgency. Musculoskeletal: Positive for arthralgias (Right shoulder) and back pain. Negative for joint swelling and myalgias. Skin: Negative for color change and rash. Allergic/Immunologic: Positive for environmental allergies. Neurological: Positive for numbness. Negative for dizziness, tremors, seizures, syncope and light-headedness. Hematological: Negative for adenopathy. Bruises/bleeds easily (Bruising is stable overall without any exacerbation). Psychiatric/Behavioral: Negative for behavioral problems and suicidal ideas. The patient is not nervous/anxious. All other systems reviewed and are negative. Objective   /60   Pulse 74   Ht 5' 11\" (1.803 m)   Wt 216 lb 14.4 oz (98.4 kg)   SpO2 97%   BMI 30.25 kg/m²     PHYSICAL EXAM:  Physical Exam  Vitals reviewed. Constitutional:       General: He is not in acute distress. Appearance: He is well-developed. HENT:      Head: Normocephalic and atraumatic. Nose: Nose normal.   Eyes:      General: No scleral icterus. Conjunctiva/sclera: Conjunctivae normal.   Neck:      Vascular: No JVD. Trachea: No tracheal deviation. Cardiovascular:      Rate and Rhythm: Normal rate and regular rhythm. Heart sounds: Normal heart sounds. No murmur heard. Pulmonary:      Effort: Pulmonary effort is normal. No respiratory distress. Breath sounds: Normal breath sounds. No wheezing. Abdominal:      General: Bowel sounds are normal. There is no distension. Palpations: Abdomen is soft. There is no fluid wave, hepatomegaly, splenomegaly or mass. Tenderness: There is no abdominal tenderness. Musculoskeletal:         General: No tenderness or deformity. Normal range of motion. Skin:     Findings: Ecchymosis (Mild on forearms) present. No erythema or rash.    Neurological:      Mental Status: He is alert and oriented to person, place, and time. Coordination: Coordination abnormal.      Gait: Gait abnormal.   Psychiatric:         Thought Content: Thought content normal.          Narrative   Exam: MR abdomen without and with IV contrast, including 3-D MRCP   sequences   INDICATION: Evaluate pancreatic lesion seen on prior CT   COMPARISON: CT 11/3/2019 and 6/15/2020   FINDINGS:   Liver: No hepatic steatosis or morphologic changes of cirrhosis. No   suspicious liver lesion. Bile ducts: No biliary ductal dilatation or biliary tree filling   defect. Gallbladder: Normal.   Vasculature: Nonaneurysmal abdominal aorta. Patent portal vein. Pancreas: Normal. No cyst or solid mass identified. Spleen: Tiny benign-appearing cyst. Otherwise, unremarkable. Adrenals: Normal.   Kidneys: Bilateral renal cysts. No solid renal mass. Other Findings: Included lower chest appears unremarkable. No soft   tissue abnormality. No suspicious focal bone lesion. No abnormal bowel   distention or inflammation. No ascites. No abdominal lymphadenopathy.       Impression   No pancreatic mass or cyst identified. No MR correlate for the tiny   pancreatic tail lesion identified on prior CT. Signed by Dr Marjory Cooks on 11/24/2020 12:03 PM     Narrative   EXAMINATION: CT ABDOMEN [address] [employer] 3/17/2021 11:05 AM   HISTORY: Follow-up pancreatic tail lesion   COMPARISON: MRI abdomen with and without contrast 11/24/2020, CT   abdomen with contrast 6/15/2020 and 11/3/2019   Technique: Sequential imaging was performed from the lung bases   through the lower abdomen before and after the administration of IV   contrast per pancreatic protocol. Sagittal and coronal reformations   were made from the original source data and reviewed. Automated   exposure control was utilized for radiation dose reduction. Radiation dose: DLP 2415 mGy-cm   Findings: The heart is enlarged.  There are dependent changes in the lung bases, which otherwise appear clear. There is artifact through the abdomen due to patient's arms. The   liver, gallbladder, spleen, and adrenal glands are unremarkable. There   are calcifications in the kidneys bilaterally, favored to be vascular   in nature or possibly tiny stones. There is a 1.9 cm right renal cyst.   The visualized ureters are decompressed. The visualized abdominal aorta appears normal in caliber. The origins   of the celiac axis, superior mesenteric artery, and inferior   mesenteric artery enhance normally. There are scattered   atherosclerotic calcifications of the aorta and its branch vessels. Dedicated imaging of the pancreas was performed. A tiny low-density   lesion is seen in the body of the pancreas which measures 3 mm in   size, stable. No additional pancreatic lesions are identified. No pathologically enlarged central mesenteric or retroperitoneal lymph   nodes are identified. The stomach is decompressed and therefore not well evaluated. The   visualized small there appears normal in caliber. There is a lower   ventral abdominal wall defect which contains normal-appearing loops of   bowel. The visualized large bowel contains a large amount of stool. There are scattered colonic diverticula. Review of the visualized osseous structures demonstrates no acute or   aggressive lesions. Degenerative osteophyte formation is present in   the spine.       Impression   Impression:   1. Exam limited due to artifact from the patient's arms. Stable tiny   pancreatic lesion suggests benign etiology. 2. Fecal stasis. Signed by Dr Kindra Marcano on 3/17/2021 11:15 AM         CBC reviewed by me    Lab Results   Component Value Date    WBC 9.80 (H) 03/03/2021    HGB 14.4 03/03/2021    HCT 44.1 03/03/2021    .6 (H) 03/03/2021    PLT 80 (L) 03/03/2021       VISIT DIAGNOSES  1. Thrombocytopenia ([address]    2. B12 deficiency    3. Lesion of pancreas        ASSESSMENT/PLAN:    1. Thrombocytopenia  2. B12 deficiency    PLT today is decreased slightly to 74,000. This is still adequate. He is on aspirin 81 daily but off of Plavix. He is not on any other anticoagulation. He did have a positive antiplatelet antibody so he may have a mild ITP. However, his platelet count is adequate at present but if it were to drop in the 50,000 range we may consider a course of steroids. He is diabetic. 3.  Macrocytosis    He is on B12 replacement. His folate, copper, zinc levels were normal.     TSH 2.87 on 3/4/2021    MCV is better at 99.2. Hgb is stable at 12.9.      4.  Pancreas lesion. He did have a CTA of the abdomen performed on 11/3/2019 at [facility] which revealed that his liver and spleen were \"normal\". It did show some rectal wall thickening, and a tiny density in the pancreatic tail. He and his wife declined MRI-MRCP    CT abdomen with contrast 6/15/2020 at [facility] was compared to 11/3/2019 revealed  · 4 mm hypodense nodule in tail of the pancreas possibly representing cyst or pseudocyst  · Nondilated pancreatic duct  · Another 6-month follow-up CT recommended to ensure stability. · Bilateral renal cysts and nonobstructing renal calculi      MRI abdomen with and without contrast MRCP at [facility] 11/24/2020 was compared to CT 6/15/2020 and 11/3/2019  · No pancreatic mass or cyst identified. No MR correlate for the tiny pancreatic tail lesion identified on prior CT    CT abdomen with and without contrast 3/17/2021 at [facility]  · Stable 3 mm pancreatic lesion suggesting benign etiology. No further follow-up imaging planned. · Prostate cancer screening. [facility] · Colon cancer screening. Colonoscopy per Dr. Thu Restrepo 2/24/2021 with polyps encountered and removed with follow-up plan 3 years.       Immunization History   Administered Date(s) Administered    COVID-19, J&J, PF, 0.5 mL 04/01/2021    Influenza, Quadv, adjuvanted, 65 yrs +, IM, PF (Fluad) 09/22/2020 Return in about 4 months (around 11/2/2021).      Royal Daljit PA-C  11:39 AM  7/2/2021

## 2021-07-02 ENCOUNTER — HOSPITAL ENCOUNTER (OUTPATIENT)
Dept: INFUSION THERAPY | Age: 71
Discharge: HOME | End: 2021-07-02
Payer: MEDICARE

## 2021-07-02 ENCOUNTER — OFFICE VISIT (OUTPATIENT)
Dept: HEMATOLOGY | Age: 71
End: 2021-07-02
Payer: MEDICARE

## 2021-07-02 VITALS
OXYGEN SATURATION: 97 % | SYSTOLIC BLOOD PRESSURE: 122 MMHG | HEART RATE: 74 BPM | DIASTOLIC BLOOD PRESSURE: 60 MMHG | HEIGHT: 71 IN | WEIGHT: 216.9 LBS | BODY MASS INDEX: 30.36 KG/M2

## 2021-07-02 DIAGNOSIS — C44.519 BASAL CELL CARCINOMA OF CHEST: ICD-10-CM

## 2021-07-02 DIAGNOSIS — D69.6 THROMBOCYTOPENIA (HCC): Primary | ICD-10-CM

## 2021-07-02 DIAGNOSIS — K86.9 LESION OF PANCREAS: ICD-10-CM

## 2021-07-02 DIAGNOSIS — E53.8 B12 DEFICIENCY: ICD-10-CM

## 2021-07-02 LAB
BASOPHILS ABSOLUTE: 0.01 K/UL (ref 0.01–0.08)
BASOPHILS RELATIVE PERCENT: 0.1 % (ref 0.1–1.2)
EOSINOPHILS ABSOLUTE: 0.12 K/UL (ref 0.04–0.54)
EOSINOPHILS RELATIVE PERCENT: 1.7 % (ref 0.7–7)
HCT VFR BLD CALC: 38.9 % (ref 40.1–51)
HEMOGLOBIN: 12.9 G/DL (ref 13.7–17.5)
LYMPHOCYTES ABSOLUTE: 1.92 K/UL (ref 1.18–3.74)
LYMPHOCYTES RELATIVE PERCENT: 26.8 % (ref 19.3–53.1)
MCH RBC QN AUTO: 32.9 PG (ref 25.7–32.2)
MCHC RBC AUTO-ENTMCNC: 33.2 G/DL (ref 32.3–36.5)
MCV RBC AUTO: 99.2 FL (ref 79–92.2)
MONOCYTES ABSOLUTE: 0.48 K/UL (ref 0.24–0.82)
MONOCYTES RELATIVE PERCENT: 6.7 % (ref 4.7–12.5)
NEUTROPHILS ABSOLUTE: 4.63 K/UL (ref 1.56–6.13)
NEUTROPHILS RELATIVE PERCENT: 64.7 % (ref 34–71.1)
PDW BLD-RTO: 12.6 % (ref 11.6–14.4)
PLATELET # BLD: 74 K/UL (ref 163–337)
PMV BLD AUTO: 10 FL (ref 7.4–10.4)
RBC # BLD: 3.92 M/UL (ref 4.63–6.08)
WBC # BLD: 7.16 K/UL (ref 4.23–9.07)

## 2021-07-02 PROCEDURE — 36415 COLL VENOUS BLD VENIPUNCTURE: CPT

## 2021-07-02 PROCEDURE — G8427 DOCREV CUR MEDS BY ELIG CLIN: HCPCS | Performed by: PHYSICIAN ASSISTANT

## 2021-07-02 PROCEDURE — 3017F COLORECTAL CA SCREEN DOC REV: CPT | Performed by: PHYSICIAN ASSISTANT

## 2021-07-02 PROCEDURE — 1123F ACP DISCUSS/DSCN MKR DOCD: CPT | Performed by: PHYSICIAN ASSISTANT

## 2021-07-02 PROCEDURE — 1036F TOBACCO NON-USER: CPT | Performed by: PHYSICIAN ASSISTANT

## 2021-07-02 PROCEDURE — 4040F PNEUMOC VAC/ADMIN/RCVD: CPT | Performed by: PHYSICIAN ASSISTANT

## 2021-07-02 PROCEDURE — G8417 CALC BMI ABV UP PARAM F/U: HCPCS | Performed by: PHYSICIAN ASSISTANT

## 2021-07-02 PROCEDURE — 99211 OFF/OP EST MAY X REQ PHY/QHP: CPT

## 2021-07-02 PROCEDURE — 85025 COMPLETE CBC W/AUTO DIFF WBC: CPT

## 2021-07-02 PROCEDURE — 99213 OFFICE O/P EST LOW 20 MIN: CPT | Performed by: PHYSICIAN ASSISTANT

## 2021-07-02 ASSESSMENT — ENCOUNTER SYMPTOMS
CONSTIPATION: 0
WHEEZING: 0
PHOTOPHOBIA: 0
SHORTNESS OF BREATH: 1
COLOR CHANGE: 0
TROUBLE SWALLOWING: 0
ABDOMINAL DISTENTION: 0
VOMITING: 0
SORE THROAT: 0
DIARRHEA: 0
NAUSEA: 0
EYE DISCHARGE: 0
BACK PAIN: 1
VOICE CHANGE: 0
COUGH: 0
BLOOD IN STOOL: 0
ABDOMINAL PAIN: 0
EYE ITCHING: 0

## 2021-07-06 ENCOUNTER — READMISSION MANAGEMENT (OUTPATIENT)
Dept: CALL CENTER | Facility: HOSPITAL | Age: 71
End: 2021-07-06

## 2021-07-06 NOTE — OUTREACH NOTE
Medical Week 2 Survey      Responses   [facility] patient discharged from?  [place]   Does the patient have one of the following disease processes/diagnoses(primary or secondary)?  Other   Week 2 attempt successful?  No   Unsuccessful attempts  Attempt 1          Sherri Marie RN

## 2021-07-08 ENCOUNTER — READMISSION MANAGEMENT (OUTPATIENT)
Dept: CALL CENTER | Facility: HOSPITAL | Age: 71
End: 2021-07-08

## 2021-07-08 NOTE — OUTREACH NOTE
Medical Week 2 Survey      Responses   [facility] patient discharged from?  [place]   Does the patient have one of the following disease processes/diagnoses(primary or secondary)?  Other   Week 2 attempt successful?  Yes   Call start time  1034   Call end time  1035   Meds reviewed with patient/caregiver?  Yes   Is the patient taking all medications as directed (includes completed medication regime)?  Yes   Has the patient kept scheduled appointments due by today?  Yes   What is the patient's perception of their health status since discharge?  Improving   Week 2 Call Completed?  Yes   Graduated  Yes   Did the patient feel the follow up calls were helpful during their recovery period?  Yes   Was the number of calls appropriate?  Yes   Graduated/Revoked comments  He adn his wife feels no calls needed, he is feeling so much better, has seen  and medications being taken, will call us if needed          Pastora Portillo RN

## 2021-07-15 ENCOUNTER — VIRTUAL VISIT (OUTPATIENT)
Dept: PRIMARY CARE CLINIC | Age: 71
End: 2021-07-15
Payer: MEDICARE

## 2021-07-15 ENCOUNTER — TELEPHONE (OUTPATIENT)
Dept: PRIMARY CARE CLINIC | Age: 71
End: 2021-07-15

## 2021-07-15 DIAGNOSIS — R11.0 NAUSEA: Primary | ICD-10-CM

## 2021-07-15 PROCEDURE — G8427 DOCREV CUR MEDS BY ELIG CLIN: HCPCS | Performed by: NURSE PRACTITIONER

## 2021-07-15 PROCEDURE — 99213 OFFICE O/P EST LOW 20 MIN: CPT | Performed by: NURSE PRACTITIONER

## 2021-07-15 PROCEDURE — 3017F COLORECTAL CA SCREEN DOC REV: CPT | Performed by: NURSE PRACTITIONER

## 2021-07-15 PROCEDURE — 4040F PNEUMOC VAC/ADMIN/RCVD: CPT | Performed by: NURSE PRACTITIONER

## 2021-07-15 PROCEDURE — 1036F TOBACCO NON-USER: CPT | Performed by: NURSE PRACTITIONER

## 2021-07-15 PROCEDURE — 1123F ACP DISCUSS/DSCN MKR DOCD: CPT | Performed by: NURSE PRACTITIONER

## 2021-07-15 PROCEDURE — G8417 CALC BMI ABV UP PARAM F/U: HCPCS | Performed by: NURSE PRACTITIONER

## 2021-07-15 RX ORDER — ONDANSETRON 4 MG/1
4 TABLET, ORALLY DISINTEGRATING ORAL EVERY 8 HOURS PRN
Qty: 30 TABLET | Refills: 0 | Status: SHIPPED | OUTPATIENT
Start: 2021-07-15 | End: 2022-09-19 | Stop reason: SDUPTHER

## 2021-07-15 ASSESSMENT — ENCOUNTER SYMPTOMS
SHORTNESS OF BREATH: 0
BACK PAIN: 0
COLOR CHANGE: 0
ABDOMINAL PAIN: 0
COUGH: 0
DIARRHEA: 0
VOMITING: 0
VOICE CHANGE: 0
RHINORRHEA: 0
NAUSEA: 1
PHOTOPHOBIA: 0

## 2021-07-15 NOTE — TELEPHONE ENCOUNTER
I spoke with wife about Loyda Mckenna wants Fuentes Cody to go get labs done tomorrow morning. Wife said she would make sure he goes and gets them done tomorrow morning.

## 2021-07-15 NOTE — PROGRESS NOTES
vomiting. Endocrine: Negative. Negative for cold intolerance and heat intolerance. Genitourinary: Negative for difficulty urinating and flank pain. Musculoskeletal: Negative for back pain and neck pain. Skin: Negative for color change and rash. Allergic/Immunologic: Negative for environmental allergies and food allergies. Neurological: Negative for dizziness, speech difficulty and headaches. Hematological: Does not bruise/bleed easily. Psychiatric/Behavioral: Negative for sleep disturbance and suicidal ideas. Prior to Visit Medications    Medication Sig Taking?  Authorizing Provider   ondansetron (ZOFRAN ODT) 4 MG disintegrating tablet Take 1 tablet by mouth every 8 hours as needed for Nausea or Vomiting Yes Dony Soda, APRN   traZODone (DESYREL) 50 MG tablet Take 1 tablet by mouth nightly  Dony Soda, APRN   pantoprazole (PROTONIX) 40 MG tablet Take 1 tablet by mouth daily Indications: Reflux Gastritis  Dony Soda, APRN   tiZANidine (ZANAFLEX) 4 MG tablet TAKE 1 TABLET BY MOUTH EVERY 8 HOURS AS NEEDED (BACK PAIN)  Dony Soda, APRN   gabapentin (NEURONTIN) 800 MG tablet TAKE ONE TABLET BY MOUTH THREE TIMES DAILY  Dony Soda, APRN   citalopram (CELEXA) 10 MG tablet Take 1 tablet by mouth daily  BRIGETTE Vasques   donepezil (ARICEPT) 5 MG tablet Take 1 tablet by mouth nightly  BRIGETTE Vasques   memantine (NAMENDA) 10 MG tablet Take 1 tablet by mouth 2 times daily  BRIGETTE Vasques   oxyCODONE-acetaminophen (PERCOCET)  MG per tablet   Michael Judd MD   diclofenac sodium (VOLTAREN) 1 % GEL Apply topically 2 times daily  BRIGETTE Triana   tamsulosin (FLOMAX) 0.4 MG capsule Take 1 capsule by mouth daily  BRIGETTE Triana   albuterol sulfate HFA (VENTOLIN HFA) 108 (90 Base) MCG/ACT inhaler Inhale 2 puffs into the lungs every 6 hours as needed for Wheezing  Historical Provider, MD   meclizine (ANTIVERT) 25 MG tablet Take 25 mg by mouth 3 times daily as needed for Dizziness   Historical Provider, MD   lisinopril (PRINIVIL;ZESTRIL) 40 MG tablet TAKE ONE TABLET BY MOUTH DAILY. Patient taking differently: Take 40 mg by mouth daily TAKE ONE TABLET BY MOUTH DAILY. BRIGETTE Triana   pravastatin (PRAVACHOL) 80 MG tablet TAKE ONE TABLET BY MOUTH EVERY EVENING. Patient taking differently: Take 80 mg by mouth nightly TAKE ONE TABLET BY MOUTH EVERY EVENING. BRIGETTE Henderson   Diabetic Shoe MISC by Does not apply route DISPENSE ONE PAIR DIABETIC SHOES AND THREE PAIRS OF HEAT MOLDED INSERTS  Monik Ealge MD   azelastine (ASTELIN) 0.1 % nasal spray 1 spray by Nasal route 2 times daily Use in each nostril as directed  BRIGETTE Sutherland NP   aspirin 81 MG chewable tablet Take 1 tablet by mouth daily  BRIGETTE Sutherland NP   cetirizine (ZYRTEC) 10 MG tablet TAKE 1 TABLET BY MOUTH DAILY  Patient taking differently: Take 10 mg by mouth daily TAKE 1 TABLET BY MOUTH DAILY  BRIGETTE Sutherland NP   triamcinolone (KENALOG) 0.1 % cream Apply topically 2 times daily. Patient taking differently: Apply topically 2 times daily Apply topically 2 times daily. BRIGETTE Henderson   Misc. Devices (WALKER) MISC 1 each by Does not apply route daily  BRIGETTE Triana   nitroGLYCERIN (NITROSTAT) 0.4 MG SL tablet up to max of 3 total doses. If no relief after 1 dose, call 911.    MD Malathi   EPINEPHrine [facility]) 0.3 MG/0.3ML SOAJ injection Use as directed for allergic reaction  Monik Eagle MD   omega-3 acid ethyl esters (LOVAZA) 1 G capsule Take 2 capsules by mouth 2 times daily  Kostas Boyce PA-C       Social History     Tobacco Use    Smoking status: Former Smoker     Packs/day: 1.50     Years: 15.00     Pack years: 22.50     Types: Cigarettes     Quit date: 10/23/2000     Years since quittin.7    Smokeless tobacco: Never Used   Substance Use Topics    Alcohol use: No    Drug use: No        Allergies Allergen Reactions    Bee Venom Anaphylaxis    Pcn [Penicillins] Other (See Comments)     Made pt \"black out\"   ,   Past Medical History:   Diagnosis Date    Arthritis     CAD (coronary artery disease)     sees dr. Gene Starr Chronic back pain     COPD (chronic obstructive pulmonary disease) ([facility] [address]     Hypertension     Peripheral sensory neuropathy     Pleurisy     Shoulder pain     TIA (transient ischemic attack)     Type II or unspecified type diabetes mellitus without mention of complication, not stated as uncontrolled    ,   Past Surgical History:   Procedure Laterality Date    APPENDECTOMY      CARDIAC CATHETERIZATION  2019    PIETER to LAD    SHOULDER SURGERY Left 2020    LEFT REVERSE TOTAL SHOULDER ARTHROPLASTY performed by Rene Ibrahim MD at [facility] OR   ,   Social History     Tobacco Use    Smoking status: Former Smoker     Packs/day: 1.50     Years: 15.00     Pack years: 22.50     Types: Cigarettes     Quit date: 10/23/2000     Years since quittin.7    Smokeless tobacco: Never Used   Substance Use Topics    Alcohol use: No    Drug use: No   ,   Family History   Problem Relation Age of Onset    Cancer Mother     Heart Disease Father    ,   Immunization History   Administered Date(s) Administered    COVID-19, J&J, PF, 0.5 mL 2021    Influenza 10/15/2013    Influenza Virus Vaccine 10/13/2014, 2016    Influenza, High Dose (Fluzone 65 yrs and older) 10/23/2018    Influenza, Quadv, IM, (6 mo and older Fluzone, Flulaval, Fluarix and 3 yrs and older Afluria) 10/30/2017    Influenza, Quadv, IM, PF (6 mo and older Fluzone, Flulaval, Fluarix, and 3 yrs and older Afluria) 2019    Influenza, Quadv, adjuvanted, 65 yrs +, IM, PF (Fluad) 2020    Pneumococcal Conjugate 13-valent ([id number]) 2017    Pneumococcal Polysaccharide ([id number]) 2015   ,   Health Maintenance   Topic Date Due    DTaP/Tdap/Td vaccine (1 - Tdap) Never done    Shingles Vaccine (1 of 2) Never done    Diabetic retinal exam  08/21/2014    Annual Wellness Visit (AWV)  Never done    Pneumococcal 65+ years Vaccine (2 of 2 - PPSV23) 09/28/2020    Diabetic microalbuminuria test  01/13/2021    Flu vaccine (1) 09/01/2021    Diabetic foot exam  09/22/2021    Lipid screen  12/02/2021    A1C test (Diabetic or Prediabetic)  03/16/2022    Potassium monitoring  04/23/2022    Creatinine monitoring  04/23/2022    Colon cancer screen colonoscopy  02/24/2026    COVID-19 Vaccine  Completed    AAA screen  Completed    Hepatitis C screen  Completed    Hepatitis A vaccine  Aged Out    Hib vaccine  Aged Out    Meningococcal (ACWY) vaccine  Aged Out       PHYSICAL EXAMINATION:  [ INSTRUCTIONS:  \"[x]\" Indicates a positive item  \"[]\" Indicates a negative item  -- DELETE ALL ITEMS NOT EXAMINED]  [x] Alert  [x] Oriented to person/place/time    [x] No apparent distress  [] Toxic appearing    [] Face flushed appearing [x] Sclera clear  [] Lips are cyanotic      [x] Breathing appears normal  [] Appears tachypneic      [] Rash on visible skin    [x] Cranial Nerves II-XII grossly intact    [x] Motor grossly intact in visible upper extremities    [x] Motor grossly intact in visible lower extremities    [x] Normal Mood  [] Anxious appearing    [] Depressed appearing  [] Confused appearing      [] Poor short term memory  [] Poor long term memory    [] OTHER:      Due to this being a TeleHealth encounter, evaluation of the following organ systems is limited: Vitals/Constitutional/EENT/Resp/CV/GI//MS/Neuro/Skin/Heme-Lymph-Imm. There were no vitals taken for this visit.      Patient-Reported Vitals 7/15/2021   Patient-Reported Weight 216   Patient-Reported Height 5'10\"   Patient-Reported Systolic 552   Patient-Reported Diastolic 74   Patient-Reported Pulse 59   Patient-Reported Temperature 98.5   Patient-Reported SpO2 93          ASSESSMENT/PLAN:  1. Nausea  - He needs to come in in the morning for labs; will send in zofran for nausea. He should increase fluids. Go to ED if symptoms not improving.     - CBC Auto Differential; Future  - Comprehensive Metabolic Panel; Future  - ondansetron (ZOFRAN ODT) 4 MG disintegrating tablet; Take 1 tablet by mouth every 8 hours as needed for Nausea or Vomiting  Dispense: 30 tablet; Refill: 0      Return if symptoms worsen or fail to improve. An  electronic signature was used to authenticate this note. --Maritza Schilder, APRN on 7/15/2021 at 2:16 PM        Pursuant to the emergency declaration under the [facility] waiver authority and the [employer] Act, this Virtual  Visit was conducted, with patient's consent, to reduce the patient's risk of exposure to COVID-19 and provide continuity of care for an established patient. Services were provided through a video synchronous discussion virtually to substitute for in-person clinic visit.

## 2021-07-16 ENCOUNTER — TELEPHONE (OUTPATIENT)
Dept: PRIMARY CARE CLINIC | Age: 71
End: 2021-07-16

## 2021-07-16 DIAGNOSIS — R11.0 NAUSEA: ICD-10-CM

## 2021-07-16 LAB
ALBUMIN SERPL-MCNC: 5 G/DL (ref 3.5–5.2)
ALP BLD-CCNC: 79 U/L (ref 40–130)
ALT SERPL-CCNC: 14 U/L (ref 5–41)
ANION GAP SERPL CALCULATED.3IONS-SCNC: 16 MMOL/L (ref 7–19)
AST SERPL-CCNC: 18 U/L (ref 5–40)
BASOPHILS ABSOLUTE: 0 K/UL (ref 0–0.2)
BASOPHILS RELATIVE PERCENT: 0.3 % (ref 0–1)
BILIRUB SERPL-MCNC: 1 MG/DL (ref 0.2–1.2)
BUN BLDV-MCNC: 30 MG/DL (ref 8–23)
CALCIUM SERPL-MCNC: 10.4 MG/DL (ref 8.8–10.2)
CHLORIDE BLD-SCNC: 105 MMOL/L (ref 98–111)
CO2: 24 MMOL/L (ref 22–29)
CREAT SERPL-MCNC: 1.5 MG/DL (ref 0.5–1.2)
EOSINOPHILS ABSOLUTE: 0.1 K/UL (ref 0–0.6)
EOSINOPHILS RELATIVE PERCENT: 1.4 % (ref 0–5)
GFR AFRICAN AMERICAN: 56
GFR NON-AFRICAN AMERICAN: 46
GLUCOSE BLD-MCNC: 75 MG/DL (ref 74–109)
HCT VFR BLD CALC: 46.3 % (ref 42–52)
HEMOGLOBIN: 14.9 G/DL (ref 14–18)
IMMATURE GRANULOCYTES #: 0 K/UL
LYMPHOCYTES ABSOLUTE: 2.8 K/UL (ref 1.1–4.5)
LYMPHOCYTES RELATIVE PERCENT: 29.3 % (ref 20–40)
MCH RBC QN AUTO: 32.4 PG (ref 27–31)
MCHC RBC AUTO-ENTMCNC: 32.2 G/DL (ref 33–37)
MCV RBC AUTO: 100.7 FL (ref 80–94)
MONOCYTES ABSOLUTE: 0.6 K/UL (ref 0–0.9)
MONOCYTES RELATIVE PERCENT: 6.4 % (ref 0–10)
NEUTROPHILS ABSOLUTE: 6 K/UL (ref 1.5–7.5)
NEUTROPHILS RELATIVE PERCENT: 62.4 % (ref 50–65)
PDW BLD-RTO: 13.3 % (ref 11.5–14.5)
PLATELET # BLD: 110 K/UL (ref 130–400)
PMV BLD AUTO: 9.5 FL (ref 9.4–12.4)
POTASSIUM SERPL-SCNC: 4.5 MMOL/L (ref 3.5–5)
RBC # BLD: 4.6 M/UL (ref 4.7–6.1)
SODIUM BLD-SCNC: 145 MMOL/L (ref 136–145)
TOTAL PROTEIN: 7.4 G/DL (ref 6.6–8.7)
WBC # BLD: 9.6 K/UL (ref 4.8–10.8)

## 2021-07-16 NOTE — TELEPHONE ENCOUNTER
Pt's wife called reporting that the ondansetron you sent in is not really helping. He is not drinking much water but is urinating like normal. Patient's wife thinks he may be dehydrated. I advised her that if he becomes lethargic or confused, or just not acting like himself to take him to the ED. Currently wife reports his vitals are good and sugar is good.

## 2021-07-19 ENCOUNTER — HOSPITAL ENCOUNTER (OUTPATIENT)
Dept: ULTRASOUND IMAGING | Age: 71
Discharge: HOME | End: 2021-07-19
Payer: MEDICARE

## 2021-07-19 DIAGNOSIS — N28.9 RENAL LESION: ICD-10-CM

## 2021-07-19 PROCEDURE — 76770 US EXAM ABDO BACK WALL COMP: CPT

## 2021-08-17 ENCOUNTER — OFFICE VISIT (OUTPATIENT)
Dept: CARDIOLOGY CLINIC | Age: 71
End: 2021-08-17
Payer: MEDICARE

## 2021-08-17 VITALS
HEART RATE: 62 BPM | HEIGHT: 70 IN | BODY MASS INDEX: 31.35 KG/M2 | SYSTOLIC BLOOD PRESSURE: 122 MMHG | WEIGHT: 219 LBS | DIASTOLIC BLOOD PRESSURE: 64 MMHG

## 2021-08-17 DIAGNOSIS — I71.21 ASCENDING AORTIC ANEURYSM: ICD-10-CM

## 2021-08-17 DIAGNOSIS — I10 ESSENTIAL HYPERTENSION: ICD-10-CM

## 2021-08-17 DIAGNOSIS — I25.10 CORONARY ARTERY DISEASE INVOLVING NATIVE CORONARY ARTERY OF NATIVE HEART WITHOUT ANGINA PECTORIS: Primary | ICD-10-CM

## 2021-08-17 DIAGNOSIS — G45.9 TIA (TRANSIENT ISCHEMIC ATTACK): ICD-10-CM

## 2021-08-17 PROCEDURE — 3017F COLORECTAL CA SCREEN DOC REV: CPT | Performed by: CLINICAL NURSE SPECIALIST

## 2021-08-17 PROCEDURE — 1123F ACP DISCUSS/DSCN MKR DOCD: CPT | Performed by: CLINICAL NURSE SPECIALIST

## 2021-08-17 PROCEDURE — 1036F TOBACCO NON-USER: CPT | Performed by: CLINICAL NURSE SPECIALIST

## 2021-08-17 PROCEDURE — 99214 OFFICE O/P EST MOD 30 MIN: CPT | Performed by: CLINICAL NURSE SPECIALIST

## 2021-08-17 PROCEDURE — 4040F PNEUMOC VAC/ADMIN/RCVD: CPT | Performed by: CLINICAL NURSE SPECIALIST

## 2021-08-17 PROCEDURE — G8427 DOCREV CUR MEDS BY ELIG CLIN: HCPCS | Performed by: CLINICAL NURSE SPECIALIST

## 2021-08-17 PROCEDURE — G8417 CALC BMI ABV UP PARAM F/U: HCPCS | Performed by: CLINICAL NURSE SPECIALIST

## 2021-08-17 NOTE — PATIENT INSTRUCTIONS
Plan  CTA of chest to follow aneurysm within next few mos  Follow up in 6-9 mos  With Dr. Marjan Salazar   Call with any questions or concerns  Follow up with BRIGETTE Sanon for non cardiac problems  Report any new problems  Cardiovascular Fitness-Exercise as tolerated. Strive for 30 minutes of exercise most days of the week. Cardiac / Healthy Diet  Continue current medications as directed  Continue plan of treatment  It is always recommended that you bring your medications bottles with you to each visit - this is for your safety!

## 2021-08-17 NOTE — PROGRESS NOTES
[facility]  [facility] [address]  [id number]  Phone: [phone]  Fax: [phone]    OFFICE VISIT:  2021    Keerthi Rodriguez - : 1950    Reason For Visit:  Ashley Fox is a 80 y.o. male who is here for Follow-up (no cardiac symptoms) and Coronary Artery Disease  History of diabetes, COPD, hypertension and coronary disease with PCI 2020 to mid LAD just after first diagonal with residual mid LAD 55% stenosis, normal LV ejection fraction. .     Establish care with Dr. Peri Hurley in November. Also has history of ascending aortic aneurysm. Recommended repeat imaging  TIA symptoms 2020 with MRI showing previous infarct with no acute findings. Zio patch monitor worn for 2 days showed no atrial fibrillation underlying sinus with rare ectopy    Patient was evaluated in  at [facility] for chest pain. No acute findings including no PE and stress test that showed preserved LV function and no evidence of ischemia    He returns today accompanied with his wife. No significant change in activity tolerance. Chuckie Shruthi denies exertional chest pain, shortness of breath, orthopnea, paroxysmal nocturnal dyspnea, syncope, presyncope, arrhythmia, edema and fatigue. The patient denies numbness or weakness to suggest cerebrovascular accident or transient ischemic attack. BRIGETTE Martinez is PCP.   Follows with MARINA Murphy for thrombocytopenia and basal cell carcinoma of chest  Follows with Min Sterling, nurse practitioner with neurology for memory loss  Keerthi Rodriguez has the following history as [facility] in [facility]:    Patient Active Problem List    Diagnosis Date Noted    Major depressive disorder, recurrent, mild 2021    Major depressive disorder, recurrent, moderate 2021    Major depressive disorder, recurrent, unspecified 2021    Ascending aortic aneurysm ([facility] [address] 2021    Left rotator cuff tear arthropathy 2020    Transient Problem Relation Age of Onset    Cancer Mother     Heart Disease Father      Social History     Tobacco Use    Smoking status: Former Smoker     Packs/day: 1.50     Years: 15.00     Pack years: 22.50     Types: Cigarettes     Quit date: 10/23/2000     Years since quittin.8    Smokeless tobacco: Never Used   Substance Use Topics    Alcohol use: No      Current Outpatient Medications   Medication Sig Dispense Refill    gabapentin (NEURONTIN) 800 MG tablet TAKE ONE TABLET BY MOUTH THREE TIMES DAILY 90 tablet 0    ondansetron (ZOFRAN ODT) 4 MG disintegrating tablet Take 1 tablet by mouth every 8 hours as needed for Nausea or Vomiting 30 tablet 0    traZODone (DESYREL) 50 MG tablet Take 1 tablet by mouth nightly 30 tablet 3    pantoprazole (PROTONIX) 40 MG tablet Take 1 tablet by mouth daily Indications: Reflux Gastritis 90 tablet 1    tiZANidine (ZANAFLEX) 4 MG tablet TAKE 1 TABLET BY MOUTH EVERY 8 HOURS AS NEEDED (BACK PAIN) 30 tablet 1    citalopram (CELEXA) 10 MG tablet Take 1 tablet by mouth daily 90 tablet 1    donepezil (ARICEPT) 5 MG tablet Take 1 tablet by mouth nightly 90 tablet 1    memantine (NAMENDA) 10 MG tablet Take 1 tablet by mouth 2 times daily 180 tablet 1    oxyCODONE-acetaminophen (PERCOCET)  MG per tablet       diclofenac sodium (VOLTAREN) 1 % GEL Apply topically 2 times daily 100 g 3    tamsulosin (FLOMAX) 0.4 MG capsule Take 1 capsule by mouth daily 90 capsule 3    albuterol sulfate HFA (VENTOLIN HFA) 108 (90 Base) MCG/ACT inhaler Inhale 2 puffs into the lungs every 6 hours as needed for Wheezing      meclizine (ANTIVERT) 25 MG tablet Take 25 mg by mouth 3 times daily as needed for Dizziness       lisinopril (PRINIVIL;ZESTRIL) 40 MG tablet TAKE ONE TABLET BY MOUTH DAILY. (Patient taking differently: Take 40 mg by mouth daily TAKE ONE TABLET BY MOUTH DAILY. ) 90 tablet 3    pravastatin (PRAVACHOL) 80 MG tablet TAKE ONE TABLET BY MOUTH EVERY EVENING.  (Patient taking differently: Take 80 mg by mouth nightly TAKE ONE TABLET BY MOUTH EVERY EVENING.) 90 tablet 3    Diabetic Shoe MISC by Does not apply route DISPENSE ONE PAIR DIABETIC SHOES AND THREE PAIRS OF HEAT MOLDED INSERTS 1 each 0    azelastine (ASTELIN) 0.1 % nasal spray 1 spray by Nasal route 2 times daily Use in each nostril as directed 2 Bottle 1    aspirin 81 MG chewable tablet Take 1 tablet by mouth daily 30 tablet 3    cetirizine (ZYRTEC) 10 MG tablet TAKE 1 TABLET BY MOUTH DAILY (Patient taking differently: Take 10 mg by mouth daily TAKE 1 TABLET BY MOUTH DAILY) 90 tablet 3    triamcinolone (KENALOG) 0.1 % cream Apply topically 2 times daily. (Patient taking differently: Apply topically 2 times daily Apply topically 2 times daily. ) 1 Tube 1    Misc. Devices (WALKER) MISC 1 each by Does not apply route daily 1 each 0    nitroGLYCERIN (NITROSTAT) 0.4 MG SL tablet up to max of 3 total doses. If no relief after 1 dose, call 911. 25 tablet 3    EPINEPHrine (EPIPEN) 0.3 MG/0.3ML SOAJ injection Use as directed for allergic reaction 0.3 mL 3     No current facility-administered medications for this visit. Allergies: Bee venom and Pcn [penicillins]    Review of Systems  Constitutional - no significant activity change, appetite change, or unexpected weight change. No fever, chills or diaphoresis. No fatigue. HEENT - no significant rhinorrhea or epistaxis. No tinnitus or significant hearing loss. Eyes - no sudden vision change or amaurosis. Respiratory - no significant wheezing, stridor, apnea or cough. + dyspnea on exertion at times. No resting  shortness of breath. Cardiovascular - no exertional chest pain, orthopnea or PND. No sensation of arrhythmia or slow heart rate. No claudication or leg edema. Gastrointestinal - no abdominal swelling or pain. No blood in stool. No severe constipation, diarrhea, nausea, or vomiting. Genitourinary - no difficulty urinating, dysuria, frequency, or urgency.  No flank pain or hematuria. Musculoskeletal - no back pain, gait disturbance, or myalgia. Skin - no color change or rash. No pallor. No new surgical incision. Neurologic - no speech difficulty, facial asymmetry or lateralizing weakness. No seizures, presyncope, syncope, or significant dizziness. Hematologic - no easy bruising or excessive bleeding. Psychiatric - no severe anxiety or insomnia. No confusion. All other review of systems are negative. Objective  Vital Signs - /64   Pulse 62   Ht 5' 10\" (1.778 m)   Wt 219 lb (99.3 kg)   BMI 31.42 kg/m²   General - Jessica Swain is alert, cooperative, and pleasant. Well groomed. No acute distress. Body habitus is . HEENT - The head is normocephalic. No circumoral cyanosis. Dentition is normal.   EYES -  No Xanthelasma, no arcus senilis, no conjunctival hemorrhages or discharge. Neck - Supple, without increased jugular venous pressures. No carotid bruits. No mass. Respiratory - Lungs are clear bilaterally. No wheezes or rales. Normal effort without use of accessory muscles. Cardiovascular - Heart has regular rhythm and rate. No murmurs, rubs or gallops. + pedal pulses and no varicosities. Abdominal -  Soft, nontender, nondistended. Bowel sounds are intact. Extremities - No clubbing, cyanosis, or  edema. Musculoskeletal -  No clubbing . No Osler's nodes. Gait normal .  No kyphosis or scoliosis. Skin -  no statis ulcers or dermatitis. Neurological - No focal signs are identified. Oriented to person, place and time. Psychiatric -  Appropriate affect and mood. Assessment:     Diagnosis Orders   1. Coronary artery disease involving native coronary artery of native heart without angina pectoris     2. TIA (transient ischemic attack)     3. Essential hypertension     4.  Ascending aortic aneurysm ([facility] [address]       Data:  BP Readings from Last 3 Encounters:   08/17/21 122/64   07/02/21 122/60   06/29/21 132/82    Pulse Readings from Last 3 Encounters:   08/17/21 62   07/02/21 74   06/29/21 55        Wt Readings from Last 3 Encounters:   08/17/21 219 lb (99.3 kg)   07/02/21 216 lb 14.4 oz (98.4 kg)   06/29/21 216 lb (98 kg)   Blood pressure heart rate well controlled. Medical management includes  ACE inhibitor statin aspirin      1. No evidence of acute aortic pathology. Dilation of the ascending   thoracic aorta to 4.4 cm.   2. Cardiomegaly. Coronary artery disease. 3 peribronchial wall thickening at the lung bases with associated   bibasilar atelectasis. Signed by Dr Benji Muir on 11/3/2019 6:54 PM     Patient was seen at [facility]  and repeat CTA of chest showed stable thoracic aortic measuring 4.4 cm     Reviewed recent notes   Reviewed recent labs     States taking medications as prescribed  Stable cardiovascular status. No evidence of overt heart failure, angina or dysrhythmia. 30 minutes were spent preparing, reviewing and seeing patient. All questions answered    Plan  Follow up in 6-9 mos  With Dr. Murray Stanford   Call with any questions or concerns  Follow up with BRIGETTE Gautam for non cardiac problems  Report any new problems  Cardiovascular Fitness-Exercise as tolerated. Strive for 30 minutes of exercise most days of the week. Cardiac / Healthy Diet  Continue current medications as directed  Continue plan of treatment  It is always recommended that you bring your medications bottles with you to each visit - this is for your safety! BRIGETTE Alejo    EMR dragon/transcription disclaimer: Much of this encounter note is electronic transcription/translation of spoken language to printed tach. Electronic translation of spoken language may be erroneous, or at times, nonsensical words or phrases may be inadvertently transcribed.  Although, I have reviewed the note for such errors, some may still exist.

## 2021-09-16 ENCOUNTER — OFFICE VISIT (OUTPATIENT)
Dept: PRIMARY CARE CLINIC | Age: 71
End: 2021-09-16
Payer: MEDICARE

## 2021-09-16 ENCOUNTER — TELEPHONE (OUTPATIENT)
Dept: PRIMARY CARE CLINIC | Age: 71
End: 2021-09-16

## 2021-09-16 VITALS
SYSTOLIC BLOOD PRESSURE: 138 MMHG | BODY MASS INDEX: 31.78 KG/M2 | HEART RATE: 54 BPM | OXYGEN SATURATION: 97 % | TEMPERATURE: 97.7 F | HEIGHT: 70 IN | DIASTOLIC BLOOD PRESSURE: 76 MMHG | WEIGHT: 222 LBS

## 2021-09-16 DIAGNOSIS — J01.90 ACUTE NON-RECURRENT SINUSITIS, UNSPECIFIED LOCATION: Primary | ICD-10-CM

## 2021-09-16 DIAGNOSIS — H61.23 BILATERAL IMPACTED CERUMEN: ICD-10-CM

## 2021-09-16 DIAGNOSIS — J30.1 SEASONAL ALLERGIC RHINITIS DUE TO POLLEN: ICD-10-CM

## 2021-09-16 LAB — SARS-COV-2, PCR: NOT DETECTED

## 2021-09-16 PROCEDURE — 3017F COLORECTAL CA SCREEN DOC REV: CPT | Performed by: NURSE PRACTITIONER

## 2021-09-16 PROCEDURE — G8417 CALC BMI ABV UP PARAM F/U: HCPCS | Performed by: NURSE PRACTITIONER

## 2021-09-16 PROCEDURE — 4040F PNEUMOC VAC/ADMIN/RCVD: CPT | Performed by: NURSE PRACTITIONER

## 2021-09-16 PROCEDURE — 96372 THER/PROPH/DIAG INJ SC/IM: CPT | Performed by: NURSE PRACTITIONER

## 2021-09-16 PROCEDURE — 1036F TOBACCO NON-USER: CPT | Performed by: NURSE PRACTITIONER

## 2021-09-16 PROCEDURE — 99213 OFFICE O/P EST LOW 20 MIN: CPT | Performed by: NURSE PRACTITIONER

## 2021-09-16 PROCEDURE — G8427 DOCREV CUR MEDS BY ELIG CLIN: HCPCS | Performed by: NURSE PRACTITIONER

## 2021-09-16 PROCEDURE — 1123F ACP DISCUSS/DSCN MKR DOCD: CPT | Performed by: NURSE PRACTITIONER

## 2021-09-16 RX ORDER — METHYLPREDNISOLONE ACETATE 40 MG/ML
80 INJECTION, SUSPENSION INTRA-ARTICULAR; INTRALESIONAL; INTRAMUSCULAR; SOFT TISSUE ONCE
Status: COMPLETED | OUTPATIENT
Start: 2021-09-16 | End: 2021-09-16

## 2021-09-16 RX ORDER — DOXYCYCLINE HYCLATE 100 MG/1
100 CAPSULE ORAL 2 TIMES DAILY
Qty: 20 CAPSULE | Refills: 0 | Status: SHIPPED | OUTPATIENT
Start: 2021-09-16 | End: 2021-09-26

## 2021-09-16 RX ORDER — DEXTROMETHORPHAN HYDROBROMIDE AND PROMETHAZINE HYDROCHLORIDE 15; 6.25 MG/5ML; MG/5ML
5 SYRUP ORAL 4 TIMES DAILY PRN
Qty: 240 ML | Refills: 0 | Status: SHIPPED | OUTPATIENT
Start: 2021-09-16 | End: 2021-09-23

## 2021-09-16 RX ORDER — AZELASTINE 1 MG/ML
1 SPRAY, METERED NASAL 2 TIMES DAILY
Qty: 2 EACH | Refills: 0 | Status: SHIPPED | OUTPATIENT
Start: 2021-09-16 | End: 2022-04-26 | Stop reason: SDUPTHER

## 2021-09-16 RX ORDER — METHYLPREDNISOLONE 4 MG/1
TABLET ORAL
Qty: 1 KIT | Refills: 0 | Status: SHIPPED | OUTPATIENT
Start: 2021-09-16 | End: 2021-10-27

## 2021-09-16 RX ADMIN — METHYLPREDNISOLONE ACETATE 80 MG: 40 INJECTION, SUSPENSION INTRA-ARTICULAR; INTRALESIONAL; INTRAMUSCULAR; SOFT TISSUE at 16:13

## 2021-09-16 ASSESSMENT — ENCOUNTER SYMPTOMS
RHINORRHEA: 1
VOMITING: 0
COUGH: 1
COLOR CHANGE: 0
SHORTNESS OF BREATH: 0
BACK PAIN: 0
SINUS PRESSURE: 1
PHOTOPHOBIA: 0
NAUSEA: 0
VOICE CHANGE: 0

## 2021-09-16 ASSESSMENT — PATIENT HEALTH QUESTIONNAIRE - PHQ9
SUM OF ALL RESPONSES TO PHQ9 QUESTIONS 1 & 2: 0
SUM OF ALL RESPONSES TO PHQ QUESTIONS 1-9: 0
1. LITTLE INTEREST OR PLEASURE IN DOING THINGS: 0
2. FEELING DOWN, DEPRESSED OR HOPELESS: 0

## 2021-09-16 NOTE — PROGRESS NOTES
After obtaining consent, and per orders of Leopoldo Duverney, APRN, injection of Depo Medrol (2) 40mg  given in left hip by Chandler Moura MA. Patient instructed to remain in clinic for 20 minutes afterwards, and to report any adverse reaction to me immediately.

## 2021-09-16 NOTE — PROGRESS NOTES
[address] [facility] CARE  [address] [facility] [address]  [address] [address]  Dept: [phone]  Dept Fax: [phone]  Loc: [phone]    Teri Ruano is a 80 y.o. male who presents today for his medical conditions/complaints as noted below. Teri Ruano is c/o of Cough (started about 1 month ago ), Congestion, Headache, Generalized Body Aches, and Shortness of Breath (chest feels tight)        HPI:     HPI   Chief Complaint   Patient presents with    Cough     started about 1 month ago     Congestion    Headache    Generalized Body Aches    Shortness of Breath     chest feels tight     Patient presents today for evaluation of cough, congestion, sinus pressure, headache and body aches. He reports symptoms have been present for several weeks. He has taken mucinex without relief. He has been afebrile. Cough is productive and keeping him awake at night. Patient has had covid19 vaccination.       Past Medical History:   Diagnosis Date    Arthritis     CAD (coronary artery disease)     sees dr. Evelia Emerson Chronic back pain     COPD (chronic obstructive pulmonary disease) ([facility] [address]     Hypertension     Peripheral sensory neuropathy     Pleurisy     Shoulder pain     TIA (transient ischemic attack)     Type II or unspecified type diabetes mellitus without mention of complication, not stated as uncontrolled       Past Surgical History:   Procedure Laterality Date    APPENDECTOMY      CARDIAC CATHETERIZATION  11/04/2019    PIETER to LAD    SHOULDER SURGERY Left 12/17/2020    LEFT REVERSE TOTAL SHOULDER ARTHROPLASTY performed by Syeda Jones MD at [address] [facility] 9/16/2021 8/17/2021 7/2/2021 6/29/2021 3/16/2021 4/8/2836   SYSTOLIC [phone]   DIASTOLIC 76 64 60 82 82 78   Pulse 54 62 74 55 54 62   Temp 97.7 - - 97.6 97.2 98.2   Resp - - - 18 18 -   SpO2 97 - 97 98 97 98   Weight 222 lb 219 lb 216 lb 14.4 oz 216 lb 214 lb 214 lb   Height 5' 10\" 5' 10\" 5' 11\" 5' 10\" \" \"   Body mass index 31.85 kg/m2 31.42 kg/m2 30.25 kg/m2 30.99 kg/m2 29.84 kg/m2 29.84 kg/m2   Some recent data might be hidden       Family History   Problem Relation Age of Onset    Cancer Mother     Heart Disease Father        Social History     Tobacco Use    Smoking status: Former Smoker     Packs/day: 1.50     Years: 15.00     Pack years: 22.50     Types: Cigarettes     Quit date: 10/23/2000     Years since quittin.9    Smokeless tobacco: Never Used   Substance Use Topics    Alcohol use: No      Current Outpatient Medications on File Prior to Visit   Medication Sig Dispense Refill    ondansetron (ZOFRAN ODT) 4 MG disintegrating tablet Take 1 tablet by mouth every 8 hours as needed for Nausea or Vomiting 30 tablet 0    traZODone (DESYREL) 50 MG tablet Take 1 tablet by mouth nightly 30 tablet 3    pantoprazole (PROTONIX) 40 MG tablet Take 1 tablet by mouth daily Indications: Reflux Gastritis 90 tablet 1    tiZANidine (ZANAFLEX) 4 MG tablet TAKE 1 TABLET BY MOUTH EVERY 8 HOURS AS NEEDED (BACK PAIN) 30 tablet 1    citalopram (CELEXA) 10 MG tablet Take 1 tablet by mouth daily 90 tablet 1    donepezil (ARICEPT) 5 MG tablet Take 1 tablet by mouth nightly 90 tablet 1    memantine (NAMENDA) 10 MG tablet Take 1 tablet by mouth 2 times daily 180 tablet 1    oxyCODONE-acetaminophen (PERCOCET)  MG per tablet       tamsulosin (FLOMAX) 0.4 MG capsule Take 1 capsule by mouth daily 90 capsule 3    albuterol sulfate HFA (VENTOLIN HFA) 108 (90 Base) MCG/ACT inhaler Inhale 2 puffs into the lungs every 6 hours as needed for Wheezing      meclizine (ANTIVERT) 25 MG tablet Take 25 mg by mouth 3 times daily as needed for Dizziness       lisinopril (PRINIVIL;ZESTRIL) 40 MG tablet TAKE ONE TABLET BY MOUTH DAILY. (Patient taking differently: Take 40 mg by mouth daily TAKE ONE TABLET BY MOUTH DAILY. ) 90 tablet 3    pravastatin (PRAVACHOL) 80 MG tablet TAKE ONE TABLET BY MOUTH EVERY EVENING. (Patient taking differently: Take 80 mg by mouth nightly TAKE ONE TABLET BY MOUTH EVERY EVENING.) 90 tablet 3    Diabetic Shoe MISC by Does not apply route DISPENSE ONE PAIR DIABETIC SHOES AND THREE PAIRS OF HEAT MOLDED INSERTS 1 each 0    aspirin 81 MG chewable tablet Take 1 tablet by mouth daily 30 tablet 3    cetirizine (ZYRTEC) 10 MG tablet TAKE 1 TABLET BY MOUTH DAILY (Patient taking differently: Take 10 mg by mouth daily TAKE 1 TABLET BY MOUTH DAILY) 90 tablet 3    triamcinolone (KENALOG) 0.1 % cream Apply topically 2 times daily. (Patient taking differently: Apply topically 2 times daily Apply topically 2 times daily. ) 1 Tube 1    Misc. Devices (WALKER) MISC 1 each by Does not apply route daily 1 each 0    nitroGLYCERIN (NITROSTAT) 0.4 MG SL tablet up to max of 3 total doses. If no relief after 1 dose, call 911. 25 tablet 3    EPINEPHrine (EPIPEN) 0.3 MG/0.3ML SOAJ injection Use as directed for allergic reaction 0.3 mL 3    gabapentin (NEURONTIN) 800 MG tablet TAKE ONE TABLET BY MOUTH THREE TIMES DAILY 90 tablet 0    diclofenac sodium (VOLTAREN) 1 % GEL Apply topically 2 times daily (Patient not taking: Reported on 9/16/2021) 100 g 3    [DISCONTINUED] omega-3 acid ethyl esters (LOVAZA) 1 G capsule Take 2 capsules by mouth 2 times daily 60 capsule 3     No current facility-administered medications on file prior to visit.      Allergies   Allergen Reactions    Bee Venom Anaphylaxis    Pcn [Penicillins] Other (See Comments)     Made pt \"black out\"       Health Maintenance   Topic Date Due    DTaP/Tdap/Td vaccine (1 - Tdap) Never done    Shingles Vaccine (1 of 2) Never done    Diabetic retinal exam  08/21/2014    Annual Wellness Visit (AWV)  Never done    Pneumococcal 65+ years Vaccine (2 of 2 - PPSV23) 09/28/2020    Diabetic microalbuminuria test  01/13/2021    Flu vaccine (1) 09/01/2021    Diabetic foot exam  09/22/2021    Lipid screen  12/02/2021    A1C test (Diabetic or Prediabetic)  03/16/2022    Potassium monitoring  07/16/2022    Creatinine monitoring  07/16/2022    Colon cancer screen colonoscopy  02/24/2026    COVID-19 Vaccine  Completed    AAA screen  Completed    Hepatitis C screen  Completed    Hepatitis A vaccine  Aged Out    Hib vaccine  Aged Out    Meningococcal (ACWY) vaccine  Aged Out       Subjective:      Review of Systems   Constitutional: Positive for chills. Negative for fever. HENT: Positive for congestion, postnasal drip, rhinorrhea and sinus pressure. Negative for ear pain, hearing loss and voice change. Eyes: Negative for photophobia and visual disturbance. Respiratory: Positive for cough. Negative for shortness of breath. Cardiovascular: Negative for chest pain and palpitations. Gastrointestinal: Negative for nausea and vomiting. Endocrine: Negative. Negative for cold intolerance and heat intolerance. Genitourinary: Negative for difficulty urinating and flank pain. Musculoskeletal: Negative for back pain and neck pain. Skin: Negative for color change and rash. Allergic/Immunologic: Negative for environmental allergies and food allergies. Neurological: Negative for dizziness, speech difficulty and headaches. Hematological: Does not bruise/bleed easily. Psychiatric/Behavioral: Negative for sleep disturbance and suicidal ideas. Objective:     Physical Exam  Vitals and nursing note reviewed. Constitutional:       Appearance: He is well-developed. HENT:      Head: Atraumatic. Comments: Ceruminosis is noted. Wax is removed by syringing and manual debridement. Instructions for home care to prevent wax buildup are given. Right Ear: External ear normal.      Left Ear: External ear normal.      Nose: Nose normal.   Eyes:      Conjunctiva/sclera: Conjunctivae normal.      Pupils: Pupils are equal, round, and reactive to light. Cardiovascular:      Rate and Rhythm: Normal rate and regular rhythm.       Heart sounds: Normal heart sounds, S1 normal and S2 normal.   Pulmonary:      Effort: Pulmonary effort is normal.      Breath sounds: Normal breath sounds. Abdominal:      General: Bowel sounds are normal.      Palpations: Abdomen is soft. Musculoskeletal:         General: Normal range of motion. Cervical back: Normal range of motion and neck supple. Skin:     General: Skin is warm and dry. Neurological:      Mental Status: He is alert and oriented to person, place, and time. Psychiatric:         Behavior: Behavior normal.       /76   Pulse 54   Temp 97.7 °F (36.5 °C) (Temporal)   Ht 5' 10\" (1.778 m)   Wt 222 lb (100.7 kg)   SpO2 97%   BMI 31.85 kg/m²     Assessment:       Diagnosis Orders   1. Acute non-recurrent sinusitis, unspecified location  doxycycline hyclate (VIBRAMYCIN) 100 MG capsule    methylPREDNISolone (MEDROL DOSEPACK) 4 MG tablet    COVID-19   2. Seasonal allergic rhinitis due to pollen  azelastine (ASTELIN) 0.1 % nasal spray   3. Bilateral impacted cerumen           Plan:       Covid swab today. Ears cleaned. Depo-medrol 80 mg IM. Begin doxycycline today; medrol dose pack tomorrow. Promethazine DM as needed. Continue nasal sprays. Patient given educational materials -see patient instructions. Discussed use, benefit, and side effects of prescribed medications. All patient questions answered. Pt voiced understanding. Reviewed health maintenance. Instructed to continue currentmedications, diet and exercise. Patient agreed with treatment plan. Follow up as directed.    MEDICATIONS:  Orders Placed This Encounter   Medications    azelastine (ASTELIN) 0.1 % nasal spray     Si spray by Nasal route 2 times daily Use in each nostril as directed     Dispense:  2 each     Refill:  0    doxycycline hyclate (VIBRAMYCIN) 100 MG capsule     Sig: Take 1 capsule by mouth 2 times daily for 10 days     Dispense:  20 capsule     Refill:  0    methylPREDNISolone (MEDROL DOSEPACK)

## 2021-09-29 DIAGNOSIS — J30.1 SEASONAL ALLERGIC RHINITIS DUE TO POLLEN: ICD-10-CM

## 2021-09-30 RX ORDER — CETIRIZINE HYDROCHLORIDE 10 MG/1
TABLET ORAL
Qty: 90 TABLET | Refills: 3 | Status: SHIPPED | OUTPATIENT
Start: 2021-09-30

## 2021-10-25 RX ORDER — DONEPEZIL HYDROCHLORIDE 5 MG/1
5 TABLET, FILM COATED ORAL NIGHTLY
Qty: 90 TABLET | Refills: 1 | Status: SHIPPED | OUTPATIENT
Start: 2021-10-25 | End: 2022-06-06

## 2021-10-25 RX ORDER — CITALOPRAM 10 MG/1
10 TABLET ORAL DAILY
Qty: 90 TABLET | Refills: 1 | Status: SHIPPED | OUTPATIENT
Start: 2021-10-25 | End: 2022-06-06

## 2021-10-25 RX ORDER — MEMANTINE HYDROCHLORIDE 10 MG/1
10 TABLET ORAL 2 TIMES DAILY
Qty: 180 TABLET | Refills: 1 | Status: SHIPPED | OUTPATIENT
Start: 2021-10-25 | End: 2022-06-06

## 2021-10-25 NOTE — TELEPHONE ENCOUNTER
Barb Mason has requested a refill on his medication.       Last office visit : 4/22/2021   Next office visit : 10/27/2021         Requested Prescriptions     Pending Prescriptions Disp Refills    memantine (NAMENDA) 10 MG tablet [Pharmacy Med Name: MEMANTINE HCL 10 MG TABS 10 Tablet] 180 tablet 1     Sig: TAKE 1 TABLET BY MOUTH 2 TIMES DAILY    donepezil (ARICEPT) 5 MG tablet [Pharmacy Med Name: DONEPEZIL HCL 5 MG TABLET 5 Tablet] 90 tablet 1     Sig: TAKE 1 TABLET BY MOUTH NIGHTLY    citalopram (CELEXA) 10 MG tablet [Pharmacy Med Name: CITALOPRAM HBR 10 MG TABLET 10 Tablet] 90 tablet 1     Sig: TAKE 1 TABLET BY MOUTH DAILY

## 2021-10-27 ENCOUNTER — OFFICE VISIT (OUTPATIENT)
Dept: PRIMARY CARE CLINIC | Age: 71
End: 2021-10-27
Payer: MEDICARE

## 2021-10-27 ENCOUNTER — OFFICE VISIT (OUTPATIENT)
Dept: NEUROSURGERY | Age: 71
End: 2021-10-27
Payer: MEDICARE

## 2021-10-27 VITALS
SYSTOLIC BLOOD PRESSURE: 132 MMHG | HEIGHT: 70 IN | WEIGHT: 222 LBS | DIASTOLIC BLOOD PRESSURE: 72 MMHG | OXYGEN SATURATION: 97 % | TEMPERATURE: 98.3 F | HEART RATE: 61 BPM | BODY MASS INDEX: 31.78 KG/M2

## 2021-10-27 VITALS
DIASTOLIC BLOOD PRESSURE: 79 MMHG | HEART RATE: 57 BPM | BODY MASS INDEX: 31.78 KG/M2 | WEIGHT: 222 LBS | TEMPERATURE: 97 F | OXYGEN SATURATION: 98 % | SYSTOLIC BLOOD PRESSURE: 157 MMHG | HEIGHT: 70 IN

## 2021-10-27 DIAGNOSIS — E11.9 TYPE 2 DIABETES MELLITUS WITHOUT COMPLICATION, WITHOUT LONG-TERM CURRENT USE OF INSULIN (HCC): Primary | ICD-10-CM

## 2021-10-27 DIAGNOSIS — R41.3 MEMORY LOSS: Primary | ICD-10-CM

## 2021-10-27 DIAGNOSIS — R20.0 NUMBNESS: ICD-10-CM

## 2021-10-27 DIAGNOSIS — I10 ESSENTIAL HYPERTENSION: ICD-10-CM

## 2021-10-27 DIAGNOSIS — Z00.00 ROUTINE GENERAL MEDICAL EXAMINATION AT A HEALTH CARE FACILITY: ICD-10-CM

## 2021-10-27 DIAGNOSIS — Z91.81 AT HIGH RISK FOR FALLS: ICD-10-CM

## 2021-10-27 LAB — HBA1C MFR BLD: 5.2 %

## 2021-10-27 PROCEDURE — 1123F ACP DISCUSS/DSCN MKR DOCD: CPT | Performed by: NURSE PRACTITIONER

## 2021-10-27 PROCEDURE — 4040F PNEUMOC VAC/ADMIN/RCVD: CPT | Performed by: NURSE PRACTITIONER

## 2021-10-27 PROCEDURE — 90694 VACC AIIV4 NO PRSRV 0.5ML IM: CPT | Performed by: NURSE PRACTITIONER

## 2021-10-27 PROCEDURE — G8417 CALC BMI ABV UP PARAM F/U: HCPCS | Performed by: NURSE PRACTITIONER

## 2021-10-27 PROCEDURE — G0008 ADMIN INFLUENZA VIRUS VAC: HCPCS | Performed by: NURSE PRACTITIONER

## 2021-10-27 PROCEDURE — G8427 DOCREV CUR MEDS BY ELIG CLIN: HCPCS | Performed by: NURSE PRACTITIONER

## 2021-10-27 PROCEDURE — 1036F TOBACCO NON-USER: CPT | Performed by: NURSE PRACTITIONER

## 2021-10-27 PROCEDURE — 3044F HG A1C LEVEL LT 7.0%: CPT | Performed by: NURSE PRACTITIONER

## 2021-10-27 PROCEDURE — 3017F COLORECTAL CA SCREEN DOC REV: CPT | Performed by: NURSE PRACTITIONER

## 2021-10-27 PROCEDURE — G8484 FLU IMMUNIZE NO ADMIN: HCPCS | Performed by: NURSE PRACTITIONER

## 2021-10-27 PROCEDURE — 99497 ADVNCD CARE PLAN 30 MIN: CPT | Performed by: NURSE PRACTITIONER

## 2021-10-27 PROCEDURE — G0439 PPPS, SUBSEQ VISIT: HCPCS | Performed by: NURSE PRACTITIONER

## 2021-10-27 PROCEDURE — 83036 HEMOGLOBIN GLYCOSYLATED A1C: CPT | Performed by: NURSE PRACTITIONER

## 2021-10-27 PROCEDURE — 99213 OFFICE O/P EST LOW 20 MIN: CPT | Performed by: NURSE PRACTITIONER

## 2021-10-27 ASSESSMENT — PATIENT HEALTH QUESTIONNAIRE - PHQ9
2. FEELING DOWN, DEPRESSED OR HOPELESS: 1
SUM OF ALL RESPONSES TO PHQ QUESTIONS 1-9: 2
SUM OF ALL RESPONSES TO PHQ9 QUESTIONS 1 & 2: 2
1. LITTLE INTEREST OR PLEASURE IN DOING THINGS: 1

## 2021-10-27 NOTE — PROGRESS NOTES
[facility] Neurology Office Note      Patient:   Tomasa Riley  MR#:    [id number]  Account Number:                         YOB: 1950  Date of Evaluation:  10/27/2021  Time of Note:                          3:54 PM  Primary/Referring Physician:  BRIGETTE Arizmendi   Consulting Physician:  Wing Olivas, KAYLA, APRN    FOLLOW UP    Chief Complaint   Patient presents with    Follow-up     pt wife states memory is worsening. pt however says things are \"good enough\"    Memory Loss     HISTORY OF PRESENT ILLNESS    Tomasa Riley is a 80y.o. year old male here for follow up of memory loss and neuropathy. He is accompanied by his wife today. Memory loss is slowly progressive. Primarily short term memory loss noted. Noting repetition of questions and stories. Noting word recall difficulty. He will go into a room and forget why he went in there or he will forget which room he was trying to go to. Noting episodes of anger. Denies crying spells. Emotions don't always match the situation. Noting depression. No SI/HI. Notes unsteady gait, c/w neuropathy. No urinary incontinence. No hallucinations. Appetite waxes and wanes. Memory loss interfering with ADLs at times. His wife is handling all his medications. He is not driving. No stroke history. No family history of dementia or memory loss. Neuropathy is about the same, notes symptoms in his hands and feet. Denies weakness. Denies falls. He is on Gabapentin 800mg TID. Denies further TIA/stroke like symptoms. Follows with hematology for anemia, family history with mother having leukemia.      Past Medical History:   Diagnosis Date    Arthritis     CAD (coronary artery disease)     sees dr. Almaraz  Chronic back pain     COPD (chronic obstructive pulmonary disease) ([facility] [address]     Hypertension     Peripheral sensory neuropathy     Pleurisy     Shoulder pain     TIA (transient ischemic attack)     Type II or unspecified type diabetes mellitus without mention of complication, not stated as uncontrolled        Past Surgical History:   Procedure Laterality Date    APPENDECTOMY      CARDIAC CATHETERIZATION  2019    PIETER to LAD    SHOULDER SURGERY Left 2020    LEFT REVERSE TOTAL SHOULDER ARTHROPLASTY performed by Mary Oshea MD at [facility] OR       Family History   Problem Relation Age of Onset    Cancer Mother     Heart Disease Father        Social History     Socioeconomic History    Marital status:      Spouse name: Not on file    Number of children: Not on file    Years of education: Not on file    Highest education level: Not on file   Occupational History    Not on file   Tobacco Use    Smoking status: Former Smoker     Packs/day: 1.50     Years: 15.00     Pack years: 22.50     Types: Cigarettes     Quit date: 10/23/2000     Years since quittin.0    Smokeless tobacco: Never Used   Substance and Sexual Activity    Alcohol use: No    Drug use: No    Sexual activity: Not on file   Other Topics Concern    Not on file   Social History Narrative    Not on file     Social Determinants of Health     Financial Resource Strain: Low Risk     Difficulty of Paying Living Expenses: Not hard at all   Food Insecurity: No Food Insecurity    Worried About [address] in the Last Year: Never true    [facility] in the Last Year: Never true   Transportation Needs:     Lack of Transportation (Medical):      Lack of Transportation (Non-Medical):    Physical Activity:     Days of Exercise per Week:     Minutes of Exercise per Session:    Stress:     Feeling of Stress :    Social Connections:     Frequency of Communication with Friends and Family:     Frequency of Social Gatherings with Friends and Family:     Attends Yazdanism Services:     Active Member of Clubs or Organizations:     Attends Club or Organization Meetings:     Marital Status:    Intimate Partner Violence:     Fear of Current or Ex-Partner:     tablet 3    Diabetic Shoe MISC by Does not apply route DISPENSE ONE PAIR DIABETIC SHOES AND THREE PAIRS OF HEAT MOLDED INSERTS 1 each 0    aspirin 81 MG chewable tablet Take 1 tablet by mouth daily 30 tablet 3    triamcinolone (KENALOG) 0.1 % cream Apply topically 2 times daily. (Patient taking differently: Apply topically 2 times daily Apply topically 2 times daily. ) 1 Tube 1    Misc. Devices (WALKER) MISC 1 each by Does not apply route daily 1 each 0    nitroGLYCERIN (NITROSTAT) 0.4 MG SL tablet up to max of 3 total doses. If no relief after 1 dose, call 911. 25 tablet 3    EPINEPHrine (EPIPEN) 0.3 MG/0.3ML SOAJ injection Use as directed for allergic reaction 0.3 mL 3     No current facility-administered medications for this visit. Allergies   Allergen Reactions    Bee Venom Anaphylaxis    Pcn [Penicillins] Other (See Comments)     Made pt \"black out\"     REVIEW OF SYSTEMS  Constitutional: []? Fever []? Sweats []? Chills []? Recent Injury [x]? Denies all unless marked  HEENT:[]? Headache  []? Head Injury []? Hearing Loss  []? Sore Throat  []? Ear Ache [x]? Denies all unless marked  Spine:  []? Neck pain  []? Back pain  []? Sciaticia  [x]? Denies all unless marked  Cardiovascular:[]? Heart Disease []? Palpitations []? Chest Pain   [x]? Denies all unless marked  Pulmonary: []? Shortness of Breath []? Cough   [x]? Denies all unless marked  Psychiatric/Behavioral:[]? Depression []? Anxiety [x]? Denies all unless marked  Gastrointestinal: []? Nausea  []? Vomiting  []? Abdominal Pain  []? Constipation  []? Diarrhea  [x]? Denies all unless marked  Genitourinary:   []? Frequency  []? Urgency  []? Dysuria []? Incontinence  [x]? Denies all unless marked  Extremities: []? Pain  []? Swelling  [x]? Denies all unless marked  Musculoskeletal: []? Myalgias  []? Joint Pain  []? Arthritis []? Muscle Cramps []? Muscle Twitches  [x]? Denies all unless marked  Sleep: []? Insomnia[]? Snoring []? Restless Legs  []? Sleep Apnea  []? Daytime prick, vibration, and proprioception BLE  [x]Sensation intact to light touch, pin prick, vibration, and proprioception BUE  COMMENTS: decreased PP BLE    Coordination [x]FTN normal bilaterally   [x]HTS normal bilaterally  [x]HUNTER normal bilaterally. COMMENTS:   Reflexes  [x]Symmetric and non-pathological  [x]Toes down going bilaterally  [x]No clonus present  COMMENTS:   Gait                  []Normal steady gait    []Ataxic    []Spastic     []Magnetic      []Shuffling  COMMENTS: cautious        LABS RECORD AND IMAGING REVIEW (As below and per HPI)    Lab Results   Component Value Date    QGOXMWIO12 783 06/03/2020     Lab Results   Component Value Date    WBC 9.6 07/16/2021    HGB 14.9 07/16/2021    HCT 46.3 07/16/2021    .7 (H) 07/16/2021     (L) 07/16/2021     Lab Results   Component Value Date     07/16/2021    K 4.5 07/16/2021     07/16/2021    CO2 24 07/16/2021    BUN 30 (H) 07/16/2021    CREATININE 1.5 (H) 07/16/2021    GLUCOSE 75 07/16/2021    CALCIUM 10.4 (H) 07/16/2021    PROT 7.4 07/16/2021    LABALBU 5.0 07/16/2021    BILITOT 1.0 07/16/2021    ALKPHOS 79 07/16/2021    AST 18 07/16/2021    ALT 14 07/16/2021    LABGLOM 46 (A) 07/16/2021    GFRAA 56 (L) 07/16/2021    GLOB 2.5 05/27/2020     Lab Results   Component Value Date    CHOL 141 (L) 12/02/2020    TRIG 69 12/02/2020    HDL 65 12/02/2020    LDLCALC 62 12/02/2020     Lab Results   Component Value Date    TSH 2.870 03/03/2021    T4FREE 1.27 06/03/2020     Lab Results   Component Value Date    CRP 0.05 06/03/2020    SEDRATE 1 06/03/2020      Ct Head Wo Contrast     Result Date: 12/1/2020  EXAM: CT HEAD WO CONTRAST -- 12/1/2020 3:22 PM HISTORY: 72 years, Male, mental status change, numb all over COMPARISON: 2/10/2020 DLP: 831 mGy cm. Automated exposure control was utilized to minimize patient radiation dose. TECHNIQUE: Unenhanced axial CT images obtained from vertex to skull base with coronal and sagittal reformats.  FINDINGS: Subtle round hypodensity at the right thalamus on axial series 3, image 17. No evidence of acute intracranial hemorrhage or midline shift. Mild periventricular and subcortical white matter hypodensities, most likely due to chronic microvascular disease. Mild proportional prominence of the ventricles, sulci and basilar cisterns. 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 Globes, retrobulbar soft tissues, and mastoid air cells are within normal limits. Small right maxillary mucus retention cyst. Paranasal sinuses appear otherwise clear. Small debris in the bilateral external auditory canals, most commonly cerumen. Calvarium appears intact. Subcutaneous tissues within normal limits.     1. Subtle round hypodensity at the right thalamus. This could represent artifact or infarct. Recommend MRI brain for further evaluation. 2. Mild volume loss and chronic microvascular changes. 3. 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 Signed by Dr Leonid Bearden on 12/1/2020 4:47 PM     Xr Chest Portable     Result Date: 12/1/2020  XR CHEST PORTABLE 12/1/2020 12:53 PM History: Chest pain. Portable chest x-ray compared with 10 February 2020. The heart size is normal. The mediastinum is within normal limits. The lungs are adequately expanded with no pneumonia or pneumothorax. There is no significant pleural fluid. No congestive failure changes.                                                                       1. No acute disease.  Signed by Dr Lavell Sylvester on 12/1/2020 12:53 PM     Vascular Carotid Duplex Bilateral     Result Date: 12/2/2020  Vascular Carotid Procedure  Demographics   Patient Name Stephanie Lamb Age                   43   Patient Number  [id number]           Gender                Male   Visit Number    [id number]        Interpreting          Jurgen Draper MD                                   [id number]   Date of Birth   1950       Referring Physician Micah Iyer       [id number]       Sonographer Kavya Zavala, NENA  Number  Procedure Type of Study:   Cerebral:Carotid, VL CAROTID BILATERAL.  Indications for Study:CVA. Risk Factors   - The patient's risk factor(s) include: diabetes mellitus, arterial     hypertension and prior MI .   - The patient has a former tobacco history. Allergies   - Penicillins.   - Bee/Wasp stings.  Impression   Duplex sonography with color flow enhancement was performed bilaterally on  the cervical carotid system. No evidence of hemodynamically significant  stenosis in the bilateral carotid and vertebral arteries.   Signature   ----------------------------------------------------------------  Electronically signed by Lamine Bolivar MD(Interpreting  physician) on 12/02/2020 01:49 PM       Mri Brain Wo Contrast     Result Date: 12/1/2020  MRI BRAIN WO CONTRAST 12/1/2020 5:07 PM HISTORY: Abnormal CT Comparison: 6/23/2020  Technique: Multiplanar imaging of the brain was performed in a routine fashion. Findings: Midline structures are nondisplaced. Ventricular system is normal. A 1.6 cm pineal cyst is again demonstrated stable in size and appearance from the previous study. . Basilar cisterns are preserved. There is atrophy of the brain with prominence of the subarachnoid spaces and ventricular enlargement. . There are scattered foci of T2 abnormality involving the periventricular and higher white matter tracts suggesting small vessel disease. Remote lacunar infarcts involving the cerebellar hemisphere bilaterally are present as well as a previous infarct involving the right parietal centrum semiovale. No abnormal extra axial fluid collections are noted. No restriction of diffusion is present. Dilated perivascular spaces are present. Proximal cervical spinal cord, brainstem, and cerebellum are unremarkable. Normal cerebrovascular flow voids are seen. Bilateral globes and orbits are normal in appearance.  No abnormal signal is noted in the mastoid 4:48 PM     EEG (12/2020)- normal      EEG (6/2020)- normal      Reviewed referral records     ASSESSMENT:    Luis Enrique Jordan is a 80y.o. year old male here for follow up of memory loss and neuropathy. Largely unchanged from prior visit. Prior MRI brain was stable, no new area of stroke, stable pineal cyst. EEG normal. Prior MoCA 13/30. Suspect Alzheimer's dementia. On Namenda and Aricept. Heart rate is low today so further titration of Aricept is limited. On Celexa for depression. Gait changes are likely related to underlying neuropathy.       ICD-10-CM    1. Memory loss  R41.3    2. Numbness  R20.0      PLAN:  1. Continue Namenda and Aricept. Further titration of Aricept limited related to heart rate. Seeing cardiology for bradycardia  2. Continue Celexa 10mg nightly. Discussed side effects with patient and wife   3. Continue stroke risk factor maximization- ASA, statin, anti hypertensive   4. Consider Nuedexta if PBA symptoms persist  5. Discussed safety concerns, increase supervision, no driving, medication monitoring/management. Recommend 30 minutes daily exercise, daily mental stimulation, and healthy diet with fish, fruits, vegetables, fiber and water   6. Return in about 6 months (around 4/27/2022) for follow up, sooner if worsening.     Marisol Francisco DNP, BRIGETTE

## 2021-10-27 NOTE — PROGRESS NOTES
[facility] PRIMARY CARE  [address] [facility] [address] [address]  Dept: [phone]  Dept Fax: [phone]  Loc: [phone]    Pablo Mata is a 80 y.o. male who presents today for his medical conditions/complaints as noted below. Pablo Mata is c/o of Diabetes (patient states last time a1c was low )        HPI:     HPI   Chief Complaint   Patient presents with    Diabetes     patient states last time a1c was low      Patient presents today for follow-up diabetes.        Past Medical History:   Diagnosis Date    Arthritis     CAD (coronary artery disease)     sees dr. Roddy Fleischer Chronic back pain     COPD (chronic obstructive pulmonary disease) ([facility] [address]     Hypertension     Peripheral sensory neuropathy     Pleurisy     Shoulder pain     TIA (transient ischemic attack)     Type II or unspecified type diabetes mellitus without mention of complication, not stated as uncontrolled       Past Surgical History:   Procedure Laterality Date    APPENDECTOMY      CARDIAC CATHETERIZATION  11/04/2019    PIETER to LAD    SHOULDER SURGERY Left 12/17/2020    LEFT REVERSE TOTAL SHOULDER ARTHROPLASTY performed by Alessandra Garcia MD at [address] [facility] 10/27/2021 9/16/2021 8/17/2021 7/2/2021 6/29/2021 5/18/4801   SYSTOLIC [phone]   DIASTOLIC 72 76 64 60 82 82   Pulse 61 54 62 74 55 54   Temp 98.3 97.7 - - 97.6 97.2   Resp - - - - 18 18   SpO2 97 97 - 97 98 97   Weight 222 lb 222 lb 219 lb 216 lb 14.4 oz 216 lb 214 lb   Height 5' 10\" 5' 10\" 5' 10\" 5' 11\" 5' 10\" 5' 11\"   Body mass index 31.85 kg/m2 31.85 kg/m2 31.42 kg/m2 30.25 kg/m2 30.99 kg/m2 29.84 kg/m2   Some recent data might be hidden       Family History   Problem Relation Age of Onset    Cancer Mother     Heart Disease Father        Social History     Tobacco Use    Smoking status: Former Smoker     Packs/day: 1.50     Years: 15.00     Pack years: 22.50     Types: Cigarettes     Quit date: 10/23/2000     Years since quittin.0    Smokeless tobacco: Never Used   Substance Use Topics    Alcohol use: No      Current Outpatient Medications on File Prior to Visit   Medication Sig Dispense Refill    memantine (NAMENDA) 10 MG tablet TAKE 1 TABLET BY MOUTH 2 TIMES DAILY 180 tablet 1    donepezil (ARICEPT) 5 MG tablet TAKE 1 TABLET BY MOUTH NIGHTLY 90 tablet 1    citalopram (CELEXA) 10 MG tablet TAKE 1 TABLET BY MOUTH DAILY 90 tablet 1    tiZANidine (ZANAFLEX) 4 MG tablet Take 1 tablet by mouth every 8 hours as needed (back pain) 30 tablet 1    traZODone (DESYREL) 50 MG tablet Take 1 tablet by mouth nightly 30 tablet 3    gabapentin (NEURONTIN) 800 MG tablet Take 1 tablet by mouth 3 times daily for 30 days. 90 tablet 0    cetirizine (ZYRTEC) 10 MG tablet TAKE 1 TABLET BY MOUTH DAILY 90 tablet 3    azelastine (ASTELIN) 0.1 % nasal spray 1 spray by Nasal route 2 times daily Use in each nostril as directed 2 each 0    ondansetron (ZOFRAN ODT) 4 MG disintegrating tablet Take 1 tablet by mouth every 8 hours as needed for Nausea or Vomiting 30 tablet 0    pantoprazole (PROTONIX) 40 MG tablet Take 1 tablet by mouth daily Indications: Reflux Gastritis 90 tablet 1    oxyCODONE-acetaminophen (PERCOCET)  MG per tablet       diclofenac sodium (VOLTAREN) 1 % GEL Apply topically 2 times daily 100 g 3    tamsulosin (FLOMAX) 0.4 MG capsule Take 1 capsule by mouth daily 90 capsule 3    albuterol sulfate HFA (VENTOLIN HFA) 108 (90 Base) MCG/ACT inhaler Inhale 2 puffs into the lungs every 6 hours as needed for Wheezing      meclizine (ANTIVERT) 25 MG tablet Take 25 mg by mouth 3 times daily as needed for Dizziness       lisinopril (PRINIVIL;ZESTRIL) 40 MG tablet TAKE ONE TABLET BY MOUTH DAILY. (Patient taking differently: Take 40 mg by mouth daily TAKE ONE TABLET BY MOUTH DAILY. ) 90 tablet 3    pravastatin (PRAVACHOL) 80 MG tablet TAKE ONE TABLET BY MOUTH EVERY EVENING.  (Patient taking differently: Take 80 mg by mouth nightly TAKE ONE TABLET BY MOUTH EVERY EVENING.) 90 tablet 3    Diabetic Shoe MISC by Does not apply route DISPENSE ONE PAIR DIABETIC SHOES AND THREE PAIRS OF HEAT MOLDED INSERTS 1 each 0    aspirin 81 MG chewable tablet Take 1 tablet by mouth daily 30 tablet 3    triamcinolone (KENALOG) 0.1 % cream Apply topically 2 times daily. (Patient taking differently: Apply topically 2 times daily Apply topically 2 times daily. ) 1 Tube 1    Misc. Devices (WALKER) MISC 1 each by Does not apply route daily 1 each 0    nitroGLYCERIN (NITROSTAT) 0.4 MG SL tablet up to max of 3 total doses. If no relief after 1 dose, call 911. 25 tablet 3    EPINEPHrine (EPIPEN) 0.3 MG/0.3ML SOAJ injection Use as directed for allergic reaction 0.3 mL 3    [DISCONTINUED] omega-3 acid ethyl esters (LOVAZA) 1 G capsule Take 2 capsules by mouth 2 times daily 60 capsule 3     No current facility-administered medications on file prior to visit.      Allergies   Allergen Reactions    Bee Venom Anaphylaxis    Pcn [Penicillins] Other (See Comments)     Made pt \"black out\"       Health Maintenance   Topic Date Due    DTaP/Tdap/Td vaccine (1 - Tdap) Never done    Shingles Vaccine (1 of 2) Never done    Diabetic retinal exam  08/21/2014    Pneumococcal 65+ years Vaccine (2 of 2 - PPSV23) 09/28/2020    Diabetic microalbuminuria test  01/13/2021    Annual Wellness Visit (AWV)  01/13/2021    Flu vaccine (1) 09/01/2021    Diabetic foot exam  09/22/2021    Lipid screen  12/02/2021    A1C test (Diabetic or Prediabetic)  03/16/2022    Potassium monitoring  07/16/2022    Creatinine monitoring  07/16/2022    Colon cancer screen colonoscopy  02/24/2026    COVID-19 Vaccine  Completed    AAA screen  Completed    Hepatitis C screen  Completed    Hepatitis A vaccine  Aged Out    Hib vaccine  Aged Out    Meningococcal (ACWY) vaccine  Aged Out       Subjective:      Review of Systems    Objective:     Physical Exam  /72 Pulse 61   Temp 98.3 °F (36.8 °C)   Ht 5' 10\" (1.778 m)   Wt 222 lb (100.7 kg)   SpO2 97%   BMI 31.85 kg/m²     Assessment:      {No diagnosis found. (Refresh or delete this SmartLink)}      Plan:   More than 50% of the time was spent counseling and coordinating care for a total time of ***min face to face. PDMP Monitoring:    Last PDMP Cherylpaulino Riccigrant as Reviewed [facility]):  Review User Review Instant Review Result            Urine Drug Screenings (1 yr)     POCT Rapid Drug Screen  Collected: 3/16/2021 (Final result)    Complete Results          POCT Rapid Drug Screen  Collected: 1/13/2020 11:30 AM (Final result)    Complete Results          POCT Rapid Drug Screen  Collected: 11/8/2019  1:30 PM (Final result)    Complete Results          Urine Drug Screen  Collected: 2/10/2020  3:57 PM (Final result)    Complete Results          Urine Drug Screen  Collected: 11/25/2019 10:01 AM (Final result)    Complete Results              Medication Contract and Consent for Opioid Use Documents Filed     Patient Documents       Type of Document Status Date Received Received By Description     Medication Contract [Status Missing]   scanned into media     Medication Contract Received 3/16/2021 11:48 AM Maria Patel Medication Contract                 Patient given educational materials -see patient instructions. Discussed use, benefit, and side effects of prescribed medications. All patient questions answered. Pt voiced understanding. Reviewed health maintenance. Instructed to continue currentmedications, diet and exercise. Patient agreed with treatment plan. Follow up as directed. MEDICATIONS:  No orders of the defined types were placed in this encounter. ORDERS:  No orders of the defined types were placed in this encounter. Follow-up:  No follow-ups on file. PATIENT INSTRUCTIONS:    There are no Patient Instructions on file for this visit.   Electronically signed by BRIGETTE Soto on 10/27/2021 at 1:16 PM    EMR Dragon/transcription disclaimer:  Much of thisencounter note is electronic transcription/translation of spoken language to printed texts. The electronic translation of spoken language may be erroneous, or at times, nonsensical words or phrases may be inadvertentlytranscribed. Although I have reviewed the note for such errors, some may still exist.  On the basis of positive falls risk screening, assessment and plan is as follows: {desc; P falls risk plan:[id number]}.

## 2021-10-27 NOTE — PROGRESS NOTES
After obtaining consent, and per orders of Lorelle  injection of Fluad given in Left arm by Bert Rasmussen MA. Patient instructed to remain in clinic for 20 minutes afterwards, and to report any adverse reaction to me immediately.

## 2021-10-27 NOTE — PROGRESS NOTES
Medicare Annual Wellness Visit  Name: Danny Terry Date: 10/27/2021   MRN: [id number] Sex: Male   Age: 80 y.o. Ethnicity: Non- / Non    : 1950 Race: White (non-)      Samantha Hagan is here for Medicare AWV    Screenings for behavioral, psychosocial and functional/safety risks, and cognitive dysfunction are all negative except as indicated below. These results, as well as other patient data from the [address] form, are documented in Flowsheets linked to this Encounter. Allergies   Allergen Reactions    Bee Venom Anaphylaxis    Pcn [Penicillins] Other (See Comments)     Made pt \"black out\"         Prior to Visit Medications    Medication Sig Taking? Authorizing Provider   memantine (NAMENDA) 10 MG tablet TAKE 1 TABLET BY MOUTH 2 TIMES DAILY Yes Alon Gibbsl APRN   donepezil (ARICEPT) 5 MG tablet TAKE 1 TABLET BY MOUTH NIGHTLY Yes Michaelalemuna Rival APRN   citalopram (CELEXA) 10 MG tablet TAKE 1 TABLET BY MOUTH DAILY Yes Michaelalemuna Gibbsl APRN   tiZANidine (ZANAFLEX) 4 MG tablet Take 1 tablet by mouth every 8 hours as needed (back pain) Yes BRIGETTE Devlin   traZODone (DESYREL) 50 MG tablet Take 1 tablet by mouth nightly Yes BRIGETTE Devlin   gabapentin (NEURONTIN) 800 MG tablet Take 1 tablet by mouth 3 times daily for 30 days.  Yes BRIGETTE Devlin   cetirizine (ZYRTEC) 10 MG tablet TAKE 1 TABLET BY MOUTH DAILY Yes BRIGETTE Fajardo - NP   azelastine (ASTELIN) 0.1 % nasal spray 1 spray by Nasal route 2 times daily Use in each nostril as directed Yes BRIGETTE Devlin   ondansetron (ZOFRAN ODT) 4 MG disintegrating tablet Take 1 tablet by mouth every 8 hours as needed for Nausea or Vomiting Yes BRIGETTE Devlin   pantoprazole (PROTONIX) 40 MG tablet Take 1 tablet by mouth daily Indications: Reflux Gastritis Yes BRIGETTE Devlin   oxyCODONE-acetaminophen (PERCOCET)  MG per tablet  Yes Laura Rizo MD diclofenac sodium (VOLTAREN) 1 % GEL Apply topically 2 times daily Yes BRIGETTE Triana   tamsulosin (FLOMAX) 0.4 MG capsule Take 1 capsule by mouth daily Yes BRIGETTE Triana   albuterol sulfate HFA (VENTOLIN HFA) 108 (90 Base) MCG/ACT inhaler Inhale 2 puffs into the lungs every 6 hours as needed for Wheezing Yes Historical Provider, MD   meclizine (ANTIVERT) 25 MG tablet Take 25 mg by mouth 3 times daily as needed for Dizziness  Yes Historical Provider, MD   lisinopril (PRINIVIL;ZESTRIL) 40 MG tablet TAKE ONE TABLET BY MOUTH DAILY. Patient taking differently: Take 40 mg by mouth daily TAKE ONE TABLET BY MOUTH DAILY. Yes BRIGETTE Triana   pravastatin (PRAVACHOL) 80 MG tablet TAKE ONE TABLET BY MOUTH EVERY EVENING. Patient taking differently: Take 80 mg by mouth nightly TAKE ONE TABLET BY MOUTH EVERY EVENING. Yes BRIGETTE Coronado   Diabetic Shoe MISC by Does not apply route DISPENSE ONE PAIR DIABETIC SHOES AND THREE PAIRS OF HEAT MOLDED INSERTS Yes Eyad Clemente MD   aspirin 81 MG chewable tablet Take 1 tablet by mouth daily Yes BRIGETTE Darling NP   triamcinolone (KENALOG) 0.1 % cream Apply topically 2 times daily. Patient taking differently: Apply topically 2 times daily Apply topically 2 times daily. Yes BRIGETTE Coronado   Misc. Devices (WALKER) MISC 1 each by Does not apply route daily Yes BRIGETTE Triana   nitroGLYCERIN (NITROSTAT) 0.4 MG SL tablet up to max of 3 total doses. If no relief after 1 dose, call 911.  Yes Chris Kulkarni MD   EPINEPHrine (EPIPEN) 0.3 MG/0.3ML SOAJ injection Use as directed for allergic reaction Yes Eyad Clemente MD   omega-3 acid ethyl esters (LOVAZA) 1 G capsule Take 2 capsules by mouth 2 times daily  Amy Joya PA-C         Past Medical History:   Diagnosis Date    Arthritis     CAD (coronary artery disease)     sees dr. Rufina Riggins Chronic back pain     COPD (chronic obstructive pulmonary disease) ([facility] [address]     Hypertension     Peripheral sensory neuropathy     Pleurisy     Shoulder pain     TIA (transient ischemic attack)     Type II or unspecified type diabetes mellitus without mention of complication, not stated as uncontrolled        Past Surgical History:   Procedure Laterality Date    APPENDECTOMY      CARDIAC CATHETERIZATION  11/04/2019    PIETER to LAD    SHOULDER SURGERY Left 12/17/2020    LEFT REVERSE TOTAL SHOULDER ARTHROPLASTY performed by Manuel Cowan MD at [facility] OR         Family History   Problem Relation Age of Onset    Cancer Mother     Heart Disease Father        CareTeam (Including outside providers/suppliers regularly involved in providing care):   Patient Care Team:  BRIGETTE Dominguez as PCP - General (Family Nurse Practitioner)  BRIGETTE Dominguez as PCP - [facility] Empaneled Provider  Saundra Castorena MD (Gastroenterology)  Luis Antonio Shaw DPM (Podiatry)  BRIGETTE Tran as Nurse Practitioner (Certified Nurse Specialist)  BRIGETTE Allen as Advanced Practice Nurse (Neurology)  Demian Preciado MD as Consulting Physician (Interventional Cardiology)    Wt Readings from Last 3 Encounters:   10/27/21 222 lb (100.7 kg)   09/16/21 222 lb (100.7 kg)   08/17/21 219 lb (99.3 kg)     Vitals:    10/27/21 1309   BP: 132/72   Pulse: 61   Temp: 98.3 °F (36.8 °C)   SpO2: 97%   Weight: 222 lb (100.7 kg)   Height: 5' 10\" (1.778 m)     Body mass index is 31.85 kg/m². Based upon direct observation of the patient, evaluation of cognition reveals recent and remote memory intact.     General Appearance: alert and oriented to person, place and time, well developed and well- nourished, in no acute distress  Skin: warm and dry, no rash or erythema  Head: normocephalic and atraumatic  Eyes: pupils equal, round, and reactive to light, extraocular eye movements intact, conjunctivae normal  ENT: tympanic membrane, external ear and ear canal normal bilaterally, nose without deformity, nasal mucosa and turbinates normal without polyps  Neck: supple and non-tender without mass, no thyromegaly or thyroid nodules, no cervical lymphadenopathy  Pulmonary/Chest: clear to auscultation bilaterally- no wheezes, rales or rhonchi, normal air movement, no respiratory distress  Cardiovascular: normal rate, regular rhythm, normal S1 and S2, no murmurs, rubs, clicks, or gallops, distal pulses intact, no carotid bruits  Abdomen: soft, non-tender, non-distended, normal bowel sounds, no masses or organomegaly  Extremities: no cyanosis, clubbing or edema  Musculoskeletal: normal range of motion, no joint swelling, deformity or tenderness  Neurologic: reflexes normal and symmetric, no cranial nerve deficit, gait, coordination and speech normal    Patient's complete Health Risk Assessment and screening values have been reviewed and are found in Flowsheets. The following problems were reviewed today and where indicated follow up appointments were made and/or referrals ordered. Positive Risk Factor Screenings with Interventions:     Fall Risk:  2 or more falls in past year?: (!) yes  Fall with injury in past year?: no  Fall Risk Interventions:    · Home safety tips provided          General Health and ACP:  General  In general, how would you say your health is?: Fair  In the past 7 days, have you experienced any of the following?  New or Increased Pain, New or Increased Fatigue, Loneliness, Social Isolation, Stress or Anger?: None of These  Do you get the social and emotional support that you need?: Yes  Do you have a Living Will?: (!) No  Advance Directives     Power of [address] [place] [address] Will ACP-Advance Directive ACP-Power of     Not on File Not on File Not on File Not on File      General Health Risk Interventions:  · No Living Will: Advance Care Planning addressed with patient today    Health Habits/Nutrition:  Health Habits/Nutrition  Do you exercise for at least 20 minutes 2-3 times per week?: Yes  Have you lost any weight without trying in the past 3 months?: No  Do you eat only one meal per day?: (!) Yes  Have you seen the dentist within the past year?: Yes  Body mass index: (!) 31.85  Health Habits/Nutrition Interventions:  · Nutritional issues:  patient is not ready to address his/her nutritional/weight issues at this time    Hearing/Vision:  No exam data present  Hearing/Vision  Do you or your family notice any trouble with your hearing that hasn't been managed with hearing aids?: No  Do you have difficulty driving, watching TV, or doing any of your daily activities because of your eyesight?: No  Have you had an eye exam within the past year?: (!) No  Hearing/Vision Interventions:  · Vision concerns:  patient encouraged to make appointment with his/her eye specialist      Personalized Preventive Plan   Current Health Maintenance Status  Immunization History   Administered Date(s) Administered    COVID-19, J&J, PF, 0.5 mL 04/01/2021    Influenza 10/15/2013    Influenza Virus Vaccine 10/13/2014, 01/04/2016    Influenza, High Dose (Fluzone 65 yrs and older) 10/23/2018    Influenza, Quadv, IM, (6 mo and older Fluzone, Flulaval, Fluarix and 3 yrs and older Afluria) 10/30/2017    Influenza, Quadv, IM, PF (6 mo and older Fluzone, Flulaval, Fluarix, and 3 yrs and older Afluria) 11/05/2019    Influenza, Quadv, adjuvanted, 65 yrs +, IM, PF (Fluad) 09/22/2020    Pneumococcal Conjugate 13-valent ([id number]) 01/31/2017    Pneumococcal Polysaccharide ([id number]) 09/28/2015        Health Maintenance   Topic Date Due    DTaP/Tdap/Td vaccine (1 - Tdap) Never done    Shingles Vaccine (1 of 2) Never done    Diabetic retinal exam  08/21/2014    Pneumococcal 65+ years Vaccine (2 of 2 - PPSV23) 09/28/2020    Diabetic microalbuminuria test  01/13/2021    Annual Wellness Visit (AWV)  01/13/2021    Flu vaccine (1) 09/01/2021    Diabetic foot exam  09/22/2021    Lipid screen  12/02/2021    Potassium monitoring  07/16/2022    Creatinine monitoring 07/16/2022    A1C test (Diabetic or Prediabetic)  10/27/2022    Colon cancer screen colonoscopy  02/24/2026    COVID-19 Vaccine  Completed    AAA screen  Completed    Hepatitis C screen  Completed    Hepatitis A vaccine  Aged Out    Hib vaccine  Aged Out    Meningococcal (ACWY) vaccine  Aged Out     Recommendations for [employer] Due: see orders and patient instructions/AVS.  . Recommended screening schedule for the next 5-10 years is provided to the patient in written form: see Patient Gary Leavitt was seen today for medicare aw. Diagnoses and all orders for this visit:    Type 2 diabetes mellitus without complication, without long-term current use of insulin (HCC)  -     POCT glycosylated hemoglobin (Hb A1C)  -     CBC Auto Differential; Future  -     Comprehensive Metabolic Panel; Future  -     Hemoglobin A1C; Future  -     Lipid Panel; Future  -     TSH without Reflex; Future    At high risk for falls    Routine general medical examination at a health care facility  -     [address] [place] [place] [address] [address] [facility] [[id number]]    Essential hypertension  -     CBC Auto Differential; Future  -     Comprehensive Metabolic Panel; Future  -     Hemoglobin A1C; Future  -     Lipid Panel; Future  -     TSH without Reflex; Future    Other orders  -     INFLUENZA, QUADV, ADJUVANTED, 72 YRS =, IM, PF, PREFILL SYR, 0.5ML (FLUAD)              Advance Care Planning   Advanced Care Planning: Discussed the patients choices for care and treatment in case of a health event that adversely affects decision-making abilities. Also discussed the patients long-term treatment options. Reviewed the process of designating a Health Care Surrogate as defined by the [facility] [address] Reviewed the [facility] Will Directive process and the kinds of life-sustaining treatments the patient would like to receive should they become terminally ill or permanently unconscious.  Explained the state requirement to complete the forms in the presence of two eligible witnesses OR in the presence of a . Patient was asked to provide a copy of the signed forms to the practice office.   Time spent (minutes): 15

## 2021-11-01 NOTE — PROGRESS NOTES
Progress Note      Pt Name: Hazel Cameron: 1950  MRN: [id number]    Date of evaluation: 11/2/2021  History Obtained From:  patient, spouse, electronic medical record    CHIEF COMPLAINT:    Chief Complaint   Patient presents with    Follow-up     Thrombocytopenia      Current active problems  Thrombocytopenia  B12 deficiency  Pancreatic lesion    HISTORY OF PRESENT ILLNESS:    Mari Jo is a 80 y.o.  male with B12 deficiency and mild to moderate thrombocytopenia followed in the office since 9/19/2019. He continues taking oral B12 daily. He continues on aspirin 81 daily-only for cardiac stent. Denies any bleeding issues. He denies any new cardiac issues. He does continue to go to pain management for injections for his chronic pain. He has diabetes controlled with diet only. He has significant diabetic neuropathy however he continues on Neurontin. He denies any COPD exacerbation. Blood pressure is stable on his lisinopril 40 daily. He continues taking his Aricept with persistent memory issues. HEMATOLOGY HISTORY: Thrombocytopenia, B12 deficiency  Elmira Polanco was seen in initial hematology consultation on 9/19/2019 referred [address] [place] [address] for evaluation of thrombocytopenia.     Elmira Polanco presented to his initial visit with his wife. I asked him if he had ever had a prior issues with low platelets and he denied knowing any history. He did report that he bruised easily and bleeds easily at times, \"as long as I can remember\". He denied taking aspirin or oral nonsteroidals.     CBCs obtained from epic:          Abdominal ultrasound 10/7/2015 (ordered by Dr. Sharon Rothman) revealed a \"normal spleen\"-no measurement given. I have asked him if he remembered previously being seen by Dr. Sharon Rothman, and he reported that he did not.     He denied any prior history of liver dysfunction.     He has chronic back pain and gets injections by Dr. Iwona Knox, and denied having any significant bleeding issues except for superficial bleeding.     He had been experiencing thrombocytopenia per records dating back to at least 2014. Recent serology on 9/4/2019 revealed that his LFTs were normal.  A PT/INR on 9/4/2019 was normal as well.     I discussed the abnormalities on his CBC that was limited to his platelets, number 1 we will maintain his wife on his initial visit. I discussed the role of platelets. He does have ecchymosis mostly on his arms. CBC on 9/19/2019 revealed a WBC of 9.94 with normal percent differential, Hgb 14 with MCV 98.8, PLT 91,000.     Serology will be requested. I discussed potential need for bone marrow aspiration and biopsy, described the procedure as well today. We will await findings from the initial serology. Serology 9/19/2019  DIC screen - negative  B12 - 295  Folate - 9.4  Copper - 80  Zinc - 70  Antiplatelet ab - Ia/IIa -negative,  IIb/IIIa POSITIVE  HIV - Non reactive  SPEP - No M spike  QI - IgG 1030, IgA 322, IgM 50 - all WNL  MMA - 253  FATOUMATA - negative    Bone marrow aspiration and biopsy 2/13/2020  · Normocellular bone marrow for age (~30 to 35%) with trilineage hematopoiesis, no significant dyspoiesis and no increase in blasts (~1%)  · Adequate megakaryocytes  · No diagnostic evidence of lymphoproliferative or myelodysplastic process or metastatic malignancy or granulomas  · Normal male karyotype    CTA abdomen 11/3/2019 at [facility] revealed that the liver and spleen were \"normal\". Serology 5/27/2020  LDH - 307  Retic - 0.8%  Haptoglobin - 102  B12 - 867  CMP - crt 1.6 with GFR 43      TREATMENT SUMMARY  B12 1000 mcg IM then changed to p.o. TUMOR HISTORY: Pancreatic lesion    CTA of the abdomen performed on 11/3/2019 at [address] [place] [address] which revealed that his liver and spleen were \"normal\". It did show some rectal wall thickening, and a tiny density in the pancreatic tail. He and his wife declined MRI-MRCP. His insurance would not cover a CA-19-9 to be drawn.     CT abdomen with contrast 6/15/2020 at [facility] was compared to 11/3/2019 revealed  · 4 mm hypodense nodule in tail of the pancreas possibly representing cyst or pseudocyst  · Nondilated pancreatic duct  · Another 6-month follow-up CT recommended to ensure stability. · Bilateral renal cysts and nonobstructing renal calculi    MRI abdomen with and without contrast MRCP at [facility] 11/24/2020 was compared to CT 6/15/2020 and 11/3/2019  · No pancreatic mass or cyst identified. No MR correlate for the tiny pancreatic tail lesion identified on prior CT    CT abdomen with and without contrast 3/17/2021 at [facility]  · Stable 3 mm pancreatic lesion suggesting benign etiology. No further follow-up imaging planned.       Past Medical History:   Diagnosis Date    Arthritis     CAD (coronary artery disease)     sees dr. Sadia Tinsley Chronic back pain     COPD (chronic obstructive pulmonary disease) ([facility] [address]     Hypertension     Peripheral sensory neuropathy     Pleurisy     Shoulder pain     TIA (transient ischemic attack)     Type II or unspecified type diabetes mellitus without mention of complication, not stated as uncontrolled         Past Surgical History:   Procedure Laterality Date    APPENDECTOMY      CARDIAC CATHETERIZATION  11/04/2019    PIETER to LAD    SHOULDER SURGERY Left 12/17/2020    LEFT REVERSE TOTAL SHOULDER ARTHROPLASTY performed by Manuel Cowan MD at [facility] OR       Current Outpatient Medications:     memantine (NAMENDA) 10 MG tablet, TAKE 1 TABLET BY MOUTH 2 TIMES DAILY, Disp: 180 tablet, Rfl: 1    donepezil (ARICEPT) 5 MG tablet, TAKE 1 TABLET BY MOUTH NIGHTLY, Disp: 90 tablet, Rfl: 1    citalopram (CELEXA) 10 MG tablet, TAKE 1 TABLET BY MOUTH DAILY, Disp: 90 tablet, Rfl: 1    tiZANidine (ZANAFLEX) 4 MG tablet, Take 1 tablet by mouth every 8 hours as needed (back pain), Disp: 30 tablet, Rfl: 1    traZODone (DESYREL) 50 MG tablet, Take 1 tablet by mouth nightly, Disp: 30 tablet, Rfl: 3    cetirizine (ZYRTEC) 0.4 MG SL tablet, up to max of 3 total doses. If no relief after 1 dose, call 911., Disp: 25 tablet, Rfl: 3    EPINEPHrine (EPIPEN) 0.3 MG/0.3ML SOAJ injection, Use as directed for allergic reaction, Disp: 0.3 mL, Rfl: 3    gabapentin (NEURONTIN) 800 MG tablet, Take 1 tablet by mouth 3 times daily for 30 days. , Disp: 90 tablet, Rfl: 0       Allergies   Allergen Reactions    Bee Venom Anaphylaxis    Pcn [Penicillins] Other (See Comments)     Made pt \"black out\"       Social History     Tobacco Use    Smoking status: Former Smoker     Packs/day: 1.50     Years: 15.00     Pack years: 22.50     Types: Cigarettes     Quit date: 10/23/2000     Years since quittin.0    Smokeless tobacco: Never Used   Substance Use Topics    Alcohol use: No    Drug use: No       Family History   Problem Relation Age of Onset    Cancer Mother     Heart Disease Father        Subjective   REVIEW OF SYSTEMS:   Review of Systems   Constitutional: Positive for fatigue. Negative for chills, diaphoresis, fever and unexpected weight change. HENT: Negative for mouth sores, nosebleeds, sore throat, trouble swallowing and voice change. Eyes: Negative for photophobia, discharge and itching. Respiratory: Positive for shortness of breath (Mild and stable). Negative for cough and wheezing. Cardiovascular: Negative for chest pain, palpitations and leg swelling. Gastrointestinal: Negative for abdominal distention, abdominal pain, blood in stool, constipation, diarrhea, nausea and vomiting. Endocrine: Positive for cold intolerance. Negative for heat intolerance, polydipsia and polyuria. Genitourinary: Negative for difficulty urinating, dysuria, hematuria and urgency. Musculoskeletal: Positive for arthralgias and back pain. Negative for joint swelling and myalgias. Skin: Negative for color change and rash. Allergic/Immunologic: Positive for environmental allergies. Neurological: Positive for numbness.  Negative for dizziness, tremors, seizures, syncope and light-headedness. Hematological: Negative for adenopathy. Bruises/bleeds easily (Minimal bruising). Psychiatric/Behavioral: Negative for behavioral problems and suicidal ideas. The patient is not nervous/anxious. All other systems reviewed and are negative. Objective   /88   Pulse 66   Ht 5' 10\" (1.778 m)   Wt 224 lb (101.6 kg)   SpO2 98%   BMI 32.14 kg/m²     PHYSICAL EXAM:  Physical Exam  Vitals reviewed. Constitutional:       General: He is not in acute distress. Appearance: He is well-developed. HENT:      Head: Normocephalic and atraumatic. Nose: Nose normal.   Eyes:      General: No scleral icterus. Conjunctiva/sclera: Conjunctivae normal.   Neck:      Vascular: No JVD. Trachea: No tracheal deviation. Cardiovascular:      Rate and Rhythm: Normal rate and regular rhythm. Heart sounds: Normal heart sounds. No murmur heard. Pulmonary:      Effort: Pulmonary effort is normal. No respiratory distress. Breath sounds: Normal breath sounds. No wheezing. Abdominal:      General: Bowel sounds are normal. There is no distension. Palpations: Abdomen is soft. There is no fluid wave, hepatomegaly, splenomegaly or mass. Tenderness: There is no abdominal tenderness. Musculoskeletal:         General: No tenderness or deformity. Normal range of motion. Skin:     Findings: No ecchymosis, erythema or rash. Neurological:      Mental Status: He is alert and oriented to person, place, and time. Coordination: Coordination abnormal.      Gait: Gait abnormal.   Psychiatric:         Thought Content: Thought content normal.         Cognition and Memory: Memory is impaired. CBC reviewed by me  Lab Results   Component Value Date    WBC 7.37 11/02/2021    HGB 14.9 11/02/2021    HCT 43.3 11/02/2021    MCV 98.9 (H) 11/02/2021    PLT 93 (L) 11/02/2021       VISIT DIAGNOSES  1. Thrombocytopenia ([address]    2.  B12 deficiency        ASSESSMENT/PLAN:    1. Thrombocytopenia  2. B12 deficiency    He did have a positive antiplatelet antibody so he may have a mild ITP. However, his platelet count is adequate at present but if it were to drop in the 50,000 range we may consider a course of steroids. He is diabetic currently controlled with diet. PLT today is 93,000 which is stable for him. 3.  Macrocytosis        Hgb today is 98.9. · Prostate cancer screening. PAO is performed annually by Dr. Elva Wayne. · Colon cancer screening. Colonoscopy per Dr. Juan Craig 2/24/2021 with polyps encountered and removed with follow-up plan 3 years. Immunization History   Administered Date(s) Administered    COVID-19, J&J, PF, 0.5 mL 04/01/2021    Influenza, Quadv, adjuvanted, 65 yrs +, IM, PF (Fluad) 09/22/2020           Return in about 6 months (around 5/2/2022).      Nini Escobar PA-C  2:33 PM  11/2/2021

## 2021-11-02 ENCOUNTER — HOSPITAL ENCOUNTER (OUTPATIENT)
Dept: INFUSION THERAPY | Age: 71
Discharge: HOME | End: 2021-11-02
Payer: MEDICARE

## 2021-11-02 ENCOUNTER — OFFICE VISIT (OUTPATIENT)
Dept: HEMATOLOGY | Age: 71
End: 2021-11-02
Payer: MEDICARE

## 2021-11-02 VITALS
DIASTOLIC BLOOD PRESSURE: 88 MMHG | HEART RATE: 66 BPM | WEIGHT: 224 LBS | OXYGEN SATURATION: 98 % | BODY MASS INDEX: 32.07 KG/M2 | HEIGHT: 70 IN | SYSTOLIC BLOOD PRESSURE: 134 MMHG

## 2021-11-02 DIAGNOSIS — C44.519 BASAL CELL CARCINOMA OF CHEST: ICD-10-CM

## 2021-11-02 DIAGNOSIS — D69.6 THROMBOCYTOPENIA (HCC): Primary | ICD-10-CM

## 2021-11-02 DIAGNOSIS — E53.8 B12 DEFICIENCY: ICD-10-CM

## 2021-11-02 LAB
BASOPHILS ABSOLUTE: 0.01 K/UL (ref 0.01–0.08)
BASOPHILS RELATIVE PERCENT: 0.1 % (ref 0.1–1.2)
EOSINOPHILS ABSOLUTE: 0.18 K/UL (ref 0.04–0.54)
EOSINOPHILS RELATIVE PERCENT: 2.4 % (ref 0.7–7)
HCT VFR BLD CALC: 43.3 % (ref 40.1–51)
HEMOGLOBIN: 14.9 G/DL (ref 13.7–17.5)
LYMPHOCYTES ABSOLUTE: 2.01 K/UL (ref 1.18–3.74)
LYMPHOCYTES RELATIVE PERCENT: 27.3 % (ref 19.3–53.1)
MCH RBC QN AUTO: 34 PG (ref 25.7–32.2)
MCHC RBC AUTO-ENTMCNC: 34.4 G/DL (ref 32.3–36.5)
MCV RBC AUTO: 98.9 FL (ref 79–92.2)
MONOCYTES ABSOLUTE: 0.46 K/UL (ref 0.24–0.82)
MONOCYTES RELATIVE PERCENT: 6.2 % (ref 4.7–12.5)
NEUTROPHILS ABSOLUTE: 4.71 K/UL (ref 1.56–6.13)
NEUTROPHILS RELATIVE PERCENT: 64 % (ref 34–71.1)
PDW BLD-RTO: 12.2 % (ref 11.6–14.4)
PLATELET # BLD: 93 K/UL (ref 163–337)
PMV BLD AUTO: 9.8 FL (ref 7.4–10.4)
RBC # BLD: 4.38 M/UL (ref 4.63–6.08)
WBC # BLD: 7.37 K/UL (ref 4.23–9.07)

## 2021-11-02 PROCEDURE — 85025 COMPLETE CBC W/AUTO DIFF WBC: CPT

## 2021-11-02 PROCEDURE — G8417 CALC BMI ABV UP PARAM F/U: HCPCS | Performed by: PHYSICIAN ASSISTANT

## 2021-11-02 PROCEDURE — G8428 CUR MEDS NOT DOCUMENT: HCPCS | Performed by: PHYSICIAN ASSISTANT

## 2021-11-02 PROCEDURE — 99213 OFFICE O/P EST LOW 20 MIN: CPT | Performed by: PHYSICIAN ASSISTANT

## 2021-11-02 PROCEDURE — 3017F COLORECTAL CA SCREEN DOC REV: CPT | Performed by: PHYSICIAN ASSISTANT

## 2021-11-02 PROCEDURE — 1123F ACP DISCUSS/DSCN MKR DOCD: CPT | Performed by: PHYSICIAN ASSISTANT

## 2021-11-02 PROCEDURE — 4040F PNEUMOC VAC/ADMIN/RCVD: CPT | Performed by: PHYSICIAN ASSISTANT

## 2021-11-02 PROCEDURE — 1036F TOBACCO NON-USER: CPT | Performed by: PHYSICIAN ASSISTANT

## 2021-11-02 PROCEDURE — 99211 OFF/OP EST MAY X REQ PHY/QHP: CPT

## 2021-11-02 PROCEDURE — G8484 FLU IMMUNIZE NO ADMIN: HCPCS | Performed by: PHYSICIAN ASSISTANT

## 2021-11-02 PROCEDURE — 36415 COLL VENOUS BLD VENIPUNCTURE: CPT

## 2021-11-02 ASSESSMENT — ENCOUNTER SYMPTOMS
TROUBLE SWALLOWING: 0
SORE THROAT: 0
COUGH: 0
ABDOMINAL PAIN: 0
PHOTOPHOBIA: 0
SHORTNESS OF BREATH: 1
BACK PAIN: 1
EYE ITCHING: 0
VOMITING: 0
NAUSEA: 0
ABDOMINAL DISTENTION: 0
BLOOD IN STOOL: 0
CONSTIPATION: 0
VOICE CHANGE: 0
DIARRHEA: 0
EYE DISCHARGE: 0
WHEEZING: 0
COLOR CHANGE: 0

## 2021-11-19 ENCOUNTER — CARE COORDINATION (OUTPATIENT)
Dept: CARE COORDINATION | Age: 71
End: 2021-11-19

## 2021-11-19 NOTE — CARE COORDINATION
Ambulatory Care Coordination Note  11/19/2021  CM Risk Score: 7  Charlson 10 Year Mortality Risk Score: 100%     ACC: Janae Pendleton RN    Summary Note: ACM spoke with patient via telephone to assess need for Care Management  Patient states that he is busy right now and does not have time to speak with me. Patient agrees that I can call him another day. ACM will call patient in 1-2 weeks to assess needs    Maryan Young, [address]                  Prior to Admission medications    Medication Sig Start Date End Date Taking? Authorizing Provider   memantine (NAMENDA) 10 MG tablet TAKE 1 TABLET BY MOUTH 2 TIMES DAILY 10/25/21   Jordyn Rival APRN   donepezil (ARICEPT) 5 MG tablet TAKE 1 TABLET BY MOUTH NIGHTLY 10/25/21   Jordyn Rival, APRN   citalopram (CELEXA) 10 MG tablet TAKE 1 TABLET BY MOUTH DAILY 10/25/21   Jordyn Gibbsl APRN   tiZANidine (ZANAFLEX) 4 MG tablet Take 1 tablet by mouth every 8 hours as needed (back pain) 9/16/81   BRIGETTE Mancera   traZODone (DESYREL) 50 MG tablet Take 1 tablet by mouth nightly 1/67/05   BRIGETTE Mancera   gabapentin (NEURONTIN) 800 MG tablet Take 1 tablet by mouth 3 times daily for 30 days.  4/96/32 96/35/90  BRIGETTE Mancera   cetirizine (ZYRTEC) 10 MG tablet TAKE 1 TABLET BY MOUTH DAILY 9/30/21   BRIGETTE Alicea - NP   azelastine (ASTELIN) 0.1 % nasal spray 1 spray by Nasal route 2 times daily Use in each nostril as directed 7/20/34   BRIGETTE Mancera   ondansetron (ZOFRAN ODT) 4 MG disintegrating tablet Take 1 tablet by mouth every 8 hours as needed for Nausea or Vomiting 6/80/07   BRIGETTE Mancera   pantoprazole (PROTONIX) 40 MG tablet Take 1 tablet by mouth daily Indications: Reflux Gastritis 7/62/79   BRIGETTE Mancera   oxyCODONE-acetaminophen (PERCOCET)  MG per tablet  2/27/21   Maryanne Wilson MD   diclofenac sodium (VOLTAREN) 1 % GEL Apply topically 2 times daily 3/16/21   Ruby Matters BRIGETTE Bird   tamsulosin (FLOMAX) 0.4 MG capsule Take 1 capsule by mouth daily 1/18/21   BRIGETTE Hill   albuterol sulfate HFA (VENTOLIN HFA) 108 (90 Base) MCG/ACT inhaler Inhale 2 puffs into the lungs every 6 hours as needed for Wheezing    Historical Provider, MD   meclizine (ANTIVERT) 25 MG tablet Take 25 mg by mouth 3 times daily as needed for Dizziness  10/19/20   Historical Provider, MD   lisinopril (PRINIVIL;ZESTRIL) 40 MG tablet TAKE ONE TABLET BY MOUTH DAILY. Patient taking differently: Take 40 mg by mouth daily TAKE ONE TABLET BY MOUTH DAILY. 10/20/20   BRIGETTE Triana   pravastatin (PRAVACHOL) 80 MG tablet TAKE ONE TABLET BY MOUTH EVERY EVENING. Patient taking differently: Take 80 mg by mouth nightly TAKE ONE TABLET BY MOUTH EVERY EVENING. 10/20/20   BRIGETTE Hill   Diabetic Shoe MISC by Does not apply route DISPENSE ONE PAIR DIABETIC SHOES AND THREE PAIRS OF HEAT MOLDED INSERTS 9/22/20   Leonor Yoon MD   aspirin 81 MG chewable tablet Take 1 tablet by mouth daily 4/6/20   BRIGETTE Mcclendon - NP   triamcinolone (KENALOG) 0.1 % cream Apply topically 2 times daily. Patient taking differently: Apply topically 2 times daily Apply topically 2 times daily. 1/13/20   BRIGETTE Hill   Misc. Devices [facility]) MISC 1 each by Does not apply route daily 11/8/19   BRIGETTE Hill   nitroGLYCERIN (NITROSTAT) 0.4 MG SL tablet up to max of 3 total doses.  If no relief after 1 dose, call 911. 11/5/19   Ginger Almodovar MD   EPINEPHrine (EPIPEN) 0.3 MG/0.3ML SOAJ injection Use as directed for allergic reaction 6/22/18   Leonor Yoon MD   omega-3 acid ethyl esters (LOVAZA) 1 G capsule Take 2 capsules by mouth 2 times daily 5/11/15 12/20/19  Esperanza Ivan PA-C       Future Appointments   Date Time Provider Sera Moss   4/27/2022  1:30 PM BRIGETTE [facility]nes[place]son [facility]   5/3/2022  1:40 PM SCHEDULE, [facility] MED ONC MA [facility] MED ONC Laura BRUNNER   5/3/2022  2:00 PM Any[facility] Rowland [facility]   6/1/2022 10:00 AM MD HUI Dueñas [facility]

## 2021-12-16 RX ORDER — TIZANIDINE 4 MG/1
4 TABLET ORAL EVERY 8 HOURS PRN
Qty: 30 TABLET | Refills: 1 | Status: SHIPPED | OUTPATIENT
Start: 2021-12-16 | End: 2022-04-26 | Stop reason: SDUPTHER

## 2022-01-02 RX ORDER — PRAVASTATIN SODIUM 80 MG/1
TABLET ORAL
Qty: 90 TABLET | Refills: 3 | Status: SHIPPED | OUTPATIENT
Start: 2022-01-02 | End: 2022-01-02 | Stop reason: SDUPTHER

## 2022-01-02 RX ORDER — PRAVASTATIN SODIUM 80 MG/1
TABLET ORAL
Qty: 90 TABLET | Refills: 3 | Status: SHIPPED | OUTPATIENT
Start: 2022-01-02

## 2022-01-02 RX ORDER — LISINOPRIL 40 MG/1
TABLET ORAL
Qty: 90 TABLET | Refills: 3 | Status: SHIPPED | OUTPATIENT
Start: 2022-01-02 | End: 2022-01-02 | Stop reason: SDUPTHER

## 2022-01-02 RX ORDER — LISINOPRIL 40 MG/1
TABLET ORAL
Qty: 90 TABLET | Refills: 3 | Status: SHIPPED | OUTPATIENT
Start: 2022-01-02 | End: 2022-04-26 | Stop reason: SDUPTHER

## 2022-01-04 RX ORDER — PANTOPRAZOLE SODIUM 40 MG/1
40 TABLET, DELAYED RELEASE ORAL DAILY
Qty: 90 TABLET | Refills: 0 | Status: SHIPPED | OUTPATIENT
Start: 2022-01-04 | End: 2022-04-26 | Stop reason: SDUPTHER

## 2022-04-26 DIAGNOSIS — E11.42 DIABETIC PERIPHERAL NEUROPATHY ASSOCIATED WITH TYPE 2 DIABETES MELLITUS (HCC): ICD-10-CM

## 2022-04-26 DIAGNOSIS — E11.9 TYPE 2 DIABETES MELLITUS WITHOUT COMPLICATION, WITHOUT LONG-TERM CURRENT USE OF INSULIN (HCC): ICD-10-CM

## 2022-04-26 DIAGNOSIS — J30.1 SEASONAL ALLERGIC RHINITIS DUE TO POLLEN: ICD-10-CM

## 2022-04-26 RX ORDER — PANTOPRAZOLE SODIUM 40 MG/1
40 TABLET, DELAYED RELEASE ORAL DAILY
Qty: 90 TABLET | Refills: 0 | Status: SHIPPED | OUTPATIENT
Start: 2022-04-26 | End: 2022-09-19 | Stop reason: SDUPTHER

## 2022-04-26 RX ORDER — LISINOPRIL 40 MG/1
TABLET ORAL
Qty: 90 TABLET | Refills: 3 | Status: SHIPPED | OUTPATIENT
Start: 2022-04-26

## 2022-04-26 RX ORDER — TIZANIDINE 4 MG/1
4 TABLET ORAL EVERY 8 HOURS PRN
Qty: 30 TABLET | Refills: 1 | Status: SHIPPED | OUTPATIENT
Start: 2022-04-26 | End: 2022-09-19 | Stop reason: SDUPTHER

## 2022-04-26 RX ORDER — AZELASTINE 1 MG/ML
1 SPRAY, METERED NASAL 2 TIMES DAILY
Qty: 2 EACH | Refills: 0 | Status: SHIPPED | OUTPATIENT
Start: 2022-04-26

## 2022-04-26 NOTE — TELEPHONE ENCOUNTER
Rod Pena called to request a refill on his medication. Last office visit : 10/27/2021   Next office visit : 4/26/2022     Last Charity Goodman: 04-  Medication Contract: 03/16/2021  Last Fill: 02-    Last UDS:   Amphetamine Screen, Urine   Date Value Ref Range Status   03/16/2021 -  Final     Barbiturate Screen, Urine   Date Value Ref Range Status   03/16/2021 -  Final     Benzodiazepine Screen, Urine   Date Value Ref Range Status   03/16/2021 -  Final     Buprenorphine Urine   Date Value Ref Range Status   03/16/2021 -  Final     Cocaine Metabolite Screen, Urine   Date Value Ref Range Status   03/16/2021 -  Final     Gabapentin Screen, Urine   Date Value Ref Range Status   03/16/2021 -  Final     MDMA, Urine   Date Value Ref Range Status   03/16/2021 -  Final     Methamphetamine, Urine   Date Value Ref Range Status   03/16/2021 -  Final     Opiate Scrn, Ur   Date Value Ref Range Status   03/16/2021 -  Final     Oxycodone Screen, Ur   Date Value Ref Range Status   03/16/2021 -  Final     PCP Screen, Urine   Date Value Ref Range Status   03/16/2021 -  Final     Propoxyphene Screen, Urine   Date Value Ref Range Status   03/16/2021 -  Final     THC Screen, Urine   Date Value Ref Range Status   03/16/2021 -  Final     Tricyclic Antidepressants, Urine   Date Value Ref Range Status   03/16/2021 -  Final                   Requested Prescriptions     Pending Prescriptions Disp Refills    gabapentin (NEURONTIN) 800 MG tablet 90 tablet 0     Sig: Take 1 tablet by mouth 3 times daily for 30 days. Please approve or refuse this medication.    Raegan Downey MA

## 2022-04-27 RX ORDER — GABAPENTIN 800 MG/1
800 TABLET ORAL 3 TIMES DAILY
Qty: 90 TABLET | Refills: 0 | Status: SHIPPED | OUTPATIENT
Start: 2022-04-27 | End: 2022-09-19 | Stop reason: SDUPTHER

## 2022-04-27 NOTE — TELEPHONE ENCOUNTER
LEE was reviewed today per office protocol. Report shows No discrepancies. Fill pattern is consistent from single provider(s) at single pharmacy(s).

## 2022-05-02 NOTE — PROGRESS NOTES
Progress Note      Pt Name: Connie Garcia: 1950  MRN: [id number]    Date of evaluation: 5/3/2022  History Obtained From:  patient, spouse, electronic medical record    CHIEF COMPLAINT:    Chief Complaint   Patient presents with    Follow-up     Thrombocytopenia ([address]     Current active problems  Thrombocytopenia  B12 deficiency    HISTORY OF PRESENT ILLNESS:    Shravan Romano is a 80 y.o.  male with B12 deficiency and mild to moderate thrombocytopenia followed in the office since 9/19/2019. He continues taking oral B12 daily. He continues on aspirin 81 daily-only for cardiac stent. Denies any bleeding issues. He denies any new cardiac issues. He continues to have chronic lumbar spine pain requiring injections through pain management. He has diabetes controlled with diet only with A1c 5.2 on 10/27/2021. He has significant diabetic neuropathy however he continues on Neurontin. He denies any COPD exacerbation. Blood pressure is stable on his lisinopril 40 daily. He continues taking his Aricept with persistent memory issues. HEMATOLOGY HISTORY: Thrombocytopenia, B12 deficiency  Fuentes Cody was seen in initial hematology consultation on 9/19/2019 referred [facility] for evaluation of thrombocytopenia.     Fuentes Cody presented to his initial visit with his wife. I asked him if he had ever had a prior issues with low platelets and he denied knowing any history. He did report that he bruised easily and bleeds easily at times, \"as long as I can remember\". He denied taking aspirin or oral nonsteroidals.     CBCs obtained from epic:          Abdominal ultrasound 10/7/2015 (ordered by Dr. Jean Mcgowan) revealed a \"normal spleen\"-no measurement given. I have asked him if he remembered previously being seen by Dr. Jean Mcgowan, and he reported that he did not.     He denied any prior history of liver dysfunction.     He has chronic back pain and gets injections by Dr. Danette Ruano, and denied having any significant bleeding issues except for superficial bleeding.     He had been experiencing thrombocytopenia per records dating back to at least 2014. Recent serology on 9/4/2019 revealed that his LFTs were normal.  A PT/INR on 9/4/2019 was normal as well.     I discussed the abnormalities on his CBC that was limited to his platelets, number 1 we will maintain his wife on his initial visit. I discussed the role of platelets. He does have ecchymosis mostly on his arms. CBC on 9/19/2019 revealed a WBC of 9.94 with normal percent differential, Hgb 14 with MCV 98.8, PLT 91,000.     Serology will be requested. I discussed potential need for bone marrow aspiration and biopsy, described the procedure as well today. We will await findings from the initial serology. Serology 9/19/2019  DIC screen - negative  B12 - 295  Folate - 9.4  Copper - 80  Zinc - 70  Antiplatelet ab - Ia/IIa -negative,  IIb/IIIa POSITIVE  HIV - Non reactive  SPEP - No M spike  QI - IgG 1030, IgA 322, IgM 50 - all WNL  MMA - 253  FATOUMATA - negative    Bone marrow aspiration and biopsy 2/13/2020  · Normocellular bone marrow for age (~30 to 35%) with trilineage hematopoiesis, no significant dyspoiesis and no increase in blasts (~1%)  · Adequate megakaryocytes  · No diagnostic evidence of lymphoproliferative or myelodysplastic process or metastatic malignancy or granulomas  · Normal male karyotype    [facility] abdomen 11/3/2019 at [facility] revealed that the liver and spleen were \"normal\". Serology 5/27/2020  LDH - 307  Retic - 0.8%  Haptoglobin - 102  B12 - 867  CMP - crt 1.6 with GFR 43      TREATMENT SUMMARY  B12 1000 mcg IM then changed to p.o. TUMOR HISTORY: Pancreatic lesion    CTA of the abdomen performed on 11/3/2019 at [address] [place] [address] which revealed that his liver and spleen were \"normal\". It did show some rectal wall thickening, and a tiny density in the pancreatic tail. He and his wife declined MRI-MRCP.   His insurance would not cover a CA-19-9 to be drawn. CT abdomen with contrast 6/15/2020 at [facility] was compared to 11/3/2019 revealed  · 4 mm hypodense nodule in tail of the pancreas possibly representing cyst or pseudocyst  · Nondilated pancreatic duct  · Another 6-month follow-up CT recommended to ensure stability. · Bilateral renal cysts and nonobstructing renal calculi    MRI abdomen with and without contrast MRCP at [address] [facility] 11/24/2020 was compared to CT 6/15/2020 and 11/3/2019  · No pancreatic mass or cyst identified. No MR correlate for the tiny pancreatic tail lesion identified on prior CT    CT abdomen with and without contrast 3/17/2021 at [facility]  · Stable 3 mm pancreatic lesion suggesting benign etiology. No further follow-up imaging planned. Past Medical History:   Diagnosis Date    Arthritis     CAD (coronary artery disease)     sees dr. Katerina Gonsales Chronic back pain     COPD (chronic obstructive pulmonary disease) ([facility]     Hypertension     Peripheral sensory neuropathy     Pleurisy     Shoulder pain     TIA (transient ischemic attack)     Type II or unspecified type diabetes mellitus without mention of complication, not stated as uncontrolled         Past Surgical History:   Procedure Laterality Date    APPENDECTOMY      CARDIAC CATHETERIZATION  11/04/2019    PIETER to LAD    SHOULDER SURGERY Left 12/17/2020    LEFT REVERSE TOTAL SHOULDER ARTHROPLASTY performed by Jhony Myles MD at [address] [place] [address] OR       Current Outpatient Medications:     gabapentin (NEURONTIN) 800 MG tablet, Take 1 tablet by mouth 3 times daily for 30 days. , Disp: 90 tablet, Rfl: 0    azelastine (ASTELIN) 0.1 % nasal spray, 1 spray by Nasal route 2 times daily Use in each nostril as directed, Disp: 2 each, Rfl: 0    tiZANidine (ZANAFLEX) 4 MG tablet, Take 1 tablet by mouth every 8 hours as needed (back pain), Disp: 30 tablet, Rfl: 1    lisinopril (PRINIVIL;ZESTRIL) 40 MG tablet, TAKE ONE TABLET BY MOUTH DAILY. Cancel previous script Sorin Zuniga, Disp: 90 tablet, Rfl: 3    pantoprazole (PROTONIX) 40 MG tablet, Take 1 tablet by mouth daily Indications: Reflux Gastritis, Disp: 90 tablet, Rfl: 0    pravastatin (PRAVACHOL) 80 MG tablet, TAKE ONE TABLET BY MOUTH EVERY EVENING. Cancel previous script Elayne Mariet, Disp: 90 tablet, Rfl: 3    memantine (NAMENDA) 10 MG tablet, TAKE 1 TABLET BY MOUTH 2 TIMES DAILY, Disp: 180 tablet, Rfl: 1    donepezil (ARICEPT) 5 MG tablet, TAKE 1 TABLET BY MOUTH NIGHTLY, Disp: 90 tablet, Rfl: 1    citalopram (CELEXA) 10 MG tablet, TAKE 1 TABLET BY MOUTH DAILY, Disp: 90 tablet, Rfl: 1    traZODone (DESYREL) 50 MG tablet, Take 1 tablet by mouth nightly, Disp: 30 tablet, Rfl: 3    cetirizine (ZYRTEC) 10 MG tablet, TAKE 1 TABLET BY MOUTH DAILY, Disp: 90 tablet, Rfl: 3    ondansetron (ZOFRAN ODT) 4 MG disintegrating tablet, Take 1 tablet by mouth every 8 hours as needed for Nausea or Vomiting, Disp: 30 tablet, Rfl: 0    oxyCODONE-acetaminophen (PERCOCET)  MG per tablet, , Disp: , Rfl:     diclofenac sodium (VOLTAREN) 1 % GEL, Apply topically 2 times daily, Disp: 100 g, Rfl: 3    tamsulosin (FLOMAX) 0.4 MG capsule, Take 1 capsule by mouth daily, Disp: 90 capsule, Rfl: 3    albuterol sulfate HFA (VENTOLIN HFA) 108 (90 Base) MCG/ACT inhaler, Inhale 2 puffs into the lungs every 6 hours as needed for Wheezing, Disp: , Rfl:     meclizine (ANTIVERT) 25 MG tablet, Take 25 mg by mouth 3 times daily as needed for Dizziness , Disp: , Rfl:     Diabetic Shoe MISC, by Does not apply route DISPENSE ONE PAIR DIABETIC SHOES AND THREE PAIRS OF HEAT MOLDED INSERTS, Disp: 1 each, Rfl: 0    aspirin 81 MG chewable tablet, Take 1 tablet by mouth daily, Disp: 30 tablet, Rfl: 3    triamcinolone (KENALOG) 0.1 % cream, Apply topically 2 times daily. (Patient taking differently: Apply topically 2 times daily Apply topically 2 times daily.), Disp: 1 Tube, Rfl: 1    Misc.  Devices (WALKER) MISC, 1 each by Does not apply route daily, Disp: 1 each, Rfl: 0    nitroGLYCERIN (NITROSTAT) 0.4 MG SL tablet, up to max of 3 total doses. If no relief after 1 dose, call 911., Disp: 25 tablet, Rfl: 3    EPINEPHrine (EPIPEN) 0.3 MG/0.3ML SOAJ injection, Use as directed for allergic reaction, Disp: 0.3 mL, Rfl: 3       Allergies   Allergen Reactions    Bee Venom Anaphylaxis    Pcn [Penicillins] Other (See Comments)     Made pt \"black out\"       Social History     Tobacco Use    Smoking status: Former Smoker     Packs/day: 1.50     Years: 15.00     Pack years: 22.50     Types: Cigarettes     Quit date: 10/23/2000     Years since quittin.5    Smokeless tobacco: Never Used   Substance Use Topics    Alcohol use: No    Drug use: No       Family History   Problem Relation Age of Onset    Cancer Mother     Heart Disease Father        Subjective   REVIEW OF SYSTEMS:   Review of Systems   Constitutional: Negative for chills, diaphoresis, fatigue, fever and unexpected weight change. HENT: Negative for mouth sores, nosebleeds, sore throat, trouble swallowing and voice change. Eyes: Negative for photophobia, discharge and itching. Respiratory: Negative for cough, shortness of breath and wheezing. Cardiovascular: Negative for chest pain, palpitations and leg swelling. Gastrointestinal: Negative for abdominal distention, abdominal pain, blood in stool, constipation, diarrhea, nausea and vomiting. Endocrine: Positive for cold intolerance. Negative for heat intolerance, polydipsia and polyuria. Genitourinary: Negative for difficulty urinating, dysuria, hematuria and urgency. Musculoskeletal: Positive for arthralgias and back pain. Negative for joint swelling and myalgias. Skin: Negative for color change and rash. Allergic/Immunologic: Positive for environmental allergies. Neurological: Positive for numbness. Negative for dizziness, tremors, seizures, syncope and light-headedness. Hematological: Negative for adenopathy.  Bruises/bleeds easily (Minimal bruising). Psychiatric/Behavioral: Negative for behavioral problems and suicidal ideas. The patient is not nervous/anxious. All other systems reviewed and are negative. Objective   /70   Pulse 61   Wt 217 lb 3.2 oz (98.5 kg)   SpO2 98%   BMI 31.16 kg/m²     PHYSICAL EXAM:  Physical Exam  Vitals reviewed. Constitutional:       General: He is not in acute distress. Appearance: He is well-developed. HENT:      Head: Normocephalic and atraumatic. Nose: Nose normal.   Eyes:      General: No scleral icterus. Conjunctiva/sclera: Conjunctivae normal.   Neck:      Vascular: No JVD. Trachea: No tracheal deviation. Cardiovascular:      Rate and Rhythm: Normal rate and regular rhythm. Heart sounds: Normal heart sounds. No murmur heard. Pulmonary:      Effort: Pulmonary effort is normal. No respiratory distress. Breath sounds: Normal breath sounds. No wheezing. Abdominal:      General: Bowel sounds are normal. There is no distension. Palpations: Abdomen is soft. There is no fluid wave, hepatomegaly, splenomegaly or mass. Tenderness: There is no abdominal tenderness. Musculoskeletal:         General: No tenderness or deformity. Normal range of motion. Skin:     Findings: No ecchymosis, erythema or rash. Neurological:      Mental Status: He is alert and oriented to person, place, and time. Coordination: Coordination abnormal.      Gait: Gait abnormal.   Psychiatric:         Cognition and Memory: Memory is impaired. CBC reviewed by me  Lab Results   Component Value Date    WBC 7.37 11/02/2021    HGB 14.9 11/02/2021    HCT 43.3 11/02/2021    MCV 98.9 (H) 11/02/2021    PLT 93 (L) 11/02/2021       VISIT DIAGNOSES  1. Thrombocytopenia ([address]    2. B12 deficiency    3. Screening for malignant neoplasm of prostate        ASSESSMENT/PLAN:    1. Thrombocytopenia  2.   B12 deficiency    He did have a positive antiplatelet antibody so he may have a mild ITP. However, his platelet count is adequate at present but if it were to drop in the 50,000 range we may consider a course of steroids. He is diabetic currently controlled with diet. PLT is stable today at 99,000.    3.  Macrocytosis        Hgb 14 with MCV 99.3. · Prostate cancer screening. Screening PSA today. · Colon cancer screening. Colonoscopy per Dr. Alesia Kessler 2/24/2021 with polyps encountered and removed with follow-up plan 3 years. Immunization History   Administered Date(s) Administered    COVID-19, J&J, PF, 0.5 mL 04/01/2021    Influenza, Quadv, adjuvanted, 65 yrs +, IM, PF (Fluad) 09/22/2020   He did get a COVID booster but does not have the date with him today      Orders Placed This Encounter   Procedures    Comprehensive Metabolic Panel    PSA Screening         Return in about 1 year (around 5/3/2023) for With John Nichols.      Higinio Strong PA-C  2:28 PM  5/3/2022

## 2022-05-03 ENCOUNTER — OFFICE VISIT (OUTPATIENT)
Dept: HEMATOLOGY | Age: 72
End: 2022-05-03
Payer: MEDICARE

## 2022-05-03 ENCOUNTER — HOSPITAL ENCOUNTER (OUTPATIENT)
Dept: INFUSION THERAPY | Age: 72
Discharge: HOME | End: 2022-05-03
Payer: MEDICARE

## 2022-05-03 VITALS
SYSTOLIC BLOOD PRESSURE: 118 MMHG | BODY MASS INDEX: 31.16 KG/M2 | OXYGEN SATURATION: 98 % | HEART RATE: 61 BPM | WEIGHT: 217.2 LBS | DIASTOLIC BLOOD PRESSURE: 70 MMHG

## 2022-05-03 DIAGNOSIS — C44.519 BASAL CELL CARCINOMA OF CHEST: ICD-10-CM

## 2022-05-03 DIAGNOSIS — Z12.5 SCREENING FOR MALIGNANT NEOPLASM OF PROSTATE: ICD-10-CM

## 2022-05-03 DIAGNOSIS — D69.6 THROMBOCYTOPENIA (HCC): Primary | ICD-10-CM

## 2022-05-03 DIAGNOSIS — D69.6 THROMBOCYTOPENIA (HCC): ICD-10-CM

## 2022-05-03 DIAGNOSIS — E53.8 B12 DEFICIENCY: ICD-10-CM

## 2022-05-03 LAB
ALP BLD-CCNC: 61 U/L (ref 40–130)
ANION GAP SERPL CALCULATED.3IONS-SCNC: 14 MMOL/L (ref 7–19)
CALCIUM SERPL-MCNC: 9.4 MG/DL (ref 8.4–10.2)
CHLORIDE BLD-SCNC: 104 MMOL/L (ref 98–111)
CO2: 24 MMOL/L (ref 22–29)
HCT VFR BLD CALC: 42.9 % (ref 40.1–51)
HEMOGLOBIN: 14 G/DL (ref 13.7–17.5)
LYMPHOCYTES ABSOLUTE: 2.6 K/UL (ref 1.18–3.74)
LYMPHOCYTES RELATIVE PERCENT: 35.2 % (ref 19.3–53.1)
MCH RBC QN AUTO: 32.4 PG (ref 25.7–32.2)
MCHC RBC AUTO-ENTMCNC: 32.6 G/DL (ref 32.3–36.5)
MCV RBC AUTO: 99.3 FL (ref 79–92.2)
MONOCYTES ABSOLUTE: 0.5 K/UL (ref 0.24–0.82)
MONOCYTES RELATIVE PERCENT: 7.1 % (ref 4.7–12.5)
NEUTROPHILS ABSOLUTE: 4.2 K/UL (ref 1.56–6.13)
NEUTROPHILS RELATIVE PERCENT: 57.7 % (ref 34–71.1)
PDW BLD-RTO: 13.1 % (ref 11.6–14.4)
PLATELET # BLD: 99 K/UL (ref 163–337)
PMV BLD AUTO: 8.5 FL (ref 7.4–10.4)
PROSTATE SPECIFIC ANTIGEN: 1.54 NG/ML (ref 0–4)
RBC # BLD: 4.32 M/UL (ref 4.63–6.08)
TOTAL PROTEIN: 6.5 G/DL (ref 6.3–8.2)
WBC # BLD: 7.3 K/UL (ref 4.23–9.07)

## 2022-05-03 PROCEDURE — G8427 DOCREV CUR MEDS BY ELIG CLIN: HCPCS | Performed by: PHYSICIAN ASSISTANT

## 2022-05-03 PROCEDURE — 4040F PNEUMOC VAC/ADMIN/RCVD: CPT | Performed by: PHYSICIAN ASSISTANT

## 2022-05-03 PROCEDURE — 36415 COLL VENOUS BLD VENIPUNCTURE: CPT | Performed by: PHYSICIAN ASSISTANT

## 2022-05-03 PROCEDURE — 99212 OFFICE O/P EST SF 10 MIN: CPT

## 2022-05-03 PROCEDURE — 99213 OFFICE O/P EST LOW 20 MIN: CPT | Performed by: PHYSICIAN ASSISTANT

## 2022-05-03 PROCEDURE — 85025 COMPLETE CBC W/AUTO DIFF WBC: CPT

## 2022-05-03 PROCEDURE — 3017F COLORECTAL CA SCREEN DOC REV: CPT | Performed by: PHYSICIAN ASSISTANT

## 2022-05-03 PROCEDURE — 1036F TOBACCO NON-USER: CPT | Performed by: PHYSICIAN ASSISTANT

## 2022-05-03 PROCEDURE — 1123F ACP DISCUSS/DSCN MKR DOCD: CPT | Performed by: PHYSICIAN ASSISTANT

## 2022-05-03 PROCEDURE — G8417 CALC BMI ABV UP PARAM F/U: HCPCS | Performed by: PHYSICIAN ASSISTANT

## 2022-05-03 ASSESSMENT — ENCOUNTER SYMPTOMS
PHOTOPHOBIA: 0
BACK PAIN: 1
EYE DISCHARGE: 0
ABDOMINAL DISTENTION: 0
EYE ITCHING: 0
NAUSEA: 0
COUGH: 0
SHORTNESS OF BREATH: 0
DIARRHEA: 0
VOICE CHANGE: 0
VOMITING: 0
COLOR CHANGE: 0
WHEEZING: 0
SORE THROAT: 0
TROUBLE SWALLOWING: 0
ABDOMINAL PAIN: 0
BLOOD IN STOOL: 0
CONSTIPATION: 0

## 2022-06-01 ENCOUNTER — OFFICE VISIT (OUTPATIENT)
Dept: CARDIOLOGY CLINIC | Age: 72
End: 2022-06-01
Payer: MEDICARE

## 2022-06-01 VITALS
HEIGHT: 70 IN | DIASTOLIC BLOOD PRESSURE: 88 MMHG | BODY MASS INDEX: 31.64 KG/M2 | WEIGHT: 221 LBS | SYSTOLIC BLOOD PRESSURE: 116 MMHG | HEART RATE: 62 BPM

## 2022-06-01 DIAGNOSIS — I71.21 ANEURYSM OF AORTIC ROOT: ICD-10-CM

## 2022-06-01 DIAGNOSIS — E08.29 DIABETES MELLITUS DUE TO UNDERLYING CONDITION WITH OTHER KIDNEY COMPLICATION, UNSPECIFIED WHETHER LONG TERM INSULIN USE (HCC): ICD-10-CM

## 2022-06-01 DIAGNOSIS — I25.10 CORONARY ARTERY DISEASE WITHOUT ANGINA PECTORIS, UNSPECIFIED VESSEL OR LESION TYPE, UNSPECIFIED WHETHER NATIVE OR TRANSPLANTED HEART: ICD-10-CM

## 2022-06-01 DIAGNOSIS — I25.10 CORONARY ARTERY DISEASE WITHOUT ANGINA PECTORIS, UNSPECIFIED VESSEL OR LESION TYPE, UNSPECIFIED WHETHER NATIVE OR TRANSPLANTED HEART: Primary | ICD-10-CM

## 2022-06-01 LAB
ANION GAP SERPL CALCULATED.3IONS-SCNC: 7 MMOL/L (ref 7–19)
BUN BLDV-MCNC: 26 MG/DL (ref 8–23)
CALCIUM SERPL-MCNC: 10.2 MG/DL (ref 8.8–10.2)
CHLORIDE BLD-SCNC: 103 MMOL/L (ref 98–111)
CO2: 29 MMOL/L (ref 22–29)
CREAT SERPL-MCNC: 1.4 MG/DL (ref 0.5–1.2)
GFR AFRICAN AMERICAN: >59
GFR NON-AFRICAN AMERICAN: 50
GLUCOSE BLD-MCNC: 90 MG/DL (ref 74–109)
HBA1C MFR BLD: 5.5 % (ref 4–6)
POTASSIUM SERPL-SCNC: 5.4 MMOL/L (ref 3.5–5)
SODIUM BLD-SCNC: 139 MMOL/L (ref 136–145)

## 2022-06-01 PROCEDURE — G8417 CALC BMI ABV UP PARAM F/U: HCPCS | Performed by: INTERNAL MEDICINE

## 2022-06-01 PROCEDURE — 1036F TOBACCO NON-USER: CPT | Performed by: INTERNAL MEDICINE

## 2022-06-01 PROCEDURE — 1123F ACP DISCUSS/DSCN MKR DOCD: CPT | Performed by: INTERNAL MEDICINE

## 2022-06-01 PROCEDURE — 99214 OFFICE O/P EST MOD 30 MIN: CPT | Performed by: INTERNAL MEDICINE

## 2022-06-01 PROCEDURE — 3017F COLORECTAL CA SCREEN DOC REV: CPT | Performed by: INTERNAL MEDICINE

## 2022-06-01 PROCEDURE — G8427 DOCREV CUR MEDS BY ELIG CLIN: HCPCS | Performed by: INTERNAL MEDICINE

## 2022-06-01 ASSESSMENT — ENCOUNTER SYMPTOMS
COUGH: 0
DIARRHEA: 0
ABDOMINAL DISTENTION: 0
BLOOD IN STOOL: 0
SHORTNESS OF BREATH: 0
CHEST TIGHTNESS: 1
VOMITING: 0
ABDOMINAL PAIN: 0
BACK PAIN: 0
WHEEZING: 0

## 2022-06-01 NOTE — PROGRESS NOTES
[facility]  Cardiology Office Note  [facility] [place]  [address]  Phone: [phone]  Fax: [phone]                            Date:  6/1/2022  Patient: Beltran Wu  Age:  80 y.o., 1950    Referral: No ref.  provider found      PROBLEM LIST:    Patient Active Problem List    Diagnosis Date Noted    Major depressive disorder, recurrent, mild 06/29/2021     Priority: Low    Major depressive disorder, recurrent, moderate 06/29/2021     Priority: Low    Major depressive disorder, recurrent, unspecified 06/29/2021     Priority: Low    Ascending aortic aneurysm ([facility] 03/16/2021     Priority: Low    Left rotator cuff tear arthropathy 12/16/2020     Priority: Low    Transient alteration of awareness      Priority: Low    TIA (transient ischemic attack)      Priority: Low    Numbness and tingling      Priority: Low    Diabetic polyneuropathy associated with type 2 diabetes mellitus ([address]      Priority: Low    Late onset Alzheimer's disease without behavioral disturbance ([address]      Priority: Low    Bradycardia      Priority: Low    Stroke-like symptoms 12/01/2020     Priority: Low    Guaiac positive stools 12/01/2020     Priority: Low    Diabetic peripheral neuropathy associated with type 2 diabetes mellitus ([address] 01/13/2020     Priority: Low    CAD (coronary artery disease)      Priority: Low    Chest pain, unspecified 11/04/2019     Priority: Low    Symptomatic bradycardia      Priority: Low    B12 deficiency 10/31/2019     Priority: Low    Thrombocytopenia ([address] 09/19/2019     Priority: Low    Basal cell carcinoma of left cheek 09/18/2019     Priority: Low    Seborrheic keratosis 09/18/2019     Priority: Low    Epigastric pain 08/05/2019     Priority: Low     Overview Note:     Added automatically from request for surgery [id number]      Gastroesophageal reflux disease 08/05/2019     Priority: Low     Overview Note:     Added automatically from request for surgery [id number]      Heartburn 08/05/2019     Priority: Low     Overview Note:     Added automatically from request for surgery [id number]      Fall from slipping on ice, initial encounter 01/17/2018     Priority: Low    Basal cell carcinoma of chest 06/20/2017     Priority: Low    Benign hemangioma 03/23/2017     Priority: Low    Benign non-nodular prostatic hyperplasia with lower urinary tract symptoms 11/29/2016     Priority: Low    Family history of prostate cancer 11/29/2016     Priority: Low    Skin lesion of face 11/29/2016     Priority: Low    Hypertension      Priority: Low    Type 2 diabetes mellitus without complication (HCC)      Priority: Low    COPD (chronic obstructive pulmonary disease) ([facility]      Priority: Low    Chronic back pain      Priority: Low     1. Coronary artery disease, PCI 11/4/2020 to mid LAD just after first diagonal with residual mid LAD 55% stenosis, normal LV ejection fraction. 2.  Diabetes mellitus. 3.  Ascending aortic moderate size aneurysm at 4.4 cm (11/2019 CT)  4. Mild thrombocytopenia. 5.  Chronic low back pain. 6.  COPD with prior tobacco use stopped 30 years ago. 7.  CKD stage III  8. Probable early Alzheimer's dementia. PRESENTATION: Lucita Shaffer is a 80y.o. year old male presents for follow-up evaluation. He has been forgetting more frequently. Did not do both his CAT scans that were ordered to assess his aneurysm. He reports that he was working out in the yard doing weed eating and developed a pressure-like sensation in the midsternal area which lasted about 10 to 15 minutes and then resolved. Has not recurred. Does not do much activity usually. REVIEW OF SYSTEMS:  Review of Systems   Constitutional: Negative for activity change, diaphoresis and fatigue. HENT: Negative for hearing loss, nosebleeds and tinnitus. Eyes: Negative for visual disturbance. Respiratory: Positive for chest tightness.  Negative for cough, shortness of breath and wheezing. Cardiovascular: Negative for chest pain, palpitations and leg swelling. Gastrointestinal: Negative for abdominal distention, abdominal pain, blood in stool, diarrhea and vomiting. Endocrine: Negative for cold intolerance, heat intolerance, polydipsia, polyphagia and polyuria. Genitourinary: Negative for difficulty urinating, flank pain and hematuria. Musculoskeletal: Negative for arthralgias, back pain, joint swelling and myalgias. Skin: Negative for pallor and rash. Neurological: Negative for dizziness, seizures, syncope and headaches. Psychiatric/Behavioral: Negative for behavioral problems and dysphoric mood. The patient is not nervous/anxious. Past Medical History:      Diagnosis Date    Arthritis     CAD (coronary artery disease)     sees dr. Ginny Saavedra Chronic back pain     [facility]     Hypertension     Peripheral sensory neuropathy     Pleurisy     Shoulder pain     TIA (transient ischemic attack)     Type II or unspecified type diabetes mellitus without mention of complication, not stated as uncontrolled        Past Surgical History:      Procedure Laterality Date    APPENDECTOMY      CARDIAC CATHETERIZATION  11/04/2019    PIETER to LAD    SHOULDER SURGERY Left 12/17/2020    LEFT REVERSE TOTAL SHOULDER ARTHROPLASTY performed by Vasu Frost MD at [facility] OR       Medications:  Current Outpatient Medications   Medication Sig Dispense Refill    azelastine (ASTELIN) 0.1 % nasal spray 1 spray by Nasal route 2 times daily Use in each nostril as directed 2 each 0    tiZANidine (ZANAFLEX) 4 MG tablet Take 1 tablet by mouth every 8 hours as needed (back pain) 30 tablet 1    lisinopril (PRINIVIL;ZESTRIL) 40 MG tablet TAKE ONE TABLET BY MOUTH DAILY. Cancel previous script BRIGETTE Triana 90 tablet 3    pantoprazole (PROTONIX) 40 MG tablet Take 1 tablet by mouth daily Indications: Reflux Gastritis 90 tablet 0    pravastatin (PRAVACHOL) 80 MG tablet TAKE ONE TABLET BY MOUTH EVERY EVENING. Cancel previous script Elba Bird,APRN 90 tablet 3    memantine (NAMENDA) 10 MG tablet TAKE 1 TABLET BY MOUTH 2 TIMES DAILY 180 tablet 1    donepezil (ARICEPT) 5 MG tablet TAKE 1 TABLET BY MOUTH NIGHTLY 90 tablet 1    citalopram (CELEXA) 10 MG tablet TAKE 1 TABLET BY MOUTH DAILY 90 tablet 1    traZODone (DESYREL) 50 MG tablet Take 1 tablet by mouth nightly 30 tablet 3    cetirizine (ZYRTEC) 10 MG tablet TAKE 1 TABLET BY MOUTH DAILY 90 tablet 3    ondansetron (ZOFRAN ODT) 4 MG disintegrating tablet Take 1 tablet by mouth every 8 hours as needed for Nausea or Vomiting 30 tablet 0    oxyCODONE-acetaminophen (PERCOCET)  MG per tablet       tamsulosin (FLOMAX) 0.4 MG capsule Take 1 capsule by mouth daily 90 capsule 3    albuterol sulfate HFA (VENTOLIN HFA) 108 (90 Base) MCG/ACT inhaler Inhale 2 puffs into the lungs every 6 hours as needed for Wheezing      meclizine (ANTIVERT) 25 MG tablet Take 25 mg by mouth 3 times daily as needed for Dizziness       Diabetic Shoe MISC by Does not apply route DISPENSE ONE PAIR DIABETIC SHOES AND THREE PAIRS OF HEAT MOLDED INSERTS 1 each 0    aspirin 81 MG chewable tablet Take 1 tablet by mouth daily 30 tablet 3    triamcinolone (KENALOG) 0.1 % cream Apply topically 2 times daily. (Patient taking differently: Apply topically 2 times daily Apply topically 2 times daily. ) 1 Tube 1    Misc. Devices (WALKER) MISC 1 each by Does not apply route daily 1 each 0    nitroGLYCERIN (NITROSTAT) 0.4 MG SL tablet up to max of 3 total doses. If no relief after 1 dose, call 911. 25 tablet 3    EPINEPHrine (EPIPEN) 0.3 MG/0.3ML SOAJ injection Use as directed for allergic reaction 0.3 mL 3    gabapentin (NEURONTIN) 800 MG tablet Take 1 tablet by mouth 3 times daily for 30 days.  90 tablet 0    diclofenac sodium (VOLTAREN) 1 % GEL Apply topically 2 times daily (Patient taking differently: Apply 2 g topically 2 times daily ) 100 g 3     No current facility-administered medications for this visit. Allergies:  Bee venom and Pcn [penicillins]    Past Social History:  Social History     Socioeconomic History    Marital status:      Spouse name: Not on file    Number of children: Not on file    Years of education: Not on file    Highest education level: Not on file   Occupational History    Not on file   Tobacco Use    Smoking status: Former Smoker     Packs/day: 1.50     Years: 15.00     Pack years: 22.50     Types: Cigarettes     Quit date: 10/23/2000     Years since quittin.6    Smokeless tobacco: Never Used   Substance and Sexual Activity    Alcohol use: No    Drug use: No    Sexual activity: Not on file   Other Topics Concern    Not on file   Social History Narrative    Not on file     Social Determinants of Health     Financial Resource Strain: Low Risk     Difficulty of Paying Living Expenses: Not hard at all   Food Insecurity: No Food Insecurity    Worried About [address] [employer] in the Last Year: Never true    [address] in the Last Year: Never true   Transportation Needs:     Lack of Transportation (Medical): Not on file    Lack of Transportation (Non-Medical):  Not on file   Physical Activity:     Days of Exercise per Week: Not on file    Minutes of Exercise per Session: Not on file   Stress:     Feeling of Stress : Not on file   Social Connections:     Frequency of Communication with Friends and Family: Not on file    Frequency of Social Gatherings with Friends and Family: Not on file    Attends [employer]ism Services: Not on file    Active Member of Clubs or Organizations: Not on file    Attends Club or Organization Meetings: Not on file    Marital Status: Not on file   Intimate Partner Violence:     Fear of Current or Ex-Partner: Not on file    Emotionally Abused: Not on file    Physically Abused: Not on file    Sexually Abused: Not on file Housing Stability:     Unable to Pay for Housing in the Last Year: Not on file    Number of Places Lived in the Last Year: Not on file    Unstable Housing in the Last Year: Not on file       Family History:       Problem Relation Age of Onset    Cancer Mother     Heart Disease Father          Physical Examination:  /88   Pulse 62   Ht 5' 10\" (1.778 m)   Wt 221 lb (100.2 kg)   BMI 31.71 kg/m²   Physical Exam  Constitutional:       Appearance: He is obese. Comments: Severe truncal obesity  Blood pressure right arm sitting 130/70 mmHg, pulse 68 bpm regular   HENT:      Mouth/Throat:      Pharynx: No oropharyngeal exudate. Eyes:      General: No scleral icterus. Right eye: No discharge. Left eye: No discharge. Neck:      Thyroid: No thyromegaly. Vascular: No JVD. Cardiovascular:      Rate and Rhythm: Normal rate and regular rhythm. Heart sounds: No murmur heard. No friction rub. No gallop. Comments: No JVD  No edema  No significant systolic or diastolic murmurs noted  No reproducible chest wall tenderness    Pulmonary:      Effort: No respiratory distress. Breath sounds: No stridor. No wheezing or rales. Abdominal:      General: Bowel sounds are normal. There is no distension. Palpations: Abdomen is soft. There is no mass. Tenderness: There is no abdominal tenderness. There is no guarding or rebound. Comments: Epigastric discomfort with palpation  No palpable organomegaly   Musculoskeletal:         General: No deformity. Skin:     General: Skin is warm. Coloration: Skin is not pale. Findings: No erythema or rash. Neurological:      Mental Status: He is alert and oriented to person, place, and time. Motor: No abnormal muscle tone. Coordination: Coordination normal.      Deep Tendon Reflexes: Reflexes normal.           Labs:   CBC: No results for input(s): WBC, HGB, HCT, PLT in the last 72 hours.   BMP:No results for input(s): NA, K, CO2, BUN, CREATININE, LABGLOM, GLUCOSE in the last 72 hours. BNP: No results for input(s): BNP in the last 72 hours. PT/INR: No results for input(s): PROTIME, INR in the last 72 hours. APTT:No results for input(s): APTT in the last 72 hours. CARDIAC ENZYMES:No results for input(s): CKTOTAL, CKMB, CKMBINDEX, TROPONINI in the last 72 hours. FASTING LIPID PANEL:  Lab Results   Component Value Date    HDL 65 12/02/2020    LDLDIRECT 131 10/07/2015    LDLCALC 62 12/02/2020    TRIG 69 12/02/2020     LIVER PROFILE:No results for input(s): AST, ALT, LABALBU in the last 72 hours. Imaging:          ASSESSMENT and PLAN:    58-year-old gentleman with past medical history of diabetes mellitus, PAD stage III, probable early Alzheimer's dementia, mild thrombocytopenia, moderate size ascending aortic aneurysm (4.4 cm), coronary artery disease with PCI to mid LAD 11/4/2019, degenerative disc disease here for follow-up evaluation. 1.  He is becoming forgetful. His wife is with him. I have asked him to help him with and remind him to follow-up with studies that are requested. We will obtain a CTA of his aorta to assess for any change. Renal functions to be assessed prior to contrast study. 2.  He is reporting some exertional chest pressure-like symptoms. Will obtain a Lexiscan study. 3.  Continue medical management. 4.  Follow-up with nurse practitioner in 3 months and with me in 8 months. Orders:  Orders Placed This Encounter   Procedures    NM MYOCARDIAL SPECT REST EXERCISE OR RX    CTA CHEST W WO CONTRAST    Basic Metabolic Panel    Hemoglobin A1C     No orders of the defined types were placed in this encounter. Return for NP 3 mths; me 8 mths. Electronically signed by Elaine Hanks MD on 6/1/2022 at [address] [facility]      Thisdictation was generated by voice recognition computer software.   Although all attempts are made to edit the dictation for accuracy, there may be errors in the transcription that are not intended.

## 2022-06-06 RX ORDER — CITALOPRAM 10 MG/1
10 TABLET ORAL DAILY
Qty: 90 TABLET | Refills: 1 | Status: SHIPPED | OUTPATIENT
Start: 2022-06-06 | End: 2022-11-01

## 2022-06-06 RX ORDER — DONEPEZIL HYDROCHLORIDE 5 MG/1
5 TABLET, FILM COATED ORAL NIGHTLY
Qty: 90 TABLET | Refills: 1 | Status: SHIPPED | OUTPATIENT
Start: 2022-06-06 | End: 2022-11-01

## 2022-06-06 RX ORDER — MEMANTINE HYDROCHLORIDE 10 MG/1
10 TABLET ORAL 2 TIMES DAILY
Qty: 180 TABLET | Refills: 1 | Status: SHIPPED | OUTPATIENT
Start: 2022-06-06

## 2022-06-06 NOTE — TELEPHONE ENCOUNTER
Requested Prescriptions     Pending Prescriptions Disp Refills    citalopram (CELEXA) 10 MG tablet [Pharmacy Med Name: CITALOPRAM HBR 10 MG TABLET 10 Tablet] 90 tablet 1     Sig: TAKE 1 TABLET BY MOUTH DAILY    donepezil (ARICEPT) 5 MG tablet [Pharmacy Med Name: DONEPEZIL HCL 5 MG TABLET 5 Tablet] 90 tablet 1     Sig: TAKE 1 TABLET BY MOUTH NIGHTLY    memantine (NAMENDA) 10 MG tablet [Pharmacy Med Name: MEMANTINE HCL 10 MG TABS 10 Tablet] 180 tablet 1     Sig: TAKE 1 TABLET BY MOUTH 2 TIMES DAILY       Last Office Visit: 10/27/2021  Next Office Visit: Visit date not found  Last Medication Refill:  Celexa 10/25/2021 with 1 RF  Aricept 10/25/2021 with 1 RF   Namenda 10/25/2021 with 1 RF

## 2022-06-14 ENCOUNTER — HOSPITAL ENCOUNTER (OUTPATIENT)
Dept: CT IMAGING | Age: 72
Discharge: HOME | End: 2022-06-14
Payer: MEDICARE

## 2022-06-14 ENCOUNTER — HOSPITAL ENCOUNTER (OUTPATIENT)
Dept: NUCLEAR MEDICINE | Age: 72
Discharge: HOME | End: 2022-06-16
Payer: MEDICARE

## 2022-06-14 DIAGNOSIS — I25.10 CORONARY ARTERY DISEASE WITHOUT ANGINA PECTORIS, UNSPECIFIED VESSEL OR LESION TYPE, UNSPECIFIED WHETHER NATIVE OR TRANSPLANTED HEART: ICD-10-CM

## 2022-06-14 DIAGNOSIS — I71.21 ANEURYSM OF AORTIC ROOT: ICD-10-CM

## 2022-06-14 LAB
LV EF: 49 %
LVEF MODALITY: NORMAL

## 2022-06-14 PROCEDURE — 3430000000 HC RX DIAGNOSTIC RADIOPHARMACEUTICAL: Performed by: INTERNAL MEDICINE

## 2022-06-14 PROCEDURE — A9502 TC99M TETROFOSMIN: HCPCS | Performed by: INTERNAL MEDICINE

## 2022-06-14 PROCEDURE — 6360000004 HC RX CONTRAST MEDICATION: Performed by: INTERNAL MEDICINE

## 2022-06-14 PROCEDURE — 71275 CT ANGIOGRAPHY CHEST: CPT | Performed by: RADIOLOGY

## 2022-06-14 PROCEDURE — 93017 CV STRESS TEST TRACING ONLY: CPT

## 2022-06-14 PROCEDURE — 71275 CT ANGIOGRAPHY CHEST: CPT

## 2022-06-14 PROCEDURE — 6360000002 HC RX W HCPCS: Performed by: INTERNAL MEDICINE

## 2022-06-14 RX ADMIN — TETROFOSMIN 8 MILLICURIE: 1.38 INJECTION, POWDER, LYOPHILIZED, FOR SOLUTION INTRAVENOUS at 12:39

## 2022-06-14 RX ADMIN — IOPAMIDOL 70 ML: 755 INJECTION, SOLUTION INTRAVENOUS at 10:00

## 2022-06-14 RX ADMIN — TETROFOSMIN 24 MILLICURIE: 1.38 INJECTION, POWDER, LYOPHILIZED, FOR SOLUTION INTRAVENOUS at 12:39

## 2022-06-14 RX ADMIN — REGADENOSON 0.4 MG: 0.08 INJECTION, SOLUTION INTRAVENOUS at 11:10

## 2022-06-20 ENCOUNTER — TELEPHONE (OUTPATIENT)
Dept: CARDIOLOGY CLINIC | Age: 72
End: 2022-06-20

## 2022-06-20 NOTE — TELEPHONE ENCOUNTER
Called and spoke with patient's wife, Feng Villegas, gave results, verbally understood. ----- Message from Yousuf Marcelo MD sent at 6/19/2022  5:28 PM CDT -----  Let patient know that I have reviewed his CAT scan. No mention on report of aorta size but measured at 4.4 cm which is unchanged from previous CT scan. Continue to monitor. Stress test did not not show any abnormality other than borderline heart function. If no further chest pain, can monitor for now.

## 2022-07-21 RX ORDER — TRAZODONE HYDROCHLORIDE 50 MG/1
50 TABLET ORAL NIGHTLY
Qty: 30 TABLET | Refills: 3 | OUTPATIENT
Start: 2022-07-21

## 2022-09-19 DIAGNOSIS — R11.0 NAUSEA: ICD-10-CM

## 2022-09-19 DIAGNOSIS — E11.9 TYPE 2 DIABETES MELLITUS WITHOUT COMPLICATION, WITHOUT LONG-TERM CURRENT USE OF INSULIN (HCC): ICD-10-CM

## 2022-09-19 DIAGNOSIS — E11.42 DIABETIC PERIPHERAL NEUROPATHY ASSOCIATED WITH TYPE 2 DIABETES MELLITUS (HCC): ICD-10-CM

## 2022-09-19 NOTE — TELEPHONE ENCOUNTER
Poornima Pemberton called to request a refill on his medication. Last office visit : 10/27/2021   Next office visit : 9/19/2022     Last UDS:   Amphetamine Screen, Urine   Date Value Ref Range Status   03/16/2021 -  Final     Barbiturate Screen, Urine   Date Value Ref Range Status   03/16/2021 -  Final     Benzodiazepine Screen, Urine   Date Value Ref Range Status   03/16/2021 -  Final     Buprenorphine Urine   Date Value Ref Range Status   03/16/2021 -  Final     Cocaine Metabolite Screen, Urine   Date Value Ref Range Status   03/16/2021 -  Final     Gabapentin Screen, Urine   Date Value Ref Range Status   03/16/2021 -  Final     MDMA, Urine   Date Value Ref Range Status   03/16/2021 -  Final     Methamphetamine, Urine   Date Value Ref Range Status   03/16/2021 -  Final     Opiate Scrn, Ur   Date Value Ref Range Status   03/16/2021 -  Final     Oxycodone Screen, Ur   Date Value Ref Range Status   03/16/2021 -  Final     PCP Screen, Urine   Date Value Ref Range Status   03/16/2021 -  Final     Propoxyphene Screen, Urine   Date Value Ref Range Status   03/16/2021 -  Final     THC Screen, Urine   Date Value Ref Range Status   03/16/2021 -  Final     Tricyclic Antidepressants, Urine   Date Value Ref Range Status   03/16/2021 -  Final       Last Mercer Cooks: 09-  Medication Contract: 03-(noted to update at next office visit)   Last Fill: 04/27/2022    Requested Prescriptions     Pending Prescriptions Disp Refills    ondansetron (ZOFRAN ODT) 4 MG disintegrating tablet 30 tablet 0     Sig: Take 1 tablet by mouth every 8 hours as needed for Nausea or Vomiting    traZODone (DESYREL) 50 MG tablet 30 tablet 3     Sig: Take 1 tablet by mouth nightly    gabapentin (NEURONTIN) 800 MG tablet 90 tablet 0     Sig: Take 1 tablet by mouth 3 times daily for 30 days.     tiZANidine (ZANAFLEX) 4 MG tablet 30 tablet 1     Sig: Take 1 tablet by mouth every 8 hours as needed (back pain)    pantoprazole (PROTONIX) 40 MG tablet 90 tablet 0     Sig: Take 1 tablet by mouth daily Indications: Reflux Gastritis         Please approve or refuse this medication.    Era Ferrell MA

## 2022-09-20 RX ORDER — PANTOPRAZOLE SODIUM 40 MG/1
40 TABLET, DELAYED RELEASE ORAL DAILY
Qty: 90 TABLET | Refills: 0 | Status: SHIPPED | OUTPATIENT
Start: 2022-09-20

## 2022-09-20 RX ORDER — ONDANSETRON 4 MG/1
4 TABLET, ORALLY DISINTEGRATING ORAL EVERY 8 HOURS PRN
Qty: 30 TABLET | Refills: 0 | Status: SHIPPED | OUTPATIENT
Start: 2022-09-20

## 2022-09-20 RX ORDER — TRAZODONE HYDROCHLORIDE 50 MG/1
50 TABLET ORAL NIGHTLY
Qty: 30 TABLET | Refills: 3 | Status: SHIPPED | OUTPATIENT
Start: 2022-09-20

## 2022-09-20 RX ORDER — GABAPENTIN 800 MG/1
800 TABLET ORAL 3 TIMES DAILY
Qty: 90 TABLET | Refills: 0 | Status: SHIPPED | OUTPATIENT
Start: 2022-09-20 | End: 2022-11-07

## 2022-09-20 RX ORDER — TIZANIDINE 4 MG/1
4 TABLET ORAL EVERY 8 HOURS PRN
Qty: 30 TABLET | Refills: 1 | Status: SHIPPED | OUTPATIENT
Start: 2022-09-20

## 2022-09-22 ENCOUNTER — OFFICE VISIT (OUTPATIENT)
Dept: CARDIOLOGY CLINIC | Age: 72
End: 2022-09-22
Payer: MEDICARE

## 2022-09-22 VITALS
HEART RATE: 57 BPM | SYSTOLIC BLOOD PRESSURE: 134 MMHG | HEIGHT: 70 IN | DIASTOLIC BLOOD PRESSURE: 78 MMHG | WEIGHT: 220 LBS | BODY MASS INDEX: 31.5 KG/M2

## 2022-09-22 DIAGNOSIS — I10 ESSENTIAL HYPERTENSION: ICD-10-CM

## 2022-09-22 DIAGNOSIS — I71.21 ANEURYSM OF AORTIC ROOT: ICD-10-CM

## 2022-09-22 DIAGNOSIS — I25.10 CORONARY ARTERY DISEASE INVOLVING NATIVE CORONARY ARTERY OF NATIVE HEART WITHOUT ANGINA PECTORIS: Primary | ICD-10-CM

## 2022-09-22 PROCEDURE — 3017F COLORECTAL CA SCREEN DOC REV: CPT | Performed by: CLINICAL NURSE SPECIALIST

## 2022-09-22 PROCEDURE — 99214 OFFICE O/P EST MOD 30 MIN: CPT | Performed by: CLINICAL NURSE SPECIALIST

## 2022-09-22 PROCEDURE — 1123F ACP DISCUSS/DSCN MKR DOCD: CPT | Performed by: CLINICAL NURSE SPECIALIST

## 2022-09-22 PROCEDURE — 1036F TOBACCO NON-USER: CPT | Performed by: CLINICAL NURSE SPECIALIST

## 2022-09-22 PROCEDURE — G8427 DOCREV CUR MEDS BY ELIG CLIN: HCPCS | Performed by: CLINICAL NURSE SPECIALIST

## 2022-09-22 PROCEDURE — 93000 ELECTROCARDIOGRAM COMPLETE: CPT | Performed by: CLINICAL NURSE SPECIALIST

## 2022-09-22 PROCEDURE — G8417 CALC BMI ABV UP PARAM F/U: HCPCS | Performed by: CLINICAL NURSE SPECIALIST

## 2022-09-22 NOTE — PROGRESS NOTES
[facility] Cardiology  [facility] [address]  [address]  Phone: [phone]  Fax: [phone]    OFFICE VISIT:  2022    Nikmario Tabatha - : 1950    Reason For Visit:  Kelly Beckham is a 71 y.o. male who is here for 3 Month Follow-Up (No cardiac symptoms) and Coronary Artery Disease  1. Coronary artery disease, PCI 2020 to mid LAD just after first diagonal with residual mid LAD 55% stenosis, normal LV ejection fraction. 2.  Diabetes mellitus. 3.  Ascending aortic moderate size aneurysm at 4.4 cm (2019 CT)  4. Mild thrombocytopenia. 5.  Chronic low back pain. 6.  COPD with prior tobacco use stopped 30 years ago. 7.  CKD stage III  8. Probable early Alzheimer's dementia. Patient underwent nuclear stress test 2022 that showed no evidence of ischemia or prior infarction. Repeat CTA of chest showed stable aorta size at 4.4 cm which is unchanged    Returns today for follow-up. He is accompanied with his wife. He denies any cardiac symptoms. She states he continues to work in the yard with no problems. He is unsteady but is more balance issue and not dizziness. No recent falls. Doraine Call denies exertional chest pain, shortness of breath, orthopnea, paroxysmal nocturnal dyspnea, syncope, presyncope, arrhythmia, edema and fatigue. The patient denies numbness or weakness to suggest cerebrovascular accident or transient ischemic attack. BRIGETTE Aparicio is PCP and follows labs.   Nikmario Tabatha has the following history as recorded in [facility]:    Patient Active Problem List    Diagnosis Date Noted    Major depressive disorder, recurrent, mild 2021    Major depressive disorder, recurrent, moderate 2021    Major depressive disorder, recurrent, unspecified 2021    Ascending aortic aneurysm ([facility] [address] 2021    Left rotator cuff tear arthropathy 2020    Transient alteration of awareness     TIA (transient ischemic attack) Numbness and tingling     Diabetic polyneuropathy associated with type 2 diabetes mellitus ([address]     Late onset Alzheimer's disease without behavioral disturbance (HCC)     Bradycardia     Stroke-like symptoms 12/01/2020    Guaiac positive stools 12/01/2020    Diabetic peripheral neuropathy associated with type 2 diabetes mellitus ([address] 01/13/2020    CAD (coronary artery disease)     Chest pain, unspecified 11/04/2019    Symptomatic bradycardia     B12 deficiency 10/31/2019    Thrombocytopenia ([address] 09/19/2019    Basal cell carcinoma of left cheek 09/18/2019    Seborrheic keratosis 09/18/2019    Epigastric pain 08/05/2019    Gastroesophageal reflux disease 08/05/2019    Heartburn 08/05/2019    Fall from slipping on ice, initial encounter 01/17/2018    Basal cell carcinoma of chest 06/20/2017    Benign hemangioma 03/23/2017    Benign non-nodular prostatic hyperplasia with lower urinary tract symptoms 11/29/2016    Family history of prostate cancer 11/29/2016    Skin lesion of face 11/29/2016    Hypertension     Type 2 diabetes mellitus without complication (HCC)     COPD (chronic obstructive pulmonary disease) ([address]     Chronic back pain      Past Medical History:   Diagnosis Date    Arthritis     CAD (coronary artery disease)     sees dr. Chyna Fish    Chronic back pain     COPD (chronic obstructive pulmonary disease) (HCC)     Hypertension     Peripheral sensory neuropathy     Pleurisy     Shoulder pain     TIA (transient ischemic attack)     Type II or unspecified type diabetes mellitus without mention of complication, not stated as uncontrolled      Past Surgical History:   Procedure Laterality Date    APPENDECTOMY      CARDIAC CATHETERIZATION  11/04/2019    PIETER to LAD    SHOULDER SURGERY Left 12/17/2020    LEFT REVERSE TOTAL SHOULDER ARTHROPLASTY performed by Nico Kc MD at [facility] OR     Family History   Problem Relation Age of Onset    Cancer Mother     Heart Disease Father      Social History     Tobacco Use Shoe MISC by Does not apply route DISPENSE ONE PAIR DIABETIC SHOES AND THREE PAIRS OF HEAT MOLDED INSERTS 1 each 0    aspirin 81 MG chewable tablet Take 1 tablet by mouth daily 30 tablet 3    triamcinolone (KENALOG) 0.1 % cream Apply topically 2 times daily. (Patient taking differently: Apply topically 2 times daily Apply topically 2 times daily. ) 1 Tube 1    Misc. Devices (WALKER) MISC 1 each by Does not apply route daily 1 each 0    nitroGLYCERIN (NITROSTAT) 0.4 MG SL tablet up to max of 3 total doses. If no relief after 1 dose, call 911. 25 tablet 3    EPINEPHrine (EPIPEN) 0.3 MG/0.3ML SOAJ injection Use as directed for allergic reaction 0.3 mL 3    diclofenac sodium (VOLTAREN) 1 % GEL Apply topically 2 times daily (Patient taking differently: Apply 2 g topically 2 times daily ) 100 g 3     No current facility-administered medications for this visit. Allergies: Bee venom and Pcn [penicillins]    Review of Systems  Constitutional - no significant activity change, appetite change, or unexpected weight change. No fever, chills or diaphoresis. No fatigue. HEENT - no significant rhinorrhea or epistaxis. No tinnitus or significant hearing loss. Eyes - no sudden vision change or amaurosis. Respiratory - no significant wheezing, stridor, apnea or cough. No dyspnea on exertion or shortness of breath. Cardiovascular - no exertional chest pain, orthopnea or PND. No sensation of arrhythmia or slow heart rate. No claudication or leg edema. Gastrointestinal - no abdominal swelling or pain. No blood in stool. No severe constipation, diarrhea, nausea, or vomiting. Genitourinary - no difficulty urinating, dysuria, frequency, or urgency. No flank pain or hematuria. Musculoskeletal - no back pain, gait disturbance, or myalgia. Skin - no color change or rash. No pallor. No new surgical incision. Neurologic - no speech difficulty, facial asymmetry or lateralizing weakness.   No seizures, presyncope, syncope, or significant dizziness. Hematologic - no easy bruising or excessive bleeding. Psychiatric - no severe anxiety or insomnia. No confusion. All other review of systems are negative. Objective  Vital Signs - /78   Pulse 57   Ht 5' 10\" (1.778 m)   Wt 220 lb (99.8 kg)   BMI 31.57 kg/m²   General - [place] Generous is alert, cooperative, and pleasant. Well groomed. No acute distress. Body habitus is overweight. HEENT - The head is normocephalic. No circumoral cyanosis. Dentition is normal.   EYES -  No Xanthelasma, no arcus senilis, no conjunctival hemorrhages or discharge. Neck - Supple, without increased jugular venous pressures. No carotid bruits. No mass. Respiratory - Lungs are clear bilaterally. No wheezes or rales. Normal effort without use of accessory muscles. Cardiovascular - Heart has regular rhythm and rate. No murmurs, rubs or gallops. + pedal pulses and no varicosities. Abdominal -  Soft, nontender, nondistended. Bowel sounds are intact. Extremities - No clubbing, cyanosis, or  edema. Musculoskeletal -  No clubbing . No Osler's nodes. Gait- unsteady. No kyphosis or scoliosis. Skin -  no statis ulcers or dermatitis. Neurological - No focal signs are identified. Oriented to person, place and time. Psychiatric -  Appropriate affect and mood. Assessment:     Diagnosis Orders   1. Essential hypertension  EKG 12 lead      2. Coronary artery disease involving native coronary artery of native heart without angina pectoris        3. Aneurysm of aortic root ([facility] [address]          Data:  BP Readings from Last 3 Encounters:   09/22/22 134/78   06/01/22 116/88   05/03/22 118/70    Pulse Readings from Last 3 Encounters:   09/22/22 57   06/01/22 62   05/03/22 61        Wt Readings from Last 3 Encounters:   09/22/22 220 lb (99.8 kg)   06/01/22 221 lb (100.2 kg)   05/03/22 217 lb 3.2 oz (98.5 kg)   EKG today shows sinus bradycardia with a rate of 57.   Stable EKG      Negative nuclear stress test in June. CT of chest showed no change in aorta size  All test results reviewed with patient and wife. They state understanding. We discussed signs and symptoms to report. We discussed fall precautions    Blood pressure heart rate controlled. Medical management includes ACE inhibitor statin and aspirin    Patient is now on Aricept and Namenda   Reviewed PCP recent notes   Reviewed recent labs      Tracey scan 6/14/2022  Summary impressions:   Borderline ejection fraction 49% normal perfusion study without   evidence of ischemia or prior infarction. Signed by Dr De Hoffman        States taking medications as prescribed  Stable cardiovascular status. No evidence of overt heart failure, angina or dysrhythmia. 30 minutes were spent preparing, reviewing and seeing patient. All questions answered    Plan    Follow up in Feb With Dr. Reveles Duyen   Call with any questions or concerns  Follow up with BRIGETTE Gallo for non cardiac problems  Report any new problems  Cardiovascular Fitness-Exercise as tolerated. Strive for 30 minutes of exercise most days of the week. Cardiac / Healthy Diet  Continue current medications as directed  Continue plan of treatment  It is always recommended that you bring your medications bottles with you to each visit - this is for your safety! BRIGETTE Tim    EMR dragon/transcription disclaimer: Much of this encounter note is electronic transcription/translation of spoken language to printed tach. Electronic translation of spoken language may be erroneous, or at times, nonsensical words or phrases may be inadvertently transcribed.  Although, I have reviewed the note for such errors, some may still exist.

## 2022-09-22 NOTE — PATIENT INSTRUCTIONS
Follow up in Feb With Dr. Kush Carson   Call with any questions or concerns  Follow up with BRIGETTE Keys for non cardiac problems  Report any new problems  Cardiovascular Fitness-Exercise as tolerated. Strive for 30 minutes of exercise most days of the week. Cardiac / Healthy Diet  Continue current medications as directed  Continue plan of treatment  It is always recommended that you bring your medications bottles with you to each visit - this is for your safety!

## 2022-09-28 ENCOUNTER — OFFICE VISIT (OUTPATIENT)
Dept: PRIMARY CARE CLINIC | Age: 72
End: 2022-09-28
Payer: MEDICARE

## 2022-09-28 VITALS
TEMPERATURE: 97.4 F | WEIGHT: 222 LBS | DIASTOLIC BLOOD PRESSURE: 84 MMHG | BODY MASS INDEX: 31.78 KG/M2 | SYSTOLIC BLOOD PRESSURE: 136 MMHG | HEART RATE: 61 BPM | OXYGEN SATURATION: 99 % | HEIGHT: 70 IN

## 2022-09-28 DIAGNOSIS — R53.83 FATIGUE, UNSPECIFIED TYPE: Primary | ICD-10-CM

## 2022-09-28 DIAGNOSIS — R68.89 COLD INTOLERANCE: ICD-10-CM

## 2022-09-28 DIAGNOSIS — I10 ESSENTIAL HYPERTENSION: ICD-10-CM

## 2022-09-28 DIAGNOSIS — G30.1 ALZHEIMER'S DISEASE WITH LATE ONSET (CODE) (HCC): ICD-10-CM

## 2022-09-28 DIAGNOSIS — E11.9 TYPE 2 DIABETES MELLITUS WITHOUT COMPLICATION, WITHOUT LONG-TERM CURRENT USE OF INSULIN (HCC): ICD-10-CM

## 2022-09-28 PROCEDURE — G0008 ADMIN INFLUENZA VIRUS VAC: HCPCS | Performed by: NURSE PRACTITIONER

## 2022-09-28 PROCEDURE — G8417 CALC BMI ABV UP PARAM F/U: HCPCS | Performed by: NURSE PRACTITIONER

## 2022-09-28 PROCEDURE — 1123F ACP DISCUSS/DSCN MKR DOCD: CPT | Performed by: NURSE PRACTITIONER

## 2022-09-28 PROCEDURE — 91312 COVID-19, PFIZER BIVALENT BOOSTER, (AGE 12Y+), IM, 30 MCG/0.3 ML: CPT | Performed by: NURSE PRACTITIONER

## 2022-09-28 PROCEDURE — 3044F HG A1C LEVEL LT 7.0%: CPT | Performed by: NURSE PRACTITIONER

## 2022-09-28 PROCEDURE — 2022F DILAT RTA XM EVC RTNOPTHY: CPT | Performed by: NURSE PRACTITIONER

## 2022-09-28 PROCEDURE — 1036F TOBACCO NON-USER: CPT | Performed by: NURSE PRACTITIONER

## 2022-09-28 PROCEDURE — 3017F COLORECTAL CA SCREEN DOC REV: CPT | Performed by: NURSE PRACTITIONER

## 2022-09-28 PROCEDURE — 0124A COVID-19, PFIZER BIVALENT BOOSTER, (AGE 12Y+), IM, 30 MCG/0.3 ML: CPT | Performed by: NURSE PRACTITIONER

## 2022-09-28 PROCEDURE — G8427 DOCREV CUR MEDS BY ELIG CLIN: HCPCS | Performed by: NURSE PRACTITIONER

## 2022-09-28 PROCEDURE — 99214 OFFICE O/P EST MOD 30 MIN: CPT | Performed by: NURSE PRACTITIONER

## 2022-09-28 PROCEDURE — 90694 VACC AIIV4 NO PRSRV 0.5ML IM: CPT | Performed by: NURSE PRACTITIONER

## 2022-09-28 RX ORDER — TAMSULOSIN HYDROCHLORIDE 0.4 MG/1
0.4 CAPSULE ORAL DAILY
Qty: 90 CAPSULE | Refills: 3 | Status: SHIPPED | OUTPATIENT
Start: 2022-09-28

## 2022-09-28 SDOH — ECONOMIC STABILITY: FOOD INSECURITY: WITHIN THE PAST 12 MONTHS, YOU WORRIED THAT YOUR FOOD WOULD RUN OUT BEFORE YOU GOT MONEY TO BUY MORE.: SOMETIMES TRUE

## 2022-09-28 SDOH — ECONOMIC STABILITY: FOOD INSECURITY: WITHIN THE PAST 12 MONTHS, THE FOOD YOU BOUGHT JUST DIDN'T LAST AND YOU DIDN'T HAVE MONEY TO GET MORE.: SOMETIMES TRUE

## 2022-09-28 ASSESSMENT — PATIENT HEALTH QUESTIONNAIRE - PHQ9
SUM OF ALL RESPONSES TO PHQ QUESTIONS 1-9: 7
9. THOUGHTS THAT YOU WOULD BE BETTER OFF DEAD, OR OF HURTING YOURSELF: 0
2. FEELING DOWN, DEPRESSED OR HOPELESS: 0
3. TROUBLE FALLING OR STAYING ASLEEP: 3
7. TROUBLE CONCENTRATING ON THINGS, SUCH AS READING THE NEWSPAPER OR WATCHING TELEVISION: 3
SUM OF ALL RESPONSES TO PHQ QUESTIONS 1-9: 7
6. FEELING BAD ABOUT YOURSELF - OR THAT YOU ARE A FAILURE OR HAVE LET YOURSELF OR YOUR FAMILY DOWN: 0
4. FEELING TIRED OR HAVING LITTLE ENERGY: 1
5. POOR APPETITE OR OVEREATING: 0
8. MOVING OR SPEAKING SO SLOWLY THAT OTHER PEOPLE COULD HAVE NOTICED. OR THE OPPOSITE, BEING SO FIGETY OR RESTLESS THAT YOU HAVE BEEN MOVING AROUND A LOT MORE THAN USUAL: 0
10. IF YOU CHECKED OFF ANY PROBLEMS, HOW DIFFICULT HAVE THESE PROBLEMS MADE IT FOR YOU TO DO YOUR WORK, TAKE CARE OF THINGS AT HOME, OR GET ALONG WITH OTHER PEOPLE: 2

## 2022-09-28 ASSESSMENT — SOCIAL DETERMINANTS OF HEALTH (SDOH): HOW HARD IS IT FOR YOU TO PAY FOR THE VERY BASICS LIKE FOOD, HOUSING, MEDICAL CARE, AND HEATING?: NOT HARD AT ALL

## 2022-09-28 NOTE — PROGRESS NOTES
[address] [facility]  [address] [place] [address]  [address] [address]  Dept: [phone]  Dept Fax: [phone]  Loc: [phone]    Lauryn Broussard is a 71 y.o. male who presents today for his medical conditions/complaints as noted below. Lauryn Broussard is c/o of Annual Exam        HPI:     HPI   Chief Complaint   Patient presents with    Annual Exam     Patient presents today for follow-up A1C, hypertension, hyperlipidemia, dementia. He reports he is doing well but having more fatigued and cold lately. Patient is due for diabetic shoe exam.   He would like covid booster and flu vaccine.      Past Medical History:   Diagnosis Date    Arthritis     CAD (coronary artery disease)     sees dr. Tea Carranza    Chronic back pain     COPD (chronic obstructive pulmonary disease) (HCC)     Hypertension     Peripheral sensory neuropathy     Pleurisy     Shoulder pain     TIA (transient ischemic attack)     Type II or unspecified type diabetes mellitus without mention of complication, not stated as uncontrolled       Past Surgical History:   Procedure Laterality Date    APPENDECTOMY      CARDIAC CATHETERIZATION  11/04/2019    PIETER to LAD    SHOULDER SURGERY Left 12/17/2020    LEFT REVERSE TOTAL SHOULDER ARTHROPLASTY performed by Taty Garcia MD at [address] [facility] 9/28/2022 9/22/2022 6/1/2022 6/1/2022 5/3/2022 87/0/8445   SYSTOLIC 540 285 [phone]   DIASTOLIC 84 78 88 62 70 88   Pulse 61 57 - 62 61 66   Temp 97.4 - - - - -   Resp - - - - - -   SpO2 99 - - - 98 98   Weight 222 lb 220 lb - 221 lb 217 lb 3.2 oz 224 lb   Height 5' 10\" 5' 10\" - 5' 10\" - 5' 10\"   Body mass index 31.85 kg/m2 31.56 kg/m2 - 31.71 kg/m2 - 32.14 kg/m2   Some recent data might be hidden       Family History   Problem Relation Age of Onset    Cancer Mother     Heart Disease Father        Social History     Tobacco Use    Smoking status: Former     Packs/day: 1.50     Years: 15.00     Pack years: 22.50 Types: Cigarettes     Quit date: 10/23/2000     Years since quittin.9    Smokeless tobacco: Never   Substance Use Topics    Alcohol use: No      Current Outpatient Medications on File Prior to Visit   Medication Sig Dispense Refill    ondansetron (ZOFRAN ODT) 4 MG disintegrating tablet Take 1 tablet by mouth every 8 hours as needed for Nausea or Vomiting 30 tablet 0    traZODone (DESYREL) 50 MG tablet Take 1 tablet by mouth nightly 30 tablet 3    gabapentin (NEURONTIN) 800 MG tablet Take 1 tablet by mouth 3 times daily for 30 days. 90 tablet 0    tiZANidine (ZANAFLEX) 4 MG tablet Take 1 tablet by mouth every 8 hours as needed (back pain) 30 tablet 1    pantoprazole (PROTONIX) 40 MG tablet Take 1 tablet by mouth daily Indications: Reflux Gastritis 90 tablet 0    citalopram (CELEXA) 10 MG tablet TAKE 1 TABLET BY MOUTH DAILY 90 tablet 1    donepezil (ARICEPT) 5 MG tablet TAKE 1 TABLET BY MOUTH NIGHTLY 90 tablet 1    memantine (NAMENDA) 10 MG tablet TAKE 1 TABLET BY MOUTH 2 TIMES DAILY 180 tablet 1    azelastine (ASTELIN) 0.1 % nasal spray 1 spray by Nasal route 2 times daily Use in each nostril as directed 2 each 0    lisinopril (PRINIVIL;ZESTRIL) 40 MG tablet TAKE ONE TABLET BY MOUTH DAILY. Cancel previous script BRIGETTE Triana 90 tablet 3    pravastatin (PRAVACHOL) 80 MG tablet TAKE ONE TABLET BY MOUTH EVERY EVENING.  Cancel previous script Elba Bird APRN 90 tablet 3    cetirizine (ZYRTEC) 10 MG tablet TAKE 1 TABLET BY MOUTH DAILY 90 tablet 3    oxyCODONE-acetaminophen (PERCOCET)  MG per tablet       albuterol sulfate HFA (PROVENTIL;VENTOLIN;PROAIR) 108 (90 Base) MCG/ACT inhaler Inhale 2 puffs into the lungs every 6 hours as needed for Wheezing      meclizine (ANTIVERT) 25 MG tablet Take 25 mg by mouth 3 times daily as needed for Dizziness       Diabetic Shoe MISC by Does not apply route DISPENSE ONE PAIR DIABETIC SHOES AND THREE PAIRS OF HEAT MOLDED INSERTS 1 each 0    aspirin 81 MG chewable Respiratory:  Negative for cough and shortness of breath. Cardiovascular:  Negative for chest pain and palpitations. Gastrointestinal:  Negative for nausea and vomiting. Endocrine: Positive for cold intolerance. Negative for heat intolerance. Genitourinary:  Negative for difficulty urinating and flank pain. Musculoskeletal:  Negative for back pain and neck pain. Skin:  Negative for color change and rash. Allergic/Immunologic: Negative for environmental allergies and food allergies. Neurological:  Negative for dizziness, speech difficulty and headaches. Hematological:  Does not bruise/bleed easily. Psychiatric/Behavioral:  Negative for sleep disturbance and suicidal ideas. Objective:     Physical Exam  Vitals and nursing note reviewed. Constitutional:       Appearance: He is well-developed. HENT:      Head: Atraumatic. Right Ear: External ear normal.      Left Ear: External ear normal.      Nose: Nose normal.   Eyes:      Conjunctiva/sclera: Conjunctivae normal.      Pupils: Pupils are equal, round, and reactive to light. Cardiovascular:      Rate and Rhythm: Normal rate and regular rhythm. Heart sounds: Normal heart sounds, S1 normal and S2 normal.   Pulmonary:      Effort: Pulmonary effort is normal.      Breath sounds: Normal breath sounds. Abdominal:      General: Bowel sounds are normal.      Palpations: Abdomen is soft. Musculoskeletal:         General: Normal range of motion. Cervical back: Normal range of motion and neck supple. Skin:     General: Skin is warm and dry. Neurological:      Mental Status: He is alert and oriented to person, place, and time. Psychiatric:         Behavior: Behavior normal.     /84   Pulse 61   Temp 97.4 °F (36.3 °C) (Temporal)   Ht 5' 10\" (1.778 m)   Wt 222 lb (100.7 kg)   SpO2 99%   BMI 31.85 kg/m²     Assessment:       Diagnosis Orders   1.  Fatigue, unspecified type  CBC with Auto Differential Comprehensive Metabolic Panel    Lipid Panel    Hemoglobin A1C    TSH      2. Cold intolerance  CBC with Auto Differential    Comprehensive Metabolic Panel    Lipid Panel    Hemoglobin A1C    TSH      3. Type 2 diabetes mellitus without complication, without long-term current use of insulin (HCC)  CBC with Auto Differential    Comprehensive Metabolic Panel    Lipid Panel    Hemoglobin A1C    TSH      4. Essential hypertension  CBC with Auto Differential    Comprehensive Metabolic Panel    Lipid Panel    Hemoglobin A1C    TSH      5. Alzheimer's disease with late onset (CODE) ([address]  CBC with Auto Differential    Comprehensive Metabolic Panel    Lipid Panel    Hemoglobin A1C    TSH          Diabetic Foot Exam:  Visual inspection:  Deformity/amputation: absent  Skin lesions/pre-ulcerative calluses: absent  Edema: right- negative, left- negative    Sensory exam:  Monofilament sensation: abnormal - >8   (minimum of 5 random plantar locations tested, avoiding callused areas - > 1 area with absence of sensation is + for neuropathy)    Plus at least one of the following:  Pulses: normal,   Pinprick: Impaired        Plan:   More than 50% of the time was spent counseling and coordinating care for a total time of 30 min face to face. Fasting labs. Booster and flu vaccines today. Follow-up in 6 months or sooner if needed.      PDMP Monitoring:    Last PDMP Deepali Mendes as Reviewed:  Review User Review Instant Review Result            Urine Drug Screenings (1 yr)       POCT Rapid Drug Screen  Collected: 3/16/2021 (Final result)              POCT Rapid Drug Screen  Collected: 1/13/2020 11:30 AM (Final result)              POCT Rapid Drug Screen  Collected: 11/8/2019  1:30 PM (Final result)              Urine Drug Screen  Collected: 2/10/2020  3:57 PM (Final result)              Urine Drug Screen  Collected: 11/25/2019 10:01 AM (Final result)                  Medication Contract and Consent for Opioid Use Documents Filed Patient Documents       Type of Document Status Date Received Received By Description    Medication Contract [Status Missing]   scanned into media    Medication Contract Received 3/16/2021 11:48 AM Ludmila Mayur Medication Contract                     Patient given educational materials -see patient instructions. Discussed use, benefit, and side effects of prescribed medications. All patient questions answered. Pt voiced understanding. Reviewed health maintenance. Instructed to continue currentmedications, diet and exercise. Patient agreed with treatment plan. Follow up as directed. MEDICATIONS:  Orders Placed This Encounter   Medications    tamsulosin (FLOMAX) 0.4 MG capsule     Sig: Take 1 capsule by mouth daily     Dispense:  90 capsule     Refill:  3         ORDERS:  Orders Placed This Encounter   Procedures    COVID-19, PFIZER Bivalent BOOSTER, (age 12y+), IM, 30 mcg/0.3 mL    Influenza, FLUAD, (age 72 y+), IM, Preservative Free, 0.5 mL    CBC with Auto Differential    Comprehensive Metabolic Panel    Lipid Panel    Hemoglobin A1C    TSH       Follow-up:  No follow-ups on file. PATIENT INSTRUCTIONS:  There are no Patient Instructions on file for this visit. Electronically signed by BRIGETTE Mcdonough on 9/29/2022 at 4:25 PM    EMR Dragon/transcription disclaimer:  Much of thisencounter note is electronic transcription/translation of spoken language to printed texts. The electronic translation of spoken language may be erroneous, or at times, nonsensical words or phrases may be inadvertentlytranscribed.   Although I have reviewed the note for such errors, some may still exist.

## 2022-09-29 ASSESSMENT — ENCOUNTER SYMPTOMS
VOICE CHANGE: 0
SHORTNESS OF BREATH: 0
COLOR CHANGE: 0
RHINORRHEA: 0
VOMITING: 0
COUGH: 0
PHOTOPHOBIA: 0
BACK PAIN: 0
NAUSEA: 0

## 2022-10-19 NOTE — PROGRESS NOTES
Chief Complaint   Patient presents with   [facility] Medicare Annual Wellness Visit  Name: Jennie Alberts Date: 2020   MRN: [id number] Sex: Male   Age: 77 y.o. Ethnicity: Non-/Non    : 1950 Race: Janina Osgood is here for Medicare AWV    Screenings for behavioral, psychosocial and functional/safety risks, and cognitive dysfunction are all negative except as indicated below. These results, as well as other patient data from the [address] [facility] form, are documented in Flowsheets linked to this Encounter. Allergies   Allergen Reactions    Bee Venom Anaphylaxis    Pcn [Penicillins] Other (See Comments)     Made pt \"black out\"         Prior to Visit Medications    Medication Sig Taking? Authorizing Provider   triamcinolone (KENALOG) 0.1 % cream Apply topically 2 times daily. Yes BRIGETTE Triana   diclofenac sodium (VOLTAREN) 1 % GEL Apply 2 g topically 2 times daily Yes BRIEGTTE Triana   gabapentin (NEURONTIN) 600 MG tablet Take 1 tablet by mouth 3 times daily for 90 days. Yes BRIGETTE Triana   pravastatin (PRAVACHOL) 80 MG tablet TAKE ONE TABLET BY MOUTH EVERY EVENING. Yes BRIGETTE Triana   tamsulosin (FLOMAX) 0.4 MG capsule Take 1 capsule by mouth daily Yes BRIGETTE Triana   lisinopril (PRINIVIL;ZESTRIL) 40 MG tablet TAKE ONE TABLET BY MOUTH DAILY. Yes BRIGETTE Triana   cetirizine (ZYRTEC) 10 MG tablet TAKE 1 TABLET BY MOUTH DAILY Yes BRIGETTE Triana   pantoprazole (PROTONIX) 40 MG tablet TAKE ONE TABLET BY MOUTH EVERY DAY Yes BRIGETTE Triana   azelastine (ASTELIN) 0.1 % nasal spray 1 spray by Nasal route 2 times daily Use in each nostril as directed Yes BRIGETTE Triana   albuterol sulfate HFA (VENTOLIN HFA) 108 (90 Base) MCG/ACT inhaler INHALE 2 PUFFS INTO THE LUNGS EVERY 6 HOURS AS NEEDED.  Yes Sepideh Tee MD   aspirin 81 MG chewable tablet Take 1 tablet by mouth daily Yes Amee Quintana MD Please get labs done 2 days before follow up appointment.    (!) No  Hearing/Vision Interventions:  · Hearing concerns:  Patient is hearing me well. He states he does not need this referral.   · He has reading glasses. He was encouraged to see optometrist.     Safety:  Safety  Do you have working smoke detectors?: (!) No  Have all throw rugs been removed or fastened?: (!) No  Do you have non-slip mats or surfaces in all bathtubs/showers?: Yes  Do all of your stairways have a railing or banister?: (!) No(no Stairs)  Are your doorways, halls and stairs free of clutter?: Yes  Do you always fasten your seatbelt when you are in a car?: Yes  Safety Interventions:  · Home safety tips provided  · Patient declines any further evaluation/treatment for this issue    ADL:  ADLs  In the past 7 days, did you need help from others to perform any of the following everyday activities? Eating, dressing, grooming, bathing, toileting, or walking/balance?: (!) Dressing  In the past 7 days, did you need help from others to take care of any of the following? Laundry, housekeeping, banking/finances, shopping, telephone use, food preparation, transportation, or taking medications?: (!) Food Preparation, Taking Medications  ADL Interventions:  · His wife helps him with medications. He is declining HH services.      Personalized Preventive Plan   Current Health Maintenance Status  Immunization History   Administered Date(s) Administered    Influenza 10/15/2013    Influenza Virus Vaccine 10/13/2014, 01/04/2016    Influenza, High Dose (Fluzone 65 yrs and older) 10/23/2018    Influenza, Quadv, IM, (6 mo and older Fluzone, Flulaval, Fluarix and 3 yrs and older Afluria) 10/30/2017    Influenza, Quadv, IM, PF (6 mo and older Fluzone, Flulaval, Fluarix, and 3 yrs and older Afluria) 11/05/2019    Pneumococcal Conjugate 13-valent ([id number]) 01/31/2017    Pneumococcal Polysaccharide ([id number]) 09/28/2015        Health Maintenance   Topic Date Due    DTaP/Tdap/Td vaccine (1 - Tdap) 07/04/1962    Shingles Vaccine (1 of 2) 07/04/2001    Diabetic retinal exam  08/21/2014    Diabetic microalbuminuria test  07/27/2018    Annual Wellness Visit (AWV)  05/29/2019    Diabetic foot exam  04/17/2020    A1C test (Diabetic or Prediabetic)  09/04/2020    Colon cancer screen colonoscopy  09/14/2020    Pneumococcal 65+ years Vaccine (2 of 2 - PPSV23) 09/28/2020    Lipid screen  11/04/2020    Potassium monitoring  11/25/2020    Creatinine monitoring  11/25/2020    Flu vaccine  Completed    AAA screen  Completed    Hepatitis C screen  Completed     Recommendations for Preventive Services Due: see orders and patient instructions/AVS.  . Recommended screening schedule for the next 5-10 years is provided to the patient in written form: see Patient Bharathi Ramirez was seen today for medicare awv. Diagnoses and all orders for this visit:    Routine general medical examination at a health care facility    Type 2 diabetes mellitus without complication, without long-term current use of insulin ([address]  -     Microalbumin / Creatinine Urine Ratio; Future  -     CBC Auto Differential; Future  -     Comprehensive Metabolic Panel; Future  -     Hemoglobin A1C; Future    Essential hypertension  -     Microalbumin / Creatinine Urine Ratio; Future  -     CBC Auto Differential; Future    Hyperlipidemia, unspecified hyperlipidemia type  -     Lipid, Fasting; Future    Therapeutic drug monitoring  -     POCT Rapid Drug Screen    Memory impairment  -     [facility] [place]    Chronic obstructive pulmonary disease, unspecified COPD type ([address]    Diabetic peripheral neuropathy associated with type 2 diabetes mellitus ([address]    Thrombocytopenia ([address]    Other orders  -     triamcinolone (KENALOG) 0.1 % cream; Apply topically 2 times daily. -     diclofenac sodium (VOLTAREN) 1 % GEL; Apply 2 g topically 2 times daily        Memory testing. Meds refills. Labs ordered. Patient to get these while fasting. UDS appropriate. He is followed by [facility]. He is on opiates. Ok for gabapentin. Follow up with specialists as scheduled. Return in about 3 months (around 4/13/2020), or if symptoms worsen or fail to improve, for Medicare Annual Wellness Visit in 1 year, memory, HTN, Diabetes, hyperlipidemia.

## 2022-11-01 DIAGNOSIS — E11.42 DIABETIC PERIPHERAL NEUROPATHY ASSOCIATED WITH TYPE 2 DIABETES MELLITUS (HCC): ICD-10-CM

## 2022-11-01 DIAGNOSIS — E11.9 TYPE 2 DIABETES MELLITUS WITHOUT COMPLICATION, WITHOUT LONG-TERM CURRENT USE OF INSULIN (HCC): ICD-10-CM

## 2022-11-01 RX ORDER — DONEPEZIL HYDROCHLORIDE 5 MG/1
5 TABLET, FILM COATED ORAL NIGHTLY
Qty: 90 TABLET | Refills: 1 | Status: SHIPPED | OUTPATIENT
Start: 2022-11-01

## 2022-11-01 RX ORDER — CITALOPRAM 10 MG/1
10 TABLET ORAL DAILY
Qty: 90 TABLET | Refills: 1 | Status: SHIPPED | OUTPATIENT
Start: 2022-11-01

## 2022-11-01 NOTE — TELEPHONE ENCOUNTER
Requested Prescriptions     Pending Prescriptions Disp Refills    citalopram (CELEXA) 10 MG tablet [Pharmacy Med Name: CITALOPRAM HBR 10 MG TABLET 10 Tablet] 90 tablet 1     Sig: TAKE 1 TABLET BY MOUTH DAILY    donepezil (ARICEPT) 5 MG tablet [Pharmacy Med Name: DONEPEZIL HCL 5 MG TABLET 5 Tablet] 90 tablet 1     Sig: TAKE 1 TABLET BY MOUTH NIGHTLY       Last Office Visit: 10/27/2021  Next Office Visit: none  Last Medication Refill: BOTH 6/6/22 with 1 RF    Pt needs appt

## 2022-11-02 NOTE — TELEPHONE ENCOUNTER
Ramu Childers called to request a refill on his medication. Last office visit : 9/28/2022   Next office visit : Visit date not found     Last Marbin Jane: 9/19/22  Medication Contract: needs updated   Last UDS: needs updated  Last Rx: 9/20/22    Amphetamine Screen, Urine   Date Value Ref Range Status   03/16/2021 -  Final     Barbiturate Screen, Urine   Date Value Ref Range Status   03/16/2021 -  Final     Benzodiazepine Screen, Urine   Date Value Ref Range Status   03/16/2021 -  Final     Buprenorphine Urine   Date Value Ref Range Status   03/16/2021 -  Final     Cocaine Metabolite Screen, Urine   Date Value Ref Range Status   03/16/2021 -  Final     Gabapentin Screen, Urine   Date Value Ref Range Status   03/16/2021 -  Final     MDMA, Urine   Date Value Ref Range Status   03/16/2021 -  Final     Methamphetamine, Urine   Date Value Ref Range Status   03/16/2021 -  Final     Opiate Scrn, Ur   Date Value Ref Range Status   03/16/2021 -  Final     Oxycodone Screen, Ur   Date Value Ref Range Status   03/16/2021 -  Final     PCP Screen, Urine   Date Value Ref Range Status   03/16/2021 -  Final     Propoxyphene Screen, Urine   Date Value Ref Range Status   03/16/2021 -  Final     THC Screen, Urine   Date Value Ref Range Status   03/16/2021 -  Final     Tricyclic Antidepressants, Urine   Date Value Ref Range Status   03/16/2021 -  Final           Requested Prescriptions     Pending Prescriptions Disp Refills    gabapentin (NEURONTIN) 800 MG tablet [Pharmacy Med Name: GABAPENTIN 800 MG TABLET 800 Tablet] 90 tablet 0     Sig: TAKE 1 TABLET BY MOUTH 3 TIMES DAILY         Please approve or refuse this medication.    Juani De Jesus

## 2022-11-03 ENCOUNTER — TELEPHONE (OUTPATIENT)
Dept: NEUROSURGERY | Age: 72
End: 2022-11-03

## 2022-11-03 NOTE — TELEPHONE ENCOUNTER
Spoke with pt wife made appt for next available , also told her I would add him to a wait list she voiced understanding .

## 2022-11-03 NOTE — TELEPHONE ENCOUNTER
Patients wife Ollie Curiel called in to schedule appointment patient missed 6 month follow up a few months back and is wanting to reschedule  A good call back time is anytime  Thank you

## 2022-11-07 RX ORDER — GABAPENTIN 800 MG/1
800 TABLET ORAL 3 TIMES DAILY
Qty: 90 TABLET | Refills: 0 | Status: SHIPPED | OUTPATIENT
Start: 2022-11-07 | End: 2022-12-07

## 2023-01-02 DIAGNOSIS — E11.42 DIABETIC PERIPHERAL NEUROPATHY ASSOCIATED WITH TYPE 2 DIABETES MELLITUS (HCC): ICD-10-CM

## 2023-01-02 DIAGNOSIS — E11.9 TYPE 2 DIABETES MELLITUS WITHOUT COMPLICATION, WITHOUT LONG-TERM CURRENT USE OF INSULIN (HCC): ICD-10-CM

## 2023-01-09 RX ORDER — GABAPENTIN 800 MG/1
TABLET ORAL
Qty: 90 TABLET | Refills: 0 | OUTPATIENT
Start: 2023-01-09

## 2023-01-31 RX ORDER — PRAVASTATIN SODIUM 80 MG/1
TABLET ORAL
Qty: 30 TABLET | Refills: 0 | Status: SHIPPED | OUTPATIENT
Start: 2023-01-31

## 2023-02-08 ENCOUNTER — OFFICE VISIT (OUTPATIENT)
Dept: CARDIOLOGY CLINIC | Age: 73
End: 2023-02-08
Payer: MEDICARE

## 2023-02-08 VITALS
SYSTOLIC BLOOD PRESSURE: 130 MMHG | HEART RATE: 60 BPM | HEIGHT: 70 IN | BODY MASS INDEX: 32.93 KG/M2 | DIASTOLIC BLOOD PRESSURE: 86 MMHG | WEIGHT: 230 LBS

## 2023-02-08 DIAGNOSIS — I25.10 CORONARY ARTERY DISEASE INVOLVING NATIVE CORONARY ARTERY OF NATIVE HEART WITHOUT ANGINA PECTORIS: ICD-10-CM

## 2023-02-08 DIAGNOSIS — E08.29 DIABETES MELLITUS DUE TO UNDERLYING CONDITION WITH OTHER KIDNEY COMPLICATION, UNSPECIFIED WHETHER LONG TERM INSULIN USE (HCC): ICD-10-CM

## 2023-02-08 DIAGNOSIS — I10 ESSENTIAL HYPERTENSION: Primary | ICD-10-CM

## 2023-02-08 DIAGNOSIS — I71.21 ANEURYSM OF ASCENDING AORTA WITHOUT RUPTURE: ICD-10-CM

## 2023-02-08 PROCEDURE — 99214 OFFICE O/P EST MOD 30 MIN: CPT | Performed by: INTERNAL MEDICINE

## 2023-02-08 PROCEDURE — G8484 FLU IMMUNIZE NO ADMIN: HCPCS | Performed by: INTERNAL MEDICINE

## 2023-02-08 PROCEDURE — 1123F ACP DISCUSS/DSCN MKR DOCD: CPT | Performed by: INTERNAL MEDICINE

## 2023-02-08 PROCEDURE — G8417 CALC BMI ABV UP PARAM F/U: HCPCS | Performed by: INTERNAL MEDICINE

## 2023-02-08 PROCEDURE — 3075F SYST BP GE 130 - 139MM HG: CPT | Performed by: INTERNAL MEDICINE

## 2023-02-08 PROCEDURE — 1036F TOBACCO NON-USER: CPT | Performed by: INTERNAL MEDICINE

## 2023-02-08 PROCEDURE — G8427 DOCREV CUR MEDS BY ELIG CLIN: HCPCS | Performed by: INTERNAL MEDICINE

## 2023-02-08 PROCEDURE — 3079F DIAST BP 80-89 MM HG: CPT | Performed by: INTERNAL MEDICINE

## 2023-02-08 PROCEDURE — 3017F COLORECTAL CA SCREEN DOC REV: CPT | Performed by: INTERNAL MEDICINE

## 2023-02-08 RX ORDER — TIZANIDINE 4 MG/1
4 TABLET ORAL EVERY 8 HOURS PRN
Qty: 30 TABLET | Refills: 1 | Status: SHIPPED | OUTPATIENT
Start: 2023-02-08

## 2023-02-08 ASSESSMENT — ENCOUNTER SYMPTOMS
COUGH: 0
BLOOD IN STOOL: 0
ABDOMINAL DISTENTION: 0
ABDOMINAL PAIN: 0
DIARRHEA: 0
VOMITING: 0
WHEEZING: 0
BACK PAIN: 0
SHORTNESS OF BREATH: 0

## 2023-02-08 NOTE — PROGRESS NOTES
[id number] [facility]  Cardiology Office Note  [address] [id number]  Phone: [phone]  Fax: [phone]                            Date:  2/8/2023  Patient: Matthew Sorto  Age:  71 y.o., 1950    Referral: No ref.  provider found      PROBLEM LIST:    Patient Active Problem List    Diagnosis Date Noted    Major depressive disorder, recurrent, mild 06/29/2021     Priority: Low    Major depressive disorder, recurrent, moderate 06/29/2021     Priority: Low    Major depressive disorder, recurrent, unspecified 06/29/2021     Priority: Low    Ascending aortic aneurysm 03/16/2021     Priority: Low    Left rotator cuff tear arthropathy 12/16/2020     Priority: Low    Transient alteration of awareness      Priority: Low    TIA (transient ischemic attack)      Priority: Low    Numbness and tingling      Priority: Low    Diabetic polyneuropathy associated with type 2 diabetes mellitus ([address]      Priority: Low    Late onset Alzheimer's disease without behavioral disturbance ([address]      Priority: Low    Bradycardia      Priority: Low    Stroke-like symptoms 12/01/2020     Priority: Low    Guaiac positive stools 12/01/2020     Priority: Low    Diabetic peripheral neuropathy associated with type 2 diabetes mellitus ([address] 01/13/2020     Priority: Low    CAD (coronary artery disease)      Priority: Low    Chest pain, unspecified 11/04/2019     Priority: Low    Symptomatic bradycardia      Priority: Low    B12 deficiency 10/31/2019     Priority: Low    Thrombocytopenia ([address] 09/19/2019     Priority: Low    Basal cell carcinoma of left cheek 09/18/2019     Priority: Low    Seborrheic keratosis 09/18/2019     Priority: Low    Epigastric pain 08/05/2019     Priority: Low     Overview Note:     Added automatically from request for surgery [id number]      Gastroesophageal reflux disease 08/05/2019     Priority: Low     Overview Note:     Added automatically from request for surgery [id number]      Heartburn 08/05/2019     Priority: Low     Overview Note:     Added automatically from request for surgery [id number]      Fall from slipping on ice, initial encounter 01/17/2018     Priority: Low    Basal cell carcinoma of chest 06/20/2017     Priority: Low    Benign hemangioma 03/23/2017     Priority: Low    Benign non-nodular prostatic hyperplasia with lower urinary tract symptoms 11/29/2016     Priority: Low    Family history of prostate cancer 11/29/2016     Priority: Low    Skin lesion of face 11/29/2016     Priority: Low    Hypertension      Priority: Low    Type 2 diabetes mellitus without complication (HCC)      Priority: Low    COPD (chronic obstructive pulmonary disease) ([facility]      Priority: Low    Chronic back pain      Priority: Low     1. Coronary artery disease, PCI 11/4/2020 to mid LAD just after first diagonal with residual mid LAD 55% stenosis, normal LV ejection fraction. 2.  Diabetes mellitus. 3.  Ascending aortic moderate size aneurysm at 4.4 cm (stable by CTA 6/2022)  4. Mild thrombocytopenia. 5.  Chronic low back pain. 6.  COPD with prior tobacco use stopped 30 years ago. 7.  CKD stage III  8. 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 PRESENTATION: Garry Pond is a 71y.o. year old male presents for follow-up evaluation. His memory as well as his gait imbalance has progressively got worse. He does not move outside the house much. Does go outside to check the drains. Does have a cane but is not using it. Uses his wife for support when he goes outside. Does not report any significant chest pain. No leg swelling. REVIEW OF SYSTEMS:  Review of Systems   Constitutional:  Negative for activity change, diaphoresis and fatigue. HENT:  Negative for hearing loss, nosebleeds and tinnitus. Eyes:  Negative for visual disturbance. Respiratory:  Negative for cough, shortness of breath and wheezing.     Cardiovascular:  Negative for chest pain, palpitations and leg swelling. Gastrointestinal:  Negative for abdominal distention, abdominal pain, blood in stool, diarrhea and vomiting. Endocrine: Negative for cold intolerance, heat intolerance, polydipsia, polyphagia and polyuria. Genitourinary:  Negative for difficulty urinating, flank pain and hematuria. Musculoskeletal:  Positive for gait problem. Negative for arthralgias, back pain, joint swelling and myalgias. Skin:  Negative for pallor and rash. Neurological:  Negative for dizziness, seizures, syncope and headaches. Psychiatric/Behavioral:  Negative for behavioral problems and dysphoric mood. The patient is not nervous/anxious. Past Medical History:      Diagnosis Date    Arthritis     CAD (coronary artery disease)     sees dr. Severino Chao    Chronic back pain     COPD (chronic obstructive pulmonary disease) ([facility])     Hypertension     Peripheral sensory neuropathy     Pleurisy     Shoulder pain     TIA (transient ischemic attack)     Type II or unspecified type diabetes mellitus without mention of complication, not stated as uncontrolled        Past Surgical History:      Procedure Laterality Date    APPENDECTOMY      CARDIAC CATHETERIZATION  11/04/2019    PIETER to LAD    SHOULDER SURGERY Left 12/17/2020    LEFT REVERSE TOTAL SHOULDER ARTHROPLASTY performed by Ryan Pierce MD at [facility] OR       Medications:  Current Outpatient Medications   Medication Sig Dispense Refill    pravastatin (PRAVACHOL) 80 MG tablet TAKE ONE TABLET BY MOUTH EVERY EVENING.  30 tablet 0    citalopram (CELEXA) 10 MG tablet TAKE 1 TABLET BY MOUTH DAILY 90 tablet 1    donepezil (ARICEPT) 5 MG tablet TAKE 1 TABLET BY MOUTH NIGHTLY 90 tablet 1    tamsulosin (FLOMAX) 0.4 MG capsule Take 1 capsule by mouth daily 90 capsule 3    ondansetron (ZOFRAN ODT) 4 MG disintegrating tablet Take 1 tablet by mouth every 8 hours as needed for Nausea or Vomiting 30 tablet 0    traZODone (DESYREL) 50 MG tablet Take 1 tablet by mouth nightly 30 tablet 3    tiZANidine (ZANAFLEX) 4 MG tablet Take 1 tablet by mouth every 8 hours as needed (back pain) 30 tablet 1    pantoprazole (PROTONIX) 40 MG tablet Take 1 tablet by mouth daily Indications: Reflux Gastritis 90 tablet 0    memantine (NAMENDA) 10 MG tablet TAKE 1 TABLET BY MOUTH 2 TIMES DAILY 180 tablet 1    azelastine (ASTELIN) 0.1 % nasal spray 1 spray by Nasal route 2 times daily Use in each nostril as directed 2 each 0    lisinopril (PRINIVIL;ZESTRIL) 40 MG tablet TAKE ONE TABLET BY MOUTH DAILY. Cancel previous script BRIGETTE Triana 90 tablet 3    cetirizine (ZYRTEC) 10 MG tablet TAKE 1 TABLET BY MOUTH DAILY 90 tablet 3    oxyCODONE-acetaminophen (PERCOCET)  MG per tablet       albuterol sulfate HFA (PROVENTIL;VENTOLIN;PROAIR) 108 (90 Base) MCG/ACT inhaler Inhale 2 puffs into the lungs every 6 hours as needed for Wheezing      meclizine (ANTIVERT) 25 MG tablet Take 25 mg by mouth 3 times daily as needed for Dizziness       Diabetic Shoe MISC by Does not apply route DISPENSE ONE PAIR DIABETIC SHOES AND THREE PAIRS OF HEAT MOLDED INSERTS 1 each 0    aspirin 81 MG chewable tablet Take 1 tablet by mouth daily 30 tablet 3    triamcinolone (KENALOG) 0.1 % cream Apply topically 2 times daily. (Patient taking differently: Apply topically 2 times daily Apply topically 2 times daily. ) 1 Tube 1    Misc. Devices (WALKER) MISC 1 each by Does not apply route daily 1 each 0    nitroGLYCERIN (NITROSTAT) 0.4 MG SL tablet up to max of 3 total doses. If no relief after 1 dose, call 911. 25 tablet 3    EPINEPHrine (EPIPEN) 0.3 MG/0.3ML SOAJ injection Use as directed for allergic reaction 0.3 mL 3    gabapentin (NEURONTIN) 800 MG tablet Take 1 tablet by mouth 3 times daily for 30 days. 90 tablet 0    diclofenac sodium (VOLTAREN) 1 % GEL Apply topically 2 times daily (Patient taking differently: Apply 2 g topically 2 times daily ) 100 g 3     No current facility-administered medications for this visit. Allergies:  Bee venom and Pcn [penicillins]    Past Social History:  Social History     Socioeconomic History    Marital status:      Spouse name: Not on file    Number of children: Not on file    Years of education: Not on file    Highest education level: Not on file   Occupational History    Not on file   Tobacco Use    Smoking status: Former     Packs/day: 1.50     Years: 15.00     Pack years: 22.50     Types: Cigarettes     Quit date: 10/23/2000     Years since quittin.3    Smokeless tobacco: Never   Substance and Sexual Activity    Alcohol use: No    Drug use: No    Sexual activity: Not on file   Other Topics Concern    Not on file   Social History Narrative    Not on file     Social Determinants of Health     Financial Resource Strain: Low Risk     Difficulty of Paying Living Expenses: Not hard at all   Food Insecurity: Food Insecurity Present    Worried About [address] in the Last Year: Sometimes true    Ran Out of Food in the Last Year: Sometimes true   Transportation Needs: Not on file   Physical Activity: Not on file   Stress: Not on file   Social Connections: Not on file   Intimate Partner Violence: Not on file   Housing Stability: Not on file       Family History:       Problem Relation Age of Onset    Cancer Mother     Heart Disease Father          Physical Examination:  /86   Pulse 60   Ht 5' 10\" (1.778 m)   Wt 230 lb (104.3 kg)   BMI 33.00 kg/m²   Physical Exam  Constitutional:       Comments: Moderate to severe truncal obesity. Has issues with gait. Needs support for transfers as well as ambulating   HENT:      Mouth/Throat:      Pharynx: No oropharyngeal exudate. Eyes:      General: No scleral icterus. Right eye: No discharge. Left eye: No discharge. Neck:      Thyroid: No thyromegaly. Vascular: No JVD. Cardiovascular:      Rate and Rhythm: Normal rate and regular rhythm. Heart sounds: No murmur heard. No friction rub.  No gallop. Comments: No JVD  No edema  No significant systolic or diastolic murmurs noted  Pulmonary:      Effort: No respiratory distress. Breath sounds: No stridor. No wheezing or rales. Abdominal:      General: Bowel sounds are normal. There is no distension. Palpations: Abdomen is soft. There is no mass. Tenderness: There is no abdominal tenderness. There is no guarding or rebound. Comments: No palpable organomegaly   Musculoskeletal:         General: No deformity. Skin:     General: Skin is warm. Coloration: Skin is not pale. Findings: No erythema or rash. Neurological:      Mental Status: He is alert and oriented to person, place, and time. Motor: No abnormal muscle tone. Coordination: Coordination normal.      Deep Tendon Reflexes: Reflexes normal.         Labs:   CBC: No results for input(s): WBC, HGB, HCT, PLT in the last 72 hours. BMP:No results for input(s): NA, K, CO2, BUN, CREATININE, LABGLOM, GLUCOSE in the last 72 hours. BNP: No results for input(s): BNP in the last 72 hours. PT/INR: No results for input(s): PROTIME, INR in the last 72 hours. APTT:No results for input(s): APTT in the last 72 hours. CARDIAC ENZYMES:No results for input(s): CKTOTAL, CKMB, CKMBINDEX, TROPONINI in the last 72 hours. FASTING LIPID PANEL:  Lab Results   Component Value Date/Time    HDL 65 12/02/2020 12:29 AM    LDLDIRECT 131 10/07/2015 06:50 AM    LDLCALC 62 12/02/2020 12:29 AM    TRIG 69 12/02/2020 12:29 AM     LIVER PROFILE:No results for input(s): AST, ALT, LABALBU in the last 72 hours. Imaging:    Lexiscan Nuclear Stress Test Report   Procedure date: 6/14/2022   Indications: residual LAD disease, chest pain   Procedure: Stress was performed with injection of 0.4 mg Lexiscan. Vital signs and EKG were monitored. Technetium-99 Myoview was injected   in divided doses, approximately 8.8 mCi and 24.6 mCi respectively for   rest and stress imaging.  The patient was discharged in stable   condition. Results: Patient had no symptoms during infusion. Baseline EKG showed   sinus bradycardia without ST/T changes. During stress there were no   significantEKG changes or rhythm changes. Baseline and peak blood   pressures were 149/81, and 149/81 respectively. Baseline and peak   heart rates were 50 and  71 respectively. Review of rest and stress images obtained utilizing a gated SPECT   acquisition protocol along with review of the polar plot revealed:   1. Ejection fraction 49%   2. Wall motion study unremarkable   3. Myocardial perfusion imaging demonstrated homogeneous uptake of the   tracer in all visualized segments no areas of ischemia or infarction   are identified. Summary impressions:   Borderline ejection fraction 49% normal perfusion study without   evidence of ischemia or prior infarction. Signed by Dr Marina Spence     Let patient know that I have reviewed his CAT scan. No mention on report of aorta size but measured at 4.4 cm which is unchanged from previous CT scan. Continue to monitor. Stress test did not not show any abnormality other than borderline heart function. If no further chest pain, can monitor for now. CTA CHEST W WO CONTRAST: Patient Communication     Add Comments   Add Notifications      Radiation Dose Estimates    No radiation information found for this patient  Narrative   NO PRIOR REPORT AVAILABLE   EXAM: CTA OF THE CHEST WITHOUT AND WITH CONTRAST   CLINICAL DATA: Coronary artery disease without angina pectoris, unspecified vessel or lesion type, unspecified whether native or transplanted heart. TECHNIQUE: Axial CT images through the lungs were acquired without and with contrast and imaged using soft tissue and lung algorithms. 3D reconstruction imageswere submitted. Radiation dose: CTDIvol =73.71 mGy, DLP =1595.51 mGy x cm. PRIOR STUDIES: Radiograph of the chest dated 12/15/2020 images.    FINDINGS:    Lungs: No pulmonary infiltrate identified. No pulmonary mass identified. No pleural effusions identified. No pneumothorax. The airway is clear. Soft Tissues: No mediastinal, axillary or supraclavicular adenopathy identified. A calcified subcarinal lymph node is noted. Vascular: Unremarkable aorta. No evidence of filling defect in the pulmonary arteries to the segmental branches. Grossly unremarkable sized heart. Bony structures: No acute or destructive abnormality   Upper Abdomen: Limited visualization of the solid upper abdominal organs shows a simple cortical cyst in the upper pole of right kidney with bilateral few tiny renal calculi. Impression   1. No significant abnormality in the chest.   2. No evidence of pulmonary thromboembolism. 3. A simple cortical cyst in the upper pole of right kidney with bilateral few tiny renal calculi. RECOMMENDATION: Follow up as clinically indicated. All CT scans at this facility utilize dose modulation, iterative reconstruction, and/or weight based dosing when appropriate to reduce radiation dose to as low as reasonably achievable. Electronically Signed by Sandy Messer MD at 16-Jun-2022 12:14:28 AM                ASSESSMENT and PLAN:    55-year-old gentleman with past medical history of diabetes mellitus, PAD stage III, probable early Alzheimer's dementia, peripheral neuropathy with gait issues, mild thrombocytopenia, moderate size ascending aortic aneurysm (4.4 cm), coronary artery disease with PCI to mid LAD 11/4/2019, degenerative disc disease here for follow-up evaluation. 1.  His memory issues as well as his gait are progressively worsening. Have advised him to use a cane but prefers to use his wife's support. 2.  Have discussed with him further plan of care.   He does not wish any major surgeries but is willing to proceed with minor surgery such as cardiac catheterization if he has significant symptomatology and revascularization would be beneficial. We will continue to monitor him in this regard. [facility] study without significant ischemia noted. Ejection fraction borderline at 49%. CTA showed stable size of ascending aortic aneurysm at 4.4 cm. Blood pressures well controlled. 3.  Can follow-up with nurse practitioner in 6 months and with me in 1 year. Orders:  No orders of the defined types were placed in this encounter. No orders of the defined types were placed in this encounter. Return for NP 6 mths; me 1year. Electronically signed by Hodan Castillo MD on 2/8/2023 at 12:04 PM    [id number] [facility]      Thisdictation was generated by voice recognition computer software. Although all attempts are made to edit the dictation for accuracy, there may be errors in the transcription that are not intended.

## 2023-02-15 ENCOUNTER — CLINICAL DOCUMENTATION (OUTPATIENT)
Dept: NEUROLOGY | Age: 73
End: 2023-02-15

## 2023-02-15 ENCOUNTER — OFFICE VISIT (OUTPATIENT)
Dept: NEUROLOGY | Age: 73
End: 2023-02-15
Payer: MEDICARE

## 2023-02-15 VITALS
OXYGEN SATURATION: 99 % | SYSTOLIC BLOOD PRESSURE: 134 MMHG | WEIGHT: 230 LBS | DIASTOLIC BLOOD PRESSURE: 73 MMHG | BODY MASS INDEX: 32.93 KG/M2 | HEART RATE: 55 BPM | HEIGHT: 70 IN

## 2023-02-15 DIAGNOSIS — F48.2 PBA (PSEUDOBULBAR AFFECT): ICD-10-CM

## 2023-02-15 DIAGNOSIS — F03.B3 MODERATE DEMENTIA WITH MOOD DISTURBANCE, UNSPECIFIED DEMENTIA TYPE: Primary | ICD-10-CM

## 2023-02-15 PROCEDURE — 3075F SYST BP GE 130 - 139MM HG: CPT | Performed by: NURSE PRACTITIONER

## 2023-02-15 PROCEDURE — G8484 FLU IMMUNIZE NO ADMIN: HCPCS | Performed by: NURSE PRACTITIONER

## 2023-02-15 PROCEDURE — 1123F ACP DISCUSS/DSCN MKR DOCD: CPT | Performed by: NURSE PRACTITIONER

## 2023-02-15 PROCEDURE — 3017F COLORECTAL CA SCREEN DOC REV: CPT | Performed by: NURSE PRACTITIONER

## 2023-02-15 PROCEDURE — 99213 OFFICE O/P EST LOW 20 MIN: CPT | Performed by: NURSE PRACTITIONER

## 2023-02-15 PROCEDURE — 1036F TOBACCO NON-USER: CPT | Performed by: NURSE PRACTITIONER

## 2023-02-15 PROCEDURE — G8427 DOCREV CUR MEDS BY ELIG CLIN: HCPCS | Performed by: NURSE PRACTITIONER

## 2023-02-15 PROCEDURE — G8417 CALC BMI ABV UP PARAM F/U: HCPCS | Performed by: NURSE PRACTITIONER

## 2023-02-15 PROCEDURE — 3078F DIAST BP <80 MM HG: CPT | Performed by: NURSE PRACTITIONER

## 2023-02-15 RX ORDER — DEXTROMETHORPHAN HYDROBROMIDE AND QUINIDINE SULFATE 20; 10 MG/1; MG/1
1 CAPSULE, GELATIN COATED ORAL DAILY
Qty: 60 CAPSULE | Refills: 3 | Status: SHIPPED | OUTPATIENT
Start: 2023-02-15

## 2023-02-15 NOTE — PROGRESS NOTES
[id number] [facility] Neurology Office Note      Patient:   Marie Nascimento  MR#:    [id number]  Account Number:                         YOB: 1950  Date of Evaluation:  2/15/2023  Time of Note:                          10:39 AM  Primary/Referring Physician:  BRIGETTE Mckeon   Consulting Physician:  Jorge Sorenson DNP, APRN    FOLLOW UP    Chief Complaint   Patient presents with    Follow-up    Memory Loss     Family states memory is getting much worse. She states he can be on a phone call and 10 minutes later he has no memory of that call      [address] is a 71y.o. year old male here for follow up of memory loss and neuropathy. He is accompanied by his wife today who helps provide most of the history. Memory loss continues to slowly progress. Primarily short term memory loss noted. Noting repetition of questions and stories. Noting word recall difficulty. Has difficulty remembering conversations that happened just a few minutes ago. He will go into a room and forget why he went in there or he will forget which room he was trying to go to. Noting irritability and some episodes of anger. Has had a few crying spells. Emotions don't always match the situation. Noting depression. No SI/HI. Notes unsteady gait, c/w neuropathy. No urinary incontinence. No hallucinations. Appetite waxes and wanes. Memory loss interfering with ADLs at times. His wife is handling all his medications. He is not driving. No stroke history. No family history of dementia or memory loss. He is taking Namenda and Aricept. Neuropathy somewhat worse. Notes symptoms in his hands and feet. Denies weakness. More imbalanced. Denies falls. He is on Gabapentin 800mg TID. Denies further TIA/stroke like symptoms. Follows with hematology for anemia, family history with mother having leukemia.      Past Medical History:   Diagnosis Date    Arthritis     CAD (coronary artery disease)     sees dr. Jeffrey Patient back pain     COPD (chronic obstructive pulmonary disease) (HCC)     Hypertension     Peripheral sensory neuropathy     Pleurisy     Shoulder pain     TIA (transient ischemic attack)     Type II or unspecified type diabetes mellitus without mention of complication, not stated as uncontrolled        Past Surgical History:   Procedure Laterality Date    APPENDECTOMY      CARDIAC CATHETERIZATION  2019    PIETER to LAD    SHOULDER SURGERY Left 2020    LEFT REVERSE TOTAL SHOULDER ARTHROPLASTY performed by Higinio De Santiago MD at [facility] OR       Family History   Problem Relation Age of Onset    Cancer Mother     Heart Disease Father        Social History     Socioeconomic History    Marital status:      Spouse name: Not on file    Number of children: Not on file    Years of education: Not on file    Highest education level: Not on file   Occupational History    Not on file   Tobacco Use    Smoking status: Former     Packs/day: 1.50     Years: 15.00     Pack years: 22.50     Types: Cigarettes     Quit date: 10/23/2000     Years since quittin.3    Smokeless tobacco: Never   Substance and Sexual Activity    Alcohol use: No    Drug use: No    Sexual activity: Not on file   Other Topics Concern    Not on file   Social History Narrative    Not on file     Social Determinants of Health     Financial Resource Strain: Low Risk     Difficulty of Paying Living Expenses: Not hard at all   Food Insecurity: Food Insecurity Present    Worried About Running Out of Food in the Last Year: Sometimes true    Ran Out of Food in the Last Year: Sometimes true   Transportation Needs: Not on file   Physical Activity: Not on file   Stress: Not on file   Social Connections: Not on file   Intimate Partner Violence: Not on file   Housing Stability: Not on file       Current Outpatient Medications   Medication Sig Dispense Refill    dextromethorphan-quiNIDine (NUEDEXTA) 20-10 MG CAPS per capsule Take 1 capsule by mouth daily 60 capsule 3    tiZANidine (ZANAFLEX) 4 MG tablet TAKE 1 TABLET BY MOUTH EVERY 8 HOURS AS NEEDED (BACK PAIN) 30 tablet 1    pravastatin (PRAVACHOL) 80 MG tablet TAKE ONE TABLET BY MOUTH EVERY EVENING. 30 tablet 0    citalopram (CELEXA) 10 MG tablet TAKE 1 TABLET BY MOUTH DAILY 90 tablet 1    donepezil (ARICEPT) 5 MG tablet TAKE 1 TABLET BY MOUTH NIGHTLY 90 tablet 1    tamsulosin (FLOMAX) 0.4 MG capsule Take 1 capsule by mouth daily 90 capsule 3    ondansetron (ZOFRAN ODT) 4 MG disintegrating tablet Take 1 tablet by mouth every 8 hours as needed for Nausea or Vomiting 30 tablet 0    traZODone (DESYREL) 50 MG tablet Take 1 tablet by mouth nightly 30 tablet 3    pantoprazole (PROTONIX) 40 MG tablet Take 1 tablet by mouth daily Indications: Reflux Gastritis 90 tablet 0    memantine (NAMENDA) 10 MG tablet TAKE 1 TABLET BY MOUTH 2 TIMES DAILY 180 tablet 1    azelastine (ASTELIN) 0.1 % nasal spray 1 spray by Nasal route 2 times daily Use in each nostril as directed 2 each 0    lisinopril (PRINIVIL;ZESTRIL) 40 MG tablet TAKE ONE TABLET BY MOUTH DAILY. Cancel previous script BRIGETTE Triana 90 tablet 3    cetirizine (ZYRTEC) 10 MG tablet TAKE 1 TABLET BY MOUTH DAILY 90 tablet 3    oxyCODONE-acetaminophen (PERCOCET)  MG per tablet       albuterol sulfate HFA (PROVENTIL;VENTOLIN;PROAIR) 108 (90 Base) MCG/ACT inhaler Inhale 2 puffs into the lungs every 6 hours as needed for Wheezing      meclizine (ANTIVERT) 25 MG tablet Take 25 mg by mouth 3 times daily as needed for Dizziness       Diabetic Shoe MISC by Does not apply route DISPENSE ONE PAIR DIABETIC SHOES AND THREE PAIRS OF HEAT MOLDED INSERTS 1 each 0    aspirin 81 MG chewable tablet Take 1 tablet by mouth daily 30 tablet 3    triamcinolone (KENALOG) 0.1 % cream Apply topically 2 times daily. (Patient taking differently: Apply topically 2 times daily Apply topically 2 times daily. ) 1 Tube 1    Misc.  Devices (WALKER) MISC 1 each by Does not apply route daily 1 each 0 nitroGLYCERIN (NITROSTAT) 0.4 MG SL tablet up to max of 3 total doses. If no relief after 1 dose, call 911. 25 tablet 3    EPINEPHrine (EPIPEN) 0.3 MG/0.3ML SOAJ injection Use as directed for allergic reaction 0.3 mL 3    gabapentin (NEURONTIN) 800 MG tablet Take 1 tablet by mouth 3 times daily for 30 days. 90 tablet 0     No current facility-administered medications for this visit. Allergies   Allergen Reactions    Bee Venom Anaphylaxis    Pcn [Penicillins] Other (See Comments)     Made pt \"black out\"     REVIEW OF SYSTEMS  Constitutional: []Fever []Sweats []Chills [] Recent Injury [x] Denies all unless marked  HEENT:[]Headache  [] Head Injury [] Hearing Loss  [] Sore Throat  [] Ear Ache [x] Denies all unless marked  Spine:  [] Neck pain  [] Back pain  [] Sciaticia  [x] Denies all unless marked  Cardiovascular:[]Heart Disease []Palpitations [] Chest Pain   [x] Denies all unless marked  Pulmonary: []Shortness of Breath []Cough   [x] Denies all unless marked  Psychiatric/Behavioral:[] Depression [] Anxiety [x] Denies all unless marked  Gastrointestinal: []Nausea  []Vomiting  []Abdominal Pain  []Constipation  []Diarrhea  [x] Denies all unless marked  Genitourinary:   [] Frequency  [] Urgency  [] Dysuria [] Incontinence  [x] Denies all unless marked  Extremities: []Pain  []Swelling  [x] Denies all unless marked  Musculoskeletal: [] Myalgias  [] Joint Pain  [] Arthritis [] Muscle Cramps [] Muscle Twitches  [x] Denies all unless marked  Sleep: []Insomnia[]Snoring []Restless Legs  []Sleep Apnea  []Daytime Sleepiness  [x] Denies all unless marked  Skin:[] Rash [] Color Change [x] Denies all unless marked   Neurological:[]Visual Disturbance [x] Memory Loss []Loss of Balance []Slurred Speech []Weakness []Seizures  [] Dizziness [x] Denies all unless marked    The MA has completed the ROS with the patient. I have reviewed it in its' entirety with the patient and agree with the documentation.      PHYSICAL EXAM  BP 134/73   Pulse 55   Ht 5' 10\" (1.778 m)   Wt 230 lb (104.3 kg)   SpO2 99%   BMI 33.00 kg/m²       Constitutional - No acute distress    HEENT- Conjunctiva normal.  No scars, masses, or lesions over external nose or ears, no neck masses noted, no jugular vein distension, no bruit  Cardiac- Regular rate and rhythm  Pulmonary- Good expansion, normal effort without use of accessory muscles  Musculoskeletal - No significant wasting of muscles noted, no bony deformities  Extremities - No clubbing, cyanosis or edema  Skin - Warm, dry, and intact. No rash, erythema, or pallor  Psychiatric - Mood, affect, and behavior appear normal      NEUROLOGICAL EXAM     Mental status   [x] Awake, alert, oriented   []Affect attention and concentration appear appropriate  []Recent and remote memory appears unremarkable  [x]Speech normal without dysarthria or aphasia, comprehension and repetition intact. COMMENTS: MoCA 13/30    Cranial Nerves [x]No VF deficit to confrontation,  no papilledema on fundoscopic exam.  [x]PERRLA, EOMI, no nystagmus, conjugate eye movements, no ptosis  [x]Face symmetric  [x]Facial sensation intact  [x]Tongue midline no atrophy or fasciculations present  [x]Palate midline, hearing to finger rub normal bilaterally  [x]Shoulder shrug and SCM testing normal bilaterally  COMMENTS:   Motor   [x]5/5 strength x 4 extremities  [x]Normal bulk and tone  [x]No tremor present  [x]No rigidity or bradykinesia noted  COMMENTS:    Sensory  []Sensation intact to light touch, pin prick, vibration, and proprioception BLE  [x]Sensation intact to light touch, pin prick, vibration, and proprioception BUE  COMMENTS: decreased PP BLE    Coordination [x]FTN normal bilaterally   [x]HTS normal bilaterally  [x]HUNTER normal bilaterally.    COMMENTS:   Reflexes  [x]Symmetric and non-pathological  [x]Toes down going bilaterally  [x]No clonus present  COMMENTS:   Gait                  []Normal steady gait    []Ataxic    []Spastic []Magnetic      []Shuffling  COMMENTS: cautious        LABS RECORD AND IMAGING REVIEW (As below and per HPI)    Lab Results   Component Value Date    ETEWZHIP00 783 06/03/2020     Lab Results   Component Value Date    WBC 7.30 05/03/2022    HGB 14.0 05/03/2022    HCT 42.9 05/03/2022    MCV 99.3 (H) 05/03/2022    PLT 99 (L) 05/03/2022     Lab Results   Component Value Date     06/01/2022    K 5.4 (H) 06/01/2022     06/01/2022    CO2 29 06/01/2022    BUN 26 (H) 06/01/2022    CREATININE 1.4 (H) 06/01/2022    GLUCOSE 90 06/01/2022    CALCIUM 10.2 06/01/2022    PROT 6.5 05/03/2022    LABALBU 5.0 07/16/2021    BILITOT 1.0 07/16/2021    ALKPHOS 61 05/03/2022    AST 18 07/16/2021    ALT 14 07/16/2021    LABGLOM 50 (A) 06/01/2022    GFRAA >59 06/01/2022    GLOB 2.5 05/27/2020     Lab Results   Component Value Date    CHOL 141 (L) 12/02/2020    TRIG 69 12/02/2020    HDL 65 12/02/2020    LDLCALC 62 12/02/2020     Lab Results   Component Value Date    TSH 2.870 03/03/2021    T4FREE 1.27 06/03/2020     Lab Results   Component Value Date    CRP 0.05 06/03/2020    SEDRATE 1 06/03/2020      Ct Head Wo Contrast     Result Date: 12/1/2020  EXAM: CT HEAD WO CONTRAST -- 12/1/2020 3:22 PM HISTORY: 70 years, Male, mental status change, numb all over COMPARISON: 2/10/2020 DLP: 831 mGy cm. Automated exposure control was utilized to minimize patient radiation dose. TECHNIQUE: Unenhanced axial CT images obtained from vertex to skull base with coronal and sagittal reformats. FINDINGS: Subtle round hypodensity at the right thalamus on axial series 3, image 17. No evidence of acute intracranial hemorrhage or midline shift. Mild periventricular and subcortical white matter hypodensities, most likely due to chronic microvascular disease. Mild proportional prominence of the ventricles, sulci and basilar cisterns. 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 Globes, retrobulbar soft tissues, and mastoid air cells are within normal limits.  Small right maxillary mucus retention cyst. Paranasal sinuses appear otherwise clear. Small debris in the bilateral external auditory canals, most commonly cerumen. Calvarium appears intact. Subcutaneous tissues within normal limits. 1. Subtle round hypodensity at the right thalamus. This could represent artifact or infarct. Recommend MRI brain for further evaluation. 2. Mild volume loss and chronic microvascular changes. 3. 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 Signed by Dr Jacobo Cosme on 12/1/2020 4:47 PM     Xr Chest Portable     Result Date: 12/1/2020  XR CHEST PORTABLE 12/1/2020 12:53 PM History: Chest pain. Portable chest x-ray compared with 10 February 2020. The heart size is normal. The mediastinum is within normal limits. The lungs are adequately expanded with no pneumonia or pneumothorax. There is no significant pleural fluid. No congestive failure changes. 1. No acute disease. Signed by Dr Sara Gonzalez on 12/1/2020 12:53 PM     Vascular Carotid Duplex Bilateral     Result Date: 12/2/2020  Vascular Carotid Procedure  Demographics   Patient Name    Kike Conklin Age                   72   Patient Number  [id number]           Gender                Male   Visit Number    [id number]        Interpreting          Yahaira Stone MD                                   Physician   Date of Birth   1950       Referring Physician   Daren Kirkland   Accession       [id number]       [address] [facility], [facility]  Number  Procedure Type of Study:   Cerebral:Carotid, VL CAROTID BILATERAL. Indications for Study:CVA. Risk Factors   - The patient's risk factor(s) include: diabetes mellitus, arterial     hypertension and prior MI .   - The patient has a former tobacco history. Allergies   - Penicillins.   - Bee/Wasp stings.   Impression   Duplex sonography with color flow enhancement was performed bilaterally on  the cervical carotid system. No evidence of hemodynamically significant  stenosis in the bilateral carotid and vertebral arteries. Signature   ----------------------------------------------------------------  Electronically signed by Dom Bryan MD(Interpreting  physician) on 12/02/2020 01:49 PM       Mri Brain Wo Contrast     Result Date: 12/1/2020  MRI BRAIN WO CONTRAST 12/1/2020 5:07 PM HISTORY: Abnormal CT Comparison: 6/23/2020  Technique: Multiplanar imaging of the brain was performed in a routine fashion. Findings: Midline structures are nondisplaced. Ventricular system is normal. A 1.6 cm pineal cyst is again demonstrated stable in size and appearance from the previous study. . Basilar cisterns are preserved. There is atrophy of the brain with prominence of the subarachnoid spaces and ventricular enlargement. . There are scattered foci of T2 abnormality involving the periventricular and higher white matter tracts suggesting small vessel disease. Remote lacunar infarcts involving the cerebellar hemisphere bilaterally are present as well as a previous infarct involving the right parietal centrum semiovale. No abnormal extra axial fluid collections are noted. No restriction of diffusion is present. Dilated perivascular spaces are present. Proximal cervical spinal cord, brainstem, and cerebellum are unremarkable. Normal cerebrovascular flow voids are seen. Bilateral globes and orbits are normal in appearance. No abnormal signal is noted in the mastoid air cells or paranasal sinuses. Impression: 1. There is no restricted diffusion to suggest acute ischemia or infarct. 2. There is atrophy of the brain with prominence of the subarachnoid spaces and ventricular enlargement. Dilated perivascular spaces are present. There is evidence of previous infarcts involving the cerebellar hemispheres bilaterally as well as the right parietal centrum semiovale.  Small vessel ischemic changes are noted involving the periventricular and higher white matter tracts. 3. Stable pineal cyst. 4. Stable appearance of the brain from previous MRI dated 6/23/2020. Signed by Dr Roz Thomas on 12/1/2020 9:25 PM     Mri Brain W Wo Contrast     Result Date: 6/23/2020  MRI BRAIN W WO CONTRAST - 6/23/2020 2:00 PM Indication: Memory loss, Comparison: 4/24/2015 Findings: No diffusion restriction to suggest acute infarction. No increased susceptibility to suggest intracranial hemorrhage. The major physiologic flow voids are maintained. A few nonspecific scattered periventricular and subcortical T2 FLAIR hyperintense lesions are present. Most of these were present on a 2015 exam although a few appear slightly increased in size. No other abnormality of brain parenchyma signal intensity. No midline shift or mass effect. Mild global cerebral volume loss. No evidence of hydrocephalus. Cavum septum lucidum and vergae is again noted. Partially empty sella turcica. 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 Optic nerves appear unremarkable. Globes appear unremarkable. Mastoid air cells are clear. No paranasal sinus fluid levels or significant mucosal thickening. Following contrast administration, no abnormal foci of enhancement are identified. 1. No acute intracranial findings. 2. Chronic microvascular ischemic white matter change with only mild progression from 2015. 3. 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 Signed by Dr Cam Foy on 6/23/2020 4:48 PM     EEG (12/2020)- normal      EEG (6/2020)- normal      Reviewed referral records     ASSESSMENT:    Noa Garner is a 71y.o. year old male here for follow up of memory loss and neuropathy. Continues to slowly progress from memory standpoint, some mild progression of neuropathy symptoms. Prior MoCA 13/30. Prior MRI brain was stable, no new area of stroke, stable pineal cyst. EEG normal. Suspect Alzheimer's dementia.  Noting more PBA type symptoms. CNS-LS scale today is 13, suggestive of PBA. On Namenda and Aricept. Unable to titrate Aricept further related to bradycardia. On Celexa already. Will add Nuedexta for PBA. Continue Gabapentin for neuropathy, hesitant to switch to Lyrica today as this could worsen cognition/memory. ICD-10-CM    1. Moderate dementia with mood disturbance, unspecified dementia type  F03. B3       2. PBA (pseudobulbar affect)  F48.2         PLAN:  1. Continue Namenda and Aricept. Further titration of Aricept limited related to heart rate. Seeing cardiology for bradycardia  2. Continue Celexa 10mg nightly   3. Will trial Nuedexta given PBA symptoms, positive CNS-LS scale. 4. Continue stroke risk factor maximization- ASA, statin, anti hypertensive   5. Discussed safety concerns, increase supervision, no driving, medication monitoring/management. Recommend 30 minutes daily exercise, daily mental stimulation, and healthy diet with fish, fruits, vegetables, fiber and water   6. Return in about 3 months (around 5/15/2023) for follow up, sooner if worsening.     [place]ing [place] DNP, APRN

## 2023-02-15 NOTE — PROGRESS NOTES
Tom Guadarrama (AmiraCherylena Ferrera)  Rx #: [id number]  Nuedexta 20-10MG capsules     Form  [employer] Electronic PA Form  Created  30 minutes ago  Sent to Plan  3 minutes ago  Plan Response  3 minutes ago  Submit Clinical Questions  1 minute ago  Determination  Favorable  1 minute ago  Message from Plan  PA Case: [id number], Status: Approved, Coverage Starts on: 1/1/2023 12:00:00 AM, Coverage Ends on: 12/31/2023 12:00:00 AM. Questions? Contact [phone].

## 2023-03-21 DIAGNOSIS — E11.9 TYPE 2 DIABETES MELLITUS WITHOUT COMPLICATION, WITHOUT LONG-TERM CURRENT USE OF INSULIN (HCC): ICD-10-CM

## 2023-03-21 DIAGNOSIS — J30.1 SEASONAL ALLERGIC RHINITIS DUE TO POLLEN: ICD-10-CM

## 2023-03-21 DIAGNOSIS — R11.0 NAUSEA: ICD-10-CM

## 2023-03-21 DIAGNOSIS — E11.42 DIABETIC PERIPHERAL NEUROPATHY ASSOCIATED WITH TYPE 2 DIABETES MELLITUS (HCC): ICD-10-CM

## 2023-03-21 DIAGNOSIS — T63.441A ALLERGIC REACTION TO BEE STING: ICD-10-CM

## 2023-03-21 RX ORDER — CITALOPRAM 10 MG/1
10 TABLET ORAL DAILY
Qty: 90 TABLET | Refills: 1 | Status: SHIPPED | OUTPATIENT
Start: 2023-03-21

## 2023-03-21 RX ORDER — LISINOPRIL 40 MG/1
TABLET ORAL
Qty: 90 TABLET | Refills: 3 | Status: SHIPPED | OUTPATIENT
Start: 2023-03-21

## 2023-03-21 RX ORDER — EPINEPHRINE 0.3 MG/.3ML
INJECTION SUBCUTANEOUS
Qty: 0.3 ML | Refills: 3 | Status: SHIPPED | OUTPATIENT
Start: 2023-03-21

## 2023-03-21 RX ORDER — DEXTROMETHORPHAN HYDROBROMIDE AND QUINIDINE SULFATE 20; 10 MG/1; MG/1
1 CAPSULE, GELATIN COATED ORAL DAILY
Qty: 60 CAPSULE | Refills: 3 | Status: CANCELLED | OUTPATIENT
Start: 2023-03-21

## 2023-03-21 RX ORDER — PANTOPRAZOLE SODIUM 40 MG/1
40 TABLET, DELAYED RELEASE ORAL DAILY
Qty: 90 TABLET | Refills: 0 | Status: SHIPPED | OUTPATIENT
Start: 2023-03-21

## 2023-03-21 RX ORDER — TRAZODONE HYDROCHLORIDE 50 MG/1
50 TABLET ORAL NIGHTLY
Qty: 30 TABLET | Refills: 3 | Status: SHIPPED | OUTPATIENT
Start: 2023-03-21

## 2023-03-21 RX ORDER — ONDANSETRON 4 MG/1
4 TABLET, ORALLY DISINTEGRATING ORAL EVERY 8 HOURS PRN
Qty: 30 TABLET | Refills: 0 | Status: SHIPPED | OUTPATIENT
Start: 2023-03-21

## 2023-03-21 RX ORDER — TAMSULOSIN HYDROCHLORIDE 0.4 MG/1
0.4 CAPSULE ORAL DAILY
Qty: 90 CAPSULE | Refills: 3 | Status: SHIPPED | OUTPATIENT
Start: 2023-03-21

## 2023-03-21 RX ORDER — TIZANIDINE 4 MG/1
4 TABLET ORAL EVERY 8 HOURS PRN
Qty: 30 TABLET | Refills: 1 | Status: SHIPPED | OUTPATIENT
Start: 2023-03-21

## 2023-03-21 RX ORDER — MEMANTINE HYDROCHLORIDE 10 MG/1
10 TABLET ORAL 2 TIMES DAILY
Qty: 180 TABLET | Refills: 1 | Status: SHIPPED | OUTPATIENT
Start: 2023-03-21

## 2023-03-21 RX ORDER — AZELASTINE 1 MG/ML
1 SPRAY, METERED NASAL 2 TIMES DAILY
Qty: 2 EACH | Refills: 0 | Status: SHIPPED | OUTPATIENT
Start: 2023-03-21

## 2023-03-21 RX ORDER — GABAPENTIN 800 MG/1
800 TABLET ORAL 3 TIMES DAILY
Qty: 90 TABLET | Refills: 0 | Status: SHIPPED | OUTPATIENT
Start: 2023-03-21 | End: 2023-04-20

## 2023-03-21 RX ORDER — DONEPEZIL HYDROCHLORIDE 5 MG/1
5 TABLET, FILM COATED ORAL NIGHTLY
Qty: 90 TABLET | Refills: 1 | Status: SHIPPED | OUTPATIENT
Start: 2023-03-21

## 2023-03-21 RX ORDER — PRAVASTATIN SODIUM 80 MG/1
TABLET ORAL
Qty: 30 TABLET | Refills: 0 | Status: SHIPPED | OUTPATIENT
Start: 2023-03-21

## 2023-03-21 NOTE — TELEPHONE ENCOUNTER
Requested Prescriptions     Pending Prescriptions Disp Refills    memantine (NAMENDA) 10 MG tablet 180 tablet 1     Sig: Take 1 tablet by mouth 2 times daily    citalopram (CELEXA) 10 MG tablet 90 tablet 1     Sig: Take 1 tablet by mouth daily    donepezil (ARICEPT) 5 MG tablet 90 tablet 1     Sig: Take 1 tablet by mouth nightly       Last Office Visit: 10/27/2021  Next Office Visit: Visit date not found  Last Medication Refill:  Celexa and aricept 11/1/2022 with 1 RF   Memantine 6/6/2022 with 1 RF

## 2023-03-21 NOTE — TELEPHONE ENCOUNTER
Hanover Graham called to request a refill on his medication. Last office visit : 2022   Next office visit : 3/21/2023     Last UDS:   Amphetamine Screen, Urine   Date Value Ref Range Status   2021 -  Final     Barbiturate Screen, Urine   Date Value Ref Range Status   2021 -  Final     Benzodiazepine Screen, Urine   Date Value Ref Range Status   2021 -  Final     Buprenorphine Urine   Date Value Ref Range Status   2021 -  Final     Cocaine Metabolite Screen, Urine   Date Value Ref Range Status   2021 -  Final     Gabapentin Screen, Urine   Date Value Ref Range Status   2021 -  Final     MDMA, Urine   Date Value Ref Range Status   2021 -  Final     Methamphetamine, Urine   Date Value Ref Range Status   2021 -  Final     Opiate Scrn, Ur   Date Value Ref Range Status   2021 -  Final     Oxycodone Screen, Ur   Date Value Ref Range Status   2021 -  Final     PCP Screen, Urine   Date Value Ref Range Status   2021 -  Final     Propoxyphene Screen, Urine   Date Value Ref Range Status   2021 -  Final     THC Screen, Urine   Date Value Ref Range Status   2021 -  Final     Tricyclic Antidepressants, Urine   Date Value Ref Range Status   2021 -  Final       Last Rena Rafael: 3/21/23  Medication Contract:none    Last Fill: 22    Requested Prescriptions     Pending Prescriptions Disp Refills    azelastine (ASTELIN) 0.1 % nasal spray 2 each 0     Si spray by Nasal route 2 times daily Use in each nostril as directed    lisinopril (PRINIVIL;ZESTRIL) 40 MG tablet 90 tablet 3     Sig: TAKE ONE TABLET BY MOUTH DAILY. Cancel previous script BRIGETTE Triana    ondansetron (ZOFRAN ODT) 4 MG disintegrating tablet 30 tablet 0     Sig: Take 1 tablet by mouth every 8 hours as needed for Nausea or Vomiting    traZODone (DESYREL) 50 MG tablet 30 tablet 3     Sig: Take 1 tablet by mouth nightly    pantoprazole (PROTONIX) 40 MG tablet 90 tablet

## 2023-03-21 NOTE — TELEPHONE ENCOUNTER
Miguel Martinez called to request a refill on his medication.       Last office visit : 9/28/2022   Next office visit : Visit date not found     Requested Prescriptions     Pending Prescriptions Disp Refills    EPINEPHrine (EPIPEN) 0.3 MG/0.3ML SOAJ injection 0.3 mL 3     Sig: Use as directed for allergic reaction            Ibrahima Casillas

## 2023-04-29 DIAGNOSIS — E11.42 DIABETIC PERIPHERAL NEUROPATHY ASSOCIATED WITH TYPE 2 DIABETES MELLITUS (HCC): ICD-10-CM

## 2023-04-29 DIAGNOSIS — E11.9 TYPE 2 DIABETES MELLITUS WITHOUT COMPLICATION, WITHOUT LONG-TERM CURRENT USE OF INSULIN (HCC): ICD-10-CM

## 2023-05-01 NOTE — TELEPHONE ENCOUNTER
Riky Ho called to request a refill on his medication. Last office visit : 9/28/2022   Next office visit : 5/4/23    Last UDS:   Amphetamine Screen, Urine   Date Value Ref Range Status   03/16/2021 -  Final     Barbiturate Screen, Urine   Date Value Ref Range Status   03/16/2021 -  Final     Benzodiazepine Screen, Urine   Date Value Ref Range Status   03/16/2021 -  Final     Buprenorphine Urine   Date Value Ref Range Status   03/16/2021 -  Final     Cocaine Metabolite Screen, Urine   Date Value Ref Range Status   03/16/2021 -  Final     Gabapentin Screen, Urine   Date Value Ref Range Status   03/16/2021 -  Final     MDMA, Urine   Date Value Ref Range Status   03/16/2021 -  Final     Methamphetamine, Urine   Date Value Ref Range Status   03/16/2021 -  Final     Opiate Scrn, Ur   Date Value Ref Range Status   03/16/2021 -  Final     Oxycodone Screen, Ur   Date Value Ref Range Status   03/16/2021 -  Final     PCP Screen, Urine   Date Value Ref Range Status   03/16/2021 -  Final     Propoxyphene Screen, Urine   Date Value Ref Range Status   03/16/2021 -  Final     THC Screen, Urine   Date Value Ref Range Status   03/16/2021 -  Final     Tricyclic Antidepressants, Urine   Date Value Ref Range Status   03/16/2021 -  Final       Last Delories Him: 3/21/23  Medication Contract:  3/6/21  Last Fill: 3/21/23    Requested Prescriptions     Pending Prescriptions Disp Refills    pravastatin (PRAVACHOL) 80 MG tablet [Pharmacy Med Name: PRAVASTATIN NA 80 MG TAB 80 Tablet] 30 tablet 0     Sig: TAKE ONE TABLET BY MOUTH EVERY EVENING. gabapentin (NEURONTIN) 800 MG tablet [Pharmacy Med Name: GABAPENTIN 800 MG TABLET 800 Tablet] 90 tablet 0     Sig: TAKE 1 TABLET BY MOUTH 3 TIMES DAILY FOR 30 DAYS. Please approve or refuse this medication.    Lori Little LPN

## 2023-05-03 ENCOUNTER — TELEPHONE (OUTPATIENT)
Dept: HEMATOLOGY | Age: 73
End: 2023-05-03

## 2023-05-03 DIAGNOSIS — C44.519 BASAL CELL CARCINOMA OF CHEST: Primary | ICD-10-CM

## 2023-05-03 RX ORDER — GABAPENTIN 800 MG/1
800 TABLET ORAL 3 TIMES DAILY
Qty: 90 TABLET | Refills: 0 | Status: SHIPPED | OUTPATIENT
Start: 2023-05-03 | End: 2023-06-02

## 2023-05-03 RX ORDER — PRAVASTATIN SODIUM 80 MG/1
TABLET ORAL
Qty: 30 TABLET | Refills: 0 | Status: SHIPPED | OUTPATIENT
Start: 2023-05-03

## 2023-05-03 NOTE — PROGRESS NOTES
for dizziness, tremors, seizures, syncope and light-headedness. Hematological:  Negative for adenopathy. Does not bruise/bleed easily. Psychiatric/Behavioral:  Negative for behavioral problems and suicidal ideas. The patient is not nervous/anxious. All other systems reviewed and are negative. Objective   /82   Pulse 54   Ht 5' 10\" (1.778 m)   Wt 238 lb 14.4 oz (108.4 kg)   SpO2 97%   BMI 34.28 kg/m²     PHYSICAL EXAM:  Physical Exam  Vitals reviewed. Constitutional:       General: He is not in acute distress. Appearance: He is well-developed. HENT:      Head: Normocephalic and atraumatic. Nose: Nose normal.   Eyes:      General: No scleral icterus. Conjunctiva/sclera: Conjunctivae normal.   Neck:      Vascular: No JVD. Trachea: No tracheal deviation. Cardiovascular:      Rate and Rhythm: Normal rate and regular rhythm. Heart sounds: Normal heart sounds. No murmur heard. Pulmonary:      Effort: Pulmonary effort is normal. No respiratory distress. Breath sounds: Normal breath sounds. No wheezing. Abdominal:      General: Bowel sounds are normal. There is no distension. Palpations: Abdomen is soft. There is no fluid wave, hepatomegaly, splenomegaly or mass. Tenderness: There is no abdominal tenderness. Musculoskeletal:         General: No tenderness or deformity. Normal range of motion. Skin:     Findings: No ecchymosis, erythema or rash. Neurological:      Mental Status: He is alert and oriented to person, place, and time. Coordination: Coordination abnormal.      Gait: Gait abnormal.   Psychiatric:         Cognition and Memory: Memory is impaired. CBC reviewed by me  Lab Results   Component Value Date    WBC 7.86 05/04/2023    HGB 14.7 05/04/2023    HCT 45.4 05/04/2023    .9 (H) 05/04/2023    PLT 93 (L) 05/04/2023       VISIT DIAGNOSES  1. Thrombocytopenia ([address]    2. B12 deficiency    3.  Screening for malignant neoplasm of

## 2023-05-04 ENCOUNTER — OFFICE VISIT (OUTPATIENT)
Dept: PRIMARY CARE CLINIC | Age: 73
End: 2023-05-04

## 2023-05-04 ENCOUNTER — HOSPITAL ENCOUNTER (OUTPATIENT)
Dept: INFUSION THERAPY | Age: 73
Discharge: HOME | End: 2023-05-04
Payer: MEDICARE

## 2023-05-04 ENCOUNTER — OFFICE VISIT (OUTPATIENT)
Dept: HEMATOLOGY | Age: 73
End: 2023-05-04
Payer: MEDICARE

## 2023-05-04 VITALS
OXYGEN SATURATION: 97 % | BODY MASS INDEX: 34.2 KG/M2 | HEART RATE: 54 BPM | SYSTOLIC BLOOD PRESSURE: 136 MMHG | WEIGHT: 238.9 LBS | DIASTOLIC BLOOD PRESSURE: 82 MMHG | HEIGHT: 70 IN

## 2023-05-04 VITALS
WEIGHT: 239.4 LBS | DIASTOLIC BLOOD PRESSURE: 74 MMHG | SYSTOLIC BLOOD PRESSURE: 136 MMHG | TEMPERATURE: 97 F | BODY MASS INDEX: 34.27 KG/M2 | HEIGHT: 70 IN | HEART RATE: 60 BPM | OXYGEN SATURATION: 98 %

## 2023-05-04 DIAGNOSIS — I10 ESSENTIAL HYPERTENSION: ICD-10-CM

## 2023-05-04 DIAGNOSIS — D69.6 THROMBOCYTOPENIA (HCC): Primary | ICD-10-CM

## 2023-05-04 DIAGNOSIS — Z00.00 MEDICARE ANNUAL WELLNESS VISIT, SUBSEQUENT: ICD-10-CM

## 2023-05-04 DIAGNOSIS — Z12.5 SCREENING FOR MALIGNANT NEOPLASM OF PROSTATE: ICD-10-CM

## 2023-05-04 DIAGNOSIS — E11.9 TYPE 2 DIABETES MELLITUS WITHOUT COMPLICATION, WITHOUT LONG-TERM CURRENT USE OF INSULIN (HCC): Primary | ICD-10-CM

## 2023-05-04 DIAGNOSIS — Z91.81 AT HIGH RISK FOR FALLS: ICD-10-CM

## 2023-05-04 DIAGNOSIS — E11.42 DIABETIC PERIPHERAL NEUROPATHY ASSOCIATED WITH TYPE 2 DIABETES MELLITUS (HCC): ICD-10-CM

## 2023-05-04 DIAGNOSIS — D69.6 THROMBOCYTOPENIA (HCC): ICD-10-CM

## 2023-05-04 DIAGNOSIS — E53.8 B12 DEFICIENCY: ICD-10-CM

## 2023-05-04 DIAGNOSIS — C44.519 BASAL CELL CARCINOMA OF CHEST: ICD-10-CM

## 2023-05-04 LAB
ALCOHOL URINE: NORMAL
AMPHETAMINE SCREEN, URINE: NORMAL
BARBITURATE SCREEN, URINE: NORMAL
BASOPHILS # BLD: 0.02 K/UL (ref 0.01–0.08)
BASOPHILS NFR BLD: 0.3 % (ref 0.1–1.2)
BENZODIAZEPINE SCREEN, URINE: NORMAL
BUPRENORPHINE URINE: NORMAL
COCAINE METABOLITE SCREEN URINE: NORMAL
EOSINOPHIL # BLD: 0.25 K/UL (ref 0.04–0.54)
EOSINOPHIL NFR BLD: 3.2 % (ref 0.7–7)
ERYTHROCYTE [DISTWIDTH] IN BLOOD BY AUTOMATED COUNT: 13.2 % (ref 11.6–14.4)
FENTANYL SCREEN, URINE: NORMAL
FOLATE SERPL-MCNC: 17.9 NG/ML (ref 4.5–32.2)
GABAPENTIN SCREEN, URINE: NORMAL
HCT VFR BLD AUTO: 45.4 % (ref 40.1–51)
HGB BLD-MCNC: 14.7 G/DL (ref 13.7–17.5)
LYMPHOCYTES # BLD: 2.38 K/UL (ref 1.18–3.74)
LYMPHOCYTES NFR BLD: 30.3 % (ref 19.3–53.1)
MCH RBC QN AUTO: 32.7 PG (ref 25.7–32.2)
MCHC RBC AUTO-ENTMCNC: 32.4 G/DL (ref 32.3–36.5)
MCV RBC AUTO: 100.9 FL (ref 79–92.2)
MDMA URINE: NORMAL
METHADONE SCREEN, URINE: NORMAL
METHAMPHETAMINE, URINE: NORMAL
MONOCYTES # BLD: 0.61 K/UL (ref 0.24–0.82)
MONOCYTES NFR BLD: 7.8 % (ref 4.7–12.5)
NEUTROPHILS # BLD: 4.59 K/UL (ref 1.56–6.13)
NEUTS SEG NFR BLD: 58.3 % (ref 34–71.1)
OPIATE SCREEN URINE: NORMAL
OXYCODONE SCREEN URINE: NORMAL
PHENCYCLIDINE SCREEN URINE: NORMAL
PLATELET # BLD AUTO: 93 K/UL (ref 163–337)
PMV BLD AUTO: 10 FL (ref 7.4–10.4)
PROPOXYPHENE SCREEN, URINE: NORMAL
PSA SERPL-MCNC: 3.66 NG/ML (ref 0–4)
RBC # BLD AUTO: 4.5 M/UL (ref 4.63–6.08)
SYNTHETIC CANNABINOIDS(K2) SCREEN, URINE: NORMAL
THC SCREEN, URINE: NORMAL
TRAMADOL SCREEN URINE: NORMAL
TRICYCLIC ANTIDEPRESSANTS, UR: NORMAL
VIT B12 SERPL-MCNC: 679 PG/ML (ref 211–946)
WBC # BLD AUTO: 7.86 K/UL (ref 4.23–9.07)

## 2023-05-04 PROCEDURE — 3078F DIAST BP <80 MM HG: CPT | Performed by: PHYSICIAN ASSISTANT

## 2023-05-04 PROCEDURE — 36415 COLL VENOUS BLD VENIPUNCTURE: CPT

## 2023-05-04 PROCEDURE — G8417 CALC BMI ABV UP PARAM F/U: HCPCS | Performed by: PHYSICIAN ASSISTANT

## 2023-05-04 PROCEDURE — 99212 OFFICE O/P EST SF 10 MIN: CPT

## 2023-05-04 PROCEDURE — 1036F TOBACCO NON-USER: CPT | Performed by: PHYSICIAN ASSISTANT

## 2023-05-04 PROCEDURE — G8427 DOCREV CUR MEDS BY ELIG CLIN: HCPCS | Performed by: PHYSICIAN ASSISTANT

## 2023-05-04 PROCEDURE — 3017F COLORECTAL CA SCREEN DOC REV: CPT | Performed by: PHYSICIAN ASSISTANT

## 2023-05-04 PROCEDURE — 1123F ACP DISCUSS/DSCN MKR DOCD: CPT | Performed by: PHYSICIAN ASSISTANT

## 2023-05-04 PROCEDURE — 85025 COMPLETE CBC W/AUTO DIFF WBC: CPT

## 2023-05-04 PROCEDURE — 3075F SYST BP GE 130 - 139MM HG: CPT | Performed by: PHYSICIAN ASSISTANT

## 2023-05-04 PROCEDURE — 99213 OFFICE O/P EST LOW 20 MIN: CPT | Performed by: PHYSICIAN ASSISTANT

## 2023-05-04 SDOH — ECONOMIC STABILITY: INCOME INSECURITY: HOW HARD IS IT FOR YOU TO PAY FOR THE VERY BASICS LIKE FOOD, HOUSING, MEDICAL CARE, AND HEATING?: NOT HARD AT ALL

## 2023-05-04 SDOH — ECONOMIC STABILITY: FOOD INSECURITY: WITHIN THE PAST 12 MONTHS, YOU WORRIED THAT YOUR FOOD WOULD RUN OUT BEFORE YOU GOT MONEY TO BUY MORE.: NEVER TRUE

## 2023-05-04 SDOH — ECONOMIC STABILITY: HOUSING INSECURITY
IN THE LAST 12 MONTHS, WAS THERE A TIME WHEN YOU DID NOT HAVE A STEADY PLACE TO SLEEP OR SLEPT IN A SHELTER (INCLUDING NOW)?: NO

## 2023-05-04 SDOH — ECONOMIC STABILITY: FOOD INSECURITY: WITHIN THE PAST 12 MONTHS, THE FOOD YOU BOUGHT JUST DIDN'T LAST AND YOU DIDN'T HAVE MONEY TO GET MORE.: NEVER TRUE

## 2023-05-04 ASSESSMENT — ENCOUNTER SYMPTOMS
PHOTOPHOBIA: 0
VOICE CHANGE: 0
RHINORRHEA: 0
WHEEZING: 0
PHOTOPHOBIA: 0
VOICE CHANGE: 0
NAUSEA: 0
TROUBLE SWALLOWING: 0
VOMITING: 0
CONSTIPATION: 0
BLOOD IN STOOL: 0
SHORTNESS OF BREATH: 0
COLOR CHANGE: 0
SORE THROAT: 0
COUGH: 0
BACK PAIN: 1
BACK PAIN: 0
SHORTNESS OF BREATH: 0
COLOR CHANGE: 0
COUGH: 0
NAUSEA: 0
EYE DISCHARGE: 0
VOMITING: 0
ABDOMINAL DISTENTION: 0
ABDOMINAL PAIN: 0
DIARRHEA: 0
EYE ITCHING: 0

## 2023-05-04 ASSESSMENT — PATIENT HEALTH QUESTIONNAIRE - PHQ9
SUM OF ALL RESPONSES TO PHQ QUESTIONS 1-9: 0
9. THOUGHTS THAT YOU WOULD BE BETTER OFF DEAD, OR OF HURTING YOURSELF: 0
3. TROUBLE FALLING OR STAYING ASLEEP: 0
6. FEELING BAD ABOUT YOURSELF - OR THAT YOU ARE A FAILURE OR HAVE LET YOURSELF OR YOUR FAMILY DOWN: 0
5. POOR APPETITE OR OVEREATING: 0
SUM OF ALL RESPONSES TO PHQ QUESTIONS 1-9: 0
2. FEELING DOWN, DEPRESSED OR HOPELESS: 0
10. IF YOU CHECKED OFF ANY PROBLEMS, HOW DIFFICULT HAVE THESE PROBLEMS MADE IT FOR YOU TO DO YOUR WORK, TAKE CARE OF THINGS AT HOME, OR GET ALONG WITH OTHER PEOPLE: 0
4. FEELING TIRED OR HAVING LITTLE ENERGY: 0
1. LITTLE INTEREST OR PLEASURE IN DOING THINGS: 0
8. MOVING OR SPEAKING SO SLOWLY THAT OTHER PEOPLE COULD HAVE NOTICED. OR THE OPPOSITE, BEING SO FIGETY OR RESTLESS THAT YOU HAVE BEEN MOVING AROUND A LOT MORE THAN USUAL: 0
SUM OF ALL RESPONSES TO PHQ9 QUESTIONS 1 & 2: 0
SUM OF ALL RESPONSES TO PHQ QUESTIONS 1-9: 0
7. TROUBLE CONCENTRATING ON THINGS, SUCH AS READING THE NEWSPAPER OR WATCHING TELEVISION: 0
SUM OF ALL RESPONSES TO PHQ QUESTIONS 1-9: 0

## 2023-05-04 ASSESSMENT — LIFESTYLE VARIABLES
HOW OFTEN DO YOU HAVE A DRINK CONTAINING ALCOHOL: NEVER
HOW MANY STANDARD DRINKS CONTAINING ALCOHOL DO YOU HAVE ON A TYPICAL DAY: PATIENT DOES NOT DRINK

## 2023-05-04 NOTE — PROGRESS NOTES
[address] [facility]  [address] [place] [address]  [address] [address]  Dept: [phone]  Dept Fax: [phone]  Loc: [phone]    Iván Patton is a 71 y.o. male who presents today for his medical conditions/complaints as noted below. Iván Patton is c/o of Follow-up (Was told he could not get diabetic shoe rx due to no open sores on foot exam)        HPI:     HPI   Chief Complaint   Patient presents with    Follow-up     Was told he could not get diabetic shoe rx due to no open sores on foot exam     Patient presents today for Medicare AWV and for follow-up diabetes and neuropathy. He is due for fasting labs. He has history of DM2 but is not currently taking medication for this. He does not check blood sugars regularly. His last A1C 11 months ago was 5.5. He is wanting a Rx for diabetic shoes; he has not had success with [facility] and [facility]. He is due for colonoscopy; Dr Jules Arizmendi is who he sees.      Past Medical History:   Diagnosis Date    Arthritis     CAD (coronary artery disease)     sees dr. Ledy Parsons    Chronic back pain     COPD (chronic obstructive pulmonary disease) (HCC)     Hypertension     Peripheral sensory neuropathy     Pleurisy     Shoulder pain     TIA (transient ischemic attack)     Type II or unspecified type diabetes mellitus without mention of complication, not stated as uncontrolled       Past Surgical History:   Procedure Laterality Date    APPENDECTOMY      CARDIAC CATHETERIZATION  11/04/2019    PIETER to LAD    SHOULDER SURGERY Left 12/17/2020    LEFT REVERSE TOTAL SHOULDER ARTHROPLASTY performed by Jennifer Gustafson MD at [address] [facility] 5/4/2023 5/4/2023 2/15/2023 2/8/2023 9/28/2022 0/97/7510   SYSTOLIC 089 906 [phone]   DIASTOLIC 82 74 73 86 84 78   Site - - - - - -   Pulse 54 60 55 60 61 57   Temp - 97 - - 97.4 -   Resp - - - - - -   SpO2 97 98 99 - 99 -   Weight 238 lb 14.4 oz 239 lb 6.4 oz 230 lb 230 lb 222

## 2023-05-04 NOTE — PATIENT INSTRUCTIONS
range of preventive health benefits. Some of the tests and screenings are paid in full while other may be subject to a deductible, co-insurance, and/or copay. Some of these benefits include a comprehensive review of your medical history including lifestyle, illnesses that may run in your family, and various assessments and screenings as appropriate. After reviewing your medical record and screening and assessments performed today your provider may have ordered immunizations, labs, imaging, and/or referrals for you. A list of these orders (if applicable) as well as your Preventive Care list are included within your After Visit Summary for your review. Other Preventive Recommendations:    A preventive eye exam performed by an eye specialist is recommended every 1-2 years to screen for glaucoma; cataracts, macular degeneration, and other eye disorders. A preventive dental visit is recommended every 6 months. Try to get at least 150 minutes of exercise per week or 10,000 steps per day on a pedometer . Order or download the FREE \"Exercise & Physical Activity: Your Everyday Guide\" from [employer]. Call 2-878.909.5585 or search [employer] online. You need 5637-8701 mg of calcium and 9686-7373 IU of vitamin D per day. It is possible to meet your calcium requirement with diet alone, but a vitamin D supplement is usually necessary to meet this goal.  When exposed to the sun, use a sunscreen that protects against both UVA and UVB radiation with an SPF of 30 or greater. Reapply every 2 to 3 hours or after sweating, drying off with a towel, or swimming. Always wear a seat belt when traveling in a car. Always wear a helmet when riding a bicycle or motorcycle.

## 2023-05-07 LAB
COPPER SERPL-MCNC: 81.4 UG/DL (ref 70–140)
ZINC SERPL-MCNC: 65.5 UG/DL (ref 60–120)

## 2023-05-15 DIAGNOSIS — E11.42 DIABETIC PERIPHERAL NEUROPATHY ASSOCIATED WITH TYPE 2 DIABETES MELLITUS (HCC): ICD-10-CM

## 2023-05-15 DIAGNOSIS — E11.9 TYPE 2 DIABETES MELLITUS WITHOUT COMPLICATION, WITHOUT LONG-TERM CURRENT USE OF INSULIN (HCC): ICD-10-CM

## 2023-05-15 DIAGNOSIS — I10 ESSENTIAL HYPERTENSION: ICD-10-CM

## 2023-05-15 LAB
ALBUMIN SERPL-MCNC: 4.3 G/DL (ref 3.5–5.2)
ALP SERPL-CCNC: 77 U/L (ref 40–130)
ALT SERPL-CCNC: 7 U/L (ref 5–41)
ANION GAP SERPL CALCULATED.3IONS-SCNC: 9 MMOL/L (ref 7–19)
AST SERPL-CCNC: 12 U/L (ref 5–40)
BASOPHILS # BLD: 0 K/UL (ref 0–0.2)
BASOPHILS NFR BLD: 0.5 % (ref 0–1)
BILIRUB SERPL-MCNC: 0.3 MG/DL (ref 0.2–1.2)
BILIRUB UR QL STRIP: NEGATIVE
BUN SERPL-MCNC: 28 MG/DL (ref 8–23)
CALCIUM SERPL-MCNC: 9.9 MG/DL (ref 8.8–10.2)
CHLORIDE SERPL-SCNC: 106 MMOL/L (ref 98–111)
CHOLEST SERPL-MCNC: 167 MG/DL (ref 160–199)
CLARITY UR: CLEAR
CO2 SERPL-SCNC: 26 MMOL/L (ref 22–29)
COLOR UR: YELLOW
CREAT SERPL-MCNC: 1.5 MG/DL (ref 0.5–1.2)
CREAT UR-MCNC: 134.3 MG/DL (ref 4.2–622)
EOSINOPHIL # BLD: 0.2 K/UL (ref 0–0.6)
EOSINOPHIL NFR BLD: 2.5 % (ref 0–5)
ERYTHROCYTE [DISTWIDTH] IN BLOOD BY AUTOMATED COUNT: 13.1 % (ref 11.5–14.5)
GLUCOSE SERPL-MCNC: 95 MG/DL (ref 74–109)
GLUCOSE UR STRIP.AUTO-MCNC: NEGATIVE MG/DL
HBA1C MFR BLD: 5.2 % (ref 4–6)
HCT VFR BLD AUTO: 45.8 % (ref 42–52)
HDLC SERPL-MCNC: 55 MG/DL (ref 55–121)
HGB BLD-MCNC: 15 G/DL (ref 14–18)
HGB UR STRIP.AUTO-MCNC: NEGATIVE MG/L
IMM GRANULOCYTES # BLD: 0 K/UL
KETONES UR STRIP.AUTO-MCNC: NEGATIVE MG/DL
LDLC SERPL CALC-MCNC: 93 MG/DL
LEUKOCYTE ESTERASE UR QL STRIP.AUTO: NEGATIVE
LYMPHOCYTES # BLD: 2.4 K/UL (ref 1.1–4.5)
LYMPHOCYTES NFR BLD: 37.8 % (ref 20–40)
MCH RBC QN AUTO: 33.2 PG (ref 27–31)
MCHC RBC AUTO-ENTMCNC: 32.8 G/DL (ref 33–37)
MCV RBC AUTO: 101.3 FL (ref 80–94)
MICROALBUMIN UR-MCNC: <1.2 MG/DL (ref 0–19)
MICROALBUMIN/CREAT UR-RTO: NORMAL MG/G
MONOCYTES # BLD: 0.7 K/UL (ref 0–0.9)
MONOCYTES NFR BLD: 10.8 % (ref 0–10)
NEUTROPHILS # BLD: 3.1 K/UL (ref 1.5–7.5)
NEUTS SEG NFR BLD: 48.2 % (ref 50–65)
NITRITE UR QL STRIP.AUTO: NEGATIVE
PH UR STRIP.AUTO: 5.5 [PH] (ref 5–8)
PLATELET # BLD AUTO: 102 K/UL (ref 130–400)
PMV BLD AUTO: 10 FL (ref 9.4–12.4)
POTASSIUM SERPL-SCNC: 4.9 MMOL/L (ref 3.5–5)
PROT SERPL-MCNC: 7 G/DL (ref 6.6–8.7)
PROT UR STRIP.AUTO-MCNC: NEGATIVE MG/DL
RBC # BLD AUTO: 4.52 M/UL (ref 4.7–6.1)
SODIUM SERPL-SCNC: 141 MMOL/L (ref 136–145)
SP GR UR STRIP.AUTO: 1.02 (ref 1–1.03)
TRIGL SERPL-MCNC: 96 MG/DL (ref 0–149)
TSH SERPL DL<=0.005 MIU/L-ACNC: 1.71 UIU/ML (ref 0.27–4.2)
UROBILINOGEN UR STRIP.AUTO-MCNC: 0.2 E.U./DL
WBC # BLD AUTO: 6.4 K/UL (ref 4.8–10.8)

## 2023-05-17 ENCOUNTER — TELEPHONE (OUTPATIENT)
Dept: NEUROSURGERY | Age: 73
End: 2023-05-17

## 2023-05-17 NOTE — TELEPHONE ENCOUNTER
Pt spouse called to cancel pt was ill, I could not reschedule patient due to time of appt and availability, please call for reschedule. Thanks !

## 2023-05-17 NOTE — TELEPHONE ENCOUNTER
Called pt spouse back and explained that Trudi was booked out but offered to get them in sooner with NICKY Loredo. She voiced yes on sooner appt. Pt scheduled 5/31 @ 9:30.

## 2023-05-31 ENCOUNTER — OFFICE VISIT (OUTPATIENT)
Dept: NEUROLOGY | Age: 73
End: 2023-05-31
Payer: MEDICARE

## 2023-05-31 VITALS
DIASTOLIC BLOOD PRESSURE: 75 MMHG | SYSTOLIC BLOOD PRESSURE: 159 MMHG | WEIGHT: 238 LBS | HEIGHT: 70 IN | OXYGEN SATURATION: 97 % | HEART RATE: 53 BPM | BODY MASS INDEX: 34.07 KG/M2

## 2023-05-31 DIAGNOSIS — F03.B3 MODERATE DEMENTIA WITH MOOD DISTURBANCE, UNSPECIFIED DEMENTIA TYPE (HCC): Primary | ICD-10-CM

## 2023-05-31 DIAGNOSIS — F48.2 PBA (PSEUDOBULBAR AFFECT): ICD-10-CM

## 2023-05-31 PROCEDURE — 99214 OFFICE O/P EST MOD 30 MIN: CPT | Performed by: NURSE PRACTITIONER

## 2023-05-31 PROCEDURE — 1036F TOBACCO NON-USER: CPT | Performed by: NURSE PRACTITIONER

## 2023-05-31 PROCEDURE — 1123F ACP DISCUSS/DSCN MKR DOCD: CPT | Performed by: NURSE PRACTITIONER

## 2023-05-31 PROCEDURE — G8417 CALC BMI ABV UP PARAM F/U: HCPCS | Performed by: NURSE PRACTITIONER

## 2023-05-31 PROCEDURE — 3078F DIAST BP <80 MM HG: CPT | Performed by: NURSE PRACTITIONER

## 2023-05-31 PROCEDURE — G8427 DOCREV CUR MEDS BY ELIG CLIN: HCPCS | Performed by: NURSE PRACTITIONER

## 2023-05-31 PROCEDURE — 3017F COLORECTAL CA SCREEN DOC REV: CPT | Performed by: NURSE PRACTITIONER

## 2023-05-31 PROCEDURE — 3077F SYST BP >= 140 MM HG: CPT | Performed by: NURSE PRACTITIONER

## 2023-05-31 NOTE — PROGRESS NOTES
[facility] Neurology Office Note      Patient:   Antelmo Marina  MR#:    [id number]  Account Number:                         YOB: 1950  Date of Evaluation:  5/31/2023  Time of Note:                          11:10 AM  Primary/Referring Physician:  BRIGETTE Snowden   Consulting Physician:  BRIGETTE Whitaker    FOLLOW UP-New to Provider    Chief Complaint   Patient presents with    Follow-up     Moderate dementia with mood disturbance        HISTORY OF PRESENT ILLNESS    Antelmo Marina is a 71y.o. year old male here for dementia and neuropathy follow up. Here today with wife. He is largely unchanged. She does note some mild progression with short-term memory loss. Feels long-term memory is largely intact. There is still repetition of questions and stories and word recall difficulty. There is still difficulty remembering conversations or events that occurred just a few minutes ago. In the past he was having mood instability with episodes of anger and inappropriate crying. Since starting Nuedexta this has largely stabilized. She states sometimes he does get angry for no apparent reason, as long as she does not engage with him this resolves by itself. No SI/HI. There is still some gait instability. Does have history of neuropathy. No new falls. He does wear life alert necklace daily. He is on gabapentin and Percocet for pain control, will be getting lumbar shots this afternoon through pain management. They deny hallucinations or urinary incontinence. Appetite is stable. He still bathing and dressing himself. Wife handles medications and cooking. Sleep is disturbed, wife reports he will have to get up in the night to urinate and then is unable to go back to sleep. Some mornings he is up at 2 AM for the day. He does not drive. There is no history of CVA or family history of dementia. He is still on Namenda 10 mg twice a day and tolerating well.   Is on Aricept 5 mg daily, is

## 2023-05-31 NOTE — PROGRESS NOTES
REVIEW OF SYSTEMS    Constitutional: []Fever []Sweat []Chills [] Recent Injury [x] Denies all unless marked  HEENT:[x]Headache  [] Head Injury/Hearing Loss  [] Sore Throat  [] Ear Ache/Dizziness  [x] Denies all unless marked  Spine:  [x] Neck pain  [x] Back pain  [] Sciaticia  [x] Denies all unless marked  Cardiovascular:[]Heart Disease []Chest Pain [] Palpitations  [x] Denies all unless marked  Pulmonary: []Shortness of Breath []Cough   [x] Denies all unless marke  Gastrointestinal: [x]Nausea  []Vomiting  []Abdominal Pain  []Constipation  []Diarrhea  []Dark Bloody Stools  [x] Denies all unless marked  Psychiatric/Behavioral:[] Depression [] Anxiety [x] Denies all unless marked  Genitourinary:   [] Frequency  [] Urgency  [] Incontinence [] Pain with Urination  [x] Denies all unless marked  Extremities: []Pain  [x]Swelling  [x] Denies all unless marked  Musculoskeletal: [] Muscle Pain  [] Joint Pain  [] Arthritis [] Muscle Cramps [] Muscle Twitches  [x] Denies all unless marked  Sleep: [] Insomnia [] Snoring [] Restless Legs [] Sleep Apnea  [] Daytime Sleepiness  [x] Denies all unless marked  Skin:[] Rash [] Skin Discoloration [x] Denies all unless marked   Neurological: [x]Visual Disturbance/Memory Loss [x] Loss of Balance [] Slurred Speech/Weakness [] Seizures  [x] Vertigo/Dizziness [x] Denies all unless marked

## 2023-07-18 PROBLEM — K59.00 CONSTIPATION: Status: ACTIVE | Noted: 2023-07-18

## 2023-07-18 PROBLEM — R10.84 GENERALIZED ABDOMINAL PAIN: Status: ACTIVE | Noted: 2023-07-18

## 2023-07-18 NOTE — H&P (VIEW-ONLY)
"Primary Physician: Naya Gandara APRN    Chief Complaint   Patient presents with    Abdominal Pain     Pt c/o \"burning\" in his abdomen-states he has had stomach issues \"for a long time\"        Subjective     Manolo Sagastume is a 73 y.o. male.    HPI  Stomach issues  Has pain in his abdomen, middle of abdomen, from top of abdomen down to lower abdomen.  No nausea or vomiting.  Has a BM every other day and takes Colace daily.  No blood in his stools.  Upper endoscopy 8/14/2019 at which time esophagus normal, no varices, stomach normal, and duodenum normal.    Personal history of adenomatous colon polyps  Last colonoscopy 2/24/2021: Many medium mouth diverticula of the left colon, nonbleeding internal hemorrhoids, otherwise unremarkable with no specimens collected.  Patient was recommended for 3-year colon surveillance given prep was not as good as it could have been.    Past Medical History:   Diagnosis Date    Alzheimer disease     Basal cell carcinoma of left cheek     Chronic back pain     COPD (chronic obstructive pulmonary disease)     Diverticulosis     GERD (gastroesophageal reflux disease)     History of adenomatous polyp of colon     History of colon polyps     Hypertension     Neoplasm of uncertain behavior of skin of chest     Neuropathy     Seborrheic keratosis     Type 2 diabetes mellitus        Past Surgical History:   Procedure Laterality Date    APPENDECTOMY      CAPSULE ENDOSCOPY  09/27/2012    Normal; Gastric passage time: 0h42m; Small bowel passage time: 6h33m    COLONOSCOPY  09/14/2015    One 8mm tubular adenomatous polyp in the mid transverse colon; Diverticulosis; Internal hemorrhoids; Repeat 5 years    COLONOSCOPY  06/20/2012    One 8mm hyperplastic polyp in the ascending colon; Internal hemorrhoids; Repeat 3-4 years    COLONOSCOPY  01/19/2010    One less than 3mm adenomatous polyp in the cecum; One 5mm adenomatous polyp in the ascending colon; One 7mm hyperplastic polyp in the " transverse colon; One 1cm tubular adenomatous polyp in the sigmoid colon; Internal hemorrhoids; Repeat 3 years    COLONOSCOPY  09/11/2008    Dr. Mittal-Small, pedunculated adenomatous polyp at 30cm; Very tortuous colon not allowing visualization to the base of the cecum; Anusitis; Repeat 3 years    COLONOSCOPY  07/16/2007    Multiple polyps in the sigmoid colon-Six 1cm hyperplastic polyps removed, but not all polyps were removed; Five hyperplastic rectal polyps removed, but not all polyps were removed; Brown spot of no significance in the ascending colon; Repeat flex sig in 3-6 months; Repeat full colon in 1 year    COLONOSCOPY N/A 02/24/2021    Diverticulosis in the left colon; Non-bleeding internal hemorrhoids; No specimens collected; Repeat 3 years    CORONARY STENT PLACEMENT  11/2019    ENDOSCOPY  06/20/2012    HH; Duodenitis    ENDOSCOPY  09/18/2008    Dr. Mittal-Obvious Mckeon's esophagus; HH; Gastritis     ENDOSCOPY  03/27/2007    Irregular Z-line-rule out Mckeon's-path showed no evidence of intestinal metaplasia    ENDOSCOPY N/A 08/14/2019    Normal esophagus; Normal stomach; Normal examined duodenum; No specimens collected    SKIN LESION EXCISION      X2 from chest    TOTAL SHOULDER REPLACEMENT Left         Current Outpatient Medications:     albuterol sulfate HFA (Ventolin HFA) 108 (90 Base) MCG/ACT inhaler, Inhale 2 puffs As Needed., Disp: , Rfl:     aspirin 81 MG chewable tablet, Chew 1 tablet Daily., Disp: , Rfl:     azelastine (ASTELIN) 0.1 % nasal spray, 2 sprays into the nostril(s) as directed by provider Daily., Disp: , Rfl:     cetirizine (zyrTEC) 10 MG tablet, Take 1 tablet by mouth Daily., Disp: , Rfl:     citalopram (CeleXA) 10 MG tablet, Take 1 tablet by mouth Daily., Disp: , Rfl:     diclofenac (VOLTAREN) 1 % gel gel, Apply  topically to the appropriate area as directed As Needed., Disp: , Rfl:     donepezil (ARICEPT) 5 MG tablet, Take 1 tablet by mouth Every Night., Disp: , Rfl:      EPINEPHrine (EPIPEN) 0.3 MG/0.3ML solution auto-injector injection, Inject 0.3 mg into the appropriate muscle as directed by prescriber Take As Directed. for allergic reaction, Disp: , Rfl:     gabapentin (NEURONTIN) 800 MG tablet, Take 1 tablet by mouth 3 (Three) Times a Day., Disp: , Rfl:     Glucose Blood (BLOOD GLUCOSE TEST) strip, Test once daily. Medtrix test stripes, Disp: , Rfl:     LANCETS ULTRA THIN misc, TEST ONCE DAILY, Disp: , Rfl:     lisinopril (PRINIVIL,ZESTRIL) 40 MG tablet, Take 1 tablet by mouth Daily., Disp: , Rfl:     memantine (NAMENDA) 5 MG tablet, Take 1 tablet by mouth 2 (two) times a day., Disp: , Rfl:     multivitamin with minerals (Multivitamin Adult) tablet tablet, Take 1 tablet by mouth Daily., Disp: , Rfl:     Naloxegol Oxalate (MOVANTIK) 12.5 MG tablet, Take 1 tablet by mouth Every Morning., Disp: , Rfl:     nitroglycerin (NITROSTAT) 0.4 MG SL tablet, Take 1 tablet by mouth Take As Directed., Disp: , Rfl:     Omega-3 Fatty Acids (fish oil) 1000 MG capsule capsule, Take 1 capsule by mouth Daily With Breakfast., Disp: , Rfl:     oxyCODONE-acetaminophen (PERCOCET)  MG per tablet, Take 1 tablet by mouth Every 6 (Six) Hours As Needed for Moderate Pain or Severe Pain., Disp: , Rfl:     pantoprazole (PROTONIX) 40 MG EC tablet, Take 1 tablet by mouth Daily., Disp: , Rfl:     pravastatin (PRAVACHOL) 80 MG tablet, Take 1 tablet by mouth Every Night., Disp: , Rfl:     tamsulosin (FLOMAX) 0.4 MG capsule 24 hr capsule, Take 1 capsule by mouth daily, Disp: , Rfl:     tiotropium (SPIRIVA) 18 MCG per inhalation capsule, Place 1 capsule into inhaler and inhale As Needed., Disp: , Rfl:     tiZANidine (ZANAFLEX) 4 MG tablet, Take 1 tablet by mouth As Needed., Disp: , Rfl:     traZODone (DESYREL) 50 MG tablet, Take 1 tablet by mouth Every Night., Disp: , Rfl:     vitamin B-12 (CYANOCOBALAMIN) 1000 MCG tablet, Take 1 tablet by mouth Daily., Disp: , Rfl:     polyethylene glycol (GoLYTELY) 236 g  "solution, Take as directed split dose prep, Disp: 4000 mL, Rfl: 0    Allergies   Allergen Reactions    Bee Venom Anaphylaxis    Penicillins Other (See Comments)     Made pt \"black out\"       Social History     Socioeconomic History    Marital status:    Tobacco Use    Smoking status: Former     Types: Cigarettes    Smokeless tobacco: Never   Vaping Use    Vaping Use: Never used   Substance and Sexual Activity    Alcohol use: Not Currently    Drug use: Never    Sexual activity: Defer       Family History   Problem Relation Age of Onset    Cancer Mother     Heart disease Father     Colon cancer Neg Hx     Colon polyps Neg Hx     Esophageal cancer Neg Hx     Liver disease Neg Hx     Liver cancer Neg Hx     Rectal cancer Neg Hx     Stomach cancer Neg Hx        Review of Systems   Constitutional:  Negative for unexpected weight change.   Respiratory:  Negative for shortness of breath.    Cardiovascular:  Negative for chest pain.   Gastrointestinal:  Negative for nausea and vomiting.     Objective     /74 (BP Location: Left arm, Patient Position: Sitting, Cuff Size: Adult)   Pulse 63   Temp 98.2 øF (36.8 øC) (Infrared)   Ht 177.8 cm (70\")   Wt 108 kg (239 lb)   SpO2 100%   BMI 34.29 kg/mý     Physical Exam  Vitals reviewed.   Constitutional:       Appearance: Normal appearance.   Cardiovascular:      Rate and Rhythm: Normal rate and regular rhythm.      Heart sounds: Normal heart sounds.   Pulmonary:      Effort: Pulmonary effort is normal.      Breath sounds: Normal breath sounds.   Neurological:      Mental Status: He is alert.             IMPRESSION/PLAN:    Assessment & Plan      Problem List Items Addressed This Visit          Gastrointestinal Abdominal     History of adenomatous polyp of colon    Overview     Added automatically from request for surgery [id number]         Generalized abdominal pain    Relevant Orders    Case Request (Completed)    Constipation - Primary    Relevant Medications    " polyethylene glycol (GoLYTELY) 236 g solution    Other Relevant Orders    Case Request (Completed)     Colonoscopy examination is due per Dr. Angy Syed  Golytely Prep    Pt instructed he could try Miralax daily if needed for his constipation.        ..The risks, benefits, and alternatives of colonoscopy were reviewed with the patient today.  Risks including perforation of the colon possibly requiring surgery or colostomy.  Additional risks include risk of bleeding from biopsies or removal of colon tissue.  There is also the risk of a drug reaction or problems with anesthesia.  This will be discussed with the further by the anesthesia team on the day of the procedure.  Lastly there is a possibility of missing a colon polyp or cancer.  The benefits include the diagnosis and management of disease of the colon and rectum.  Alternatives to colonoscopy include barium enema, laboratory testing, radiographic evaluation, or no intervention.  The patient verbalizes understanding and agrees.    In accordance with requirements under the Affordable Care Act, [facility] has provided pricing for all hospital services and items on each of its websites. However, a patient's actual cost may differ based on the services the patient receives to meet individual healthcare needs and based on the benefits provided under the patient's insurance coverage.        Clare Rossi, APRN  07/18/23  11:15 CDT    Part of this note may be an electronic transcription/translation of spoken language to printed text.

## 2023-07-20 DIAGNOSIS — E11.42 DIABETIC PERIPHERAL NEUROPATHY ASSOCIATED WITH TYPE 2 DIABETES MELLITUS (HCC): ICD-10-CM

## 2023-07-20 DIAGNOSIS — J30.1 SEASONAL ALLERGIC RHINITIS DUE TO POLLEN: ICD-10-CM

## 2023-07-20 DIAGNOSIS — E11.9 TYPE 2 DIABETES MELLITUS WITHOUT COMPLICATION, WITHOUT LONG-TERM CURRENT USE OF INSULIN (HCC): ICD-10-CM

## 2023-07-20 RX ORDER — GABAPENTIN 800 MG/1
800 TABLET ORAL 3 TIMES DAILY
Qty: 90 TABLET | Refills: 0 | Status: SHIPPED | OUTPATIENT
Start: 2023-07-20 | End: 2023-08-19

## 2023-07-20 RX ORDER — CITALOPRAM 10 MG/1
10 TABLET ORAL DAILY
Qty: 90 TABLET | Refills: 1 | Status: SHIPPED | OUTPATIENT
Start: 2023-07-20

## 2023-07-20 RX ORDER — MEMANTINE HYDROCHLORIDE 10 MG/1
10 TABLET ORAL 2 TIMES DAILY
Qty: 180 TABLET | Refills: 1 | Status: SHIPPED | OUTPATIENT
Start: 2023-07-20

## 2023-07-20 RX ORDER — LISINOPRIL 40 MG/1
TABLET ORAL
Qty: 90 TABLET | Refills: 1 | Status: SHIPPED | OUTPATIENT
Start: 2023-07-20

## 2023-07-20 RX ORDER — AZELASTINE 1 MG/ML
1 SPRAY, METERED NASAL 2 TIMES DAILY
Qty: 2 EACH | Refills: 1 | Status: SHIPPED | OUTPATIENT
Start: 2023-07-20

## 2023-07-20 RX ORDER — DEXTROMETHORPHAN HYDROBROMIDE AND QUINIDINE SULFATE 20; 10 MG/1; MG/1
1 CAPSULE, GELATIN COATED ORAL DAILY
Qty: 60 CAPSULE | Refills: 3 | Status: SHIPPED | OUTPATIENT
Start: 2023-07-20

## 2023-07-20 RX ORDER — TRAZODONE HYDROCHLORIDE 50 MG/1
50 TABLET ORAL NIGHTLY
Qty: 30 TABLET | Refills: 1 | Status: SHIPPED | OUTPATIENT
Start: 2023-07-20

## 2023-07-20 RX ORDER — PANTOPRAZOLE SODIUM 40 MG/1
40 TABLET, DELAYED RELEASE ORAL DAILY
Qty: 90 TABLET | Refills: 1 | Status: SHIPPED | OUTPATIENT
Start: 2023-07-20

## 2023-07-20 RX ORDER — PRAVASTATIN SODIUM 80 MG/1
80 TABLET ORAL NIGHTLY
Qty: 30 TABLET | Refills: 1 | Status: SHIPPED | OUTPATIENT
Start: 2023-07-20

## 2023-07-20 RX ORDER — TAMSULOSIN HYDROCHLORIDE 0.4 MG/1
0.4 CAPSULE ORAL DAILY
Qty: 90 CAPSULE | Refills: 3 | Status: SHIPPED | OUTPATIENT
Start: 2023-07-20

## 2023-07-20 RX ORDER — TIZANIDINE 4 MG/1
4 TABLET ORAL EVERY 8 HOURS PRN
Qty: 30 TABLET | Refills: 1 | Status: SHIPPED | OUTPATIENT
Start: 2023-07-20

## 2023-07-20 RX ORDER — DONEPEZIL HYDROCHLORIDE 5 MG/1
5 TABLET, FILM COATED ORAL NIGHTLY
Qty: 90 TABLET | Refills: 1 | Status: SHIPPED | OUTPATIENT
Start: 2023-07-20

## 2023-07-20 NOTE — TELEPHONE ENCOUNTER
Clair Johnrosio called to request a refill on his medication. Last office visit : 2023   Next office visit : 2023     Last Beauty Hammed: 23  Medication Contract: needs updated   Last UDS: 23  Last Rx: 5/3/23                                  Amphetamine Screen, Urine   Date Value Ref Range Status   2023 -  Final     Barbiturate Screen, Urine   Date Value Ref Range Status   2023 -  Final     Benzodiazepine Screen, Urine   Date Value Ref Range Status   2023 -  Final     Buprenorphine Urine   Date Value Ref Range Status   2023 -  Final     Cocaine Metabolite Screen, Urine   Date Value Ref Range Status   2023 --  Final     Gabapentin Screen, Urine   Date Value Ref Range Status   2021 -  Final     MDMA, Urine   Date Value Ref Range Status   2023 -  Final     Methamphetamine, Urine   Date Value Ref Range Status   2023 -  Final     Opiate Scrn, Ur   Date Value Ref Range Status   2023 -  Final     Oxycodone Screen, Ur   Date Value Ref Range Status   2023 -  Final     PCP Screen, Urine   Date Value Ref Range Status   2023 -  Final     Propoxyphene Screen, Urine   Date Value Ref Range Status   2023 --  Final     THC Screen, Urine   Date Value Ref Range Status   2023 -  Final     Tricyclic Antidepressants, Urine   Date Value Ref Range Status   2023 -  Final           Requested Prescriptions     Pending Prescriptions Disp Refills    azelastine (ASTELIN) 0.1 % nasal spray 2 each 0     Si spray by Nasal route 2 times daily Use in each nostril as directed    lisinopril (PRINIVIL;ZESTRIL) 40 MG tablet 90 tablet 3     Sig: TAKE ONE TABLET BY MOUTH DAILY. Cancel previous script BRIGETTE Triana    traZODone (DESYREL) 50 MG tablet 30 tablet 3     Sig: Take 1 tablet by mouth nightly    pantoprazole (PROTONIX) 40 MG tablet 90 tablet 0     Sig: Take 1 tablet by mouth daily Indications: Reflux Gastritis    tamsulosin (FLOMAX) 0.4 MG

## 2023-07-20 NOTE — TELEPHONE ENCOUNTER
Requested Prescriptions     Pending Prescriptions Disp Refills    dextromethorphan-quiNIDine (NUEDEXTA) 20-10 MG CAPS per capsule 60 capsule 3     Sig: Take 1 capsule by mouth daily    memantine (NAMENDA) 10 MG tablet 180 tablet 1     Sig: Take 1 tablet by mouth 2 times daily    citalopram (CELEXA) 10 MG tablet 90 tablet 1     Sig: Take 1 tablet by mouth daily    donepezil (ARICEPT) 5 MG tablet 90 tablet 1     Sig: Take 1 tablet by mouth nightly       Last Office Visit: 5/31/2023  Next Office Visit: 9/6/2023  Last Medication Refill:  Nuedexta 2/15/23 with 3 RF  Memantine 3/21/23 with 1 RF  Citalopram 3/21/23 with 1 RF  Donepezil 3/21/23 with 1 RF

## 2023-08-14 ENCOUNTER — HOSPITAL ENCOUNTER (OUTPATIENT)
Dept: GENERAL RADIOLOGY | Age: 73
Discharge: HOME | End: 2023-08-14
Payer: MEDICARE

## 2023-08-14 DIAGNOSIS — M51.37 DDD (DEGENERATIVE DISC DISEASE), LUMBOSACRAL: ICD-10-CM

## 2023-08-14 PROCEDURE — 72110 X-RAY EXAM L-2 SPINE 4/>VWS: CPT

## 2023-08-14 PROCEDURE — 72050 X-RAY EXAM NECK SPINE 4/5VWS: CPT

## 2023-08-16 ENCOUNTER — HOSPITAL ENCOUNTER (OUTPATIENT)
Facility: HOSPITAL | Age: 73
Setting detail: HOSPITAL OUTPATIENT SURGERY
Discharge: HOME | End: 2023-08-16
Attending: INTERNAL MEDICINE | Admitting: INTERNAL MEDICINE
Payer: MEDICARE

## 2023-08-16 ENCOUNTER — ANESTHESIA (OUTPATIENT)
Dept: GASTROENTEROLOGY | Facility: HOSPITAL | Age: 73
End: 2023-08-16
Payer: MEDICARE

## 2023-08-16 ENCOUNTER — ANESTHESIA EVENT (OUTPATIENT)
Dept: GASTROENTEROLOGY | Facility: HOSPITAL | Age: 73
End: 2023-08-16
Payer: MEDICARE

## 2023-08-16 ENCOUNTER — TELEPHONE (OUTPATIENT)
Dept: GASTROENTEROLOGY | Facility: CLINIC | Age: 73
End: 2023-08-16
Payer: MEDICARE

## 2023-08-16 VITALS
OXYGEN SATURATION: 99 % | WEIGHT: 234 LBS | RESPIRATION RATE: 10 BRPM | TEMPERATURE: 97.9 F | SYSTOLIC BLOOD PRESSURE: 124 MMHG | BODY MASS INDEX: 33.5 KG/M2 | HEART RATE: 64 BPM | HEIGHT: 70 IN | DIASTOLIC BLOOD PRESSURE: 70 MMHG

## 2023-08-16 DIAGNOSIS — R10.84 GENERALIZED ABDOMINAL PAIN: ICD-10-CM

## 2023-08-16 DIAGNOSIS — K59.00 CONSTIPATION, UNSPECIFIED CONSTIPATION TYPE: ICD-10-CM

## 2023-08-16 PROCEDURE — 25010000002 PROPOFOL 10 MG/ML EMULSION: Performed by: NURSE ANESTHETIST, CERTIFIED REGISTERED

## 2023-08-16 PROCEDURE — 45378 DIAGNOSTIC COLONOSCOPY: CPT | Performed by: INTERNAL MEDICINE

## 2023-08-16 RX ORDER — SODIUM CHLORIDE 0.9 % (FLUSH) 0.9 %
10 SYRINGE (ML) INJECTION EVERY 12 HOURS SCHEDULED
Status: CANCELLED | OUTPATIENT
Start: 2023-08-16

## 2023-08-16 RX ORDER — PROPOFOL 10 MG/ML
VIAL (ML) INTRAVENOUS AS NEEDED
Status: DISCONTINUED | OUTPATIENT
Start: 2023-08-16 | End: 2023-08-16 | Stop reason: SURG

## 2023-08-16 RX ORDER — SODIUM CHLORIDE 9 MG/ML
500 INJECTION, SOLUTION INTRAVENOUS CONTINUOUS PRN
Status: DISCONTINUED | OUTPATIENT
Start: 2023-08-16 | End: 2023-08-16 | Stop reason: HOSPADM

## 2023-08-16 RX ORDER — LIDOCAINE HYDROCHLORIDE 20 MG/ML
INJECTION, SOLUTION EPIDURAL; INFILTRATION; INTRACAUDAL; PERINEURAL AS NEEDED
Status: DISCONTINUED | OUTPATIENT
Start: 2023-08-16 | End: 2023-08-16 | Stop reason: SURG

## 2023-08-16 RX ORDER — SODIUM CHLORIDE 9 MG/ML
40 INJECTION, SOLUTION INTRAVENOUS AS NEEDED
Status: CANCELLED | OUTPATIENT
Start: 2023-08-16

## 2023-08-16 RX ORDER — SODIUM CHLORIDE 0.9 % (FLUSH) 0.9 %
10 SYRINGE (ML) INJECTION AS NEEDED
Status: CANCELLED | OUTPATIENT
Start: 2023-08-16

## 2023-08-16 RX ORDER — ASPIRIN 81 MG/1
81 TABLET, CHEWABLE ORAL ONCE
Status: COMPLETED | OUTPATIENT
Start: 2023-08-16 | End: 2023-08-16

## 2023-08-16 RX ORDER — SODIUM CHLORIDE 9 MG/ML
100 INJECTION, SOLUTION INTRAVENOUS CONTINUOUS
Status: CANCELLED | OUTPATIENT
Start: 2023-08-16

## 2023-08-16 RX ADMIN — PROPOFOL INJECTABLE EMULSION 400 MG: 10 INJECTION, EMULSION INTRAVENOUS at 10:15

## 2023-08-16 RX ADMIN — LIDOCAINE HYDROCHLORIDE 50 MG: 20 INJECTION, SOLUTION EPIDURAL; INFILTRATION; INTRACAUDAL; PERINEURAL at 10:14

## 2023-08-16 RX ADMIN — ASPIRIN 81 MG: 81 TABLET, CHEWABLE ORAL at 09:21

## 2023-08-16 RX ADMIN — SODIUM CHLORIDE 500 ML: 9 INJECTION, SOLUTION INTRAVENOUS at 09:17

## 2023-08-16 NOTE — ANESTHESIA POSTPROCEDURE EVALUATION
Patient: Manolo Sagastume    Procedure Summary       Date: 08/16/23 Room / Location: [facility] ENDOSCOPY 4 / [facility] END[facility]    Anesthesia Start: 1011 Anesthesia Stop: 1044    Procedure: COLONOSCOPY WITH ANESTHESIA Diagnosis:       Generalized abdominal pain      Constipation, unspecified constipation type      (Generalized abdominal pain [R10.84])      (Constipation, unspecified constipation type [K59.00])    Surgeons: Angy Syed MD Provider: Satinder Paredes CRNA    Anesthesia Type: MAC ASA Status: 3            Anesthesia Type: MAC    Vitals  No vitals data found for the desired time range.          Post Anesthesia Care and Evaluation    Patient location during evaluation: PACU  Patient participation: complete - patient participated  Level of consciousness: awake and awake and alert  Pain score: 0  Pain management: adequate    Airway patency: patent  Anesthetic complications: No anesthetic complications    Cardiovascular status: acceptable and stable  Respiratory status: acceptable and unassisted  Hydration status: acceptable

## 2023-08-16 NOTE — ANESTHESIA PREPROCEDURE EVALUATION
Anesthesia Evaluation     Patient summary reviewed   no history of anesthetic complications:   NPO Solid Status: > 8 hours  NPO Liquid Status: > 8 hours           Airway   Mallampati: II  TM distance: >3 FB  Neck ROM: full  No difficulty expected  Dental    (+) poor dentition    Comment: Extremely poor dentition, denies loose teeth      Pulmonary    (+) a smoker Former, COPD,  (-) sleep apnea  Cardiovascular   Exercise tolerance: good (4-7 METS)    (+) hypertension, CAD, cardiac stents Drug eluting stent more than 12 months ago PVD, hyperlipidemia      Neuro/Psych  (+) numbness, dementia  GI/Hepatic/Renal/Endo    (+) obesity, GERD, renal disease CRI, diabetes mellitus type 2  (-) liver disease    Musculoskeletal     Abdominal    Substance History      OB/GYN          Other      history of cancer                    Anesthesia Plan    ASA 3     MAC     (Needs asa preop )  intravenous induction     Anesthetic plan, risks, benefits, and alternatives have been provided, discussed and informed consent has been obtained with: patient.

## 2023-10-03 ENCOUNTER — HOSPITAL ENCOUNTER (OUTPATIENT)
Dept: CT IMAGING | Facility: HOSPITAL | Age: 73
Discharge: HOME | End: 2023-10-03
Admitting: INTERNAL MEDICINE
Payer: MEDICARE

## 2023-10-03 DIAGNOSIS — R10.84 GENERALIZED ABDOMINAL PAIN: ICD-10-CM

## 2023-10-03 LAB — CREAT BLDA-MCNC: 1.4 MG/DL (ref 0.6–1.3)

## 2023-10-03 PROCEDURE — 82565 ASSAY OF CREATININE: CPT

## 2023-10-03 PROCEDURE — 25510000001 IOPAMIDOL 61 % SOLUTION: Performed by: INTERNAL MEDICINE

## 2023-10-03 PROCEDURE — 74177 CT ABD & PELVIS W/CONTRAST: CPT

## 2023-10-03 RX ADMIN — IOPAMIDOL 100 ML: 612 INJECTION, SOLUTION INTRAVENOUS at 10:56

## 2023-10-05 ENCOUNTER — TELEPHONE (OUTPATIENT)
Dept: GASTROENTEROLOGY | Facility: CLINIC | Age: 73
End: 2023-10-05
Payer: MEDICARE

## 2023-10-05 NOTE — TELEPHONE ENCOUNTER
IMPRESSION:  1. No acute intra-abdominal or pelvic abnormality is identified.  2. Small bilateral nonobstructing nephrolithiasis, there is mild stable  dilation of the collecting systems and the ureters are decompressed.  Benign-appearing cyst at the upper pole of the right kidney measuring  2.3 cm. No solid renal mass is identified.  3. Stable 3.7 cm periumbilical ventral hernia which contains fat and a  nonobstructed small bowel loop. There may be decreased transit through  the small bowel loop as described above.  4. Mild sigmoid diverticulosis without evidence of acute diverticulitis.  5. Fatty infiltration of the liver. Small sliding-type hiatal hernia.      Please inform his CT scan is unremarkable.  Advise to follow-up with Rosalie in the office in 2024.    Angy Syed MD

## 2023-10-05 NOTE — TELEPHONE ENCOUNTER
Called and spoke to pt's wife, Dina, re: results-she VU. I transferred her to [facility] to schedule f/u with Rosalie in Jan. Pt/wife advised to call me back with any further questions/problems.

## 2023-10-18 ENCOUNTER — OFFICE VISIT (OUTPATIENT)
Dept: PRIMARY CARE CLINIC | Age: 73
End: 2023-10-18
Payer: MEDICARE

## 2023-10-18 VITALS
HEART RATE: 56 BPM | BODY MASS INDEX: 35.99 KG/M2 | HEIGHT: 70 IN | SYSTOLIC BLOOD PRESSURE: 156 MMHG | WEIGHT: 251.4 LBS | OXYGEN SATURATION: 96 % | TEMPERATURE: 97.8 F | DIASTOLIC BLOOD PRESSURE: 80 MMHG

## 2023-10-18 DIAGNOSIS — I10 ESSENTIAL HYPERTENSION: Primary | ICD-10-CM

## 2023-10-18 DIAGNOSIS — E11.9 TYPE 2 DIABETES MELLITUS WITHOUT COMPLICATION, WITHOUT LONG-TERM CURRENT USE OF INSULIN (HCC): ICD-10-CM

## 2023-10-18 DIAGNOSIS — E11.42 DIABETIC PERIPHERAL NEUROPATHY ASSOCIATED WITH TYPE 2 DIABETES MELLITUS (HCC): ICD-10-CM

## 2023-10-18 PROCEDURE — 99214 OFFICE O/P EST MOD 30 MIN: CPT | Performed by: NURSE PRACTITIONER

## 2023-10-18 PROCEDURE — 2022F DILAT RTA XM EVC RTNOPTHY: CPT | Performed by: NURSE PRACTITIONER

## 2023-10-18 PROCEDURE — 3078F DIAST BP <80 MM HG: CPT | Performed by: NURSE PRACTITIONER

## 2023-10-18 PROCEDURE — 3044F HG A1C LEVEL LT 7.0%: CPT | Performed by: NURSE PRACTITIONER

## 2023-10-18 PROCEDURE — 3074F SYST BP LT 130 MM HG: CPT | Performed by: NURSE PRACTITIONER

## 2023-10-18 PROCEDURE — 1036F TOBACCO NON-USER: CPT | Performed by: NURSE PRACTITIONER

## 2023-10-18 PROCEDURE — 90732 PPSV23 VACC 2 YRS+ SUBQ/IM: CPT | Performed by: NURSE PRACTITIONER

## 2023-10-18 PROCEDURE — G8417 CALC BMI ABV UP PARAM F/U: HCPCS | Performed by: NURSE PRACTITIONER

## 2023-10-18 PROCEDURE — G8427 DOCREV CUR MEDS BY ELIG CLIN: HCPCS | Performed by: NURSE PRACTITIONER

## 2023-10-18 PROCEDURE — 1123F ACP DISCUSS/DSCN MKR DOCD: CPT | Performed by: NURSE PRACTITIONER

## 2023-10-18 PROCEDURE — G0008 ADMIN INFLUENZA VIRUS VAC: HCPCS | Performed by: NURSE PRACTITIONER

## 2023-10-18 PROCEDURE — G0009 ADMIN PNEUMOCOCCAL VACCINE: HCPCS | Performed by: NURSE PRACTITIONER

## 2023-10-18 PROCEDURE — 90694 VACC AIIV4 NO PRSRV 0.5ML IM: CPT | Performed by: NURSE PRACTITIONER

## 2023-10-18 PROCEDURE — 3017F COLORECTAL CA SCREEN DOC REV: CPT | Performed by: NURSE PRACTITIONER

## 2023-10-18 PROCEDURE — G8484 FLU IMMUNIZE NO ADMIN: HCPCS | Performed by: NURSE PRACTITIONER

## 2023-10-18 RX ORDER — AMLODIPINE BESYLATE 5 MG/1
5 TABLET ORAL DAILY
Qty: 30 TABLET | Refills: 5 | Status: SHIPPED | OUTPATIENT
Start: 2023-10-18

## 2023-10-18 ASSESSMENT — ENCOUNTER SYMPTOMS
COLOR CHANGE: 0
NAUSEA: 0
SHORTNESS OF BREATH: 0
VOMITING: 0
COUGH: 0
BACK PAIN: 0
PHOTOPHOBIA: 0
RHINORRHEA: 0
VOICE CHANGE: 0

## 2023-10-18 NOTE — PATIENT INSTRUCTIONS
Begin Amlodipine 5 mg nightly. Fasting labs in [address] within 1 week. Schedule appointment with cardiology. Follow-up in 3 months or sooner if needed.

## 2023-10-18 NOTE — PROGRESS NOTES
[facility] PRIMARY CARE  [address] [facility],[address]  [address] [address]  Dept: [phone]  Dept Fax: [phone]  Loc: [phone]    Leyla Albarran is a 68 y.o. male who presents today for his medical conditions/complaints as noted below. Leyla Albarran is c/o of Follow-up (Pt has been having spells where he starts sweating heavily, he gets clammy and bp spikes. Episodes happen a couple of times a week)        HPI:     HPI   Chief Complaint   Patient presents with    Follow-up     Pt has been having spells where he starts sweating heavily, he gets clammy and bp spikes. Episodes happen a couple of times a week     Patient presents today for evaluation of random diaphoresis and hypertension. He states this is occurring several times per week and has been happening for several months. This typically only occurring when he is active. His wife states he has also gained weight. His BP is slightly elevated in clinic today 156/80; it was also elevated last visit. He is currently taking Lisinopril 40 mg daily. His heart rate is typically in 50s; he is not on beta blocker. He has history stent in 2019; he sees Dr Citlalli Corrigan. He would like flu vaccine today.     Past Medical History:   Diagnosis Date    Arthritis     CAD (coronary artery disease)     sees dr. Citlalli Corrigan    Chronic back pain     COPD (chronic obstructive pulmonary disease) (HCC)     Hypertension     Peripheral sensory neuropathy     Pleurisy     Shoulder pain     TIA (transient ischemic attack)     Type II or unspecified type diabetes mellitus without mention of complication, not stated as uncontrolled       Past Surgical History:   Procedure Laterality Date    APPENDECTOMY      CARDIAC CATHETERIZATION  11/04/2019    PIETER to LAD    SHOULDER SURGERY Left 12/17/2020    LEFT REVERSE TOTAL SHOULDER ARTHROPLASTY performed by Aisha Colon MD at [address]           10/18/2023     1:58 PM 5/31/2023     9:26 AM 5/4/2023    12:50 PM

## 2023-10-24 ENCOUNTER — OFFICE VISIT (OUTPATIENT)
Dept: CARDIOLOGY CLINIC | Age: 73
End: 2023-10-24
Payer: MEDICARE

## 2023-10-24 VITALS
DIASTOLIC BLOOD PRESSURE: 88 MMHG | HEIGHT: 70 IN | BODY MASS INDEX: 35.93 KG/M2 | WEIGHT: 251 LBS | HEART RATE: 57 BPM | SYSTOLIC BLOOD PRESSURE: 128 MMHG

## 2023-10-24 DIAGNOSIS — R06.09 DOE (DYSPNEA ON EXERTION): ICD-10-CM

## 2023-10-24 DIAGNOSIS — I10 ESSENTIAL HYPERTENSION: Primary | ICD-10-CM

## 2023-10-24 DIAGNOSIS — I25.10 CORONARY ARTERY DISEASE INVOLVING NATIVE CORONARY ARTERY OF NATIVE HEART WITHOUT ANGINA PECTORIS: ICD-10-CM

## 2023-10-24 DIAGNOSIS — I71.21 ANEURYSM OF ASCENDING AORTA WITHOUT RUPTURE (HCC): ICD-10-CM

## 2023-10-24 PROCEDURE — 1036F TOBACCO NON-USER: CPT | Performed by: CLINICAL NURSE SPECIALIST

## 2023-10-24 PROCEDURE — 3079F DIAST BP 80-89 MM HG: CPT | Performed by: CLINICAL NURSE SPECIALIST

## 2023-10-24 PROCEDURE — G8484 FLU IMMUNIZE NO ADMIN: HCPCS | Performed by: CLINICAL NURSE SPECIALIST

## 2023-10-24 PROCEDURE — G8417 CALC BMI ABV UP PARAM F/U: HCPCS | Performed by: CLINICAL NURSE SPECIALIST

## 2023-10-24 PROCEDURE — 1123F ACP DISCUSS/DSCN MKR DOCD: CPT | Performed by: CLINICAL NURSE SPECIALIST

## 2023-10-24 PROCEDURE — 3017F COLORECTAL CA SCREEN DOC REV: CPT | Performed by: CLINICAL NURSE SPECIALIST

## 2023-10-24 PROCEDURE — G8427 DOCREV CUR MEDS BY ELIG CLIN: HCPCS | Performed by: CLINICAL NURSE SPECIALIST

## 2023-10-24 PROCEDURE — 93000 ELECTROCARDIOGRAM COMPLETE: CPT | Performed by: CLINICAL NURSE SPECIALIST

## 2023-10-24 PROCEDURE — 3074F SYST BP LT 130 MM HG: CPT | Performed by: CLINICAL NURSE SPECIALIST

## 2023-10-24 PROCEDURE — 99214 OFFICE O/P EST MOD 30 MIN: CPT | Performed by: CLINICAL NURSE SPECIALIST

## 2023-10-24 RX ORDER — NITROGLYCERIN 0.4 MG/1
TABLET SUBLINGUAL
Qty: 25 TABLET | Refills: 3 | Status: SHIPPED | OUTPATIENT
Start: 2023-10-24

## 2023-10-24 NOTE — PATIENT INSTRUCTIONS
CTA of chest soon  Lexiscan and echo soon     How to take:  NITROGLYCERIN (Nitrostat) 0.4 mg tablets, sublingual.  Nitroglycerin is in a group of drugs called nitrates. Nitroglycerin dilates (widens) blood vessels, making it easier for blood to flow through them and easier for the heart to pump. Dosing Guidelines for Nitroglycerin Tablets  At the start of an angina (chest pain) attack, place one tablet under the tongue or between the cheek and gum. Do not swallow or chew the tablet; let it dissolve on its own. If necessary, a second and third tablet may be used, with five minutes between using each tablet. If you use a third tablet and your chest pain continues, it is time to seek immediate medical attention. Call 911 immediately and have someone drive you to the emergency room. You may be having a heart attack or other serious heart problem. To prevent angina from exercise or stress, use 1 tablet 5 to 10 minutes before the activity. Maintain good blood pressure control-goal<130/80 at rest  Maintain good cholesterol control LDL goal<70 with arterial disease  If you are diabetic work to keep/obtain hemoglobin A1c< 7    Follow up after testing   Call with any questions or concerns  Follow up with BRIGETTE Mixon for non cardiac problems  Report any new problems  Cardiovascular Fitness-Exercise as tolerated. Fall precautions     Cardiac / Healthy Diet- Avoid processed high fat foods, maintain low sodium/salt   Continue current medications as directed  Continue plan of treatment  It is always recommended that you bring your medications bottles with you to each visit - this is for your safety!

## 2023-10-24 NOTE — PROGRESS NOTES
swallow or chew the tablet; let it dissolve on its own. If necessary, a second and third tablet may be used, with five minutes between using each tablet. If you use a third tablet and your chest pain continues, it is time to seek immediate medical attention. Call 911 immediately and have someone drive you to the emergency room. You may be having a heart attack or other serious heart problem. To prevent angina from exercise or stress, use 1 tablet 5 to 10 minutes before the activity. Maintain good blood pressure control-goal<130/80 at rest  Maintain good cholesterol control LDL goal<70 with arterial disease  If you are diabetic work to keep/obtain hemoglobin A1c< 7    Follow up after testing   Call with any questions or concerns  Follow up with BRIGETTE Cuevas for non cardiac problems  Report any new problems  Cardiovascular Fitness-Exercise as tolerated. Fall precautions     Cardiac / Healthy Diet- Avoid processed high fat foods, maintain low sodium/salt   Continue current medications as directed  Continue plan of treatment  It is always recommended that you bring your medications bottles with you to each visit - this is for your safety! BRIGETTE Kingsley    EMR dragon/transcription disclaimer: Much of this encounter note is electronic transcription/translation of spoken language to printed tach. Electronic translation of spoken language may be erroneous, or at times, nonsensical words or phrases may be inadvertently transcribed.  Although, I have reviewed the note for such errors, some may still exist.

## 2023-10-25 DIAGNOSIS — I10 ESSENTIAL HYPERTENSION: ICD-10-CM

## 2023-10-25 LAB
ALBUMIN SERPL-MCNC: 4.5 G/DL (ref 3.5–5.2)
ALP SERPL-CCNC: 65 U/L (ref 40–130)
ALT SERPL-CCNC: 10 U/L (ref 5–41)
ANION GAP SERPL CALCULATED.3IONS-SCNC: 12 MMOL/L (ref 7–19)
AST SERPL-CCNC: 19 U/L (ref 5–40)
BASOPHILS # BLD: 0 K/UL (ref 0–0.2)
BASOPHILS NFR BLD: 0.3 % (ref 0–1)
BILIRUB SERPL-MCNC: 0.8 MG/DL (ref 0.2–1.2)
BUN SERPL-MCNC: 15 MG/DL (ref 8–23)
CALCIUM SERPL-MCNC: 9.6 MG/DL (ref 8.8–10.2)
CHLORIDE SERPL-SCNC: 101 MMOL/L (ref 98–111)
CHOLEST SERPL-MCNC: 154 MG/DL (ref 160–199)
CO2 SERPL-SCNC: 27 MMOL/L (ref 22–29)
CREAT SERPL-MCNC: 1.5 MG/DL (ref 0.5–1.2)
EOSINOPHIL # BLD: 0.2 K/UL (ref 0–0.6)
EOSINOPHIL NFR BLD: 3.1 % (ref 0–5)
ERYTHROCYTE [DISTWIDTH] IN BLOOD BY AUTOMATED COUNT: 12.5 % (ref 11.5–14.5)
GLUCOSE SERPL-MCNC: 91 MG/DL (ref 74–109)
HBA1C MFR BLD: 5.2 % (ref 4–6)
HCT VFR BLD AUTO: 45 % (ref 42–52)
HDLC SERPL-MCNC: 54 MG/DL (ref 55–121)
HGB BLD-MCNC: 14.8 G/DL (ref 14–18)
IMM GRANULOCYTES # BLD: 0 K/UL
LDLC SERPL CALC-MCNC: 81 MG/DL
LYMPHOCYTES # BLD: 1.9 K/UL (ref 1.1–4.5)
LYMPHOCYTES NFR BLD: 30.5 % (ref 20–40)
MCH RBC QN AUTO: 33 PG (ref 27–31)
MCHC RBC AUTO-ENTMCNC: 32.9 G/DL (ref 33–37)
MCV RBC AUTO: 100.2 FL (ref 80–94)
MONOCYTES # BLD: 0.4 K/UL (ref 0–0.9)
MONOCYTES NFR BLD: 7 % (ref 0–10)
NEUTROPHILS # BLD: 3.6 K/UL (ref 1.5–7.5)
NEUTS SEG NFR BLD: 58.8 % (ref 50–65)
PLATELET # BLD AUTO: 90 K/UL (ref 130–400)
PMV BLD AUTO: 10.1 FL (ref 9.4–12.4)
POTASSIUM SERPL-SCNC: 4.7 MMOL/L (ref 3.5–5)
PROT SERPL-MCNC: 7 G/DL (ref 6.6–8.7)
RBC # BLD AUTO: 4.49 M/UL (ref 4.7–6.1)
SODIUM SERPL-SCNC: 140 MMOL/L (ref 136–145)
TRIGL SERPL-MCNC: 96 MG/DL (ref 0–149)
TSH SERPL DL<=0.005 MIU/L-ACNC: 1.33 UIU/ML (ref 0.27–4.2)
WBC # BLD AUTO: 6.1 K/UL (ref 4.8–10.8)

## 2023-10-30 ENCOUNTER — HOSPITAL ENCOUNTER (OUTPATIENT)
Dept: NUCLEAR MEDICINE | Age: 73
Discharge: HOME | End: 2023-11-01
Payer: MEDICARE

## 2023-10-30 ENCOUNTER — HOSPITAL ENCOUNTER (OUTPATIENT)
Dept: NON INVASIVE DIAGNOSTICS | Age: 73
Discharge: HOME | End: 2023-10-30
Payer: MEDICARE

## 2023-10-30 DIAGNOSIS — R06.09 DOE (DYSPNEA ON EXERTION): ICD-10-CM

## 2023-10-30 DIAGNOSIS — I25.10 CORONARY ARTERY DISEASE INVOLVING NATIVE CORONARY ARTERY OF NATIVE HEART WITHOUT ANGINA PECTORIS: ICD-10-CM

## 2023-10-30 DIAGNOSIS — I10 ESSENTIAL HYPERTENSION: ICD-10-CM

## 2023-10-30 PROCEDURE — 93018 CV STRESS TEST I&R ONLY: CPT | Performed by: INTERNAL MEDICINE

## 2023-10-30 PROCEDURE — 3430000000 HC RX DIAGNOSTIC RADIOPHARMACEUTICAL: Performed by: CLINICAL NURSE SPECIALIST

## 2023-10-30 PROCEDURE — 78452 HT MUSCLE IMAGE SPECT MULT: CPT | Performed by: INTERNAL MEDICINE

## 2023-10-30 PROCEDURE — 93016 CV STRESS TEST SUPVJ ONLY: CPT | Performed by: INTERNAL MEDICINE

## 2023-10-30 PROCEDURE — 93306 TTE W/DOPPLER COMPLETE: CPT

## 2023-10-30 PROCEDURE — 6360000002 HC RX W HCPCS: Performed by: CLINICAL NURSE SPECIALIST

## 2023-10-30 PROCEDURE — A9502 TC99M TETROFOSMIN: HCPCS | Performed by: CLINICAL NURSE SPECIALIST

## 2023-10-30 PROCEDURE — 93017 CV STRESS TEST TRACING ONLY: CPT

## 2023-10-30 RX ORDER — REGADENOSON 0.08 MG/ML
0.4 INJECTION, SOLUTION INTRAVENOUS
Status: COMPLETED | OUTPATIENT
Start: 2023-10-30 | End: 2023-10-30

## 2023-10-30 RX ADMIN — TETROFOSMIN 24 MILLICURIE: 1.38 INJECTION, POWDER, LYOPHILIZED, FOR SOLUTION INTRAVENOUS at 11:00

## 2023-10-30 RX ADMIN — REGADENOSON 0.4 MG: 0.08 INJECTION, SOLUTION INTRAVENOUS at 10:44

## 2023-10-30 RX ADMIN — TETROFOSMIN 8 MILLICURIE: 1.38 INJECTION, POWDER, LYOPHILIZED, FOR SOLUTION INTRAVENOUS at 09:10

## 2023-11-01 ENCOUNTER — TELEPHONE (OUTPATIENT)
Dept: CARDIOLOGY CLINIC | Age: 73
End: 2023-11-01

## 2023-11-01 NOTE — TELEPHONE ENCOUNTER
----- Message from BRIGETTE Villatoro sent at 10/31/2023 11:51 AM CDT -----  Echo looks good. Overall heart function is normal.  No significant valvular disease.   Also had negative stress test

## 2023-11-01 NOTE — TELEPHONE ENCOUNTER
----- Message from BRIGETTE Novoa sent at 10/31/2023 11:48 AM CDT -----  Stress test look good. No evidence of any blockage.   Also had 2D echo

## 2023-12-06 ENCOUNTER — TELEMEDICINE (OUTPATIENT)
Dept: PRIMARY CARE CLINIC | Age: 73
End: 2023-12-06
Payer: MEDICARE

## 2023-12-06 DIAGNOSIS — J06.9 ACUTE UPPER RESPIRATORY INFECTION: Primary | ICD-10-CM

## 2023-12-06 PROCEDURE — G8484 FLU IMMUNIZE NO ADMIN: HCPCS | Performed by: NURSE PRACTITIONER

## 2023-12-06 PROCEDURE — G8428 CUR MEDS NOT DOCUMENT: HCPCS | Performed by: NURSE PRACTITIONER

## 2023-12-06 PROCEDURE — G8417 CALC BMI ABV UP PARAM F/U: HCPCS | Performed by: NURSE PRACTITIONER

## 2023-12-06 PROCEDURE — 1036F TOBACCO NON-USER: CPT | Performed by: NURSE PRACTITIONER

## 2023-12-06 PROCEDURE — 1123F ACP DISCUSS/DSCN MKR DOCD: CPT | Performed by: NURSE PRACTITIONER

## 2023-12-06 PROCEDURE — 99213 OFFICE O/P EST LOW 20 MIN: CPT | Performed by: NURSE PRACTITIONER

## 2023-12-06 PROCEDURE — 3017F COLORECTAL CA SCREEN DOC REV: CPT | Performed by: NURSE PRACTITIONER

## 2023-12-06 RX ORDER — DEXTROMETHORPHAN HYDROBROMIDE AND PROMETHAZINE HYDROCHLORIDE 15; 6.25 MG/5ML; MG/5ML
5 SYRUP ORAL 4 TIMES DAILY PRN
Qty: 180 ML | Refills: 0 | Status: SHIPPED | OUTPATIENT
Start: 2023-12-06 | End: 2023-12-13

## 2023-12-06 RX ORDER — DOXYCYCLINE HYCLATE 100 MG
100 TABLET ORAL 2 TIMES DAILY
Qty: 20 TABLET | Refills: 0 | Status: SHIPPED | OUTPATIENT
Start: 2023-12-06 | End: 2023-12-16

## 2023-12-06 RX ORDER — METHYLPREDNISOLONE 4 MG/1
TABLET ORAL
Qty: 1 KIT | Refills: 0 | Status: SHIPPED | OUTPATIENT
Start: 2023-12-06

## 2023-12-06 ASSESSMENT — ENCOUNTER SYMPTOMS
PHOTOPHOBIA: 0
RHINORRHEA: 0
BACK PAIN: 0
COUGH: 1
VOMITING: 0
VOICE CHANGE: 0
SHORTNESS OF BREATH: 1
SORE THROAT: 1
NAUSEA: 0
COLOR CHANGE: 0

## 2023-12-06 NOTE — PROGRESS NOTES
Elizabeth Richard, was evaluated through a synchronous (real-time) audio-video encounter. The patient (or guardian if applicable) is aware that this is a billable service, which includes applicable co-pays. This Virtual Visit was conducted with patient's (and/or legal guardian's) consent. Patient identification was verified, and a caregiver was present when appropriate. The patient was located at Home: [address] [place] [address].  Provider was located at Home ([facility]): Kurtis Stout (:  1950) is a Established patient, presenting virtually for evaluation of the following:    Assessment & Plan   Below is the assessment and plan developed based on review of pertinent history, physical exam, labs, studies, and medications. 1. Acute upper respiratory infection  -     methylPREDNISolone (MEDROL DOSEPACK) 4 MG tablet; Take by mouth., Disp-1 kit, R-0Normal  -     promethazine-dextromethorphan (PROMETHAZINE-DM) 6.25-15 MG/5ML syrup; Take 5 mLs by mouth 4 times daily as needed for Cough, Disp-180 mL, R-0Normal  -     doxycycline hyclate (VIBRA-TABS) 100 MG tablet; Take 1 tablet by mouth 2 times daily for 10 days, Disp-20 tablet, R-0Normal    No follow-ups on file. Subjective   Patient presents today for evaluation of sore throat, vomiting, congestion. He states he can barely swallow because his throat feels swollen after vomiting. His symptoms began 1 week ago. He denies fever. He is very weak; his wife states she is having to help him to the bathroom. He is only having crushed ice. He is urinating as usual; no diarrhea. He is having productive cough. He has taken mucinex and antihistamine. He has had negative covid test. He is having shortness of breath. His O2 sat this morning was in the 90s. Discussed with patient's wife to not hesitate to take him to the ED if symptoms do not start to improve within the next 24 hours.        Review of Systems   Constitutional:  Positive for

## 2024-01-08 ENCOUNTER — OFFICE VISIT (OUTPATIENT)
Dept: GASTROENTEROLOGY | Facility: CLINIC | Age: 74
End: 2024-01-08
Payer: MEDICARE

## 2024-01-08 VITALS
TEMPERATURE: 97.5 F | HEIGHT: 70 IN | DIASTOLIC BLOOD PRESSURE: 82 MMHG | HEART RATE: 75 BPM | BODY MASS INDEX: 33.64 KG/M2 | OXYGEN SATURATION: 99 % | WEIGHT: 235 LBS | SYSTOLIC BLOOD PRESSURE: 124 MMHG

## 2024-01-08 DIAGNOSIS — R10.84 GENERALIZED ABDOMINAL PAIN: Primary | ICD-10-CM

## 2024-01-08 DIAGNOSIS — Z86.010 HISTORY OF ADENOMATOUS POLYP OF COLON: ICD-10-CM

## 2024-01-08 PROCEDURE — 99213 OFFICE O/P EST LOW 20 MIN: CPT | Performed by: NURSE PRACTITIONER

## 2024-01-08 PROCEDURE — 1160F RVW MEDS BY RX/DR IN RCRD: CPT | Performed by: NURSE PRACTITIONER

## 2024-01-08 PROCEDURE — 1159F MED LIST DOCD IN RCRD: CPT | Performed by: NURSE PRACTITIONER

## 2024-01-08 RX ORDER — AMLODIPINE BESYLATE 5 MG/1
1 TABLET ORAL DAILY
COMMUNITY
Start: 2023-11-28

## 2024-01-08 RX ORDER — DEXTROMETHORPHAN HYDROBROMIDE AND QUINIDINE SULFATE 20; 10 MG/1; MG/1
1 CAPSULE, GELATIN COATED ORAL DAILY
COMMUNITY
Start: 2023-11-28

## 2024-01-08 NOTE — PROGRESS NOTES
Primary Physician: Naya Gandara APRN    Chief Complaint   Patient presents with    Follow-up     Pt presents today for abdominal pain follow up-had colon 8/16/2023 and CT 10/3/2023; Pt states he still has trouble with his stomach-especially after eating        Subjective     Manolo Sagastume is a 73 y.o. male.    HPI  Abdominal pain follow-up  Patient last seen July 18, 2023 with pain in the middle of his abdomen.    Colonoscopy was collected 8/16/2023 revealing diverticulosis of the left colon otherwise examination was unremarkable and no specimens were collected.    CT scan of the abdomen pelvis with contrast 10/3/2023 collected revealing no acute intra-abdominal or pelvic abnormality identified.  Once again mild sigmoid diverticulosis was seen with no evidence of diverticulitis.      Given negative CT and negative colonoscopy I suspect abdominal pain to be secondary to irritable bowel syndrome.    Today patient is accompanied by his significant other.  Overall he is feeling better.  The abdominal pain is less severe and not as often.  He thinks it could be contributed to not having as good a bowel movement as he would like.  However, he does tells me he has about 1 BM per day.  There is no blood in his stool.  No unexpected weight loss.  His appetite and weight are stable.    Past Medical History:   Diagnosis Date    Alzheimer disease     Basal cell carcinoma of left cheek     Chronic back pain     COPD (chronic obstructive pulmonary disease)     Diverticulosis     GERD (gastroesophageal reflux disease)     History of adenomatous polyp of colon     History of colon polyps     Hypertension     Neoplasm of uncertain behavior of skin of chest     Neuropathy     Seborrheic keratosis     Type 2 diabetes mellitus     diet controlled       Past Surgical History:   Procedure Laterality Date    APPENDECTOMY      CAPSULE ENDOSCOPY  09/27/2012    Normal; Gastric passage time: 0h42m; Small bowel passage time:  6h33m    COLONOSCOPY  09/14/2015    One 8mm tubular adenomatous polyp in the mid transverse colon; Diverticulosis; Internal hemorrhoids; Repeat 5 years    COLONOSCOPY  06/20/2012    One 8mm hyperplastic polyp in the ascending colon; Internal hemorrhoids; Repeat 3-4 years    COLONOSCOPY  01/19/2010    One less than 3mm adenomatous polyp in the cecum; One 5mm adenomatous polyp in the ascending colon; One 7mm hyperplastic polyp in the transverse colon; One 1cm tubular adenomatous polyp in the sigmoid colon; Internal hemorrhoids; Repeat 3 years    COLONOSCOPY  09/11/2008    Dr. iMttal-Small, pedunculated adenomatous polyp at 30cm; Very tortuous colon not allowing visualization to the base of the cecum; Anusitis; Repeat 3 years    COLONOSCOPY  07/16/2007    Multiple polyps in the sigmoid colon-Six 1cm hyperplastic polyps removed, but not all polyps were removed; Five hyperplastic rectal polyps removed, but not all polyps were removed; Brown spot of no significance in the ascending colon; Repeat flex sig in 3-6 months; Repeat full colon in 1 year    COLONOSCOPY N/A 02/24/2021    Diverticulosis in the left colon; Non-bleeding internal hemorrhoids; No specimens collected; Repeat 3 years    COLONOSCOPY N/A 08/16/2023    Diverticulosis in the left colon; No specimens collected; Repeat 5 years    CORONARY STENT PLACEMENT  11/2019    ENDOSCOPY  06/20/2012    HH; Duodenitis    ENDOSCOPY  09/18/2008    Dr. Mittal-Obvious Mckeon's esophagus; HH; Gastritis     ENDOSCOPY  03/27/2007    Irregular Z-line-rule out Mckeon's-path showed no evidence of intestinal metaplasia    ENDOSCOPY N/A 08/14/2019    Normal esophagus; Normal stomach; Normal examined duodenum; No specimens collected    SKIN LESION EXCISION      X2 from chest    TOTAL SHOULDER REPLACEMENT Left         Current Outpatient Medications:     albuterol sulfate HFA (Ventolin HFA) 108 (90 Base) MCG/ACT inhaler, Inhale 2 puffs As Needed., Disp: , Rfl:     amLODIPine (NORVASC)  5 MG tablet, Take 1 tablet by mouth Daily., Disp: , Rfl:     aspirin 81 MG chewable tablet, Chew 1 tablet Daily., Disp: , Rfl:     azelastine (ASTELIN) 0.1 % nasal spray, 2 sprays into the nostril(s) as directed by provider Daily., Disp: , Rfl:     cetirizine (zyrTEC) 10 MG tablet, Take 1 tablet by mouth Daily., Disp: , Rfl:     citalopram (CeleXA) 10 MG tablet, Take 1 tablet by mouth Daily., Disp: , Rfl:     diclofenac (VOLTAREN) 1 % gel gel, Apply  topically to the appropriate area as directed As Needed., Disp: , Rfl:     donepezil (ARICEPT) 5 MG tablet, Take 1 tablet by mouth Every Night., Disp: , Rfl:     EPINEPHrine (EPIPEN) 0.3 MG/0.3ML solution auto-injector injection, Inject 0.3 mL into the appropriate muscle as directed by prescriber Take As Directed. for allergic reaction, Disp: , Rfl:     gabapentin (NEURONTIN) 800 MG tablet, Take 1 tablet by mouth 3 (Three) Times a Day., Disp: , Rfl:     Glucose Blood (BLOOD GLUCOSE TEST) strip, Test once daily. Medtrix test stripes, Disp: , Rfl:     LANCETS ULTRA THIN misc, TEST ONCE DAILY, Disp: , Rfl:     lisinopril (PRINIVIL,ZESTRIL) 40 MG tablet, Take 1 tablet by mouth Daily., Disp: , Rfl:     memantine (NAMENDA) 5 MG tablet, Take 1 tablet by mouth 2 (two) times a day., Disp: , Rfl:     multivitamin with minerals tablet tablet, Take 1 tablet by mouth Daily., Disp: , Rfl:     Naloxegol Oxalate (MOVANTIK) 12.5 MG tablet, Take 1 tablet by mouth Every Morning., Disp: , Rfl:     nitroglycerin (NITROSTAT) 0.4 MG SL tablet, Take 1 tablet by mouth Take As Directed., Disp: , Rfl:     Nuedexta 20-10 MG capsule capsule, Take 1 capsule by mouth Daily., Disp: , Rfl:     Omega-3 Fatty Acids (fish oil) 1000 MG capsule capsule, Take 1 capsule by mouth Daily With Breakfast., Disp: , Rfl:     oxyCODONE-acetaminophen (PERCOCET)  MG per tablet, Take 1 tablet by mouth Every 6 (Six) Hours As Needed for Moderate Pain or Severe Pain., Disp: , Rfl:     pantoprazole (PROTONIX) 40 MG EC  "tablet, Take 1 tablet by mouth Daily., Disp: , Rfl:     pravastatin (PRAVACHOL) 80 MG tablet, Take 1 tablet by mouth Every Night., Disp: , Rfl:     tamsulosin (FLOMAX) 0.4 MG capsule 24 hr capsule, Take 1 capsule by mouth daily, Disp: , Rfl:     tiotropium (SPIRIVA) 18 MCG per inhalation capsule, Place 1 capsule into inhaler and inhale As Needed., Disp: , Rfl:     tiZANidine (ZANAFLEX) 4 MG tablet, Take 1 tablet by mouth As Needed., Disp: , Rfl:     traZODone (DESYREL) 50 MG tablet, Take 1 tablet by mouth Every Night., Disp: , Rfl:     vitamin B-12 (CYANOCOBALAMIN) 1000 MCG tablet, Take 1 tablet by mouth Daily., Disp: , Rfl:     Allergies   Allergen Reactions    Bee Venom Anaphylaxis    Penicillins Other (See Comments)     Made pt \"black out\"       Social History     Socioeconomic History    Marital status:    Tobacco Use    Smoking status: Former     Types: Cigarettes    Smokeless tobacco: Never   Vaping Use    Vaping Use: Never used   Substance and Sexual Activity    Alcohol use: Not Currently    Drug use: Never    Sexual activity: Defer       Family History   Problem Relation Age of Onset    Cancer Mother     Heart disease Father     Colon cancer Neg Hx     Colon polyps Neg Hx     Esophageal cancer Neg Hx     Liver disease Neg Hx     Liver cancer Neg Hx     Rectal cancer Neg Hx     Stomach cancer Neg Hx        Review of Systems   Constitutional:  Negative for unexpected weight change.       Objective     /82 (BP Location: Left arm, Patient Position: Sitting, Cuff Size: Adult)   Pulse 75   Temp 97.5 °F (36.4 °C) (Infrared)   Ht 177.8 cm (70\")   Wt 107 kg (235 lb)   SpO2 99%   BMI 33.72 kg/m²     Physical Exam  Vitals reviewed.   Constitutional:       Appearance: Normal appearance.   Neurological:      Mental Status: He is alert.         Lab Results - Last 18 Months   Lab Units 10/03/23  1035   CREATININE mg/dL 1.40*           IMPRESSION/PLAN:    Assessment & Plan      Problem List Items " Addressed This Visit       History of adenomatous polyp of colon    Overview     No adenomas 2/24/2021.  No adenomas 8/2023.  Repeat colonoscopy 8/2028.         Generalized abdominal pain - Primary    Overview     Colonoscopy 8/2023 unremarkable.  CT 10/3/2023 negative.          Follow-up as needed, he is to call me if his abdominal pain were to return and is severe in nature.  At this point I suspected to be irritable bowel and recommend fiber therapy and making sure he has regular bowel movements.  Also recommend avoiding milk products as he admits to drinking an enormous amount on a daily basis.            Clare Rossi, APRN  01/08/24  14:03 CST    Part of this note may be an electronic transcription/translation of spoken language to printed text.

## 2024-01-09 DIAGNOSIS — E11.9 TYPE 2 DIABETES MELLITUS WITHOUT COMPLICATION, WITHOUT LONG-TERM CURRENT USE OF INSULIN (HCC): ICD-10-CM

## 2024-01-09 DIAGNOSIS — R11.0 NAUSEA: ICD-10-CM

## 2024-01-09 DIAGNOSIS — T63.441A ALLERGIC REACTION TO BEE STING: ICD-10-CM

## 2024-01-09 DIAGNOSIS — E11.42 DIABETIC PERIPHERAL NEUROPATHY ASSOCIATED WITH TYPE 2 DIABETES MELLITUS (HCC): ICD-10-CM

## 2024-01-09 DIAGNOSIS — J30.1 SEASONAL ALLERGIC RHINITIS DUE TO POLLEN: ICD-10-CM

## 2024-01-09 RX ORDER — DEXTROMETHORPHAN HYDROBROMIDE AND QUINIDINE SULFATE 20; 10 MG/1; MG/1
1 CAPSULE, GELATIN COATED ORAL DAILY
Qty: 60 CAPSULE | Refills: 3 | Status: ON HOLD | OUTPATIENT
Start: 2024-01-09

## 2024-01-09 RX ORDER — MEMANTINE HYDROCHLORIDE 10 MG/1
10 TABLET ORAL 2 TIMES DAILY
Qty: 180 TABLET | Refills: 1 | Status: ON HOLD | OUTPATIENT
Start: 2024-01-09

## 2024-01-09 RX ORDER — CITALOPRAM HYDROBROMIDE 10 MG/1
10 TABLET ORAL DAILY
Qty: 90 TABLET | Refills: 1 | Status: ON HOLD | OUTPATIENT
Start: 2024-01-09

## 2024-01-09 RX ORDER — DONEPEZIL HYDROCHLORIDE 5 MG/1
5 TABLET, FILM COATED ORAL NIGHTLY
Qty: 90 TABLET | Refills: 1 | Status: ON HOLD | OUTPATIENT
Start: 2024-01-09

## 2024-01-09 NOTE — TELEPHONE ENCOUNTER
Requested Prescriptions     Pending Prescriptions Disp Refills    dextromethorphan-quiNIDine (NUEDEXTA) 20-10 MG CAPS per capsule 60 capsule 3     Sig: Take 1 capsule by mouth daily    memantine (NAMENDA) 10 MG tablet 180 tablet 1     Sig: Take 1 tablet by mouth 2 times daily    citalopram (CELEXA) 10 MG tablet 90 tablet 1     Sig: Take 1 tablet by mouth daily    donepezil (ARICEPT) 5 MG tablet 90 tablet 1     Sig: Take 1 tablet by mouth nightly       Last Office Visit: 5/31/2023  Next Office Visit: Visit date not found  Last Medication Refill:7/20/2023 with RFs

## 2024-01-10 NOTE — TELEPHONE ENCOUNTER
Carlos called requesting a refill of the below medication which has been pended for you:     Requested Prescriptions     Pending Prescriptions Disp Refills    ondansetron (ZOFRAN ODT) 4 MG disintegrating tablet 30 tablet 0     Sig: Take 1 tablet by mouth every 8 hours as needed for Nausea or Vomiting    EPINEPHrine (EPIPEN) 0.3 MG/0.3ML SOAJ injection 0.3 mL 3     Sig: Use as directed for allergic reaction    azelastine (ASTELIN) 0.1 % nasal spray 2 each 1     Si spray by Nasal route 2 times daily Use in each nostril as directed    lisinopril (PRINIVIL;ZESTRIL) 40 MG tablet 90 tablet 1     Sig: TAKE ONE TABLET BY MOUTH DAILY.Cancel previous script BRIGETTE Triana    traZODone (DESYREL) 50 MG tablet 30 tablet 1     Sig: Take 1 tablet by mouth nightly    pantoprazole (PROTONIX) 40 MG tablet 90 tablet 1     Sig: Take 1 tablet by mouth daily Indications: Reflux Gastritis    tamsulosin (FLOMAX) 0.4 MG capsule 90 capsule 3     Sig: Take 1 capsule by mouth daily    tiZANidine (ZANAFLEX) 4 MG tablet 30 tablet 1     Sig: Take 1 tablet by mouth every 8 hours as needed (back pain)    pravastatin (PRAVACHOL) 80 MG tablet 30 tablet 1     Sig: Take 1 tablet by mouth nightly    gabapentin (NEURONTIN) 800 MG tablet 90 tablet 0     Sig: Take 1 tablet by mouth 3 times daily for 30 days.    amLODIPine (NORVASC) 5 MG tablet 30 tablet 5     Sig: Take 1 tablet by mouth daily       Last Appointment Date: 2023  Next Appointment Date: Visit date not found    Allergies   Allergen Reactions    Bee Venom Anaphylaxis    Pcn [Penicillins] Other (See Comments)     Made pt \"black out\"

## 2024-01-11 RX ORDER — ONDANSETRON 4 MG/1
4 TABLET, ORALLY DISINTEGRATING ORAL EVERY 8 HOURS PRN
Qty: 30 TABLET | Refills: 0 | Status: ON HOLD | OUTPATIENT
Start: 2024-01-11

## 2024-01-11 RX ORDER — AZELASTINE 1 MG/ML
1 SPRAY, METERED NASAL 2 TIMES DAILY
Qty: 2 EACH | Refills: 1 | Status: ON HOLD | OUTPATIENT
Start: 2024-01-11

## 2024-01-11 RX ORDER — PANTOPRAZOLE SODIUM 40 MG/1
40 TABLET, DELAYED RELEASE ORAL DAILY
Qty: 90 TABLET | Refills: 1 | Status: ON HOLD | OUTPATIENT
Start: 2024-01-11

## 2024-01-11 RX ORDER — PRAVASTATIN SODIUM 80 MG/1
80 TABLET ORAL NIGHTLY
Qty: 30 TABLET | Refills: 1 | Status: ON HOLD | OUTPATIENT
Start: 2024-01-11

## 2024-01-11 RX ORDER — GABAPENTIN 800 MG/1
800 TABLET ORAL 3 TIMES DAILY
Qty: 90 TABLET | Refills: 0 | Status: ON HOLD | OUTPATIENT
Start: 2024-01-11 | End: 2024-02-10

## 2024-01-11 RX ORDER — LISINOPRIL 40 MG/1
TABLET ORAL
Qty: 90 TABLET | Refills: 1 | Status: ON HOLD | OUTPATIENT
Start: 2024-01-11

## 2024-01-11 RX ORDER — TRAZODONE HYDROCHLORIDE 50 MG/1
50 TABLET ORAL NIGHTLY
Qty: 30 TABLET | Refills: 1 | Status: ON HOLD | OUTPATIENT
Start: 2024-01-11

## 2024-01-11 RX ORDER — EPINEPHRINE 0.3 MG/.3ML
INJECTION SUBCUTANEOUS
Qty: 0.3 ML | Refills: 3 | Status: ON HOLD | OUTPATIENT
Start: 2024-01-11

## 2024-01-11 RX ORDER — AMLODIPINE BESYLATE 5 MG/1
5 TABLET ORAL DAILY
Qty: 30 TABLET | Refills: 5 | Status: ON HOLD | OUTPATIENT
Start: 2024-01-11

## 2024-01-11 RX ORDER — TIZANIDINE 4 MG/1
4 TABLET ORAL EVERY 8 HOURS PRN
Qty: 30 TABLET | Refills: 1 | Status: ON HOLD | OUTPATIENT
Start: 2024-01-11

## 2024-01-11 RX ORDER — TAMSULOSIN HYDROCHLORIDE 0.4 MG/1
0.4 CAPSULE ORAL DAILY
Qty: 90 CAPSULE | Refills: 3 | Status: ON HOLD | OUTPATIENT
Start: 2024-01-11

## 2024-01-14 ENCOUNTER — APPOINTMENT (OUTPATIENT)
Dept: GENERAL RADIOLOGY | Age: 74
End: 2024-01-14
Payer: MEDICARE

## 2024-01-14 ENCOUNTER — HOSPITAL ENCOUNTER (INPATIENT)
Age: 74
LOS: 6 days | Discharge: HOME | End: 2024-01-20
Attending: EMERGENCY MEDICINE | Admitting: INTERNAL MEDICINE
Payer: MEDICARE

## 2024-01-14 ENCOUNTER — APPOINTMENT (OUTPATIENT)
Dept: CT IMAGING | Age: 74
End: 2024-01-14
Payer: MEDICARE

## 2024-01-14 DIAGNOSIS — R10.84 GENERALIZED ABDOMINAL PAIN: Primary | ICD-10-CM

## 2024-01-14 DIAGNOSIS — E11.42 DIABETIC POLYNEUROPATHY ASSOCIATED WITH TYPE 2 DIABETES MELLITUS (HCC): ICD-10-CM

## 2024-01-14 DIAGNOSIS — I95.9 HYPOTENSION, UNSPECIFIED HYPOTENSION TYPE: ICD-10-CM

## 2024-01-14 PROBLEM — A41.9 SEPSIS (HCC): Status: ACTIVE | Noted: 2024-01-14

## 2024-01-14 PROBLEM — A04.9 BACTERIAL GASTROENTERITIS: Status: ACTIVE | Noted: 2024-01-14

## 2024-01-14 PROBLEM — R10.9 INTRACTABLE ABDOMINAL PAIN: Status: ACTIVE | Noted: 2024-01-14

## 2024-01-14 PROBLEM — E87.20 METABOLIC ACIDOSIS: Status: ACTIVE | Noted: 2024-01-14

## 2024-01-14 LAB
ABO/RH: NORMAL
ALBUMIN SERPL-MCNC: 3.9 G/DL (ref 3.5–5.2)
ALP SERPL-CCNC: 77 U/L (ref 40–130)
ALT SERPL-CCNC: 21 U/L (ref 5–41)
ANION GAP SERPL CALCULATED.3IONS-SCNC: 15 MMOL/L (ref 7–19)
ANTIBODY SCREEN: NORMAL
AST SERPL-CCNC: 31 U/L (ref 5–40)
BACTERIA URNS QL MICRO: NEGATIVE /HPF
BASOPHILS # BLD: 0 K/UL (ref 0–0.2)
BASOPHILS # BLD: 0 K/UL (ref 0–0.2)
BASOPHILS NFR BLD: 0.1 % (ref 0–1)
BASOPHILS NFR BLD: 0.1 % (ref 0–1)
BILIRUB SERPL-MCNC: 0.6 MG/DL (ref 0.2–1.2)
BILIRUB UR QL STRIP: NEGATIVE
BNP BLD-MCNC: 306 PG/ML (ref 0–124)
BUN SERPL-MCNC: 24 MG/DL (ref 8–23)
CALCIUM SERPL-MCNC: 10 MG/DL (ref 8.8–10.2)
CHLORIDE SERPL-SCNC: 98 MMOL/L (ref 98–111)
CLARITY UR: CLEAR
CO2 SERPL-SCNC: 19 MMOL/L (ref 22–29)
COLOR UR: YELLOW
CREAT SERPL-MCNC: 1.3 MG/DL (ref 0.5–1.2)
CRYSTALS URNS MICRO: ABNORMAL /HPF
EOSINOPHIL # BLD: 0 K/UL (ref 0–0.6)
EOSINOPHIL # BLD: 0.1 K/UL (ref 0–0.6)
EOSINOPHIL NFR BLD: 0.2 % (ref 0–5)
EOSINOPHIL NFR BLD: 0.6 % (ref 0–5)
EPI CELLS #/AREA URNS AUTO: 1 /HPF (ref 0–5)
ERYTHROCYTE [DISTWIDTH] IN BLOOD BY AUTOMATED COUNT: 13.3 % (ref 11.5–14.5)
ERYTHROCYTE [DISTWIDTH] IN BLOOD BY AUTOMATED COUNT: 13.3 % (ref 11.5–14.5)
GLUCOSE BLD-MCNC: 190 MG/DL (ref 70–99)
GLUCOSE SERPL-MCNC: 167 MG/DL (ref 74–109)
GLUCOSE UR STRIP.AUTO-MCNC: 100 MG/DL
HBA1C MFR BLD: 5.5 % (ref 4–6)
HCT VFR BLD AUTO: 45 % (ref 42–52)
HCT VFR BLD AUTO: 50.6 % (ref 42–52)
HGB BLD-MCNC: 15.5 G/DL (ref 14–18)
HGB BLD-MCNC: 17.2 G/DL (ref 14–18)
HGB UR STRIP.AUTO-MCNC: NEGATIVE MG/L
HYALINE CASTS #/AREA URNS AUTO: 3 /HPF (ref 0–8)
IMM GRANULOCYTES # BLD: 0 K/UL
IMM GRANULOCYTES # BLD: 0 K/UL
INR PPP: 0.95 (ref 0.88–1.18)
KETONES UR STRIP.AUTO-MCNC: ABNORMAL MG/DL
LACTATE BLDV-SCNC: 1.8 MG/DL (ref 0.5–1.9)
LACTATE BLDV-SCNC: 2.2 MMOL/L (ref 0.5–1.9)
LACTATE BLDV-SCNC: 3 MMOL/L (ref 0.5–1.9)
LEUKOCYTE ESTERASE UR QL STRIP.AUTO: ABNORMAL
LIPASE SERPL-CCNC: 49 U/L (ref 13–60)
LYMPHOCYTES # BLD: 0.4 K/UL (ref 1.1–4.5)
LYMPHOCYTES # BLD: 4 K/UL (ref 1.1–4.5)
LYMPHOCYTES NFR BLD: 36.4 % (ref 20–40)
LYMPHOCYTES NFR BLD: 5 % (ref 20–40)
MCH RBC QN AUTO: 33 PG (ref 27–31)
MCH RBC QN AUTO: 33.2 PG (ref 27–31)
MCHC RBC AUTO-ENTMCNC: 34 G/DL (ref 33–37)
MCHC RBC AUTO-ENTMCNC: 34.4 G/DL (ref 33–37)
MCV RBC AUTO: 95.9 FL (ref 80–94)
MCV RBC AUTO: 97.7 FL (ref 80–94)
MONOCYTES # BLD: 0.3 K/UL (ref 0–0.9)
MONOCYTES # BLD: 0.5 K/UL (ref 0–0.9)
MONOCYTES NFR BLD: 2.5 % (ref 0–10)
MONOCYTES NFR BLD: 5.5 % (ref 0–10)
NEUTROPHILS # BLD: 6.6 K/UL (ref 1.5–7.5)
NEUTROPHILS # BLD: 7.6 K/UL (ref 1.5–7.5)
NEUTS SEG NFR BLD: 60.5 % (ref 50–65)
NEUTS SEG NFR BLD: 88.7 % (ref 50–65)
NITRITE UR QL STRIP.AUTO: NEGATIVE
PERFORMED ON: ABNORMAL
PH UR STRIP.AUTO: 5 [PH] (ref 5–8)
PLATELET # BLD AUTO: 121 K/UL (ref 130–400)
PLATELET # BLD AUTO: 156 K/UL (ref 130–400)
PMV BLD AUTO: 9.3 FL (ref 9.4–12.4)
PMV BLD AUTO: 9.8 FL (ref 9.4–12.4)
POTASSIUM SERPL-SCNC: 4.9 MMOL/L (ref 3.5–5)
PROCALCITONIN: 0.1 NG/ML (ref 0–0.09)
PROT SERPL-MCNC: 7.5 G/DL (ref 6.6–8.7)
PROT UR STRIP.AUTO-MCNC: 30 MG/DL
PROTHROMBIN TIME: 12.4 SEC (ref 12–14.6)
RBC # BLD AUTO: 4.69 M/UL (ref 4.7–6.1)
RBC # BLD AUTO: 5.18 M/UL (ref 4.7–6.1)
RBC #/AREA URNS AUTO: 2 /HPF (ref 0–4)
REASON FOR REJECTION: NORMAL
REJECTED TEST: NORMAL
SODIUM SERPL-SCNC: 132 MMOL/L (ref 136–145)
SP GR UR STRIP.AUTO: 1.03 (ref 1–1.03)
T4 FREE SERPL-MCNC: 1.14 NG/DL (ref 0.93–1.7)
TROPONIN, HIGH SENSITIVITY: 16 NG/L (ref 0–22)
TSH SERPL DL<=0.005 MIU/L-ACNC: 4.43 UIU/ML (ref 0.35–5.5)
UROBILINOGEN UR STRIP.AUTO-MCNC: 0.2 E.U./DL
WBC # BLD AUTO: 10.9 K/UL (ref 4.8–10.8)
WBC # BLD AUTO: 8.6 K/UL (ref 4.8–10.8)
WBC #/AREA URNS AUTO: 6 /HPF (ref 0–5)

## 2024-01-14 PROCEDURE — 84484 ASSAY OF TROPONIN QUANT: CPT

## 2024-01-14 PROCEDURE — 93005 ELECTROCARDIOGRAM TRACING: CPT | Performed by: EMERGENCY MEDICINE

## 2024-01-14 PROCEDURE — 2580000003 HC RX 258: Performed by: EMERGENCY MEDICINE

## 2024-01-14 PROCEDURE — 84145 PROCALCITONIN (PCT): CPT

## 2024-01-14 PROCEDURE — 99285 EMERGENCY DEPT VISIT HI MDM: CPT

## 2024-01-14 PROCEDURE — 84443 ASSAY THYROID STIM HORMONE: CPT

## 2024-01-14 PROCEDURE — 83605 ASSAY OF LACTIC ACID: CPT

## 2024-01-14 PROCEDURE — 3E043XZ INTRODUCTION OF VASOPRESSOR INTO CENTRAL VEIN, PERCUTANEOUS APPROACH: ICD-10-PCS | Performed by: HOSPITALIST

## 2024-01-14 PROCEDURE — 87040 BLOOD CULTURE FOR BACTERIA: CPT

## 2024-01-14 PROCEDURE — 6360000002 HC RX W HCPCS: Performed by: EMERGENCY MEDICINE

## 2024-01-14 PROCEDURE — 85610 PROTHROMBIN TIME: CPT

## 2024-01-14 PROCEDURE — 2500000003 HC RX 250 WO HCPCS

## 2024-01-14 PROCEDURE — 96375 TX/PRO/DX INJ NEW DRUG ADDON: CPT

## 2024-01-14 PROCEDURE — 96366 THER/PROPH/DIAG IV INF ADDON: CPT

## 2024-01-14 PROCEDURE — 2500000003 HC RX 250 WO HCPCS: Performed by: HOSPITALIST

## 2024-01-14 PROCEDURE — 86850 RBC ANTIBODY SCREEN: CPT

## 2024-01-14 PROCEDURE — 86900 BLOOD TYPING SEROLOGIC ABO: CPT

## 2024-01-14 PROCEDURE — 74022 RADEX COMPL AQT ABD SERIES: CPT

## 2024-01-14 PROCEDURE — 1210000000 HC MED SURG R&B

## 2024-01-14 PROCEDURE — 6360000002 HC RX W HCPCS: Performed by: HOSPITALIST

## 2024-01-14 PROCEDURE — 6370000000 HC RX 637 (ALT 250 FOR IP): Performed by: HOSPITALIST

## 2024-01-14 PROCEDURE — 80053 COMPREHEN METABOLIC PANEL: CPT

## 2024-01-14 PROCEDURE — 86901 BLOOD TYPING SEROLOGIC RH(D): CPT

## 2024-01-14 PROCEDURE — 96367 TX/PROPH/DG ADDL SEQ IV INF: CPT

## 2024-01-14 PROCEDURE — 74174 CTA ABD&PLVS W/CONTRAST: CPT

## 2024-01-14 PROCEDURE — 96365 THER/PROPH/DIAG IV INF INIT: CPT

## 2024-01-14 PROCEDURE — 83690 ASSAY OF LIPASE: CPT

## 2024-01-14 PROCEDURE — 36415 COLL VENOUS BLD VENIPUNCTURE: CPT

## 2024-01-14 PROCEDURE — 96361 HYDRATE IV INFUSION ADD-ON: CPT

## 2024-01-14 PROCEDURE — 71275 CT ANGIOGRAPHY CHEST: CPT

## 2024-01-14 PROCEDURE — 83036 HEMOGLOBIN GLYCOSYLATED A1C: CPT

## 2024-01-14 PROCEDURE — 81001 URINALYSIS AUTO W/SCOPE: CPT

## 2024-01-14 PROCEDURE — 87086 URINE CULTURE/COLONY COUNT: CPT

## 2024-01-14 PROCEDURE — 6360000004 HC RX CONTRAST MEDICATION: Performed by: EMERGENCY MEDICINE

## 2024-01-14 PROCEDURE — 83880 ASSAY OF NATRIURETIC PEPTIDE: CPT

## 2024-01-14 PROCEDURE — 84439 ASSAY OF FREE THYROXINE: CPT

## 2024-01-14 PROCEDURE — 85025 COMPLETE CBC W/AUTO DIFF WBC: CPT

## 2024-01-14 PROCEDURE — 2580000003 HC RX 258: Performed by: HOSPITALIST

## 2024-01-14 RX ORDER — NITROGLYCERIN 0.4 MG/1
0.4 TABLET SUBLINGUAL EVERY 5 MIN PRN
Status: DISCONTINUED | OUTPATIENT
Start: 2024-01-14 | End: 2024-01-20 | Stop reason: HOSPADM

## 2024-01-14 RX ORDER — OXYCODONE HYDROCHLORIDE 5 MG/1
5 TABLET ORAL EVERY 4 HOURS PRN
Status: DISCONTINUED | OUTPATIENT
Start: 2024-01-14 | End: 2024-01-16

## 2024-01-14 RX ORDER — NOREPINEPHRINE BITARTRATE 0.06 MG/ML
1-100 INJECTION, SOLUTION INTRAVENOUS CONTINUOUS
Status: DISCONTINUED | OUTPATIENT
Start: 2024-01-14 | End: 2024-01-14

## 2024-01-14 RX ORDER — POTASSIUM CHLORIDE 20 MEQ/1
40 TABLET, EXTENDED RELEASE ORAL PRN
Status: DISCONTINUED | OUTPATIENT
Start: 2024-01-14 | End: 2024-01-20 | Stop reason: HOSPADM

## 2024-01-14 RX ORDER — MEMANTINE HYDROCHLORIDE 5 MG/1
10 TABLET ORAL 2 TIMES DAILY
Status: DISCONTINUED | OUTPATIENT
Start: 2024-01-14 | End: 2024-01-20 | Stop reason: HOSPADM

## 2024-01-14 RX ORDER — MORPHINE SULFATE 4 MG/ML
4 INJECTION, SOLUTION INTRAMUSCULAR; INTRAVENOUS ONCE
Status: COMPLETED | OUTPATIENT
Start: 2024-01-14 | End: 2024-01-14

## 2024-01-14 RX ORDER — METRONIDAZOLE 500 MG/100ML
500 INJECTION, SOLUTION INTRAVENOUS EVERY 8 HOURS
Status: DISCONTINUED | OUTPATIENT
Start: 2024-01-15 | End: 2024-01-20 | Stop reason: HOSPADM

## 2024-01-14 RX ORDER — TIZANIDINE 4 MG/1
4 TABLET ORAL EVERY 8 HOURS PRN
Status: DISCONTINUED | OUTPATIENT
Start: 2024-01-14 | End: 2024-01-16

## 2024-01-14 RX ORDER — ACETAMINOPHEN 650 MG/1
650 SUPPOSITORY RECTAL EVERY 4 HOURS PRN
Status: DISCONTINUED | OUTPATIENT
Start: 2024-01-14 | End: 2024-01-20 | Stop reason: HOSPADM

## 2024-01-14 RX ORDER — SODIUM CHLORIDE 0.9 % (FLUSH) 0.9 %
5-40 SYRINGE (ML) INJECTION PRN
Status: DISCONTINUED | OUTPATIENT
Start: 2024-01-14 | End: 2024-01-15 | Stop reason: SDUPTHER

## 2024-01-14 RX ORDER — POLYETHYLENE GLYCOL 3350 17 G/17G
17 POWDER, FOR SOLUTION ORAL DAILY PRN
Status: DISCONTINUED | OUTPATIENT
Start: 2024-01-14 | End: 2024-01-17

## 2024-01-14 RX ORDER — MORPHINE SULFATE 4 MG/ML
4 INJECTION, SOLUTION INTRAMUSCULAR; INTRAVENOUS EVERY 4 HOURS PRN
Status: DISCONTINUED | OUTPATIENT
Start: 2024-01-14 | End: 2024-01-16

## 2024-01-14 RX ORDER — CITALOPRAM HYDROBROMIDE 10 MG/1
10 TABLET ORAL DAILY
Status: DISCONTINUED | OUTPATIENT
Start: 2024-01-15 | End: 2024-01-20 | Stop reason: HOSPADM

## 2024-01-14 RX ORDER — ONDANSETRON 4 MG/1
4 TABLET, ORALLY DISINTEGRATING ORAL EVERY 8 HOURS PRN
Status: DISCONTINUED | OUTPATIENT
Start: 2024-01-14 | End: 2024-01-20 | Stop reason: HOSPADM

## 2024-01-14 RX ORDER — ONDANSETRON 2 MG/ML
4 INJECTION INTRAMUSCULAR; INTRAVENOUS ONCE
Status: COMPLETED | OUTPATIENT
Start: 2024-01-14 | End: 2024-01-14

## 2024-01-14 RX ORDER — MORPHINE SULFATE 2 MG/ML
1 INJECTION, SOLUTION INTRAMUSCULAR; INTRAVENOUS EVERY 4 HOURS PRN
Status: DISCONTINUED | OUTPATIENT
Start: 2024-01-14 | End: 2024-01-16

## 2024-01-14 RX ORDER — SODIUM CHLORIDE 0.9 % (FLUSH) 0.9 %
5-40 SYRINGE (ML) INJECTION EVERY 12 HOURS SCHEDULED
Status: DISCONTINUED | OUTPATIENT
Start: 2024-01-14 | End: 2024-01-15 | Stop reason: SDUPTHER

## 2024-01-14 RX ORDER — SODIUM CHLORIDE 9 MG/ML
INJECTION, SOLUTION INTRAVENOUS PRN
Status: DISCONTINUED | OUTPATIENT
Start: 2024-01-14 | End: 2024-01-15 | Stop reason: SDUPTHER

## 2024-01-14 RX ORDER — ACETAMINOPHEN 500 MG
1000 TABLET ORAL EVERY 8 HOURS SCHEDULED
Status: DISCONTINUED | OUTPATIENT
Start: 2024-01-14 | End: 2024-01-20 | Stop reason: HOSPADM

## 2024-01-14 RX ORDER — MORPHINE SULFATE 2 MG/ML
2 INJECTION, SOLUTION INTRAMUSCULAR; INTRAVENOUS EVERY 4 HOURS PRN
Status: DISCONTINUED | OUTPATIENT
Start: 2024-01-14 | End: 2024-01-16

## 2024-01-14 RX ORDER — MECOBALAMIN 5000 MCG
5 TABLET,DISINTEGRATING ORAL NIGHTLY PRN
Status: DISCONTINUED | OUTPATIENT
Start: 2024-01-14 | End: 2024-01-20 | Stop reason: HOSPADM

## 2024-01-14 RX ORDER — SODIUM CHLORIDE 9 MG/ML
INJECTION, SOLUTION INTRAVENOUS PRN
Status: DISCONTINUED | OUTPATIENT
Start: 2024-01-14 | End: 2024-01-20 | Stop reason: HOSPADM

## 2024-01-14 RX ORDER — MAGNESIUM SULFATE IN WATER 40 MG/ML
2000 INJECTION, SOLUTION INTRAVENOUS PRN
Status: DISCONTINUED | OUTPATIENT
Start: 2024-01-14 | End: 2024-01-20 | Stop reason: HOSPADM

## 2024-01-14 RX ORDER — ACETAMINOPHEN 325 MG/1
650 TABLET ORAL EVERY 4 HOURS PRN
Status: DISCONTINUED | OUTPATIENT
Start: 2024-01-14 | End: 2024-01-20 | Stop reason: HOSPADM

## 2024-01-14 RX ORDER — SODIUM CHLORIDE, SODIUM LACTATE, POTASSIUM CHLORIDE, AND CALCIUM CHLORIDE .6; .31; .03; .02 G/100ML; G/100ML; G/100ML; G/100ML
30 INJECTION, SOLUTION INTRAVENOUS ONCE
Status: COMPLETED | OUTPATIENT
Start: 2024-01-14 | End: 2024-01-14

## 2024-01-14 RX ORDER — ENOXAPARIN SODIUM 100 MG/ML
30 INJECTION SUBCUTANEOUS 2 TIMES DAILY
Status: DISCONTINUED | OUTPATIENT
Start: 2024-01-14 | End: 2024-01-15

## 2024-01-14 RX ORDER — CETIRIZINE HYDROCHLORIDE 10 MG/1
10 TABLET ORAL DAILY PRN
Status: DISCONTINUED | OUTPATIENT
Start: 2024-01-14 | End: 2024-01-20 | Stop reason: HOSPADM

## 2024-01-14 RX ORDER — GABAPENTIN 600 MG/1
600 TABLET ORAL 3 TIMES DAILY
Status: DISCONTINUED | OUTPATIENT
Start: 2024-01-14 | End: 2024-01-17

## 2024-01-14 RX ORDER — ONDANSETRON 2 MG/ML
4 INJECTION INTRAMUSCULAR; INTRAVENOUS EVERY 6 HOURS PRN
Status: DISCONTINUED | OUTPATIENT
Start: 2024-01-14 | End: 2024-01-19 | Stop reason: SDUPTHER

## 2024-01-14 RX ORDER — PANTOPRAZOLE SODIUM 40 MG/1
40 TABLET, DELAYED RELEASE ORAL
Status: DISCONTINUED | OUTPATIENT
Start: 2024-01-15 | End: 2024-01-15

## 2024-01-14 RX ORDER — PRAVASTATIN SODIUM 20 MG
80 TABLET ORAL NIGHTLY
Status: DISCONTINUED | OUTPATIENT
Start: 2024-01-14 | End: 2024-01-20 | Stop reason: HOSPADM

## 2024-01-14 RX ORDER — OXYCODONE HYDROCHLORIDE 10 MG/1
10 TABLET ORAL EVERY 4 HOURS PRN
Status: DISCONTINUED | OUTPATIENT
Start: 2024-01-14 | End: 2024-01-16

## 2024-01-14 RX ORDER — TRAZODONE HYDROCHLORIDE 50 MG/1
50 TABLET ORAL NIGHTLY
Status: DISCONTINUED | OUTPATIENT
Start: 2024-01-14 | End: 2024-01-20 | Stop reason: HOSPADM

## 2024-01-14 RX ORDER — SODIUM CHLORIDE 0.9 % (FLUSH) 0.9 %
5-40 SYRINGE (ML) INJECTION EVERY 12 HOURS SCHEDULED
Status: DISCONTINUED | OUTPATIENT
Start: 2024-01-14 | End: 2024-01-15

## 2024-01-14 RX ORDER — TAMSULOSIN HYDROCHLORIDE 0.4 MG/1
0.4 CAPSULE ORAL DAILY
Status: DISCONTINUED | OUTPATIENT
Start: 2024-01-15 | End: 2024-01-20 | Stop reason: HOSPADM

## 2024-01-14 RX ORDER — FENTANYL CITRATE 50 UG/ML
50 INJECTION, SOLUTION INTRAMUSCULAR; INTRAVENOUS ONCE
Status: COMPLETED | OUTPATIENT
Start: 2024-01-14 | End: 2024-01-14

## 2024-01-14 RX ORDER — MECLIZINE HYDROCHLORIDE 25 MG/1
25 TABLET ORAL 3 TIMES DAILY PRN
Status: DISCONTINUED | OUTPATIENT
Start: 2024-01-14 | End: 2024-01-20 | Stop reason: HOSPADM

## 2024-01-14 RX ORDER — AZELASTINE 1 MG/ML
1 SPRAY, METERED NASAL 2 TIMES DAILY
Status: DISCONTINUED | OUTPATIENT
Start: 2024-01-14 | End: 2024-01-14

## 2024-01-14 RX ORDER — AMLODIPINE BESYLATE 5 MG/1
5 TABLET ORAL DAILY
Status: DISCONTINUED | OUTPATIENT
Start: 2024-01-15 | End: 2024-01-19

## 2024-01-14 RX ORDER — ASPIRIN 81 MG/1
81 TABLET, CHEWABLE ORAL DAILY
Status: DISCONTINUED | OUTPATIENT
Start: 2024-01-15 | End: 2024-01-20 | Stop reason: HOSPADM

## 2024-01-14 RX ORDER — NOREPINEPHRINE BITARTRATE 0.06 MG/ML
INJECTION, SOLUTION INTRAVENOUS
Status: COMPLETED
Start: 2024-01-14 | End: 2024-01-14

## 2024-01-14 RX ORDER — ONDANSETRON 2 MG/ML
4 INJECTION INTRAMUSCULAR; INTRAVENOUS EVERY 6 HOURS PRN
Status: DISCONTINUED | OUTPATIENT
Start: 2024-01-14 | End: 2024-01-20 | Stop reason: HOSPADM

## 2024-01-14 RX ORDER — METRONIDAZOLE 500 MG/100ML
500 INJECTION, SOLUTION INTRAVENOUS ONCE
Status: COMPLETED | OUTPATIENT
Start: 2024-01-14 | End: 2024-01-14

## 2024-01-14 RX ORDER — DONEPEZIL HYDROCHLORIDE 5 MG/1
5 TABLET, FILM COATED ORAL NIGHTLY
Status: DISCONTINUED | OUTPATIENT
Start: 2024-01-14 | End: 2024-01-20 | Stop reason: HOSPADM

## 2024-01-14 RX ORDER — POTASSIUM CHLORIDE 7.45 MG/ML
10 INJECTION INTRAVENOUS PRN
Status: DISCONTINUED | OUTPATIENT
Start: 2024-01-14 | End: 2024-01-20 | Stop reason: HOSPADM

## 2024-01-14 RX ORDER — LISINOPRIL 20 MG/1
40 TABLET ORAL DAILY
Status: DISCONTINUED | OUTPATIENT
Start: 2024-01-15 | End: 2024-01-20 | Stop reason: HOSPADM

## 2024-01-14 RX ORDER — SODIUM CHLORIDE 0.9 % (FLUSH) 0.9 %
5-40 SYRINGE (ML) INJECTION PRN
Status: DISCONTINUED | OUTPATIENT
Start: 2024-01-14 | End: 2024-01-20 | Stop reason: HOSPADM

## 2024-01-14 RX ORDER — MECOBALAMIN 5000 MCG
5 TABLET,DISINTEGRATING ORAL NIGHTLY
Status: DISCONTINUED | OUTPATIENT
Start: 2024-01-14 | End: 2024-01-20 | Stop reason: HOSPADM

## 2024-01-14 RX ORDER — CALCIUM CARBONATE 500 MG/1
500 TABLET, CHEWABLE ORAL 3 TIMES DAILY PRN
Status: DISCONTINUED | OUTPATIENT
Start: 2024-01-14 | End: 2024-01-20 | Stop reason: HOSPADM

## 2024-01-14 RX ORDER — OXYCODONE HYDROCHLORIDE 5 MG/1
2.5 TABLET ORAL EVERY 4 HOURS PRN
Status: DISCONTINUED | OUTPATIENT
Start: 2024-01-14 | End: 2024-01-16

## 2024-01-14 RX ADMIN — ACETAMINOPHEN 1000 MG: 500 TABLET ORAL at 23:41

## 2024-01-14 RX ADMIN — TIZANIDINE 4 MG: 4 TABLET ORAL at 23:41

## 2024-01-14 RX ADMIN — ENOXAPARIN SODIUM 30 MG: 100 INJECTION SUBCUTANEOUS at 23:42

## 2024-01-14 RX ADMIN — ONDANSETRON 4 MG: 2 INJECTION INTRAMUSCULAR; INTRAVENOUS at 15:21

## 2024-01-14 RX ADMIN — METRONIDAZOLE 500 MG: 500 INJECTION, SOLUTION INTRAVENOUS at 23:40

## 2024-01-14 RX ADMIN — IOPAMIDOL 70 ML: 755 INJECTION, SOLUTION INTRAVENOUS at 18:52

## 2024-01-14 RX ADMIN — SODIUM BICARBONATE: 84 INJECTION, SOLUTION INTRAVENOUS at 23:08

## 2024-01-14 RX ADMIN — FENTANYL CITRATE 50 MCG: 50 INJECTION INTRAMUSCULAR; INTRAVENOUS at 15:21

## 2024-01-14 RX ADMIN — SODIUM CHLORIDE, POTASSIUM CHLORIDE, SODIUM LACTATE AND CALCIUM CHLORIDE 1000 ML: 600; 310; 30; 20 INJECTION, SOLUTION INTRAVENOUS at 15:02

## 2024-01-14 RX ADMIN — MORPHINE SULFATE 4 MG: 4 INJECTION, SOLUTION INTRAMUSCULAR; INTRAVENOUS at 19:56

## 2024-01-14 RX ADMIN — Medication 4 MCG/MIN: at 15:07

## 2024-01-14 RX ADMIN — SODIUM CHLORIDE, PRESERVATIVE FREE 10 ML: 5 INJECTION INTRAVENOUS at 23:40

## 2024-01-14 RX ADMIN — CEFEPIME 2000 MG: 2 INJECTION, POWDER, FOR SOLUTION INTRAVENOUS at 15:25

## 2024-01-14 RX ADMIN — PRAVASTATIN SODIUM 80 MG: 20 TABLET ORAL at 23:40

## 2024-01-14 RX ADMIN — NOREPINEPHRINE BITARTRATE 4 MCG/MIN: 0.06 INJECTION, SOLUTION INTRAVENOUS at 15:07

## 2024-01-14 RX ADMIN — GABAPENTIN 600 MG: 600 TABLET, FILM COATED ORAL at 23:40

## 2024-01-14 RX ADMIN — Medication 5 MG: at 23:41

## 2024-01-14 RX ADMIN — DONEPEZIL HYDROCHLORIDE 5 MG: 5 TABLET, FILM COATED ORAL at 23:41

## 2024-01-14 RX ADMIN — METRONIDAZOLE 500 MG: 500 INJECTION, SOLUTION INTRAVENOUS at 15:59

## 2024-01-14 RX ADMIN — TRAZODONE HYDROCHLORIDE 50 MG: 50 TABLET ORAL at 23:40

## 2024-01-14 RX ADMIN — CEFEPIME 2000 MG: 2 INJECTION, POWDER, FOR SOLUTION INTRAVENOUS at 23:39

## 2024-01-14 RX ADMIN — ONDANSETRON 4 MG: 2 INJECTION INTRAMUSCULAR; INTRAVENOUS at 21:10

## 2024-01-14 SDOH — ECONOMIC STABILITY: INCOME INSECURITY: HOW HARD IS IT FOR YOU TO PAY FOR THE VERY BASICS LIKE FOOD, HOUSING, MEDICAL CARE, AND HEATING?: NOT HARD AT ALL

## 2024-01-14 SDOH — ECONOMIC STABILITY: FOOD INSECURITY: WITHIN THE PAST 12 MONTHS, YOU WORRIED THAT YOUR FOOD WOULD RUN OUT BEFORE YOU GOT MONEY TO BUY MORE.: NEVER TRUE

## 2024-01-14 SDOH — ECONOMIC STABILITY: INCOME INSECURITY: IN THE PAST 12 MONTHS, HAS THE ELECTRIC, GAS, OIL, OR WATER COMPANY THREATENED TO SHUT OFF SERVICE IN YOUR HOME?: NO

## 2024-01-14 ASSESSMENT — PATIENT HEALTH QUESTIONNAIRE - PHQ9
SUM OF ALL RESPONSES TO PHQ QUESTIONS 1-9: 0
SUM OF ALL RESPONSES TO PHQ QUESTIONS 1-9: 0
SUM OF ALL RESPONSES TO PHQ9 QUESTIONS 1 & 2: 0
SUM OF ALL RESPONSES TO PHQ QUESTIONS 1-9: 0
1. LITTLE INTEREST OR PLEASURE IN DOING THINGS: 0
SUM OF ALL RESPONSES TO PHQ QUESTIONS 1-9: 0
2. FEELING DOWN, DEPRESSED OR HOPELESS: 0

## 2024-01-14 ASSESSMENT — PAIN SCALES - GENERAL: PAINLEVEL_OUTOF10: 3

## 2024-01-14 ASSESSMENT — PAIN DESCRIPTION - ORIENTATION: ORIENTATION: MID

## 2024-01-14 ASSESSMENT — PAIN DESCRIPTION - LOCATION: LOCATION: ABDOMEN

## 2024-01-14 ASSESSMENT — PAIN DESCRIPTION - DESCRIPTORS: DESCRIPTORS: TENDER

## 2024-01-14 NOTE — ED PROVIDER NOTES
[facility] EMERGENCY DEPT  EMERGENCY DEPARTMENT ENCOUNTER      Pt Name: Carlos Ramirez  MRN: [id number]  Birthdate 1950  Date of evaluation: 1/14/2024  Provider: John Flynn Jr, MD    CHIEF COMPLAINT       Chief Complaint   Patient presents with    Abdominal Pain     X 3 weeks         HISTORY OF PRESENT ILLNESS   (Location/Symptom, Timing/Onset,Context/Setting, Quality, Duration, Modifying Factors, Severity)  Note limiting factors.   Carlos Ramirez is a 73 y.o. male who presents to the emergency department for evaluation after having onset of abdominal pain and generalized weakness.  Family reported to EMS that patient had gone to the bathroom and while he was there started having pain.  Says they went to help him but he was so weak they were having difficulty assisting him up off of the floor because of the weakness    Patient says that he is having abdominal pain and points to the epigastric and supraumbilical area.  Says that that just started today.  Denies any associated nausea, vomiting, constipation, diarrhea.  Says that he is having back pain as well but says that that is chronic and that he gets injections in his back for that.    Per medical records, patient has history of type 2 diabetes, COPD, CAD, ascending aortic aneurysm, and Alzheimer's    HPI    NursingNotes were reviewed.    REVIEW OF SYSTEMS    (2-9 systems for level 4, 10 or more for level 5)     Review of Systems   Unable to perform ROS: Acuity of condition   Constitutional:  Positive for fatigue.   Gastrointestinal:  Positive for abdominal pain.   Musculoskeletal:  Positive for back pain.   Neurological:  Positive for weakness.       A complete review of systems was performed and is negative except as noted above in the HPI.       PAST MEDICAL HISTORY     Past Medical History:   Diagnosis Date    Arthritis     CAD (coronary artery disease)     sees dr. zhou    Chronic back pain     COPD (chronic obstructive pulmonary disease) ([facility])

## 2024-01-14 NOTE — ED PROVIDER NOTES
[facility] EMERGENCY DEPT  eMERGENCY dEPARTMENT eNCOUnter      Pt Name: Carlos Ramirez  MRN: [id number]  Birthdate 1950  Date of evaluation: 1/14/2024  Provider: Gaby Garcia MD    CHIEF COMPLAINT       Chief Complaint   Patient presents with    Abdominal Pain     X 3 weeks     Assumed care from Dr Flynn at end of shift pending chemistries and CTA of chest and abd. Hypotensive on arrival. Possible bowel obstruction on AAS. Bp improved with IVF.     Cta chest with stable aneurysm, no PE  CTA abd/pelvis with possible enteritis vs partial SBO.  On exam, pt has bowel sounds but still very tender.    D/w Dr Tellez Will admit obs for serial examinations and FU culture given pt's toxic appearance on arrival.     PHYSICAL EXAM    (up to 7 for level 4, 8 or more for level 5)     ED Triage Vitals   BP Temp Temp Source Pulse Respirations SpO2 Height Weight - Scale   01/14/24 1449 01/14/24 1449 01/14/24 1449 01/14/24 1449 01/14/24 1449 01/14/24 1449 01/14/24 1532 01/14/24 1532   (!) 71/56 97.6 °F (36.4 °C) Axillary 65 22 99 % 1.778 m (5' 10\") 106.6 kg (235 lb)       Physical Exam    DIAGNOSTIC RESULTS     EKG: All EKG's are interpreted by [facility] who either signs or Co-signs this chart in the absence of a cardiologist.    19:13 EKG NSR rate 95 nonspecific st similar to prior    RADIOLOGY:   Non-plain film images such as CT, Ultrasound and MRI are read by the radiologist. Plain radiographic images are visualized and preliminarily interpreted by the emergencyphysician with the below findings:        Interpretation per the Radiologist below, if available at the time of thisnote:    CTA CHEST W WO CONTRAST   Final Result   1.  Pulmonary arteries have no filling defects to indicate thromboemboli.   10.  The ascending thoracic aorta has mild aneurysmal caliber at 3.7 cm.  No aortic intimal dissection or significant atherosclerotic disease.   3.  Mild cardiomegaly.   4.  Chronic-appearing interstitial changes  below, none      Procedures    FINAL IMPRESSION      1. Generalized abdominal pain    2. Hypotension, unspecified hypotension type          DISPOSITION/PLAN   DISPOSITION Admitted    PATIENT REFERRED TO:  No follow-up provider specified.    DISCHARGE MEDICATIONS:  New Prescriptions    No medications on file          (Please note that portions of this note were completed with a voice recognition program.  Efforts were made to edit the dictations but occasionally words aremis-transcribed.)    Gaby Garcia MD (electronically signed)  Attending Emergency Physician            Gaby Garcia MD  01/14/24 2009

## 2024-01-15 ENCOUNTER — APPOINTMENT (OUTPATIENT)
Dept: GENERAL RADIOLOGY | Age: 74
End: 2024-01-15
Payer: MEDICARE

## 2024-01-15 LAB
ALBUMIN SERPL-MCNC: 3.1 G/DL (ref 3.5–5.2)
ALLENS TEST: ABNORMAL
ALP SERPL-CCNC: 40 U/L (ref 40–130)
ALT SERPL-CCNC: 15 U/L (ref 5–41)
ANION GAP SERPL CALCULATED.3IONS-SCNC: 6 MMOL/L (ref 7–19)
AST SERPL-CCNC: 20 U/L (ref 5–40)
B PARAP IS1001 DNA NPH QL NAA+NON-PROBE: NOT DETECTED
B PERT.PT PRMT NPH QL NAA+NON-PROBE: NOT DETECTED
BASE EXCESS ARTERIAL: 0.8 MMOL/L (ref -2–2)
BASOPHILS # BLD: 0 K/UL (ref 0–0.2)
BASOPHILS NFR BLD: 0.3 % (ref 0–1)
BILIRUB SERPL-MCNC: 0.7 MG/DL (ref 0.2–1.2)
BNP BLD-MCNC: 840 PG/ML (ref 0–124)
BUN SERPL-MCNC: 32 MG/DL (ref 8–23)
C PNEUM DNA NPH QL NAA+NON-PROBE: NOT DETECTED
CALCIUM SERPL-MCNC: 8 MG/DL (ref 8.8–10.2)
CARBOXYHEMOGLOBIN ARTERIAL: 2.1 % (ref 0–5)
CHLORIDE SERPL-SCNC: 106 MMOL/L (ref 98–111)
CO2 SERPL-SCNC: 25 MMOL/L (ref 22–29)
CREAT SERPL-MCNC: 1.7 MG/DL (ref 0.5–1.2)
EKG P AXIS: 29 DEGREES
EKG P AXIS: 31 DEGREES
EKG P-R INTERVAL: 136 MS
EKG P-R INTERVAL: 152 MS
EKG Q-T INTERVAL: 332 MS
EKG Q-T INTERVAL: 440 MS
EKG QRS DURATION: 100 MS
EKG QRS DURATION: 90 MS
EKG QTC CALCULATION (BAZETT): 393 MS
EKG QTC CALCULATION (BAZETT): 432 MS
EKG T AXIS: 43 DEGREES
EKG T AXIS: 67 DEGREES
EOSINOPHIL # BLD: 0.1 K/UL (ref 0–0.6)
EOSINOPHIL NFR BLD: 1.2 % (ref 0–5)
ERYTHROCYTE [DISTWIDTH] IN BLOOD BY AUTOMATED COUNT: 13.7 % (ref 11.5–14.5)
FLUAV RNA NPH QL NAA+NON-PROBE: NOT DETECTED
FLUBV RNA NPH QL NAA+NON-PROBE: NOT DETECTED
GLUCOSE BLD-MCNC: 146 MG/DL (ref 70–99)
GLUCOSE SERPL-MCNC: 139 MG/DL (ref 74–109)
HADV DNA NPH QL NAA+NON-PROBE: NOT DETECTED
HCO3 ARTERIAL: 26 MMOL/L (ref 22–26)
HCOV 229E RNA NPH QL NAA+NON-PROBE: NOT DETECTED
HCOV HKU1 RNA NPH QL NAA+NON-PROBE: NOT DETECTED
HCOV NL63 RNA NPH QL NAA+NON-PROBE: NOT DETECTED
HCOV OC43 RNA NPH QL NAA+NON-PROBE: NOT DETECTED
HCT VFR BLD AUTO: 39.4 % (ref 42–52)
HEMOGLOBIN, ART, EXTENDED: 13.2 G/DL (ref 14–18)
HGB BLD-MCNC: 13.1 G/DL (ref 14–18)
HMPV RNA NPH QL NAA+NON-PROBE: NOT DETECTED
HPIV1 RNA NPH QL NAA+NON-PROBE: NOT DETECTED
HPIV2 RNA NPH QL NAA+NON-PROBE: NOT DETECTED
HPIV3 RNA NPH QL NAA+NON-PROBE: NOT DETECTED
HPIV4 RNA NPH QL NAA+NON-PROBE: NOT DETECTED
IMM GRANULOCYTES # BLD: 0.1 K/UL
LACTATE BLDV-SCNC: 0.9 MMOL/L (ref 0.5–1.9)
LACTATE BLDV-SCNC: 2 MMOL/L (ref 0.5–1.9)
LYMPHOCYTES # BLD: 0.9 K/UL (ref 1.1–4.5)
LYMPHOCYTES NFR BLD: 12.8 % (ref 20–40)
M PNEUMO DNA NPH QL NAA+NON-PROBE: NOT DETECTED
MAGNESIUM SERPL-MCNC: 2.1 MG/DL (ref 1.6–2.4)
MCH RBC QN AUTO: 33.2 PG (ref 27–31)
MCHC RBC AUTO-ENTMCNC: 33.2 G/DL (ref 33–37)
MCV RBC AUTO: 99.7 FL (ref 80–94)
METHEMOGLOBIN ARTERIAL: 1 %
MONOCYTES # BLD: 0.8 K/UL (ref 0–0.9)
MONOCYTES NFR BLD: 11.6 % (ref 0–10)
NEUTROPHILS # BLD: 4.9 K/UL (ref 1.5–7.5)
NEUTS SEG NFR BLD: 73.2 % (ref 50–65)
O2 CONTENT ARTERIAL: 16.9 ML/DL
O2 SAT, ARTERIAL: 91.1 %
O2 THERAPY: ABNORMAL
OXYGEN FLOW: 21
PCO2 ARTERIAL: 43 MMHG (ref 35–45)
PERFORMED ON: ABNORMAL
PH ARTERIAL: 7.39 (ref 7.35–7.45)
PHOSPHATE SERPL-MCNC: 1.9 MG/DL (ref 2.5–4.5)
PLATELET # BLD AUTO: 113 K/UL (ref 130–400)
PMV BLD AUTO: 9.8 FL (ref 9.4–12.4)
PO2 ARTERIAL: 58 MMHG (ref 80–100)
POTASSIUM BLD-SCNC: 4.8 MMOL/L
POTASSIUM SERPL-SCNC: 5 MMOL/L (ref 3.5–5)
PROT SERPL-MCNC: 5.2 G/DL (ref 6.6–8.7)
RBC # BLD AUTO: 3.95 M/UL (ref 4.7–6.1)
RSV RNA NPH QL NAA+NON-PROBE: NOT DETECTED
RV+EV RNA NPH QL NAA+NON-PROBE: NOT DETECTED
SAMPLE SOURCE: ABNORMAL
SARS-COV-2 RNA NPH QL NAA+NON-PROBE: NOT DETECTED
SODIUM SERPL-SCNC: 137 MMOL/L (ref 136–145)
WBC # BLD AUTO: 6.7 K/UL (ref 4.8–10.8)

## 2024-01-15 PROCEDURE — 2700000000 HC OXYGEN THERAPY PER DAY

## 2024-01-15 PROCEDURE — 2580000003 HC RX 258: Performed by: HOSPITALIST

## 2024-01-15 PROCEDURE — 0202U NFCT DS 22 TRGT SARS-COV-2: CPT

## 2024-01-15 PROCEDURE — 71045 X-RAY EXAM CHEST 1 VIEW: CPT

## 2024-01-15 PROCEDURE — 82803 BLOOD GASES ANY COMBINATION: CPT

## 2024-01-15 PROCEDURE — 02HV33Z INSERTION OF INFUSION DEVICE INTO SUPERIOR VENA CAVA, PERCUTANEOUS APPROACH: ICD-10-PCS | Performed by: HOSPITALIST

## 2024-01-15 PROCEDURE — 94760 N-INVAS EAR/PLS OXIMETRY 1: CPT

## 2024-01-15 PROCEDURE — 6370000000 HC RX 637 (ALT 250 FOR IP): Performed by: HOSPITALIST

## 2024-01-15 PROCEDURE — 83735 ASSAY OF MAGNESIUM: CPT

## 2024-01-15 PROCEDURE — 2580000003 HC RX 258: Performed by: EMERGENCY MEDICINE

## 2024-01-15 PROCEDURE — 85025 COMPLETE CBC W/AUTO DIFF WBC: CPT

## 2024-01-15 PROCEDURE — 6360000002 HC RX W HCPCS: Performed by: HOSPITALIST

## 2024-01-15 PROCEDURE — 2000000000 HC ICU R&B

## 2024-01-15 PROCEDURE — 84100 ASSAY OF PHOSPHORUS: CPT

## 2024-01-15 PROCEDURE — 2580000003 HC RX 258: Performed by: INTERNAL MEDICINE

## 2024-01-15 PROCEDURE — 83605 ASSAY OF LACTIC ACID: CPT

## 2024-01-15 PROCEDURE — 2500000003 HC RX 250 WO HCPCS: Performed by: HOSPITALIST

## 2024-01-15 PROCEDURE — 80053 COMPREHEN METABOLIC PANEL: CPT

## 2024-01-15 PROCEDURE — 99221 1ST HOSP IP/OBS SF/LOW 40: CPT | Performed by: SURGERY

## 2024-01-15 PROCEDURE — 36415 COLL VENOUS BLD VENIPUNCTURE: CPT

## 2024-01-15 PROCEDURE — 2500000003 HC RX 250 WO HCPCS: Performed by: INTERNAL MEDICINE

## 2024-01-15 PROCEDURE — 93010 ELECTROCARDIOGRAM REPORT: CPT | Performed by: INTERNAL MEDICINE

## 2024-01-15 PROCEDURE — 04HY32Z INSERTION OF MONITORING DEVICE INTO LOWER ARTERY, PERCUTANEOUS APPROACH: ICD-10-PCS | Performed by: HOSPITALIST

## 2024-01-15 PROCEDURE — 82308 ASSAY OF CALCITONIN: CPT

## 2024-01-15 PROCEDURE — 82962 GLUCOSE BLOOD TEST: CPT

## 2024-01-15 PROCEDURE — 83880 ASSAY OF NATRIURETIC PEPTIDE: CPT

## 2024-01-15 PROCEDURE — 36600 WITHDRAWAL OF ARTERIAL BLOOD: CPT

## 2024-01-15 RX ORDER — 0.9 % SODIUM CHLORIDE 0.9 %
500 INTRAVENOUS SOLUTION INTRAVENOUS ONCE
Status: COMPLETED | OUTPATIENT
Start: 2024-01-15 | End: 2024-01-15

## 2024-01-15 RX ORDER — NOREPINEPHRINE BITARTRATE 0.06 MG/ML
1-100 INJECTION, SOLUTION INTRAVENOUS CONTINUOUS
Status: DISCONTINUED | OUTPATIENT
Start: 2024-01-15 | End: 2024-01-16

## 2024-01-15 RX ADMIN — SODIUM CHLORIDE 500 ML: 9 INJECTION, SOLUTION INTRAVENOUS at 01:38

## 2024-01-15 RX ADMIN — SODIUM CHLORIDE, PRESERVATIVE FREE 10 ML: 5 INJECTION INTRAVENOUS at 08:08

## 2024-01-15 RX ADMIN — ACETAMINOPHEN 1000 MG: 500 TABLET ORAL at 20:51

## 2024-01-15 RX ADMIN — ACETAMINOPHEN 1000 MG: 500 TABLET ORAL at 08:10

## 2024-01-15 RX ADMIN — DONEPEZIL HYDROCHLORIDE 5 MG: 5 TABLET, FILM COATED ORAL at 20:55

## 2024-01-15 RX ADMIN — SODIUM BICARBONATE: 84 INJECTION, SOLUTION INTRAVENOUS at 11:45

## 2024-01-15 RX ADMIN — SODIUM BICARBONATE: 84 INJECTION, SOLUTION INTRAVENOUS at 04:59

## 2024-01-15 RX ADMIN — Medication 22 MCG/MIN: at 14:47

## 2024-01-15 RX ADMIN — CITALOPRAM HYDROBROMIDE 10 MG: 10 TABLET ORAL at 08:04

## 2024-01-15 RX ADMIN — CEFEPIME 2000 MG: 2 INJECTION, POWDER, FOR SOLUTION INTRAVENOUS at 20:46

## 2024-01-15 RX ADMIN — PRAVASTATIN SODIUM 80 MG: 20 TABLET ORAL at 20:52

## 2024-01-15 RX ADMIN — Medication 10 MCG/MIN: at 17:31

## 2024-01-15 RX ADMIN — GABAPENTIN 600 MG: 600 TABLET, FILM COATED ORAL at 08:04

## 2024-01-15 RX ADMIN — SODIUM BICARBONATE: 84 INJECTION, SOLUTION INTRAVENOUS at 16:48

## 2024-01-15 RX ADMIN — OXYCODONE HYDROCHLORIDE 10 MG: 10 TABLET ORAL at 12:42

## 2024-01-15 RX ADMIN — METRONIDAZOLE 500 MG: 500 INJECTION, SOLUTION INTRAVENOUS at 08:15

## 2024-01-15 RX ADMIN — Medication 5 MG: at 20:51

## 2024-01-15 RX ADMIN — OXYCODONE HYDROCHLORIDE 10 MG: 10 TABLET ORAL at 08:07

## 2024-01-15 RX ADMIN — SODIUM PHOSPHATE, MONOBASIC, MONOHYDRATE AND SODIUM PHOSPHATE, DIBASIC, ANHYDROUS 10 MMOL: 142; 276 INJECTION, SOLUTION INTRAVENOUS at 11:45

## 2024-01-15 RX ADMIN — ENOXAPARIN SODIUM 30 MG: 100 INJECTION SUBCUTANEOUS at 08:05

## 2024-01-15 RX ADMIN — MEMANTINE HYDROCHLORIDE 10 MG: 5 TABLET ORAL at 00:39

## 2024-01-15 RX ADMIN — TRAZODONE HYDROCHLORIDE 50 MG: 50 TABLET ORAL at 20:55

## 2024-01-15 RX ADMIN — SODIUM CHLORIDE 500 ML: 9 INJECTION, SOLUTION INTRAVENOUS at 02:05

## 2024-01-15 RX ADMIN — METRONIDAZOLE 500 MG: 500 INJECTION, SOLUTION INTRAVENOUS at 14:46

## 2024-01-15 RX ADMIN — PANTOPRAZOLE SODIUM 40 MG: 40 TABLET, DELAYED RELEASE ORAL at 08:04

## 2024-01-15 RX ADMIN — ASPIRIN 81 MG: 81 TABLET, CHEWABLE ORAL at 08:04

## 2024-01-15 RX ADMIN — TAMSULOSIN HYDROCHLORIDE 0.4 MG: 0.4 CAPSULE ORAL at 08:05

## 2024-01-15 RX ADMIN — Medication 15 MCG/MIN: at 04:25

## 2024-01-15 RX ADMIN — ACETAMINOPHEN 1000 MG: 500 TABLET ORAL at 12:42

## 2024-01-15 RX ADMIN — CEFEPIME 2000 MG: 2 INJECTION, POWDER, FOR SOLUTION INTRAVENOUS at 08:20

## 2024-01-15 RX ADMIN — METRONIDAZOLE 500 MG: 500 INJECTION, SOLUTION INTRAVENOUS at 23:20

## 2024-01-15 RX ADMIN — SODIUM CHLORIDE, PRESERVATIVE FREE 10 ML: 5 INJECTION INTRAVENOUS at 08:07

## 2024-01-15 RX ADMIN — MEMANTINE HYDROCHLORIDE 10 MG: 5 TABLET ORAL at 08:05

## 2024-01-15 RX ADMIN — MEMANTINE HYDROCHLORIDE 10 MG: 5 TABLET ORAL at 20:51

## 2024-01-15 ASSESSMENT — ENCOUNTER SYMPTOMS
NAUSEA: 0
ABDOMINAL DISTENTION: 0
EYE REDNESS: 0
SHORTNESS OF BREATH: 0
VOMITING: 0
CONSTIPATION: 0
BACK PAIN: 1
COLOR CHANGE: 0
EYE PAIN: 0
BLOOD IN STOOL: 0
CHEST TIGHTNESS: 0
COUGH: 0
ABDOMINAL PAIN: 1
DIARRHEA: 0
SORE THROAT: 0

## 2024-01-15 ASSESSMENT — PAIN SCALES - GENERAL
PAINLEVEL_OUTOF10: 8
PAINLEVEL_OUTOF10: 0

## 2024-01-15 NOTE — PROGRESS NOTES
4 Eyes Skin Assessment     NAME:  Carlos Ramirez  YOB: 1950  MEDICAL RECORD NUMBER:  [id number]    The patient is being assessed for  Admission    I agree that at least one RN has performed a thorough Head to Toe Skin Assessment on the patient. ALL assessment sites listed below have been assessed.      Areas assessed by both nurses:    Head, Face, Ears, Shoulders, Back, Chest, Arms, Elbows, Hands, Sacrum. Buttock, Coccyx, Ischium, and Legs. Feet and Heels        Does the Patient have a Wound? No noted wound(s)       Ganesh Prevention initiated by RN: No  Wound Care Orders initiated by RN: No    Pressure Injury (Stage 3,4, Unstageable, DTI, NWPT, and Complex wounds) if present, place Wound referral order by RN under : No    New Ostomies, if present place, Ostomy referral order under : No     Nurse 1 eSignature: Electronically signed by Tory Cartwright RN on 1/14/24 at 10:16 PM CST    **SHARE this note so that the co-signing nurse can place an eSignature**    Nurse 2 eSignature: Electronically signed by Tara Mcgee RN on 1/14/2024 at 10:44 PM

## 2024-01-15 NOTE — ED NOTES
Encouraged pt to provide urine sample and told him I'd have to straight cath if we cannot get a sample.  He states he will try. Wife is at bedside and is going to assist him with the urinal.

## 2024-01-15 NOTE — H&P
[facility]      Hospitalist - History & Physical      PCP: Inez Garcia APRN    Date of Admission: 1/14/2024    Date of Service: 1/14/2024    Chief Complaint:  abdominal pain     History Of Present Illness:   The patient is a 73 y.o. male with CAD with stent placement, COPD, HTN, TIA, Type II DM, ascending aortic aneurysm 4.4cm, Alzheimer's dementia, complaining of abdominal pain. Patient states that he has had diffuse abdominal pain for the past few days. Did have normal bowel movement yesterday, and has been passing flatus this morning. This afternoon patient states he went into bathroom and began having worse abdominal pain, became diaphoretic, and generalized weakness. Denies fever, chills, nausea, vomiting, shortness of breath, and chest pain. Work up in ER CXR abnormal small bowel gas pattern could indicate ileus or obstruction, CTA abdomen no definite evidence of obstruction, may represent diffuse enteritis or partial small bowel obstruction, CTA chest no pulmonary emboli, no aortic dissection, ascending thoracic aorta mild aneurysmal at 3.7cm, chronic appearing interstitial changes of lungs with bibasilar fibrosis and bronchiectasis, either mild bibasilar pneumonia or more likely atelectasis, lipase 49, lactate acid 1.8, , creat 1.3 BUN 24. Patient is to be admitted to hospitalist service.   Past Medical History:        Diagnosis Date    Arthritis     CAD (coronary artery disease)     sees dr. zhou    Chronic back pain     COPD (chronic obstructive pulmonary disease) (HCC)     Hypertension     Peripheral sensory neuropathy     Pleurisy     Shoulder pain     TIA (transient ischemic attack)     Type II or unspecified type diabetes mellitus without mention of complication, not stated as uncontrolled        Past Surgical History:        Procedure Laterality Date    APPENDECTOMY      CARDIAC CATHETERIZATION  11/04/2019    PIETER to LAD    SHOULDER SURGERY Left 12/17/2020    LEFT REVERSE

## 2024-01-15 NOTE — PROGRESS NOTES
PHARMACY NOTE  Carlos Ramirez was ordered Azelastine nasal spray. Per the [facility] Formulary Committee, this medication is non-formulary and not stocked by pharmacy.  It has been discontinued. The medication can be reordered at discharge.     Electronically signed by Katlyn Sesay RPH on 1/14/2024 at 9:43 PM

## 2024-01-15 NOTE — PROGRESS NOTES
Carlos Ramirez transferred to [facility] from 141 via bed.    Reason for transfer: Need for higher level of care d/t hypotension   Explained reason for transfer to Patient and Family.   Belongings: None with patient at bedside .   Soft chart transferred with patient: Yes.  Telemetry box number N/A transferred with patient: no.  Report given to: ANDREA Marcelino, via telephone.      Electronically signed by Preet Osborn RN on 1/15/2024 at 5:17 AM

## 2024-01-15 NOTE — PROCEDURES
need for an Arterial Line, on levophed 15  US Guided Vascular Access     Patient informed consent obtained prior to procedure in all non life-threatening or limb-threatening emergencies.      Patient was examined preprocedurally with US to determine the best site for the arterial line insertion.   The patient was positioned.  Sterile precautions were taken including but not necessarily limited to: cap, mask, sterile gown, gloves, probe-cover, and draping.  The patient received 5 cc local anesthetic with the lidocaine included in the procedural tray if the patient was not under sedation and/or already being treated with systemic pain medications.   The target vessel was entered under direct US guidance with aspiration of bright red pulsatile arterial blood.  The guidewire was inserted through the finder needle.  The arterial line was then passed over the guidewire.  The arterial line was then sutured in to place.  The arterial line was then held in place while the guidwire was removed.   The transducer was then attached to the catheter.  There were appropriate blood pressures seen, and an appropriate arterial waveform.  The sterile dressing was then applied by me.     Total Attempts: 2  Estimated Aspirated Blood Loss: 1 cc  Estimated Total Blood Loss: 3 cc (includes aspirated blood volume)  Line placed to the right femoral artery      These procedures are not included as a portion of another charge, nor was their associated time included in any time measure used for a separate bill, they will be billed seperately.  US Guidance is billed separately from the arterial line.

## 2024-01-15 NOTE — PROGRESS NOTES
Pharmacy Renal Adjustment    Carlos Ramirez is a 73 y.o. male. Pharmacy has renally adjusted medications per protocol.    Recent Labs     01/14/24  1752 01/15/24  0251   BUN 24* 32*       Recent Labs     01/14/24  1752 01/15/24  0251   CREATININE 1.3* 1.7*       Estimated Creatinine Clearance: 49 mL/min (A) (based on SCr of 1.7 mg/dL (H)).    Height:   Ht Readings from Last 1 Encounters:   01/14/24 1.778 m (5' 10\")     Weight:  Wt Readings from Last 1 Encounters:   01/15/24 111.8 kg (246 lb 6.4 oz)     Plan: Adjust the following medications based on renal function:           Cefepime to 2000mg IV over 4 hr infusion every 12 hours.    Electronically signed by Elaina Reagan RPH on 1/15/2024 at 11:38 AM

## 2024-01-15 NOTE — CONSULTS
Recent Labs     01/14/24  1752 01/15/24  0227 01/15/24  0251   *  --  137   K 4.9 4.8 5.0   CL 98  --  106   CO2 19*  --  25   ANIONGAP 15  --  6*   GLUCOSE 167*  --  139*   BUN 24*  --  32*   CREATININE 1.3*  --  1.7*   LABGLOM 58*  --  42*   CALCIUM 10.0  --  8.0*     LFT:   Recent Labs     01/14/24  1752 01/15/24  0251   PROT 7.5 5.2*   LABALBU 3.9 3.1*   BILITOT 0.6 0.7   ALKPHOS 77 40   ALT 21 15   AST 31 20     PT/INR:  Recent Labs     01/14/24  1450   PROTIME 12.4   INR 0.95     CTA Abdomen 1/14/2024  IMPRESSION:  1. Normal CTA of the abdomen and pelvis.  2. The multiple dilated segments of small bowel are identified throughout the abdomen and pelvis.  The stomach and distal esophagus are also dilated.  There are two ventral hernias, change containing segments of small bowel.  No definite evidence of   focal obstruction is identified.  This findings are nonspecific, and may represent diffuse enteritis or at most a partial small bowel obstruction.  Correlation with lactate levels is recommended.      All CT scans are performed using dose optimization techniques as appropriate to the performed exam and include   at least one of the following: Automated exposure control, adjustment of the mA and/or kV according to size, and the use of iterative reconstruction technique.      ______________________________________   Electronically signed by: JUSTUS CARRASCO M.D.  Date:     01/14/2024  Time:    18:59     Component  Ref Range & Units    Adenovirus by PCR  Not Detected Not Detected   Bordetella parapertussis by PCR  Not Detected Not Detected   Bordetella pertussis by PCR  Not Detected Not Detected   Chlamydophilia pneumoniae by PCR  Not Detected Not Detected   Coronavirus 229E by PCR  Not Detected Not Detected   Coronavirus HKU1 by PCR  Not Detected Not Detected   Coronavirus NL63 by PCR  Not Detected Not Detected   Coronavirus OC43 by PCR  Not Detected Not Detected   SARS-CoV-2, PCR  Not Detected Not  he not improve in short order.    Will discuss with Dr. Carver.   Electronically signed by Kimberly Hilliard DO on 1/15/2024 at 11:07 AM

## 2024-01-15 NOTE — PROGRESS NOTES
Hospitalist Progress Note  [facility]     Patient: Carlos Ramirez  : 1950  MRN: [id number]  Code Status: Full Code    Hospital Day: 1   Date of Service: 1/15/2024    Subjective:   Patient seen and examined.  Abdominal tenderness to palpation.     Past Medical History:   Diagnosis Date    Arthritis     CAD (coronary artery disease)     sees dr. zhou    Chronic back pain     COPD (chronic obstructive pulmonary disease) (HCC)     Hypertension     Peripheral sensory neuropathy     Pleurisy     Shoulder pain     TIA (transient ischemic attack)     Type II or unspecified type diabetes mellitus without mention of complication, not stated as uncontrolled        Past Surgical History:   Procedure Laterality Date    APPENDECTOMY      CARDIAC CATHETERIZATION  2019    PIETER to LAD    SHOULDER SURGERY Left 2020    LEFT REVERSE TOTAL SHOULDER ARTHROPLASTY performed by Colin Martinez MD at [facility] OR       Family History   Problem Relation Age of Onset    Cancer Mother     Heart Disease Father        Social History     Socioeconomic History    Marital status:      Spouse name: Not on file    Number of children: Not on file    Years of education: Not on file    Highest education level: Not on file   Occupational History    Not on file   Tobacco Use    Smoking status: Former     Current packs/day: 0.00     Average packs/day: 1.5 packs/day for 15.0 years (22.5 ttl pk-yrs)     Types: Cigarettes     Start date: 10/23/1985     Quit date: 10/23/2000     Years since quittin.2    Smokeless tobacco: Never   Substance and Sexual Activity    Alcohol use: No    Drug use: No    Sexual activity: Not on file   Other Topics Concern    Not on file   Social History Narrative    Not on file     Social Determinants of Health     Financial Resource Strain: Low Risk  (2024)    Overall Financial Resource Strain (CARDIA)     Difficulty of Paying Living Expenses: Not hard at all   Food Insecurity: No Food  Insecurity (1/14/2024)    Hunger Vital Sign     Worried About Running Out of Food in the Last Year: Never true     Ran Out of Food in the Last Year: Never true   Transportation Needs: No Transportation Needs (1/14/2024)    Transportation Problems ([facility] HRSN)     In the past 12 months, has lack of reliable transportation kept you from medical appointments, meetings, work or from getting things needed for daily living?: No   Physical Activity: Unknown (1/14/2024)    Physical Activity ([facility] HRSN)     In the last 30 days, other than the activities you did for work, on average, how many days per week did you engage in moderate exercise (like walking fast, running, jogging, dancing, swimming, biking, or other similar activites)?: 0     Number of minutes of exercise per week:: 0   Stress: Not on file   Social Connections: Socially Integrated (1/14/2024)    Social Connections ([facility] HRSN)     If for any reason you need help with day-to-day activities such as bathing, preparing meals, shopping, managing finances, etc., do you get the help you need?: I don't need any help   Intimate Partner Violence: Not on file   Housing Stability: Not on file (1/14/2024)       Current Facility-Administered Medications   Medication Dose Route Frequency Provider Last Rate Last Admin    dextromethorphan-quiNIDine (NUEDEXTA) 20-10 MG per capsule 1 capsule (Patient Supplied)  1 capsule Oral Daily Matthew Mac MD        norepinephrine (LEVOPHED) 16 mg in sodium chloride 0.9 % 250 mL infusion  1-100 mcg/min IntraVENous Continuous Matthew Mac MD 13.1 mL/hr at 01/15/24 1002 14 mcg/min at 01/15/24 1002    acetaminophen (TYLENOL) tablet 1,000 mg  1,000 mg Oral 3 times per day Matthew Mac MD   1,000 mg at 01/15/24 0810    morphine (PF) injection 1 mg  1 mg IntraVENous Q4H PRN Matthew Mac MD        morphine (PF) injection 2 mg  2 mg IntraVENous Q4H PRN Matthew Mac MD        morphine sulfate (PF) injection 4 mg  4 mg IntraVENous Q4H PRN

## 2024-01-15 NOTE — PROGRESS NOTES
Pt is being transferred to ICU per Dr. Mac. Family at the bedside and inform of transfer. Staff nurse calling report now.

## 2024-01-15 NOTE — PROGRESS NOTES
Carlos Ramirez arrived to room # 435.   Presented with: Abdominal pain  Mental Status: Patient is alert, coherent, and able to concentrate and follow conversation.   Vitals:    01/15/24 0245   BP: (!) 68/41   Pulse: 67   Resp: 20   Temp: 98.6 °F (37 °C)   SpO2: 94%     Patient safety contract and falls prevention contract reviewed with patient Yes.  Oriented Patient and Family to room.  Call light within reach. Yes.  Needs, issues or concerns expressed at this time: no.      Electronically signed by Preet Osborn RN on 1/15/2024 at 5:21 AM

## 2024-01-15 NOTE — ED NOTES
ED TO INPATIENT SBAR HANDOFF    Patient Name: Carlos Ramirez   : 1950  73 y.o.   Family/Caregiver Present: Yes  Code Status Order: Prior    C-SSRS: Risk of Suicide: No Risk  Sitter No  Restraints:         Situation  Chief Complaint   Patient presents with    Abdominal Pain     X 3 weeks     Brief Description of Patient's Condition: stable  Mental Status: oriented and alert  Arrived from: home    Imaging:   CTA CHEST W WO CONTRAST   Final Result   1.  Pulmonary arteries have no filling defects to indicate thromboemboli.   10.  The ascending thoracic aorta has mild aneurysmal caliber at 3.7 cm.  No aortic intimal dissection or significant atherosclerotic disease.   3.  Mild cardiomegaly.   4.  Chronic-appearing interstitial changes of the lungs with bibasilar fibrosis and bronchiectasis.  There is either mild bibasilar pneumonia or probably more likely atelectasis.  Correlate clinically.   5.  Incidental note of fluid distension of the stomach and upper small bowel.   - - - - -        All CT scans are performed using dose optimization techniques as appropriate to the performed exam and include    at least one of the following: Automated exposure control, adjustment of the mA and/or kV according to size, and the use of iterative reconstruction technique.        ______________________________________    Electronically signed by: SHANTEL MORTENSEN M.D.   Date:     2024   Time:    18:56       CTA ABDOMEN PELVIS W WO CONTRAST   Final Result   1. Normal CTA of the abdomen and pelvis.   2. The multiple dilated segments of small bowel are identified throughout the abdomen and pelvis.  The stomach and distal esophagus are also dilated.  There are two ventral hernias, change containing segments of small bowel.  No definite evidence of    focal obstruction is identified.  This findings are nonspecific, and may represent diffuse enteritis or at most a partial small bowel obstruction.  Correlation with lactate levels is

## 2024-01-15 NOTE — PROCEDURES
Consulted for or Contacted for need for a CVC  US Guided Vascular Access     Patient informed consent was obtained prior to procedure as in non life-threatening or limb-threatening emergencies. Family was at bedside.        Patient was examined preprocedurally with US to determine the best site for CVC insertion.   The patient was positioned.  Sterile precautions were taken including but not necessarily limited to: cap, mask, sterile gown, gloves, probe-cover, and draping.  The patient received local anesthetic with 3 cc of the lidocaine included in the CVC procedural tray as the patient was not under sedation and/or already being treated with systemic pain medications.   The two side ports were flushed with sterile saline and locked prior to detatchment of the syringe from the ports.  The target vessel was entered under direct US guidance with aspiration of dark venous blood, there was a slow dribble from the finder needle after the syringe was detatched from the needle.   The guidewire was inserted through the finder needle.  The hollow bore needle was removed while the guidewire was held in place. The guidewire was then traced again with the US from where it entered the skin down to the level of the clavicle with the US to ensure placement of the CVC in to the correct target vessel prior to dilation.  The scalpel was used to make a small nick along the side of the guidewire so as to allow passage of the dilator over the guidewire.  The dilator was then passed over the guidewire, folllowed by placement of the CVC over the guidewire.  The CVC was then held in place while the guidwire was removed.   Port was attached to the middle lumen of the CVC.  The central port was then aspirated with return of blood.  The port then flushed easily.  The CVC was then sutured in to place on the first side.  The biopatch was then applied.  The CVC was then sutured in to place on the second side.  The sterile dressing was then

## 2024-01-15 NOTE — PLAN OF CARE
SUBJECTIVE:    Mr. Carlos Ramirez is a 73 year old gent. He presented with abdominal pain. His abdominal pain has been intractable. The diagnostics did not reveal a clear obstructive process but he does have apparent diffuse enteritis. He has a WBC which while WNL is nearly double his baseline and his ANC is over twice his baseline. He is a 106kg man, but his IBW is only 73kg and he needed a 3L bolus to normalize his BP rather than the 2.2L that would have been anticipated. He also had a lactic acid which while not flagged as abnormal at 1.8 is certainly higher than what would be anticipated in a healthy adult.    He was started on Cefepime and Flagyl early in his course, and was on Levophed GGT for ~2 hours apparently. I anticipate he'll need a length of stay exceeding two midnights to allow for safe discharge  as upon chart review he appears to have been in  septic shock and been becoming decompensated as his blood pressure was down to 71/56 at arrival, within 9 minutes of that he had risen to 101/86mmHg and has been normotensive since.       OBJECTIVE:    BP (!) 108/57   Pulse 84   Temp 97.6 °F (36.4 °C) (Axillary)   Resp 20   Ht 1.778 m (5' 10\")   Wt 106.6 kg (235 lb)   SpO2 94%   BMI 33.72 kg/m²       ASSESSMENTS & PLANS:    Metabolic Acidosis and Intractable Abdominal Pain due to Septic Bacterial Gastroenteritis:  Admit to medical hahn  NPO except sips with meds for now, cn reassess in AM  Repeat LA and CBC with Diff now x once  CBC with Differential Daily  CMP with Mag reflex in AM and daily BMP with Mag reflex to follow that QAM  3L bolus in ED  IVF with NaHCO3 150meq per liter sterile water solution  Strict Is and Os  Daily Weights  Elevate HoB 30-45'  Elevate legs to prevent sliding down in bed  Contine Cefepime 2g IV Q8h timed off ED dose  Contine Flagyl 500mg IV Q8h timed off ED dose  F/U Blood Cxx x2  F/U Urine Cx x1    Chronic Medical Problems:  Continue home regimen as indicated  Any puffers  will be subbed to nebulizers as able, and any oral  antihyperglycemics to an insuling regimen with POCT scheduled nad PRN as well as providion of an antihypoglycemic orders set for safety    Supportive and Prophylactic Txx:  DVT PPx: Lovenox SQ  GI (PUD) PPx: not indicated  PT: indicated as per admitted status and age      Case d/w EP & Hospitalist NP in detail  Chart reviewed   Orders entered by me with CPOE

## 2024-01-16 LAB
ALBUMIN SERPL-MCNC: 3 G/DL (ref 3.5–5.2)
ALP SERPL-CCNC: 40 U/L (ref 40–130)
ALT SERPL-CCNC: 17 U/L (ref 5–41)
ANION GAP SERPL CALCULATED.3IONS-SCNC: 11 MMOL/L (ref 7–19)
AST SERPL-CCNC: 17 U/L (ref 5–40)
BACTERIA UR CULT: NORMAL
BASOPHILS # BLD: 0 K/UL (ref 0–0.2)
BASOPHILS NFR BLD: 0.3 % (ref 0–1)
BILIRUB SERPL-MCNC: 0.7 MG/DL (ref 0.2–1.2)
BUN SERPL-MCNC: 29 MG/DL (ref 8–23)
CALCIUM SERPL-MCNC: 7.7 MG/DL (ref 8.8–10.2)
CHLORIDE SERPL-SCNC: 104 MMOL/L (ref 98–111)
CO2 SERPL-SCNC: 25 MMOL/L (ref 22–29)
CREAT SERPL-MCNC: 1.5 MG/DL (ref 0.5–1.2)
EOSINOPHIL # BLD: 0.2 K/UL (ref 0–0.6)
EOSINOPHIL NFR BLD: 1.7 % (ref 0–5)
ERYTHROCYTE [DISTWIDTH] IN BLOOD BY AUTOMATED COUNT: 13.6 % (ref 11.5–14.5)
GLUCOSE SERPL-MCNC: 144 MG/DL (ref 74–109)
HCT VFR BLD AUTO: 36.7 % (ref 42–52)
HGB BLD-MCNC: 12.5 G/DL (ref 14–18)
IMM GRANULOCYTES # BLD: 0 K/UL
LYMPHOCYTES # BLD: 1.7 K/UL (ref 1.1–4.5)
LYMPHOCYTES NFR BLD: 18.3 % (ref 20–40)
MAGNESIUM SERPL-MCNC: 2.1 MG/DL (ref 1.6–2.4)
MCH RBC QN AUTO: 32.9 PG (ref 27–31)
MCHC RBC AUTO-ENTMCNC: 34.1 G/DL (ref 33–37)
MCV RBC AUTO: 96.6 FL (ref 80–94)
MONOCYTES # BLD: 0.8 K/UL (ref 0–0.9)
MONOCYTES NFR BLD: 8.6 % (ref 0–10)
NEUTROPHILS # BLD: 6.4 K/UL (ref 1.5–7.5)
NEUTS SEG NFR BLD: 70.8 % (ref 50–65)
PLATELET # BLD AUTO: 119 K/UL (ref 130–400)
PMV BLD AUTO: 8.9 FL (ref 9.4–12.4)
POTASSIUM SERPL-SCNC: 4.1 MMOL/L (ref 3.5–5)
PROT SERPL-MCNC: 4.9 G/DL (ref 6.6–8.7)
RBC # BLD AUTO: 3.8 M/UL (ref 4.7–6.1)
SODIUM SERPL-SCNC: 140 MMOL/L (ref 136–145)
WBC # BLD AUTO: 9.1 K/UL (ref 4.8–10.8)

## 2024-01-16 PROCEDURE — 85025 COMPLETE CBC W/AUTO DIFF WBC: CPT

## 2024-01-16 PROCEDURE — 6370000000 HC RX 637 (ALT 250 FOR IP): Performed by: INTERNAL MEDICINE

## 2024-01-16 PROCEDURE — 6360000002 HC RX W HCPCS: Performed by: HOSPITALIST

## 2024-01-16 PROCEDURE — 6370000000 HC RX 637 (ALT 250 FOR IP): Performed by: HOSPITALIST

## 2024-01-16 PROCEDURE — 6360000002 HC RX W HCPCS: Performed by: INTERNAL MEDICINE

## 2024-01-16 PROCEDURE — 80053 COMPREHEN METABOLIC PANEL: CPT

## 2024-01-16 PROCEDURE — 1210000000 HC MED SURG R&B

## 2024-01-16 PROCEDURE — 2580000003 HC RX 258: Performed by: HOSPITALIST

## 2024-01-16 PROCEDURE — 94760 N-INVAS EAR/PLS OXIMETRY 1: CPT

## 2024-01-16 PROCEDURE — 2580000003 HC RX 258: Performed by: INTERNAL MEDICINE

## 2024-01-16 PROCEDURE — 2500000003 HC RX 250 WO HCPCS: Performed by: HOSPITALIST

## 2024-01-16 PROCEDURE — 83735 ASSAY OF MAGNESIUM: CPT

## 2024-01-16 PROCEDURE — 2700000000 HC OXYGEN THERAPY PER DAY

## 2024-01-16 PROCEDURE — 99232 SBSQ HOSP IP/OBS MODERATE 35: CPT | Performed by: SURGERY

## 2024-01-16 PROCEDURE — 6370000000 HC RX 637 (ALT 250 FOR IP): Performed by: SURGERY

## 2024-01-16 PROCEDURE — C9113 INJ PANTOPRAZOLE SODIUM, VIA: HCPCS | Performed by: INTERNAL MEDICINE

## 2024-01-16 RX ORDER — BISACODYL 5 MG/1
5 TABLET, DELAYED RELEASE ORAL DAILY PRN
Status: DISCONTINUED | OUTPATIENT
Start: 2024-01-16 | End: 2024-01-17

## 2024-01-16 RX ORDER — OXYCODONE HYDROCHLORIDE 5 MG/1
5 TABLET ORAL EVERY 4 HOURS PRN
Status: DISCONTINUED | OUTPATIENT
Start: 2024-01-16 | End: 2024-01-20 | Stop reason: HOSPADM

## 2024-01-16 RX ORDER — OXYCODONE HYDROCHLORIDE 5 MG/1
2.5 TABLET ORAL EVERY 4 HOURS PRN
Status: DISCONTINUED | OUTPATIENT
Start: 2024-01-16 | End: 2024-01-20 | Stop reason: HOSPADM

## 2024-01-16 RX ADMIN — OXYCODONE HYDROCHLORIDE 5 MG: 5 TABLET ORAL at 17:01

## 2024-01-16 RX ADMIN — MEMANTINE HYDROCHLORIDE 10 MG: 5 TABLET ORAL at 21:36

## 2024-01-16 RX ADMIN — SODIUM BICARBONATE: 84 INJECTION, SOLUTION INTRAVENOUS at 20:22

## 2024-01-16 RX ADMIN — CEFEPIME 2000 MG: 2 INJECTION, POWDER, FOR SOLUTION INTRAVENOUS at 20:13

## 2024-01-16 RX ADMIN — DONEPEZIL HYDROCHLORIDE 5 MG: 5 TABLET, FILM COATED ORAL at 21:36

## 2024-01-16 RX ADMIN — TRAZODONE HYDROCHLORIDE 50 MG: 50 TABLET ORAL at 21:36

## 2024-01-16 RX ADMIN — SODIUM BICARBONATE: 84 INJECTION, SOLUTION INTRAVENOUS at 02:59

## 2024-01-16 RX ADMIN — MEMANTINE HYDROCHLORIDE 10 MG: 5 TABLET ORAL at 07:19

## 2024-01-16 RX ADMIN — TAMSULOSIN HYDROCHLORIDE 0.4 MG: 0.4 CAPSULE ORAL at 07:19

## 2024-01-16 RX ADMIN — ACETAMINOPHEN 1000 MG: 500 TABLET ORAL at 21:35

## 2024-01-16 RX ADMIN — BISACODYL 5 MG: 5 TABLET, COATED ORAL at 17:01

## 2024-01-16 RX ADMIN — ACETAMINOPHEN 1000 MG: 500 TABLET ORAL at 07:19

## 2024-01-16 RX ADMIN — SODIUM BICARBONATE: 84 INJECTION, SOLUTION INTRAVENOUS at 17:02

## 2024-01-16 RX ADMIN — Medication 5 MG: at 21:36

## 2024-01-16 RX ADMIN — ACETAMINOPHEN 1000 MG: 500 TABLET ORAL at 12:07

## 2024-01-16 RX ADMIN — ASPIRIN 81 MG: 81 TABLET, CHEWABLE ORAL at 07:19

## 2024-01-16 RX ADMIN — OXYCODONE HYDROCHLORIDE 2.5 MG: 5 TABLET ORAL at 12:07

## 2024-01-16 RX ADMIN — CITALOPRAM HYDROBROMIDE 10 MG: 10 TABLET ORAL at 07:19

## 2024-01-16 RX ADMIN — SODIUM CHLORIDE, PRESERVATIVE FREE 40 MG: 5 INJECTION INTRAVENOUS at 07:19

## 2024-01-16 RX ADMIN — CEFEPIME 2000 MG: 2 INJECTION, POWDER, FOR SOLUTION INTRAVENOUS at 07:20

## 2024-01-16 RX ADMIN — METRONIDAZOLE 500 MG: 500 INJECTION, SOLUTION INTRAVENOUS at 07:20

## 2024-01-16 RX ADMIN — METRONIDAZOLE 500 MG: 500 INJECTION, SOLUTION INTRAVENOUS at 17:01

## 2024-01-16 RX ADMIN — PRAVASTATIN SODIUM 80 MG: 20 TABLET ORAL at 21:35

## 2024-01-16 RX ADMIN — OXYCODONE HYDROCHLORIDE 5 MG: 5 TABLET ORAL at 07:19

## 2024-01-16 RX ADMIN — METRONIDAZOLE 500 MG: 500 INJECTION, SOLUTION INTRAVENOUS at 23:50

## 2024-01-16 NOTE — PROGRESS NOTES
[facility] General Surgery Progress Note    Chief Complaint:   Chief Complaint   Patient presents with    Abdominal Pain     X 3 weeks       SUBJECTIVE:  Mr. Ramirez is a 73 y.o. male is seen and examined at bedside. He is up to the chair. He states he has pain \"all over his belly\" and he is unsure if he can eat. No nausea or vomiting. No diarrhea. Off levophed.      OBJECTIVE:  BP (!) 106/55   Pulse 69   Temp 99.5 °F (37.5 °C) (Temporal)   Resp 17   Ht 1.778 m (5' 10\")   Wt 111.8 kg (246 lb 6.4 oz)   SpO2 96%   BMI 35.35 kg/m²   CONSTITUTIONAL: Alert, appropriate, no acute distress  SKIN: warm, dry with no rashes or lesions  HEENT: NCAT, Non icteric, PERR. Trachea Midline.  CHEST/LUNGS: CTA bilaterally. Normal respiratory effort.  CARDIOVASCULAR: RRR, No edema.  ABDOMEN: soft, ND, minimally  TTP, +BS  NEUROLOGIC: CN II-XI grossly intact, no motor or sensory deficits   MUSCULOSKELETAL: No clubbing or cyanosis.   PSYCHIATRIC:  Normal Mood and Affect. Alert and Oriented.    Labs:  CBC:   Recent Labs     01/14/24  2105 01/15/24  0251 01/16/24  0111   WBC 8.6 6.7 9.1   HGB 15.5 13.1* 12.5*   * 113* 119*     BMP:    Recent Labs     01/14/24  1752 01/15/24  0227 01/15/24  0251 01/16/24  0111   *  --  137 140   K 4.9 4.8 5.0 4.1   CL 98  --  106 104   CO2 19*  --  25 25   BUN 24*  --  32* 29*   CREATININE 1.3*  --  1.7* 1.5*   GLUCOSE 167*  --  139* 144*       ASSESSMENT:  Principal Problem:    Intractable abdominal pain  Active Problems:    Hypertension    Bacterial gastroenteritis    Metabolic acidosis    Sepsis (HCC)  Resolved Problems:    * No resolved hospital problems. *      PLAN:  Abdomen remains benign.   Start clear liquid diet and advance as tolerated.   No findings to indicate surgical intervention is warranted at this time. Will monitor for toleration of diet.     Kimberly Hilliard DO   Electronically Signed on 1/16/2024 at 12:43 PM

## 2024-01-16 NOTE — PLAN OF CARE
Problem: Discharge Planning  Goal: Discharge to home or other facility with appropriate resources  Outcome: Progressing  Flowsheets  Taken 1/15/2024 2301  Discharge to home or other facility with appropriate resources: Identify barriers to discharge with patient and caregiver  Taken 1/15/2024 1938  Discharge to home or other facility with appropriate resources: Identify barriers to discharge with patient and caregiver     Problem: Pain  Goal: Verbalizes/displays adequate comfort level or baseline comfort level  Outcome: Progressing     Problem: ABCDS Injury Assessment  Goal: Absence of physical injury  Outcome: Progressing     Problem: Safety - Adult  Goal: Free from fall injury  Outcome: Progressing     Problem: Chronic Conditions and Co-morbidities  Goal: Patient's chronic conditions and co-morbidity symptoms are monitored and maintained or improved  Outcome: Progressing  Flowsheets  Taken 1/15/2024 2301  Care Plan - Patient's Chronic Conditions and Co-Morbidity Symptoms are Monitored and Maintained or Improved: Monitor and assess patient's chronic conditions and comorbid symptoms for stability, deterioration, or improvement  Taken 1/15/2024 1938  Care Plan - Patient's Chronic Conditions and Co-Morbidity Symptoms are Monitored and Maintained or Improved: Monitor and assess patient's chronic conditions and comorbid symptoms for stability, deterioration, or improvement      Patient is requesting oxycodone for pain. Reports no pain relief with other medications

## 2024-01-16 NOTE — PROGRESS NOTES
Patient ambulated approximately 2 feet up  to chair with three nurse assist. Patient baseline up ad sergio with no devices per wife.    Electronically signed by Lisa Culp RN on 1/16/2024 at 10:06 AM

## 2024-01-16 NOTE — PROGRESS NOTES
Food Insecurity (1/14/2024)    Hunger Vital Sign     Worried About Running Out of Food in the Last Year: Never true     Ran Out of Food in the Last Year: Never true   Transportation Needs: No Transportation Needs (1/14/2024)    Transportation Problems ([facility])     In the past 12 months, has lack of reliable transportation kept you from medical appointments, meetings, work or from getting things needed for daily living?: No   Physical Activity: Unknown (1/14/2024)    Physical Activity ([facility])     In the last 30 days, other than the activities you did for work, on average, how many days per week did you engage in moderate exercise (like walking fast, running, jogging, dancing, swimming, biking, or other similar activites)?: 0     Number of minutes of exercise per week:: 0   Stress: Not on file   Social Connections: Socially Integrated (1/14/2024)    Social Connections ([facility])     If for any reason you need help with day-to-day activities such as bathing, preparing meals, shopping, managing finances, etc., do you get the help you need?: I don't need any help   Intimate Partner Violence: Not on file   Housing Stability: Not on file (1/14/2024)       Current Facility-Administered Medications   Medication Dose Route Frequency Provider Last Rate Last Admin    oxyCODONE (ROXICODONE) immediate release tablet 2.5 mg  2.5 mg Oral Q4H PRN Kuldip Carver MD   2.5 mg at 01/16/24 1207    oxyCODONE (ROXICODONE) immediate release tablet 5 mg  5 mg Oral Q4H PRN Kuldip Carver MD        dextromethorphan-quiNIDine (NUEDEXTA) 20-10 MG per capsule 1 capsule (Patient Supplied)  1 capsule Oral Daily Matthew Mac MD        pantoprazole (PROTONIX) 40 mg in sodium chloride (PF) 0.9 % 10 mL injection  40 mg IntraVENous Daily Kuldip Carver MD   40 mg at 01/16/24 0719    ceFEPIme (MAXIPIME) 2,000 mg in sodium chloride 0.9 % 100 mL IVPB ([id number])  2,000 mg IntraVENous Q12H Matthew Mac MD   Stopped at 01/16/24 1120     IntraVENous Q8H Matthew Mac MD   Stopped at 01/16/24 0820    sodium chloride flush 0.9 % injection 5-40 mL  5-40 mL IntraVENous PRN Matthew Mac MD        0.9 % sodium chloride infusion   IntraVENous PRN Matthew Mac MD        potassium chloride (KLOR-CON M) extended release tablet 40 mEq  40 mEq Oral PRN Matthew Mac MD        Or    potassium bicarb-citric acid (EFFER-K) effervescent tablet 40 mEq  40 mEq Oral PRN Matthew Mac MD        Or    potassium chloride 10 mEq/100 mL IVPB (Peripheral Line)  10 mEq IntraVENous PRN Matthew Mac MD        magnesium sulfate 2000 mg in 50 mL IVPB premix  2,000 mg IntraVENous PRN Matthew Mac MD        ondansetron (ZOFRAN-ODT) disintegrating tablet 4 mg  4 mg Oral Q8H PRN Matthew Mac MD        Or    ondansetron (ZOFRAN) injection 4 mg  4 mg IntraVENous Q6H PRN Matthew Mac MD        acetaminophen (TYLENOL) tablet 650 mg  650 mg Oral Q4H PRN Matthew Mac MD        Or    acetaminophen (TYLENOL) suppository 650 mg  650 mg Rectal Q4H PRN Matthew Mac MD        sodium bicarbonate 150 mEq in sterile water 1,000 mL infusion   IntraVENous Continuous Matthew Mac  mL/hr at 01/16/24 0259 New Bag at 01/16/24 0259         sodium chloride      sodium bicarbonate 150 mEq in sterile water 1,000 mL infusion 100 mL/hr at 01/16/24 0259        Objective:   BP (!) 106/55   Pulse 69   Temp 99.5 °F (37.5 °C) (Temporal)   Resp 17   Ht 1.778 m (5' 10\")   Wt 111.8 kg (246 lb 6.4 oz)   SpO2 96%   BMI 35.35 kg/m²     HEENT: normocephalic  Lungs: clear to auscultation bilaterally   Cardiovascular: s1 and s2 normal  Abdomen: soft, positive bowel sounds, tenderness to palpation   Extremities: no cyanosis   Neuro: alert, dementia, moving all 4 ext    Recent Labs     01/14/24  2105 01/15/24  0251 01/16/24  0111   WBC 8.6 6.7 9.1   RBC 4.69* 3.95* 3.80*   HGB 15.5 13.1* 12.5*   HCT 45.0 39.4* 36.7*   MCV 95.9* 99.7* 96.6*   MCH 33.0* 33.2* 32.9*   MCHC 34.4 33.2

## 2024-01-17 PROBLEM — Z51.5 PALLIATIVE CARE PATIENT: Status: ACTIVE | Noted: 2024-01-17

## 2024-01-17 LAB
ALBUMIN SERPL-MCNC: 3.2 G/DL (ref 3.5–5.2)
ALP SERPL-CCNC: 63 U/L (ref 40–130)
ALT SERPL-CCNC: 14 U/L (ref 5–41)
ANION GAP SERPL CALCULATED.3IONS-SCNC: 6 MMOL/L (ref 7–19)
AST SERPL-CCNC: 15 U/L (ref 5–40)
BASOPHILS # BLD: 0 K/UL (ref 0–0.2)
BASOPHILS NFR BLD: 0.2 % (ref 0–1)
BILIRUB SERPL-MCNC: 0.8 MG/DL (ref 0.2–1.2)
BUN SERPL-MCNC: 20 MG/DL (ref 8–23)
CALCIUM SERPL-MCNC: 8 MG/DL (ref 8.8–10.2)
CHLORIDE SERPL-SCNC: 102 MMOL/L (ref 98–111)
CO2 SERPL-SCNC: 31 MMOL/L (ref 22–29)
CREAT SERPL-MCNC: 1 MG/DL (ref 0.5–1.2)
EOSINOPHIL # BLD: 0.2 K/UL (ref 0–0.6)
EOSINOPHIL NFR BLD: 2 % (ref 0–5)
ERYTHROCYTE [DISTWIDTH] IN BLOOD BY AUTOMATED COUNT: 13.2 % (ref 11.5–14.5)
GLUCOSE SERPL-MCNC: 115 MG/DL (ref 74–109)
HCT VFR BLD AUTO: 37.3 % (ref 42–52)
HGB BLD-MCNC: 12.4 G/DL (ref 14–18)
IMM GRANULOCYTES # BLD: 0.1 K/UL
LACTATE BLDV-SCNC: 0.9 MMOL/L (ref 0.5–1.9)
LYMPHOCYTES # BLD: 1.1 K/UL (ref 1.1–4.5)
LYMPHOCYTES NFR BLD: 13.3 % (ref 20–40)
MAGNESIUM SERPL-MCNC: 1.9 MG/DL (ref 1.6–2.4)
MCH RBC QN AUTO: 32.9 PG (ref 27–31)
MCHC RBC AUTO-ENTMCNC: 33.2 G/DL (ref 33–37)
MCV RBC AUTO: 98.9 FL (ref 80–94)
MONOCYTES # BLD: 0.6 K/UL (ref 0–0.9)
MONOCYTES NFR BLD: 6.5 % (ref 0–10)
NEUTROPHILS # BLD: 6.6 K/UL (ref 1.5–7.5)
NEUTS SEG NFR BLD: 77.3 % (ref 50–65)
PLATELET # BLD AUTO: 86 K/UL (ref 130–400)
PMV BLD AUTO: 9.2 FL (ref 9.4–12.4)
POTASSIUM SERPL-SCNC: 3.7 MMOL/L (ref 3.5–5)
PROT SERPL-MCNC: 5.2 G/DL (ref 6.6–8.7)
RBC # BLD AUTO: 3.77 M/UL (ref 4.7–6.1)
SODIUM SERPL-SCNC: 139 MMOL/L (ref 136–145)
WBC # BLD AUTO: 8.6 K/UL (ref 4.8–10.8)

## 2024-01-17 PROCEDURE — 85025 COMPLETE CBC W/AUTO DIFF WBC: CPT

## 2024-01-17 PROCEDURE — 6360000002 HC RX W HCPCS: Performed by: INTERNAL MEDICINE

## 2024-01-17 PROCEDURE — 2580000003 HC RX 258: Performed by: HOSPITALIST

## 2024-01-17 PROCEDURE — 6370000000 HC RX 637 (ALT 250 FOR IP): Performed by: SURGERY

## 2024-01-17 PROCEDURE — 83605 ASSAY OF LACTIC ACID: CPT

## 2024-01-17 PROCEDURE — 2580000003 HC RX 258: Performed by: INTERNAL MEDICINE

## 2024-01-17 PROCEDURE — 99232 SBSQ HOSP IP/OBS MODERATE 35: CPT | Performed by: SURGERY

## 2024-01-17 PROCEDURE — C1751 CATH, INF, PER/CENT/MIDLINE: HCPCS

## 2024-01-17 PROCEDURE — 2500000003 HC RX 250 WO HCPCS: Performed by: HOSPITALIST

## 2024-01-17 PROCEDURE — 05HB33Z INSERTION OF INFUSION DEVICE INTO RIGHT BASILIC VEIN, PERCUTANEOUS APPROACH: ICD-10-PCS | Performed by: HOSPITALIST

## 2024-01-17 PROCEDURE — 83735 ASSAY OF MAGNESIUM: CPT

## 2024-01-17 PROCEDURE — 36410 VNPNXR 3YR/> PHY/QHP DX/THER: CPT

## 2024-01-17 PROCEDURE — C9113 INJ PANTOPRAZOLE SODIUM, VIA: HCPCS | Performed by: INTERNAL MEDICINE

## 2024-01-17 PROCEDURE — 6370000000 HC RX 637 (ALT 250 FOR IP): Performed by: HOSPITALIST

## 2024-01-17 PROCEDURE — 6370000000 HC RX 637 (ALT 250 FOR IP): Performed by: INTERNAL MEDICINE

## 2024-01-17 PROCEDURE — 6360000002 HC RX W HCPCS: Performed by: HOSPITALIST

## 2024-01-17 PROCEDURE — 80053 COMPREHEN METABOLIC PANEL: CPT

## 2024-01-17 PROCEDURE — 76937 US GUIDE VASCULAR ACCESS: CPT

## 2024-01-17 PROCEDURE — 1210000000 HC MED SURG R&B

## 2024-01-17 RX ORDER — GABAPENTIN 600 MG/1
300 TABLET ORAL 3 TIMES DAILY
Status: DISCONTINUED | OUTPATIENT
Start: 2024-01-17 | End: 2024-01-20 | Stop reason: HOSPADM

## 2024-01-17 RX ORDER — BISACODYL 5 MG/1
5 TABLET, DELAYED RELEASE ORAL DAILY
Status: DISCONTINUED | OUTPATIENT
Start: 2024-01-18 | End: 2024-01-20 | Stop reason: HOSPADM

## 2024-01-17 RX ORDER — BISACODYL 10 MG
10 SUPPOSITORY, RECTAL RECTAL ONCE
Status: DISCONTINUED | OUTPATIENT
Start: 2024-01-17 | End: 2024-01-20 | Stop reason: HOSPADM

## 2024-01-17 RX ORDER — POLYETHYLENE GLYCOL 3350 17 G/17G
17 POWDER, FOR SOLUTION ORAL DAILY
Status: DISCONTINUED | OUTPATIENT
Start: 2024-01-17 | End: 2024-01-20 | Stop reason: HOSPADM

## 2024-01-17 RX ORDER — SODIUM CHLORIDE, SODIUM LACTATE, POTASSIUM CHLORIDE, CALCIUM CHLORIDE 600; 310; 30; 20 MG/100ML; MG/100ML; MG/100ML; MG/100ML
INJECTION, SOLUTION INTRAVENOUS CONTINUOUS
Status: DISCONTINUED | OUTPATIENT
Start: 2024-01-17 | End: 2024-01-19

## 2024-01-17 RX ADMIN — GABAPENTIN 300 MG: 600 TABLET, FILM COATED ORAL at 12:06

## 2024-01-17 RX ADMIN — ACETAMINOPHEN 1000 MG: 500 TABLET ORAL at 06:25

## 2024-01-17 RX ADMIN — ONDANSETRON 4 MG: 4 TABLET, ORALLY DISINTEGRATING ORAL at 09:58

## 2024-01-17 RX ADMIN — PRAVASTATIN SODIUM 80 MG: 20 TABLET ORAL at 20:09

## 2024-01-17 RX ADMIN — ACETAMINOPHEN 1000 MG: 500 TABLET ORAL at 20:09

## 2024-01-17 RX ADMIN — TRAZODONE HYDROCHLORIDE 50 MG: 50 TABLET ORAL at 20:09

## 2024-01-17 RX ADMIN — CITALOPRAM HYDROBROMIDE 10 MG: 10 TABLET ORAL at 08:41

## 2024-01-17 RX ADMIN — SODIUM BICARBONATE: 84 INJECTION, SOLUTION INTRAVENOUS at 08:28

## 2024-01-17 RX ADMIN — POLYETHYLENE GLYCOL 3350 17 G: 17 POWDER, FOR SOLUTION ORAL at 12:05

## 2024-01-17 RX ADMIN — SODIUM CHLORIDE, POTASSIUM CHLORIDE, SODIUM LACTATE AND CALCIUM CHLORIDE: 600; 310; 30; 20 INJECTION, SOLUTION INTRAVENOUS at 12:20

## 2024-01-17 RX ADMIN — METRONIDAZOLE 500 MG: 500 INJECTION, SOLUTION INTRAVENOUS at 15:48

## 2024-01-17 RX ADMIN — ACETAMINOPHEN 1000 MG: 500 TABLET ORAL at 14:39

## 2024-01-17 RX ADMIN — Medication 5 MG: at 20:09

## 2024-01-17 RX ADMIN — METRONIDAZOLE 500 MG: 500 INJECTION, SOLUTION INTRAVENOUS at 08:30

## 2024-01-17 RX ADMIN — CEFEPIME 2000 MG: 2 INJECTION, POWDER, FOR SOLUTION INTRAVENOUS at 16:57

## 2024-01-17 RX ADMIN — CEFEPIME 2000 MG: 2 INJECTION, POWDER, FOR SOLUTION INTRAVENOUS at 08:49

## 2024-01-17 RX ADMIN — SODIUM CHLORIDE, PRESERVATIVE FREE 40 MG: 5 INJECTION INTRAVENOUS at 08:41

## 2024-01-17 RX ADMIN — DONEPEZIL HYDROCHLORIDE 5 MG: 5 TABLET, FILM COATED ORAL at 20:09

## 2024-01-17 RX ADMIN — ASPIRIN 81 MG: 81 TABLET, CHEWABLE ORAL at 08:41

## 2024-01-17 RX ADMIN — GABAPENTIN 300 MG: 600 TABLET, FILM COATED ORAL at 20:09

## 2024-01-17 RX ADMIN — MEMANTINE HYDROCHLORIDE 10 MG: 5 TABLET ORAL at 20:08

## 2024-01-17 RX ADMIN — TAMSULOSIN HYDROCHLORIDE 0.4 MG: 0.4 CAPSULE ORAL at 08:41

## 2024-01-17 RX ADMIN — MECLIZINE HYDROCHLORIDE 25 MG: 25 TABLET ORAL at 14:39

## 2024-01-17 RX ADMIN — MEMANTINE HYDROCHLORIDE 10 MG: 5 TABLET ORAL at 08:41

## 2024-01-17 ASSESSMENT — PAIN SCALES - GENERAL
PAINLEVEL_OUTOF10: 3
PAINLEVEL_OUTOF10: 0
PAINLEVEL_OUTOF10: 3

## 2024-01-17 ASSESSMENT — ENCOUNTER SYMPTOMS
VOICE CHANGE: 0
CONSTIPATION: 0
SHORTNESS OF BREATH: 0
VOMITING: 0
DIARRHEA: 0
NAUSEA: 0
BACK PAIN: 0
ABDOMINAL PAIN: 1
COLOR CHANGE: 0

## 2024-01-17 ASSESSMENT — PAIN DESCRIPTION - PAIN TYPE: TYPE: ACUTE PAIN

## 2024-01-17 ASSESSMENT — PAIN DESCRIPTION - FREQUENCY: FREQUENCY: INTERMITTENT

## 2024-01-17 ASSESSMENT — PAIN DESCRIPTION - ONSET: ONSET: GRADUAL

## 2024-01-17 ASSESSMENT — PAIN DESCRIPTION - DESCRIPTORS: DESCRIPTORS: SORE

## 2024-01-17 ASSESSMENT — PAIN DESCRIPTION - LOCATION: LOCATION: ABDOMEN

## 2024-01-17 ASSESSMENT — PAIN DESCRIPTION - ORIENTATION: ORIENTATION: MID

## 2024-01-17 ASSESSMENT — PAIN - FUNCTIONAL ASSESSMENT: PAIN_FUNCTIONAL_ASSESSMENT: ACTIVITIES ARE NOT PREVENTED

## 2024-01-17 NOTE — PROGRESS NOTES
Carlos Ramirez transferred to 524 from 141 via bed.    Reason for transfer: lower level of care   Explained reason for transfer to Patient and Family.   Belongings:  cellphone  with patient at bedside .   Soft chart transferred with patient: Yes.  Telemetry box transferred with patient: yes.  Report given to: Tika MENDEZ, at bedside.      Electronically signed by Lisa Culp RN on 1/16/2024 at 7:09 PM

## 2024-01-17 NOTE — PROGRESS NOTES
Pharmacy Adjustment per [facility] protocol    Carlos Ramirez is a 73 y.o. male. Pharmacy has adjusted medications per [facility] protocol.    Recent Labs     01/16/24  0111 01/17/24  0314   BUN 29* 20       Recent Labs     01/16/24  0111 01/17/24  0314   CREATININE 1.5* 1.0       Estimated Creatinine Clearance: 84 mL/min (based on SCr of 1 mg/dL).    Height:   Ht Readings from Last 1 Encounters:   01/14/24 1.778 m (5' 10\")     Weight:  Wt Readings from Last 1 Encounters:   01/15/24 111.8 kg (246 lb 6.4 oz)         Plan: Adjust the following medications based on [facility] protocol:           Change cefepime to 2000mg IV every 8 hours extended infusion.    LUAD ALDANA, PHARM D, 1/17/2024, 1:19 PM

## 2024-01-17 NOTE — PROGRESS NOTES
Report called to Tika MENDEZ on 5th    Electronically signed by Lisa Culp RN on 1/16/2024 at 6:34 PM

## 2024-01-17 NOTE — PROGRESS NOTES
Access inadequate to discontinue central line. Patient to transfer with central line per Dr. Carver and PICC team to see tomorrow. Order placed.    Electronically signed by Lisa Culp RN on 1/16/2024 at 6:06 PM

## 2024-01-17 NOTE — PROCEDURES
[facility] Vascular Access Team:  Midline Insertion Procedure Note      Patient: Carlos Ramirez  YOB: 1950   MRN: [id number]  Room: [address] [place] [address]     Attending Physician - Gera Perez DO  Ordering Physician - Gera Preez DO    Diagnosis:   Generalized abdominal pain [R10.84]  Bacterial gastroenteritis [A04.9]  Intractable abdominal pain [R10.9]  Hypotension, unspecified hypotension type [I95.9]       Procedure(s):   Insertion of a 4.5 English Single Lumen Power Midline    Indication(s):   Downgrade from CVC to Midline, Poor Venous Access    Date of Procedure: 1/17/2024   Start Time: 1115  End Time: 1145    Performed by: Noe Sims RN    Anesthesia: Local Injection <=5 mL 1% Lidocaine without Epinephrine    Estimated Blood Loss (mL): Minimal    Complications: None       Findings:   1. Successful insertion of Midline.  2. Midline is ready to be used. Please change tubing prior to using the new Midline. Make sure to label, date and use alcohol caps on new tubing and alcohol caps on unused ports.   3. Labs may be drawn from Midline. Please make patient a unit draw.       Detailed Description of Procedure:   Informed consent was obtained for the procedure. Risks including nerve injury, arterial puncture, bleeding, and adverse drug reaction were discussed.     The Right Basilic vein was visualized using the ultrasound and deemed a suitable vessel. The area was prepped with Chlorhexidine and draped in sterile fashion using full max barrier draping. The area was anesthetized with 3 cc's of 1% Lidocaine. The vein was accessed using US guidance. The guidewire was advanced through the needle to secure the vein. The needle was removed and a peel-a-way Sheath was placed over the guidewire. Guidewire was removed with tip intact. The 4.5 English Single Lumen Power Midline was trimmed at 11 cm and inserted into the Sheath. The peel-a-way sheath was removed. Approximately 0 cm exposed. All lumens had

## 2024-01-17 NOTE — ACP (ADVANCE CARE PLANNING)
Advance Care Planning     Advance Care Planning Activator (Inpatient)  Conversation Note      Date of ACP Conversation: 1/17/2024     Conversation Conducted with: Pt,   With wife present     ACP Activator: Jyothi Keating RN      Health Care Decision Maker:     Current Designated Health Care Decision Maker:     Primary Decision Maker: Justo Ramirez - Child - [phone]    Secondary Decision Maker: Naima Ramirez - Spouse - [phone]      Care Preferences    Ventilation:  \"If you were in your present state of health and suddenly became very ill and were unable to breathe on your own, what would your preference be about the use of a ventilator (breathing machine) if it were available to you?\"      Would the patient desire the use of ventilator (breathing machine)?:   YES        Resuscitation  \"CPR works best to restart the heart when there is a sudden event, like a heart attack, in someone who is otherwise healthy. Unfortunately, CPR does not typically restart the heart for people who have serious health conditions or who are very sick.\"    \"In the event your heart stopped as a result of an underlying serious health condition, would you want attempts to be made to restart your heart (answer \"yes\" for attempt to resuscitate) or would you prefer a natural death (answer \"no\" for do not attempt to resuscitate)?\"   YES        Conversation Outcomes:  ACP discussion completed    Follow-up plan:    [x] Schedule follow-up conversation to continue planning.  FAMILY TO BRING IN DOCUMENTS

## 2024-01-17 NOTE — PROGRESS NOTES
[facility] General Surgery Progress Note    Chief Complaint:   Chief Complaint   Patient presents with    Abdominal Pain     X 3 weeks       SUBJECTIVE:  Mr. Ramirez is a 73 y.o. male is seen and examined at bedside. He is up to the chair. He continues to have some nausea and states abdominal pain \"all over\".  No bowel movement since Sunday.    OBJECTIVE:  BP (!) 154/79   Pulse 66   Temp 97 °F (36.1 °C) (Temporal)   Resp 17   Ht 1.778 m (5' 10\")   Wt 111.8 kg (246 lb 6.4 oz)   SpO2 96%   BMI 35.35 kg/m²   CONSTITUTIONAL: Alert, appropriate, no acute distress  SKIN: warm, dry with no rashes or lesions  HEENT: NCAT, Non icteric, PERR. Trachea Midline.  CHEST/LUNGS: CTA bilaterally. Normal respiratory effort.  CARDIOVASCULAR: RRR, No edema.  ABDOMEN: soft, ND, minimally  TTP, +BS  NEUROLOGIC: CN II-XI grossly intact, no motor or sensory deficits   MUSCULOSKELETAL: No clubbing or cyanosis.   PSYCHIATRIC:  Normal Mood and Affect. Alert and Oriented.    Labs:  CBC:   Recent Labs     01/15/24  0251 01/16/24  0111 01/17/24  0306   WBC 6.7 9.1 8.6   HGB 13.1* 12.5* 12.4*   * 119* 86*     BMP:    Recent Labs     01/15/24  0251 01/16/24  0111 01/17/24  0314    140 139   K 5.0 4.1 3.7    104 102   CO2 25 25 31*   BUN 32* 29* 20   CREATININE 1.7* 1.5* 1.0   GLUCOSE 139* 144* 115*       ASSESSMENT:  Principal Problem:    Intractable abdominal pain  Active Problems:    Hypertension    Bacterial gastroenteritis    Metabolic acidosis    Sepsis (HCC)  Resolved Problems:    * No resolved hospital problems. *      PLAN:  Abdomen remains benign.   Bowel regimen with dulcolax po and per rectum. Miralax daily.   Will continue clear liquids for now.   If no response from above, will repeat CT with oral contrast.    No findings to indicate surgical intervention is warranted at this time. Will monitor.    Kimberly Hilliard DO   Electronically Signed on 1/17/2024 at 10:46 AM

## 2024-01-17 NOTE — PROGRESS NOTES
4 Eyes Skin Assessment     NAME:  Carlos Ramirez  YOB: 1950  MEDICAL RECORD NUMBER:  [id number]    The patient is being assessed for  Transfer to New Unit    I agree that at least one RN has performed a thorough Head to Toe Skin Assessment on the patient. ALL assessment sites listed below have been assessed.      Areas assessed by both nurses:    Head, Face, Ears, Shoulders, Back, Chest, Arms, Elbows, Hands, Sacrum. Buttock, Coccyx, Ischium, Legs. Feet and Heels, and Under Medical Devices         Does the Patient have a Wound? No noted wound(s)       Agnesh Prevention initiated by RN: No  Wound Care Orders initiated by RN: No    Pressure Injury (Stage 3,4, Unstageable, DTI, NWPT, and Complex wounds) if present, place Wound referral order by RN under : No    New Ostomies, if present place, Ostomy referral order under : No     Nurse 1 eSignature: Electronically signed by Momo Manning RN on 1/17/24 at 1:02 AM CST    **SHARE this note so that the co-signing nurse can place an eSignature**    Nurse 2 eSignature: Electronically signed by Janelle Marte RN on 1/17/24 at 1:03 AM CST

## 2024-01-17 NOTE — CONSULTS
Palliative Care:  Initiated for support,  advance care planning and goals of care.     72 y/o alexander who presented with abdominal pain.  Diagnostics  did not reveal obstructive process. Admitted for treatment of  bacterial gastroenteritis.  IVF and IV antibiotics initiated.  General surgery consulted and following.  No indication for surgical intervention at this time.   Bowel regimen with dulcolax and miralax, no BM since Sunday. Upgraded to clear liquids.    Met with pt and his wife, explained role of palliative care.  Pt has chronic health history and wife reports some dementia.  Pt had not taken any of his bkft tray, reports he is nauseous.  His nurse is notified and administered zofran.  Pt reports pain, but unable to rate.  Wife, Naima gives history of life at home and baseline.  She provides some assist with pt when he gets up and is \"wobbly\" once he is up she reports he gets around well. She assists with his medications and monitors his blood pressure.   He has a cane and walker and uses when going out.      Past Medical History:        Past Medical History:   Diagnosis Date    Arthritis     CAD (coronary artery disease)     sees dr. zhou    Chronic back pain     COPD (chronic obstructive pulmonary disease) ([facility])     Hypertension     Palliative care patient 01/16/2024    Peripheral sensory neuropathy     Pleurisy     Shoulder pain     TIA (transient ischemic attack)     Type II or unspecified type diabetes mellitus without mention of complication, not stated as uncontrolled        Advance Directives:    Full Code  Son, Justo Ramirez is POA and pt has a living will.  Documents have been requested for our chart.           Pain/Other Symptoms:      Pt voiced his back and belly hurts.  His wife reports increased weakness.   Pt takes pain meds at home for chronic back pain.  He has been treated with prn pain medication.      How are symptoms affecting QOL At baseline, pt had been able to mow the grass and enjoys

## 2024-01-18 LAB
ALBUMIN SERPL-MCNC: 2.9 G/DL (ref 3.5–5.2)
ALP SERPL-CCNC: 45 U/L (ref 40–130)
ALT SERPL-CCNC: 22 U/L (ref 5–41)
ANION GAP SERPL CALCULATED.3IONS-SCNC: 6 MMOL/L (ref 7–19)
AST SERPL-CCNC: 31 U/L (ref 5–40)
BASOPHILS # BLD: 0 K/UL (ref 0–0.2)
BASOPHILS NFR BLD: 0.3 % (ref 0–1)
BILIRUB SERPL-MCNC: 0.5 MG/DL (ref 0.2–1.2)
BUN SERPL-MCNC: 14 MG/DL (ref 8–23)
CALCIT SERPL-MCNC: 2.8 PG/ML (ref 0–7.5)
CALCIUM SERPL-MCNC: 8.6 MG/DL (ref 8.8–10.2)
CHLORIDE SERPL-SCNC: 105 MMOL/L (ref 98–111)
CO2 SERPL-SCNC: 31 MMOL/L (ref 22–29)
CREAT SERPL-MCNC: 1.2 MG/DL (ref 0.5–1.2)
EOSINOPHIL # BLD: 0.2 K/UL (ref 0–0.6)
EOSINOPHIL NFR BLD: 3.1 % (ref 0–5)
ERYTHROCYTE [DISTWIDTH] IN BLOOD BY AUTOMATED COUNT: 13.1 % (ref 11.5–14.5)
GLUCOSE SERPL-MCNC: 99 MG/DL (ref 74–109)
HCT VFR BLD AUTO: 36.7 % (ref 42–52)
HGB BLD-MCNC: 12.2 G/DL (ref 14–18)
IMM GRANULOCYTES # BLD: 0.1 K/UL
LYMPHOCYTES # BLD: 1.1 K/UL (ref 1.1–4.5)
LYMPHOCYTES NFR BLD: 16.7 % (ref 20–40)
MAGNESIUM SERPL-MCNC: 2 MG/DL (ref 1.6–2.4)
MCH RBC QN AUTO: 32.4 PG (ref 27–31)
MCHC RBC AUTO-ENTMCNC: 33.2 G/DL (ref 33–37)
MCV RBC AUTO: 97.3 FL (ref 80–94)
MONOCYTES # BLD: 0.5 K/UL (ref 0–0.9)
MONOCYTES NFR BLD: 7.8 % (ref 0–10)
NEUTROPHILS # BLD: 4.6 K/UL (ref 1.5–7.5)
NEUTS SEG NFR BLD: 71.2 % (ref 50–65)
PHOSPHATE SERPL-MCNC: 1.7 MG/DL (ref 2.5–4.5)
PLATELET # BLD AUTO: 107 K/UL (ref 130–400)
PMV BLD AUTO: 9.6 FL (ref 9.4–12.4)
POTASSIUM SERPL-SCNC: 4.1 MMOL/L (ref 3.5–5)
PROT SERPL-MCNC: 4.9 G/DL (ref 6.6–8.7)
RBC # BLD AUTO: 3.77 M/UL (ref 4.7–6.1)
SODIUM SERPL-SCNC: 142 MMOL/L (ref 136–145)
WBC # BLD AUTO: 6.4 K/UL (ref 4.8–10.8)

## 2024-01-18 PROCEDURE — 2580000003 HC RX 258: Performed by: HOSPITALIST

## 2024-01-18 PROCEDURE — C9113 INJ PANTOPRAZOLE SODIUM, VIA: HCPCS | Performed by: INTERNAL MEDICINE

## 2024-01-18 PROCEDURE — 2580000003 HC RX 258: Performed by: INTERNAL MEDICINE

## 2024-01-18 PROCEDURE — 6370000000 HC RX 637 (ALT 250 FOR IP): Performed by: INTERNAL MEDICINE

## 2024-01-18 PROCEDURE — 1210000000 HC MED SURG R&B

## 2024-01-18 PROCEDURE — 83735 ASSAY OF MAGNESIUM: CPT

## 2024-01-18 PROCEDURE — 6360000002 HC RX W HCPCS: Performed by: INTERNAL MEDICINE

## 2024-01-18 PROCEDURE — 85025 COMPLETE CBC W/AUTO DIFF WBC: CPT

## 2024-01-18 PROCEDURE — 84100 ASSAY OF PHOSPHORUS: CPT

## 2024-01-18 PROCEDURE — 6370000000 HC RX 637 (ALT 250 FOR IP): Performed by: SURGERY

## 2024-01-18 PROCEDURE — 6370000000 HC RX 637 (ALT 250 FOR IP): Performed by: HOSPITALIST

## 2024-01-18 PROCEDURE — 99231 SBSQ HOSP IP/OBS SF/LOW 25: CPT | Performed by: SURGERY

## 2024-01-18 PROCEDURE — 6360000002 HC RX W HCPCS: Performed by: HOSPITALIST

## 2024-01-18 PROCEDURE — 94760 N-INVAS EAR/PLS OXIMETRY 1: CPT

## 2024-01-18 PROCEDURE — 80053 COMPREHEN METABOLIC PANEL: CPT

## 2024-01-18 RX ADMIN — METRONIDAZOLE 500 MG: 500 INJECTION, SOLUTION INTRAVENOUS at 07:27

## 2024-01-18 RX ADMIN — BISACODYL 5 MG: 5 TABLET, COATED ORAL at 07:37

## 2024-01-18 RX ADMIN — MEMANTINE HYDROCHLORIDE 10 MG: 5 TABLET ORAL at 07:37

## 2024-01-18 RX ADMIN — ACETAMINOPHEN 1000 MG: 500 TABLET ORAL at 06:19

## 2024-01-18 RX ADMIN — ASPIRIN 81 MG: 81 TABLET, CHEWABLE ORAL at 07:37

## 2024-01-18 RX ADMIN — POLYETHYLENE GLYCOL 3350 17 G: 17 POWDER, FOR SOLUTION ORAL at 07:31

## 2024-01-18 RX ADMIN — CEFEPIME 2000 MG: 2 INJECTION, POWDER, FOR SOLUTION INTRAVENOUS at 16:52

## 2024-01-18 RX ADMIN — CITALOPRAM HYDROBROMIDE 10 MG: 10 TABLET ORAL at 07:37

## 2024-01-18 RX ADMIN — TRAZODONE HYDROCHLORIDE 50 MG: 50 TABLET ORAL at 20:55

## 2024-01-18 RX ADMIN — Medication 5 MG: at 20:56

## 2024-01-18 RX ADMIN — GABAPENTIN 300 MG: 600 TABLET, FILM COATED ORAL at 07:37

## 2024-01-18 RX ADMIN — CEFEPIME 2000 MG: 2 INJECTION, POWDER, FOR SOLUTION INTRAVENOUS at 09:21

## 2024-01-18 RX ADMIN — METRONIDAZOLE 500 MG: 500 INJECTION, SOLUTION INTRAVENOUS at 15:41

## 2024-01-18 RX ADMIN — ONDANSETRON 4 MG: 2 INJECTION INTRAMUSCULAR; INTRAVENOUS at 11:10

## 2024-01-18 RX ADMIN — TAMSULOSIN HYDROCHLORIDE 0.4 MG: 0.4 CAPSULE ORAL at 07:37

## 2024-01-18 RX ADMIN — SODIUM CHLORIDE, POTASSIUM CHLORIDE, SODIUM LACTATE AND CALCIUM CHLORIDE: 600; 310; 30; 20 INJECTION, SOLUTION INTRAVENOUS at 11:10

## 2024-01-18 RX ADMIN — DONEPEZIL HYDROCHLORIDE 5 MG: 5 TABLET, FILM COATED ORAL at 20:56

## 2024-01-18 RX ADMIN — GABAPENTIN 300 MG: 600 TABLET, FILM COATED ORAL at 13:18

## 2024-01-18 RX ADMIN — SODIUM CHLORIDE, PRESERVATIVE FREE 40 MG: 5 INJECTION INTRAVENOUS at 07:28

## 2024-01-18 RX ADMIN — AMLODIPINE BESYLATE 5 MG: 5 TABLET ORAL at 07:37

## 2024-01-18 RX ADMIN — PRAVASTATIN SODIUM 80 MG: 20 TABLET ORAL at 20:55

## 2024-01-18 RX ADMIN — CEFEPIME 2000 MG: 2 INJECTION, POWDER, FOR SOLUTION INTRAVENOUS at 01:17

## 2024-01-18 RX ADMIN — ACETAMINOPHEN 1000 MG: 500 TABLET ORAL at 13:18

## 2024-01-18 RX ADMIN — OXYCODONE HYDROCHLORIDE 5 MG: 5 TABLET ORAL at 11:15

## 2024-01-18 RX ADMIN — LISINOPRIL 40 MG: 20 TABLET ORAL at 07:37

## 2024-01-18 RX ADMIN — MEMANTINE HYDROCHLORIDE 10 MG: 5 TABLET ORAL at 20:55

## 2024-01-18 RX ADMIN — METRONIDAZOLE 500 MG: 500 INJECTION, SOLUTION INTRAVENOUS at 00:00

## 2024-01-18 RX ADMIN — GABAPENTIN 300 MG: 600 TABLET, FILM COATED ORAL at 20:56

## 2024-01-18 RX ADMIN — ACETAMINOPHEN 1000 MG: 500 TABLET ORAL at 20:55

## 2024-01-18 ASSESSMENT — PAIN DESCRIPTION - DESCRIPTORS
DESCRIPTORS: SORE
DESCRIPTORS: ACHING
DESCRIPTORS: ACHING

## 2024-01-18 ASSESSMENT — ENCOUNTER SYMPTOMS
BACK PAIN: 0
VOMITING: 0
CONSTIPATION: 0
COLOR CHANGE: 0
DIARRHEA: 0
VOICE CHANGE: 0
SHORTNESS OF BREATH: 0
NAUSEA: 0

## 2024-01-18 ASSESSMENT — PAIN DESCRIPTION - LOCATION
LOCATION: BACK
LOCATION: ABDOMEN
LOCATION: BACK

## 2024-01-18 ASSESSMENT — PAIN DESCRIPTION - ONSET
ONSET: GRADUAL
ONSET: GRADUAL

## 2024-01-18 ASSESSMENT — PAIN DESCRIPTION - PAIN TYPE
TYPE: CHRONIC PAIN
TYPE: CHRONIC PAIN
TYPE: ACUTE PAIN

## 2024-01-18 ASSESSMENT — PAIN SCALES - GENERAL
PAINLEVEL_OUTOF10: 8
PAINLEVEL_OUTOF10: 0
PAINLEVEL_OUTOF10: 2
PAINLEVEL_OUTOF10: 6

## 2024-01-18 ASSESSMENT — PAIN DESCRIPTION - ORIENTATION
ORIENTATION: MID

## 2024-01-18 ASSESSMENT — PAIN - FUNCTIONAL ASSESSMENT
PAIN_FUNCTIONAL_ASSESSMENT: PREVENTS OR INTERFERES SOME ACTIVE ACTIVITIES AND ADLS

## 2024-01-18 ASSESSMENT — PAIN DESCRIPTION - FREQUENCY
FREQUENCY: INTERMITTENT
FREQUENCY: INTERMITTENT

## 2024-01-18 NOTE — PROGRESS NOTES
Hospitalist Progress Note    Patient:  Carlos Ramirez  YOB: 1950  Date of Service: 1/17/2024  MRN: [id number]   Acct: [id number]   Primary Care Physician: Inez Garcia APRN  Advance Directive: Full Code  Admit Date: 1/14/2024       Hospital Day: 3  Referring Provider: Gera Perez DO    Patient Seen, Chart, Consults, Notes, Labs, Radiology studies reviewed.    Subjective:  Carlos Ramirez is a 73 y.o. male  whom we are following for abdominal pain, enteritis.  He is tolerating clear liquids.  He is still complaining of diffuse abdominal pain.  No surgical intervention is recommended at this point.    Allergies:  Bee venom and Pcn [penicillins]    Medicines:  Current Facility-Administered Medications   Medication Dose Route Frequency Provider Last Rate Last Admin    polyethylene glycol (GLYCOLAX) packet 17 g  17 g Oral Daily Kimberly Hilliard DO   17 g at 01/17/24 1205    [START ON 1/18/2024] bisacodyl (DULCOLAX) EC tablet 5 mg  5 mg Oral Daily Kimberly Hilliard DO        lactated ringers IV soln infusion   IntraVENous Continuous Gera Perez  mL/hr at 01/17/24 1220 New Bag at 01/17/24 1220    gabapentin (NEURONTIN) tablet 300 mg  300 mg Oral TID Gera Perez DO   300 mg at 01/17/24 1206    bisacodyl (DULCOLAX) suppository 10 mg  10 mg Rectal Once Kimberly Hilliard DO        ceFEPIme (MAXIPIME) 2,000 mg in sodium chloride 0.9 % 100 mL IVPB ([id number])  2,000 mg IntraVENous Q8H Matthew Mac MD 25 mL/hr at 01/17/24 1657 2,000 mg at 01/17/24 1657    oxyCODONE (ROXICODONE) immediate release tablet 2.5 mg  2.5 mg Oral Q4H PRN Kuldip Carver MD   2.5 mg at 01/16/24 1207    oxyCODONE (ROXICODONE) immediate release tablet 5 mg  5 mg Oral Q4H PRN Kuldip Carver MD   5 mg at 01/16/24 1701    dextromethorphan-quiNIDine (NUEDEXTA) 20-10 MG per capsule 1 capsule (Patient Supplied)  1 capsule Oral Daily Matthew Mac MD        pantoprazole (PROTONIX) 40 mg in sodium chloride

## 2024-01-18 NOTE — PROGRESS NOTES
calcified/non-calcified arterial plaque is identified.  COMMON ILIAC ARTERIES: No significant aneurysm or stenosis. EXTERNAL ILIAC ARTERIS: No significant aneurysm or stenosis. INTERNAL ILIAC ARTERIES: No significant aneurysm or stenosis.  CELIAC ARTERY: No significant aneurysm or stenosis. SUPERIOR MESENTERIC ARTERY: No significant aneurysm or stenosis. INFERIOR MESENTERIC ARTERY: No significant aneurysm or stenosis.  RIGHT RENAL ARTERY: No significant aneurysm or stenosis. LEFT RENAL ARTERY: No significant aneurysm or stenosis.  LIMITED VENOUS SYSTEM: Not well opacified due to phase of contrast.  ABDOMEN/PELVIS: LIVER: No suspicious mass or biliary ductal dilatation. GALLBLADDER:  No abnormality is identified. PANCREAS: No mass or evidence of pancreatitis. No duct dilation. SPLEEN: No mass. No splenomegaly. ADRENALS: Normal. KIDNEYS/URETERS: No suspicious mass, or hydronephrosis. Scattered bilateral 2-4 mm intrarenal calcifications are noted. GI TRACT: - The visualized distal esophagus is mildly distended with fluid.  Moderate distension of the stomach is noted.  Multiple continuous proximal dilated segments of small bowel are identified. - There are to ventral small bowel containing hernias.  The periumbilical hernia contains a short segment of distal ileum.  No focal obstruction. - The more inferior ventral hernia also contains a short segment of small bowel, likely representing a more proximal segment of ileum.  No definite focal obstruction. - The colon is decompressed.  The appendix is not identified.  URINARY BLADDER: Normal. REPRODUCTIVE ORGANS: No abnormality identified.  LYMPH NODES: No lymphadenopathy. OTHER: Mild bibasilar pulmonary atelectasis is noted.  OSSEOUS/SOFT TISSUES:  No suspicious osteolytic, or osteoblastic lesion. No acute fracture. Age-related degenerative changes are noted.       1. Normal CTA of the abdomen and pelvis. 2. The multiple dilated segments of small bowel are identified  aorta has mild aneurysmal caliber at 3.7 cm.  No aortic intimal dissection or significant atherosclerotic disease. 3.  Mild cardiomegaly. 4.  Chronic-appearing interstitial changes of the lungs with bibasilar fibrosis and bronchiectasis.  There is either mild bibasilar pneumonia or probably more likely atelectasis.  Correlate clinically. 5.  Incidental note of fluid distension of the stomach and upper small bowel. - - - - -  All CT scans are performed using dose optimization techniques as appropriate to the performed exam and include at least one of the following: Automated exposure control, adjustment of the mA and/or kV according to size, and the use of iterative reconstruction technique.  ______________________________________ Electronically signed by: SHANTEL MORTENSEN M.D. Date:     01/14/2024 Time:    18:56     XR ACUTE ABD SERIES CHEST 1 VW    Result Date: 1/14/2024  EXAM:  FRONTAL CHEST, KUB UPRIGHT, KUB SUPINE  HISTORY:  Sepsis, abdominal pain, concern for small-bowel obstruction  FINDINGS:  Lungs are clear.  Prominent heart size.  Abdominal radiographs demonstrate distension of several small bowel loops in the mid abdomen.  Colon caliber appears grossly unremarkable.  No air-fluid levels are free air.  No suspicious calcification  or acute bony finding.      1.  Abnormal small bowel gas pattern could indicate ileus or obstruction. 2.  No free air. 3.  Lungs are clear.   ______________________________________ Electronically signed by: SHANTEL MORTENSEN M.D. Date:     01/14/2024 Time:    16:15        Assessment     Intractable abdominal pain.  Supportive care.     Enteritis.  IV fluids.  Continue broad-spectrum antibiotics.     Sepsis.  Resolved.      Gera Perez, DO

## 2024-01-18 NOTE — PROGRESS NOTES
[facility] General Surgery Progress Note    Chief Complaint:   Chief Complaint   Patient presents with    Abdominal Pain     X 3 weeks       SUBJECTIVE:  Mr. Ramirez is a 73 y.o. male is seen and examined at bedside. States he is feeling better since BM yesterday.     OBJECTIVE:  BP (!) 149/70   Pulse 58   Temp 97.3 °F (36.3 °C) (Temporal)   Resp 16   Ht 1.778 m (5' 10\")   Wt 111.8 kg (246 lb 6.4 oz)   SpO2 93%   BMI 35.35 kg/m²   CONSTITUTIONAL: Alert, appropriate, no acute distress  SKIN: warm, dry with no rashes or lesions  HEENT: NCAT, Non icteric, PERR. Trachea Midline.  CHEST/LUNGS: CTA bilaterally. Normal respiratory effort.  CARDIOVASCULAR: RRR, No edema.  ABDOMEN: soft, ND, non-  TTP, +BS  NEUROLOGIC: CN II-XI grossly intact, no motor or sensory deficits   MUSCULOSKELETAL: No clubbing or cyanosis.   PSYCHIATRIC:  Normal Mood and Affect. Alert and Oriented.    Labs:  CBC:   Recent Labs     01/16/24  0111 01/17/24  0306 01/18/24  0430   WBC 9.1 8.6 6.4   HGB 12.5* 12.4* 12.2*   * 86* 107*     BMP:    Recent Labs     01/16/24  0111 01/17/24  0314 01/18/24  0430    139 142   K 4.1 3.7 4.1    102 105   CO2 25 31* 31*   BUN 29* 20 14   CREATININE 1.5* 1.0 1.2   GLUCOSE 144* 115* 99       ASSESSMENT:  Principal Problem:    Intractable abdominal pain  Active Problems:    Hypertension    Bacterial gastroenteritis    Metabolic acidosis    Sepsis (HCC)  Resolved Problems:    * No resolved hospital problems. *      PLAN:  Abdomen remains benign.   Miralax effective. Recommend Miralax daily.   Advance diet to regular.     No further surgical intervention required. Please reconsult as needed. Thank you for the courtesy of this consultation.       Kimberly Hilliard DO   Electronically Signed on 1/18/2024 at 10:49 AM

## 2024-01-19 LAB
BACTERIA BLD CULT ORG #2: NORMAL
BACTERIA BLD CULT: NORMAL

## 2024-01-19 PROCEDURE — 2580000003 HC RX 258: Performed by: HOSPITALIST

## 2024-01-19 PROCEDURE — 6370000000 HC RX 637 (ALT 250 FOR IP): Performed by: INTERNAL MEDICINE

## 2024-01-19 PROCEDURE — 97535 SELF CARE MNGMENT TRAINING: CPT

## 2024-01-19 PROCEDURE — 6360000002 HC RX W HCPCS: Performed by: HOSPITALIST

## 2024-01-19 PROCEDURE — 6370000000 HC RX 637 (ALT 250 FOR IP): Performed by: HOSPITALIST

## 2024-01-19 PROCEDURE — 97165 OT EVAL LOW COMPLEX 30 MIN: CPT

## 2024-01-19 PROCEDURE — 1210000000 HC MED SURG R&B

## 2024-01-19 PROCEDURE — 2580000003 HC RX 258: Performed by: INTERNAL MEDICINE

## 2024-01-19 PROCEDURE — 97161 PT EVAL LOW COMPLEX 20 MIN: CPT

## 2024-01-19 PROCEDURE — 6370000000 HC RX 637 (ALT 250 FOR IP): Performed by: SURGERY

## 2024-01-19 PROCEDURE — C9113 INJ PANTOPRAZOLE SODIUM, VIA: HCPCS | Performed by: INTERNAL MEDICINE

## 2024-01-19 PROCEDURE — 94760 N-INVAS EAR/PLS OXIMETRY 1: CPT

## 2024-01-19 PROCEDURE — 6360000002 HC RX W HCPCS: Performed by: INTERNAL MEDICINE

## 2024-01-19 PROCEDURE — 97116 GAIT TRAINING THERAPY: CPT

## 2024-01-19 RX ORDER — PANTOPRAZOLE SODIUM 40 MG/1
40 TABLET, DELAYED RELEASE ORAL
Status: DISCONTINUED | OUTPATIENT
Start: 2024-01-20 | End: 2024-01-20 | Stop reason: HOSPADM

## 2024-01-19 RX ORDER — AMLODIPINE BESYLATE 10 MG/1
10 TABLET ORAL DAILY
Status: DISCONTINUED | OUTPATIENT
Start: 2024-01-20 | End: 2024-01-20 | Stop reason: HOSPADM

## 2024-01-19 RX ADMIN — TAMSULOSIN HYDROCHLORIDE 0.4 MG: 0.4 CAPSULE ORAL at 08:47

## 2024-01-19 RX ADMIN — METRONIDAZOLE 500 MG: 500 INJECTION, SOLUTION INTRAVENOUS at 23:41

## 2024-01-19 RX ADMIN — MEMANTINE HYDROCHLORIDE 10 MG: 5 TABLET ORAL at 08:47

## 2024-01-19 RX ADMIN — GABAPENTIN 300 MG: 600 TABLET, FILM COATED ORAL at 20:38

## 2024-01-19 RX ADMIN — POLYETHYLENE GLYCOL 3350 17 G: 17 POWDER, FOR SOLUTION ORAL at 08:46

## 2024-01-19 RX ADMIN — GABAPENTIN 300 MG: 600 TABLET, FILM COATED ORAL at 13:59

## 2024-01-19 RX ADMIN — TRAZODONE HYDROCHLORIDE 50 MG: 50 TABLET ORAL at 20:38

## 2024-01-19 RX ADMIN — OXYCODONE HYDROCHLORIDE 5 MG: 5 TABLET ORAL at 10:40

## 2024-01-19 RX ADMIN — CEFEPIME 2000 MG: 2 INJECTION, POWDER, FOR SOLUTION INTRAVENOUS at 00:06

## 2024-01-19 RX ADMIN — GABAPENTIN 300 MG: 600 TABLET, FILM COATED ORAL at 08:47

## 2024-01-19 RX ADMIN — ACETAMINOPHEN 1000 MG: 500 TABLET ORAL at 22:15

## 2024-01-19 RX ADMIN — LISINOPRIL 40 MG: 20 TABLET ORAL at 08:47

## 2024-01-19 RX ADMIN — CEFEPIME 2000 MG: 2 INJECTION, POWDER, FOR SOLUTION INTRAVENOUS at 16:29

## 2024-01-19 RX ADMIN — ASPIRIN 81 MG: 81 TABLET, CHEWABLE ORAL at 08:47

## 2024-01-19 RX ADMIN — ACETAMINOPHEN 1000 MG: 500 TABLET ORAL at 04:57

## 2024-01-19 RX ADMIN — PRAVASTATIN SODIUM 80 MG: 20 TABLET ORAL at 20:37

## 2024-01-19 RX ADMIN — CITALOPRAM HYDROBROMIDE 10 MG: 10 TABLET ORAL at 08:47

## 2024-01-19 RX ADMIN — CEFEPIME 2000 MG: 2 INJECTION, POWDER, FOR SOLUTION INTRAVENOUS at 23:52

## 2024-01-19 RX ADMIN — BISACODYL 5 MG: 5 TABLET, COATED ORAL at 08:47

## 2024-01-19 RX ADMIN — ACETAMINOPHEN 1000 MG: 500 TABLET ORAL at 13:59

## 2024-01-19 RX ADMIN — OXYCODONE HYDROCHLORIDE 5 MG: 5 TABLET ORAL at 04:57

## 2024-01-19 RX ADMIN — METRONIDAZOLE 500 MG: 500 INJECTION, SOLUTION INTRAVENOUS at 08:44

## 2024-01-19 RX ADMIN — CEFEPIME 2000 MG: 2 INJECTION, POWDER, FOR SOLUTION INTRAVENOUS at 08:46

## 2024-01-19 RX ADMIN — METRONIDAZOLE 500 MG: 500 INJECTION, SOLUTION INTRAVENOUS at 16:28

## 2024-01-19 RX ADMIN — Medication 5 MG: at 20:37

## 2024-01-19 RX ADMIN — AMLODIPINE BESYLATE 5 MG: 5 TABLET ORAL at 08:47

## 2024-01-19 RX ADMIN — DONEPEZIL HYDROCHLORIDE 5 MG: 5 TABLET, FILM COATED ORAL at 20:38

## 2024-01-19 RX ADMIN — MEMANTINE HYDROCHLORIDE 10 MG: 5 TABLET ORAL at 20:38

## 2024-01-19 RX ADMIN — METRONIDAZOLE 500 MG: 500 INJECTION, SOLUTION INTRAVENOUS at 00:06

## 2024-01-19 RX ADMIN — SODIUM CHLORIDE, PRESERVATIVE FREE 40 MG: 5 INJECTION INTRAVENOUS at 08:42

## 2024-01-19 ASSESSMENT — PAIN SCALES - GENERAL
PAINLEVEL_OUTOF10: 2
PAINLEVEL_OUTOF10: 5
PAINLEVEL_OUTOF10: 7
PAINLEVEL_OUTOF10: 4
PAINLEVEL_OUTOF10: 1

## 2024-01-19 ASSESSMENT — PAIN DESCRIPTION - LOCATION
LOCATION: ABDOMEN
LOCATION: BACK
LOCATION: BACK

## 2024-01-19 ASSESSMENT — ENCOUNTER SYMPTOMS
SHORTNESS OF BREATH: 0
COLOR CHANGE: 0
DIARRHEA: 0
NAUSEA: 0
CONSTIPATION: 0
VOMITING: 0
BACK PAIN: 0
VOICE CHANGE: 0

## 2024-01-19 ASSESSMENT — PAIN - FUNCTIONAL ASSESSMENT: PAIN_FUNCTIONAL_ASSESSMENT: ACTIVITIES ARE NOT PREVENTED

## 2024-01-19 ASSESSMENT — PAIN DESCRIPTION - DESCRIPTORS: DESCRIPTORS: ACHING

## 2024-01-19 ASSESSMENT — PAIN DESCRIPTION - ORIENTATION: ORIENTATION: MID

## 2024-01-19 NOTE — CARE COORDINATION
Patient/spouse have requested a rolling walker for home. Rolling walker ordered.    Spouse states they have help at home and will be discharging to home. ? Need for home healthcare.  Electronically signed by Rhys Delatorre RN, BSN on 1/19/2024 at 9:56 AM

## 2024-01-19 NOTE — CARE COORDINATION
Strip Received 01/18/24    Telemetry Strip Received 01/18/24    EKG Received 01/15/24 EKG 12-LEAD on the ekg interface   EKG Received 01/15/24 EKG 12-LEAD on the ekg interface       Medical Problems  Comment    Hospital Problem List  Date Reviewed: 10/18/2023     ICD-10-CM Priority Class Noted POA    Intractable abdominal pain R10.9   1/14/2024 Yes   Hypertension I10   Unknown Yes   Bacterial gastroenteritis A04.9   1/14/2024 Yes   Metabolic acidosis E87.20   1/14/2024 Yes   Sepsis (HCC) A41.9   1/14/2024 Yes     Non-Hospital Problem List  Date Reviewed: 10/18/2023     ICD-10-CM Priority Class Noted   Type 2 diabetes mellitus without complication (HCC) E11.9   Unknown   COPD (chronic obstructive pulmonary disease) (HCC) J44.9   Unknown   Chronic back pain M54.9, G89.29   Unknown   Benign non-nodular prostatic hyperplasia with lower urinary tract symptoms N40.1   11/29/2016   Family history of prostate cancer Z80.42   11/29/2016   Skin lesion of face L98.9   11/29/2016   Basal cell carcinoma of chest C44.519   6/20/2017   Basal cell carcinoma of left cheek C44.319   9/18/2019   Benign hemangioma D18.00   3/23/2017   Epigastric pain R10.13   8/5/2019   Overview Signed 9/18/2019  5:13 PM by Jesenia Baez MA    Added automatically from request for surgery [id number]      Fall from slipping on ice, initial encounter W00.9XXA   1/17/2018   Gastroesophageal reflux disease K21.9   8/5/2019   Overview Signed 9/18/2019  5:13 PM by Jesenia Baez MA    Added automatically from request for surgery [id number]      Heartburn R12   8/5/2019   Overview Signed 9/18/2019  5:13 PM by Jesenia Baez MA    Added automatically from request for surgery [id number]      Seborrheic keratosis L82.1   9/18/2019   Thrombocytopenia (HCC) D69.6   9/19/2019   B12 deficiency E53.8   10/31/2019   Chest pain, unspecified R07.9   11/4/2019   Symptomatic bradycardia R00.1   Unknown   CAD (coronary artery disease) I25.10    below and add the medical necessity documentation for this DME in a separate note.     Folding Walker with Wheels     Current patient weight: Weight - Scale: 123.4 kg (272 lb)  Current patient height: Height: 177.8 cm (5' 10\")  Diagnosis: Intractable Abd Pain  Duration: Purchase            Scheduling Instructions:                                 Specimen Source             Collection Date    Collection Time    Order Status    Expected Date                 Electronically Signed By  Gera Perez DO  NPI:  [id number] Date  Jan 19, 2024  9:49 AM               Responsible Party /Guarantor Information     Guar-ActID   Relationship Account Type Home Phone   MILTON WICK - [address] / [place] [address] Self P/F [phone]   Employer   Work Phone   RETIRED                  Insurance & Policy [place] Information         Primary Insurance:  Insurance/Subscriber ID:  [id number]  Subscriber Name:  MILTON WICK              Relationship to Patient: Self     Secondary Insurance:  Insurance/Subscriber ID: [id number]  Subscriber Name: MILTON WICK  Relationship to Patient: Self   Signed ABN: N       Payor Name:  [facility] MEDICARE   Plan:  [facility] [employer]   Group: [id number]        Payor Name: [place]ICA[place]  Plan:  MEDICAID [facility]     [facility] Date of Injury:

## 2024-01-19 NOTE — PROGRESS NOTES
Occupational Therapy Initial Assessment  Date: 2024   Patient Name: Carlos Ramirez  MRN: [id number]     : 1950    Date of Service: 2024    Discharge Recommendations:  Patient would benefit from continued therapy after discharge, 24 hour supervision or assist       Assessment   Assessment: Evaluation completed and tx initiated.  The patient would benefit from further skilled therapy to upgrade safety and functional independence.  Requires 24/7 supervision/assist primarily for ambulatory ADL.  May want travel wheelchair for prn needs depending upon amount of assist available at home  Treatment Diagnosis: Intractable abdominal pain, enteritis  History: hx of alzheimers  REQUIRES OT FOLLOW-UP: Yes  Activity Tolerance  Activity Tolerance: Patient Tolerated treatment well  Activity Tolerance Comments: States he feels so much better after moving around           Patient Diagnosis(es): The primary encounter diagnosis was Generalized abdominal pain. A diagnosis of Hypotension, unspecified hypotension type was also pertinent to this visit.   has a past medical history of Arthritis, CAD (coronary artery disease), Chronic back pain, COPD (chronic obstructive pulmonary disease) ([facility]), Hypertension, Palliative care patient, Peripheral sensory neuropathy, Pleurisy, Shoulder pain, TIA (transient ischemic attack), and Type II or unspecified type diabetes mellitus without mention of complication, not stated as uncontrolled.   has a past surgical history that includes Appendectomy; Cardiac catheterization (2019); and shoulder surgery (Left, 2020).    Treatment Diagnosis: Intractable abdominal pain, enteritis      Restrictions  Restrictions/Precautions  Restrictions/Precautions: Fall Risk    Subjective   General  Chart Reviewed: Yes  Patient assessed for rehabilitation services?: Yes  Family / Caregiver Present: Yes (spouse)  Pre Treatment Pain Screening  Pain at present: 0  Scale Used: Numeric

## 2024-01-19 NOTE — PROGRESS NOTES
Follow up:    Pt is sitting up in the chair this morning and had most of his breakfast.  Reports feeling better.  No needs voiced at this time.  Pt's wife present and requests a walker with wheels for home as well as a gait belt.  She would like the walker delivered to the room.  Care management aware and will follow up with order.  Encouragement and support provided.  Will continue to follow POC.

## 2024-01-19 NOTE — PROGRESS NOTES
Comprehensive Nutrition Assessment    Type and Reason for Visit:  Initial, RD Nutrition Re-Screen/LOS    Nutrition Recommendations/Plan:   Continue POC     Malnutrition Assessment:  Malnutrition Status:  No malnutrition (01/19/24 1007)    Context:  Acute Illness     Findings of the 6 clinical characteristics of malnutrition:  Energy Intake:  No significant decrease in energy intake  Weight Loss:  No significant weight loss     Body Fat Loss:  No significant body fat loss     Muscle Mass Loss:  No significant muscle mass loss    Fluid Accumulation:  No significant fluid accumulation     Strength:  Not Performed    Nutrition Assessment:    Pt tolerates a Regular diet. He is feeling better after BM 1/18. Continue POC at this time.    Current Nutrition Intake & Therapies:    Average Meal Intake: 26-50%, %  ADULT DIET; Regular    Anthropometric Measures:  Height: 177.8 cm (5' 10\")  Ideal Body Weight (IBW): 166 lbs (75 kg)    Current Body Weight: 123.4 kg (272 lb 0.8 oz)  Current BMI (kg/m2): 39  BMI Categories: Obese Class 2 (BMI 35.0 -39.9)    Nutrition Diagnosis:   No nutrition diagnosis at this time     Nutrition Interventions:   Food and/or Nutrient Delivery: Continue Current Diet  Coordination of Nutrition Care: Continue to monitor while inpatient    Goals:  Goals: PO intake 75% or greater    Nutrition Monitoring and Evaluation:   Food/Nutrient Intake Outcomes: Food and Nutrient Intake  Physical Signs/Symptoms Outcomes: Biochemical Data, Nutrition Focused Physical Findings, Weight    Janelle Urrutia MS, RD, LD  Contact: [phone]

## 2024-01-19 NOTE — PROGRESS NOTES
Physical Therapy  Facility/Department: [facility] SURG SERVICES  Physical Therapy Initial Assessment    Name: Carlos Ramirez  : 1950  MRN: [id number]  Date of Service: 2024    Discharge Recommendations:  Continue to assess pending progress, Patient would benefit from continued therapy after discharge (Pt'S WIFE STATES CONCERN ABOUT TRYING TO CARE FOR Pt AT HOME WITH HIS CURRENT LEVEL OF FUNCTION. CM NOTIFIED)          Patient Diagnosis(es): The primary encounter diagnosis was Generalized abdominal pain. A diagnosis of Hypotension, unspecified hypotension type was also pertinent to this visit.  Past Medical History:  has a past medical history of Arthritis, CAD (coronary artery disease), Chronic back pain, COPD (chronic obstructive pulmonary disease) (HCC), Hypertension, Palliative care patient, Peripheral sensory neuropathy, Pleurisy, Shoulder pain, TIA (transient ischemic attack), and Type II or unspecified type diabetes mellitus without mention of complication, not stated as uncontrolled.  Past Surgical History:  has a past surgical history that includes Appendectomy; Cardiac catheterization (2019); and shoulder surgery (Left, 2020).    Assessment   Body Structures, Functions, Activity Limitations Requiring Skilled Therapeutic Intervention: Decreased functional mobility ;Decreased strength;Decreased endurance  Assessment: pt WOULD BENEFIT FROM SKILLED PT IN THIS SETTING TO ADDRESS HIS MOBILITY/ENDURANCE DEFICITS  Therapy Prognosis: Good  Decision Making: Low Complexity  Requires PT Follow-Up: Yes  Activity Tolerance  Activity Tolerance: Patient tolerated treatment well     Plan   Physical Therapy Plan  General Plan: 5-7 times per week  Therapy Duration: 2 Weeks  Current Treatment Recommendations: Strengthening, Functional mobility training, Transfer training, Gait training, Pain management, Safety education & training, Patient/Caregiver education & training, Therapeutic activities,

## 2024-01-20 VITALS
BODY MASS INDEX: 41.09 KG/M2 | DIASTOLIC BLOOD PRESSURE: 87 MMHG | HEART RATE: 56 BPM | RESPIRATION RATE: 16 BRPM | OXYGEN SATURATION: 97 % | TEMPERATURE: 98.2 F | HEIGHT: 70 IN | WEIGHT: 287 LBS | SYSTOLIC BLOOD PRESSURE: 174 MMHG

## 2024-01-20 PROBLEM — A41.9 SEPSIS (HCC): Status: RESOLVED | Noted: 2024-01-14 | Resolved: 2024-01-20

## 2024-01-20 PROBLEM — E87.20 METABOLIC ACIDOSIS: Status: RESOLVED | Noted: 2024-01-14 | Resolved: 2024-01-20

## 2024-01-20 PROBLEM — R10.9 INTRACTABLE ABDOMINAL PAIN: Status: RESOLVED | Noted: 2024-01-14 | Resolved: 2024-01-20

## 2024-01-20 PROBLEM — A04.9 BACTERIAL GASTROENTERITIS: Status: RESOLVED | Noted: 2024-01-14 | Resolved: 2024-01-20

## 2024-01-20 LAB
ALBUMIN SERPL-MCNC: 3.1 G/DL (ref 3.5–5.2)
ALP SERPL-CCNC: 68 U/L (ref 40–130)
ALT SERPL-CCNC: 75 U/L (ref 5–41)
ANION GAP SERPL CALCULATED.3IONS-SCNC: 5 MMOL/L (ref 7–19)
AST SERPL-CCNC: 81 U/L (ref 5–40)
BASOPHILS # BLD: 0 K/UL (ref 0–0.2)
BASOPHILS NFR BLD: 0.5 % (ref 0–1)
BILIRUB SERPL-MCNC: 0.3 MG/DL (ref 0.2–1.2)
BUN SERPL-MCNC: 11 MG/DL (ref 8–23)
CALCIUM SERPL-MCNC: 8.5 MG/DL (ref 8.8–10.2)
CHLORIDE SERPL-SCNC: 108 MMOL/L (ref 98–111)
CO2 SERPL-SCNC: 28 MMOL/L (ref 22–29)
CREAT SERPL-MCNC: 1 MG/DL (ref 0.5–1.2)
EOSINOPHIL # BLD: 0.3 K/UL (ref 0–0.6)
EOSINOPHIL NFR BLD: 3.1 % (ref 0–5)
ERYTHROCYTE [DISTWIDTH] IN BLOOD BY AUTOMATED COUNT: 13.3 % (ref 11.5–14.5)
GLUCOSE SERPL-MCNC: 100 MG/DL (ref 74–109)
HCT VFR BLD AUTO: 35.9 % (ref 42–52)
HGB BLD-MCNC: 12 G/DL (ref 14–18)
IMM GRANULOCYTES # BLD: 0.2 K/UL
LYMPHOCYTES # BLD: 1.6 K/UL (ref 1.1–4.5)
LYMPHOCYTES NFR BLD: 19.4 % (ref 20–40)
MAGNESIUM SERPL-MCNC: 1.7 MG/DL (ref 1.6–2.4)
MCH RBC QN AUTO: 32.9 PG (ref 27–31)
MCHC RBC AUTO-ENTMCNC: 33.4 G/DL (ref 33–37)
MCV RBC AUTO: 98.4 FL (ref 80–94)
MONOCYTES # BLD: 0.8 K/UL (ref 0–0.9)
MONOCYTES NFR BLD: 9.8 % (ref 0–10)
NEUTROPHILS # BLD: 5.5 K/UL (ref 1.5–7.5)
NEUTS SEG NFR BLD: 64.9 % (ref 50–65)
PHOSPHATE SERPL-MCNC: 2.5 MG/DL (ref 2.5–4.5)
PLATELET # BLD AUTO: 143 K/UL (ref 130–400)
PMV BLD AUTO: 8.9 FL (ref 9.4–12.4)
POTASSIUM SERPL-SCNC: 4.2 MMOL/L (ref 3.5–5)
PROT SERPL-MCNC: 5 G/DL (ref 6.6–8.7)
RBC # BLD AUTO: 3.65 M/UL (ref 4.7–6.1)
SODIUM SERPL-SCNC: 141 MMOL/L (ref 136–145)
WBC # BLD AUTO: 8.4 K/UL (ref 4.8–10.8)

## 2024-01-20 PROCEDURE — 6370000000 HC RX 637 (ALT 250 FOR IP): Performed by: INTERNAL MEDICINE

## 2024-01-20 PROCEDURE — 6360000002 HC RX W HCPCS: Performed by: HOSPITALIST

## 2024-01-20 PROCEDURE — 85025 COMPLETE CBC W/AUTO DIFF WBC: CPT

## 2024-01-20 PROCEDURE — 83735 ASSAY OF MAGNESIUM: CPT

## 2024-01-20 PROCEDURE — 84100 ASSAY OF PHOSPHORUS: CPT

## 2024-01-20 PROCEDURE — 6370000000 HC RX 637 (ALT 250 FOR IP): Performed by: HOSPITALIST

## 2024-01-20 PROCEDURE — 80053 COMPREHEN METABOLIC PANEL: CPT

## 2024-01-20 PROCEDURE — 6370000000 HC RX 637 (ALT 250 FOR IP): Performed by: SURGERY

## 2024-01-20 PROCEDURE — 94760 N-INVAS EAR/PLS OXIMETRY 1: CPT

## 2024-01-20 PROCEDURE — 2580000003 HC RX 258: Performed by: HOSPITALIST

## 2024-01-20 RX ORDER — GABAPENTIN 300 MG/1
300 CAPSULE ORAL 3 TIMES DAILY
Qty: 90 CAPSULE | Refills: 0 | Status: SHIPPED | OUTPATIENT
Start: 2024-01-20 | End: 2024-01-23

## 2024-01-20 RX ORDER — AMLODIPINE BESYLATE 10 MG/1
10 TABLET ORAL DAILY
Qty: 30 TABLET | Refills: 3 | Status: SHIPPED | OUTPATIENT
Start: 2024-01-21 | End: 2024-01-20

## 2024-01-20 RX ORDER — AMLODIPINE BESYLATE 10 MG/1
10 TABLET ORAL DAILY
Qty: 30 TABLET | Refills: 3 | Status: SHIPPED | OUTPATIENT
Start: 2024-01-21 | End: 2024-01-23

## 2024-01-20 RX ADMIN — SODIUM CHLORIDE, PRESERVATIVE FREE 10 ML: 5 INJECTION INTRAVENOUS at 09:39

## 2024-01-20 RX ADMIN — ASPIRIN 81 MG: 81 TABLET, CHEWABLE ORAL at 09:39

## 2024-01-20 RX ADMIN — GABAPENTIN 300 MG: 600 TABLET, FILM COATED ORAL at 09:39

## 2024-01-20 RX ADMIN — CEFEPIME 2000 MG: 2 INJECTION, POWDER, FOR SOLUTION INTRAVENOUS at 09:32

## 2024-01-20 RX ADMIN — CITALOPRAM HYDROBROMIDE 10 MG: 10 TABLET ORAL at 09:39

## 2024-01-20 RX ADMIN — POLYETHYLENE GLYCOL 3350 17 G: 17 POWDER, FOR SOLUTION ORAL at 09:38

## 2024-01-20 RX ADMIN — LISINOPRIL 40 MG: 20 TABLET ORAL at 09:39

## 2024-01-20 RX ADMIN — ACETAMINOPHEN 1000 MG: 500 TABLET ORAL at 06:10

## 2024-01-20 RX ADMIN — AMLODIPINE BESYLATE 10 MG: 10 TABLET ORAL at 09:39

## 2024-01-20 RX ADMIN — MEMANTINE HYDROCHLORIDE 10 MG: 5 TABLET ORAL at 09:38

## 2024-01-20 RX ADMIN — PANTOPRAZOLE SODIUM 40 MG: 40 TABLET, DELAYED RELEASE ORAL at 06:11

## 2024-01-20 RX ADMIN — TAMSULOSIN HYDROCHLORIDE 0.4 MG: 0.4 CAPSULE ORAL at 09:39

## 2024-01-20 RX ADMIN — BISACODYL 5 MG: 5 TABLET, COATED ORAL at 09:39

## 2024-01-20 RX ADMIN — METRONIDAZOLE 500 MG: 500 INJECTION, SOLUTION INTRAVENOUS at 09:34

## 2024-01-20 ASSESSMENT — PAIN DESCRIPTION - ORIENTATION: ORIENTATION: MID

## 2024-01-20 ASSESSMENT — PAIN - FUNCTIONAL ASSESSMENT: PAIN_FUNCTIONAL_ASSESSMENT: PREVENTS OR INTERFERES SOME ACTIVE ACTIVITIES AND ADLS

## 2024-01-20 ASSESSMENT — PAIN SCALES - GENERAL: PAINLEVEL_OUTOF10: 6

## 2024-01-20 ASSESSMENT — PAIN DESCRIPTION - DESCRIPTORS: DESCRIPTORS: ACHING

## 2024-01-20 ASSESSMENT — PAIN DESCRIPTION - LOCATION: LOCATION: BACK

## 2024-01-20 NOTE — PLAN OF CARE
Problem: Discharge Planning  Goal: Discharge to home or other facility with appropriate resources  1/20/2024 1219 by Felicitas Son RN  Outcome: Adequate for Discharge  1/19/2024 2327 by Ning Dupont LPN  Outcome: Progressing     Problem: Pain  Goal: Verbalizes/displays adequate comfort level or baseline comfort level  1/20/2024 1219 by Felicitas Son RN  Outcome: Adequate for Discharge  1/19/2024 2327 by Ning Dupont LPN  Outcome: Progressing     Problem: ABCDS Injury Assessment  Goal: Absence of physical injury  1/20/2024 1219 by Felicitas Son RN  Outcome: Adequate for Discharge  1/19/2024 2327 by Ning Dupont LPN  Outcome: Progressing     Problem: Safety - Adult  Goal: Free from fall injury  1/20/2024 1219 by Felicitas Son RN  Outcome: Adequate for Discharge  1/19/2024 2327 by Ning Dupont LPN  Outcome: Progressing     Problem: Chronic Conditions and Co-morbidities  Goal: Patient's chronic conditions and co-morbidity symptoms are monitored and maintained or improved  1/20/2024 1219 by Felicitas Son RN  Outcome: Adequate for Discharge  1/19/2024 2327 by Ning Dupont LPN  Outcome: Progressing

## 2024-01-20 NOTE — DISCHARGE SUMMARY
Discharge Summary    Carlos Ramirez  :  1950  MRN:  [id number]    Admit date:  2024  Discharge date:      Admitting Physician:  Kuldip Carver MD    Advance Directive: Full Code    Consults: {consultation:[id number]}    Primary Care Physician:  Inez Garcia, APRN    Discharge Diagnoses:  Principal Problem (Resolved):    Intractable abdominal pain  Active Problems:    * No active hospital problems. *  Resolved Problems:    Hypertension    Bacterial gastroenteritis    Metabolic acidosis    Sepsis (HCC)      Significant Diagnostic Studies:   XR CHEST PORTABLE    Result Date: 1/15/2024  EXAM: CHEST RADIOGRAPH  TECHNIQUE: Single frontal chest radiograph.  HISTORY: Line placement.  COMPARISON: 2024  FINDINGS:  The patient is mildly leaning and rotated to the left.  Interval placement of a right IJ central venous line, tip in the SVC.  EKG leads project over the chest.  Oxygen tubing projects over left upper shoulder. Stable mild bibasilar atelectasis. Equivocal trace right pleural effusion.  No visible pneumothorax. Stable cardiomegaly. No acute displaced rib fractures are identified. Arthroplasty of the left glenohumeral joint, only partially imaged, again noted.       1.  Interval placement of a right IJ central venous line.  No visible pneumothorax. 2.  Stable mild bibasilar atelectasis, with equivocal trace right pleural effusion.  Pneumonia cannot be excluded. 3.  Stable cardiomegaly.    ______________________________________ Electronically signed by: KITTY DICK M.D. Date:     01/15/2024 Time:    06:05     CTA ABDOMEN PELVIS W WO CONTRAST    Result Date: 2024  EXAM: CTA ABDOMEN AND PELVIS WITH AND WITHOUT CONTRAST  HISTORY: Abdominal and pelvic pain.  TECHNIQUE: CT acquisition of the abdomen and pelvis from the lower thorax through the pelvis without intravenous contrast administration. CTA acquisition of the abdomen and pelvis with intravenous contrast administration. IV Contrast: 100 mL of

## 2024-01-20 NOTE — PROGRESS NOTES
Hospitalist Progress Note    Patient:  Carlos Ramirez  YOB: 1950  Date of Service: 1/19/2024  MRN: [id number]   Acct: [id number]   Primary Care Physician: Inez Garcia APRN  Advance Directive: Full Code  Admit Date: 1/14/2024       Hospital Day: 5  Referring Provider: Gera Perez DO    Patient Seen, Chart, Consults, Notes, Labs, Radiology studies reviewed.    Subjective:  Carlos Ramirez is a 73 y.o. male  whom we are following for abdominal pain, enteritis.  He is unable to ambulate at present.  He still has some intermittent abdominal pain.  Hemodynamics are stable.  He is tolerating a regular diet.    Allergies:  Bee venom and Pcn [penicillins]    Medicines:  Current Facility-Administered Medications   Medication Dose Route Frequency Provider Last Rate Last Admin    [START ON 1/20/2024] amLODIPine (NORVASC) tablet 10 mg  10 mg Oral Daily Gera Perez DO        [START ON 1/20/2024] pantoprazole (PROTONIX) tablet 40 mg  40 mg Oral QAM AC Gera Perez DO        polyethylene glycol (GLYCOLAX) packet 17 g  17 g Oral Daily Kimberly Hilliard DO   17 g at 01/19/24 0846    bisacodyl (DULCOLAX) EC tablet 5 mg  5 mg Oral Daily Kimberly Hilliard DO   5 mg at 01/19/24 0847    gabapentin (NEURONTIN) tablet 300 mg  300 mg Oral TID Gera Perez DO   300 mg at 01/19/24 1359    bisacodyl (DULCOLAX) suppository 10 mg  10 mg Rectal Once Kimberly Hilliard DO        ceFEPIme (MAXIPIME) 2,000 mg in sodium chloride 0.9 % 100 mL IVPB ([id number])  2,000 mg IntraVENous Q8H Matthew Mac MD 25 mL/hr at 01/19/24 1629 2,000 mg at 01/19/24 1629    oxyCODONE (ROXICODONE) immediate release tablet 2.5 mg  2.5 mg Oral Q4H PRN Kuldip Carver MD   2.5 mg at 01/16/24 1207    oxyCODONE (ROXICODONE) immediate release tablet 5 mg  5 mg Oral Q4H PRN Kuldip Carver MD   5 mg at 01/19/24 1040    dextromethorphan-quiNIDine (NUEDEXTA) 20-10 MG per capsule 1 capsule (Patient Supplied)  1 capsule Oral  least one of the following: Automated exposure control, adjustment of the mA and/or kV according to size, and the use of iterative reconstruction technique.  ______________________________________ Electronically signed by: JUSTUS CARRASCO M.D. Date:     01/14/2024 Time:    18:59     CTA CHEST W WO CONTRAST    Result Date: 1/14/2024  EXAM:  CT ANGIOGRAPHY CHEST  HISTORY:  Pain, hypotension  TECHNIQUE:  CT angiography chest with and without intravenous contrast.  Multiplanar images.  3-D reconstruction images.  MIP images provided.  FINDINGS:  Pulmonary arteries have no filling defects to indicate thromboemboli.  The ascending thoracic aorta has mild aneurysmal caliber at 3.7 cm.  No aortic intimal dissection or significant atherosclerotic disease.  Mild cardiomegaly.  Small sliding  hiatal hernia.  Calcified mediastinal lymph nodes consistent with old granulomatous disease.  Chronic-appearing interstitial changes of the lungs with bibasilar fibrosis and bronchiectasis.  There is either mild bibasilar pneumonia or probably more likely atelectasis.  Correlate clinically.  There is no pneumothorax or vascular congestion.  No interstitial edema.  The bones reveal bridging osteophytic spurring of the spine.  Incidental note of fluid distension of the stomach and upper small bowel as well as probable bilateral nephrolithiasis.      1.  Pulmonary arteries have no filling defects to indicate thromboemboli. 10.  The ascending thoracic aorta has mild aneurysmal caliber at 3.7 cm.  No aortic intimal dissection or significant atherosclerotic disease. 3.  Mild cardiomegaly. 4.  Chronic-appearing interstitial changes of the lungs with bibasilar fibrosis and bronchiectasis.  There is either mild bibasilar pneumonia or probably more likely atelectasis.  Correlate clinically. 5.  Incidental note of fluid distension of the stomach and upper small bowel. - - - - -  All CT scans are performed using dose optimization techniques as

## 2024-01-20 NOTE — PROGRESS NOTES
PT notes addressed with RN and physician, patient was able to get up to the chair with minimal assistance with gait belt and walker. Pt. Has a walker in the room that will go home with pt.   Spoke with patient and wife who does not want SNF nor home health at this time; patient's wife states they have a son that can help wife if needed at the home.

## 2024-01-23 ENCOUNTER — OFFICE VISIT (OUTPATIENT)
Dept: PRIMARY CARE CLINIC | Age: 74
End: 2024-01-23

## 2024-01-23 VITALS
WEIGHT: 251 LBS | DIASTOLIC BLOOD PRESSURE: 80 MMHG | HEIGHT: 70 IN | HEART RATE: 63 BPM | SYSTOLIC BLOOD PRESSURE: 144 MMHG | BODY MASS INDEX: 35.93 KG/M2 | OXYGEN SATURATION: 99 % | TEMPERATURE: 97.8 F

## 2024-01-23 DIAGNOSIS — I10 ESSENTIAL HYPERTENSION: ICD-10-CM

## 2024-01-23 DIAGNOSIS — Z09 HOSPITAL DISCHARGE FOLLOW-UP: Primary | ICD-10-CM

## 2024-01-23 DIAGNOSIS — E11.42 DIABETIC PERIPHERAL NEUROPATHY ASSOCIATED WITH TYPE 2 DIABETES MELLITUS (HCC): ICD-10-CM

## 2024-01-23 DIAGNOSIS — E11.9 TYPE 2 DIABETES MELLITUS WITHOUT COMPLICATION, WITHOUT LONG-TERM CURRENT USE OF INSULIN (HCC): ICD-10-CM

## 2024-01-23 RX ORDER — GABAPENTIN 800 MG/1
800 TABLET ORAL 3 TIMES DAILY
Qty: 90 TABLET | Refills: 0 | Status: SHIPPED | OUTPATIENT
Start: 2024-01-23 | End: 2024-02-22

## 2024-01-23 RX ORDER — AMLODIPINE BESYLATE 5 MG/1
5 TABLET ORAL DAILY
Qty: 90 TABLET | Refills: 1 | Status: SHIPPED | OUTPATIENT
Start: 2024-01-23

## 2024-01-23 NOTE — PROGRESS NOTES
Medications marked \"taking\" at this time  Outpatient Medications Marked as Taking for the 1/23/24 encounter (Office Visit) with Inez Garcia APRN   Medication Sig Dispense Refill    amLODIPine (NORVASC) 5 MG tablet Take 1 tablet by mouth daily 90 tablet 1    gabapentin (NEURONTIN) 800 MG tablet Take 1 tablet by mouth 3 times daily for 30 days. Max Daily Amount: 2,400 mg 90 tablet 0    ondansetron (ZOFRAN ODT) 4 MG disintegrating tablet Take 1 tablet by mouth every 8 hours as needed for Nausea or Vomiting 30 tablet 0    EPINEPHrine (EPIPEN) 0.3 MG/0.3ML SOAJ injection Use as directed for allergic reaction 0.3 mL 3    azelastine (ASTELIN) 0.1 % nasal spray 1 spray by Nasal route 2 times daily Use in each nostril as directed 2 each 1    lisinopril (PRINIVIL;ZESTRIL) 40 MG tablet TAKE ONE TABLET BY MOUTH DAILY.Cancel previous script BRIGETTE Triana 90 tablet 1    traZODone (DESYREL) 50 MG tablet Take 1 tablet by mouth nightly 30 tablet 1    pantoprazole (PROTONIX) 40 MG tablet Take 1 tablet by mouth daily Indications: Reflux Gastritis 90 tablet 1    tamsulosin (FLOMAX) 0.4 MG capsule Take 1 capsule by mouth daily 90 capsule 3    tiZANidine (ZANAFLEX) 4 MG tablet Take 1 tablet by mouth every 8 hours as needed (back pain) 30 tablet 1    pravastatin (PRAVACHOL) 80 MG tablet Take 1 tablet by mouth nightly 30 tablet 1    dextromethorphan-quiNIDine (NUEDEXTA) 20-10 MG CAPS per capsule Take 1 capsule by mouth daily 60 capsule 3    memantine (NAMENDA) 10 MG tablet Take 1 tablet by mouth 2 times daily 180 tablet 1    citalopram (CELEXA) 10 MG tablet Take 1 tablet by mouth daily 90 tablet 1    donepezil (ARICEPT) 5 MG tablet Take 1 tablet by mouth nightly 90 tablet 1    nitroGLYCERIN (NITROSTAT) 0.4 MG SL tablet up to max of 3 total doses. If no relief after 1 dose, call 911. 25 tablet 3    cetirizine (ZYRTEC) 10 MG tablet TAKE 1 TABLET BY MOUTH DAILY 90 tablet 3    albuterol sulfate HFA (PROVENTIL;VENTOLIN;PROAIR)

## 2024-01-23 NOTE — PATIENT INSTRUCTIONS
Continue amlodipine 5 mg daily.   Continue gabapentin 800 mg 3x daily.   Monitor blood pressure.   Follow-up in 3 months or sooner if needed.   Fasting labs prior to next visit.

## 2024-03-12 DIAGNOSIS — R11.0 NAUSEA: ICD-10-CM

## 2024-03-12 RX ORDER — ONDANSETRON 4 MG/1
TABLET, ORALLY DISINTEGRATING ORAL
Qty: 30 TABLET | Refills: 0 | Status: SHIPPED | OUTPATIENT
Start: 2024-03-12

## 2024-03-12 NOTE — TELEPHONE ENCOUNTER
Carlos L Helton called to request a refill on his medication.      Last office visit : 1/23/2024   Next office visit : 4/23/2024     Requested Prescriptions     Pending Prescriptions Disp Refills    ondansetron (ZOFRAN-ODT) 4 MG disintegrating tablet [Pharmacy Med Name: ONDANSETRON 4 MG TBDP 4 Tablet] 30 tablet 0     Sig: DISSOLVE 1 TABLET UNDER THE TONGUE EVERY 8 HOURS AS NEEDED FOR NAUSEA OR VOMITING            Maritza Avila MA    18-Nov-2020 15:22

## 2024-04-16 ENCOUNTER — OFFICE VISIT (OUTPATIENT)
Dept: PRIMARY CARE CLINIC | Age: 74
End: 2024-04-16

## 2024-04-16 VITALS
OXYGEN SATURATION: 93 % | BODY MASS INDEX: 35.5 KG/M2 | HEART RATE: 61 BPM | TEMPERATURE: 97.6 F | WEIGHT: 248 LBS | HEIGHT: 70 IN | DIASTOLIC BLOOD PRESSURE: 86 MMHG | SYSTOLIC BLOOD PRESSURE: 134 MMHG

## 2024-04-16 DIAGNOSIS — H61.22 IMPACTED CERUMEN OF LEFT EAR: Primary | ICD-10-CM

## 2024-04-16 ASSESSMENT — ENCOUNTER SYMPTOMS
COLOR CHANGE: 0
COUGH: 0
SHORTNESS OF BREATH: 0
RHINORRHEA: 0
VOICE CHANGE: 0
VOMITING: 0
BACK PAIN: 0
PHOTOPHOBIA: 0
NAUSEA: 0

## 2024-04-16 NOTE — PROGRESS NOTES
[facility]  [address]  [address]  [place] [address]  Dept: [phone]  Dept Fax: [phone]  Loc: [phone]    Carlos Ramirez is a 73 y.o. male who presents today for his medical conditions/complaints as noted below.  Carlos Ramirez is c/o of Otalgia (Pt in office for left ear pain. Was seen at [facility] who stated that wax was impacted. )        HPI:     HPI   Chief Complaint   Patient presents with    Otalgia     Pt in office for left ear pain. Was seen at [facility] who stated that wax was impacted.      Patient presents today for evaluation of left ear pain. He states he went for a hearing test and was told he had an impaction. He complains of decreased hearing. There has been no bleeding or drainage. Denies fever.      Past Medical History:   Diagnosis Date    Arthritis     CAD (coronary artery disease)     sees dr. zhou    Chronic back pain     COPD (chronic obstructive pulmonary disease) ([facility])     Hypertension     Palliative care patient 01/16/2024    Peripheral sensory neuropathy     Pleurisy     Shoulder pain     TIA (transient ischemic attack)     Type II or unspecified type diabetes mellitus without mention of complication, not stated as uncontrolled       Past Surgical History:   Procedure Laterality Date    APPENDECTOMY      CARDIAC CATHETERIZATION  11/04/2019    PIETER to LAD    SHOULDER SURGERY Left 12/17/2020    LEFT REVERSE TOTAL SHOULDER ARTHROPLASTY performed by Colin Martinez MD at [facility] OR           4/16/2024     3:04 PM 1/23/2024    10:24 AM 1/20/2024     9:38 AM 1/20/2024     7:52 AM 1/20/2024     7:48 AM 1/20/2024     6:10 AM   Vitals   SYSTOLIC 134 144 174  174    DIASTOLIC 86 80 87  87    Pulse 61 63   56    Temp 97.6 °F (36.4 °C) 97.8 °F (36.6 °C)   98.2 °F (36.8 °C)    Respirations     16    SpO2 93 % 99 %  97 % 97 %    Weight - Scale 248 lb 251 lb       Height 5' 10\" 5' 10\"       Body Mass Index 35.58 kg/m2

## 2024-05-23 ENCOUNTER — TELEPHONE (OUTPATIENT)
Dept: HEMATOLOGY | Age: 74
End: 2024-05-23

## 2024-05-23 NOTE — TELEPHONE ENCOUNTER
Called Patient and reminded patient of their appointment on 05/30/2024 and patient confirmed they would be here.

## 2024-05-30 ENCOUNTER — APPOINTMENT (OUTPATIENT)
Dept: MRI IMAGING | Age: 74
End: 2024-05-30
Payer: MEDICARE

## 2024-06-12 ENCOUNTER — HOSPITAL ENCOUNTER (OUTPATIENT)
Dept: MRI IMAGING | Age: 74
Discharge: HOME | End: 2024-06-12
Payer: MEDICARE

## 2024-06-12 DIAGNOSIS — M51.36 LUMBAR DEGENERATIVE DISC DISEASE: ICD-10-CM

## 2024-06-12 PROCEDURE — 72148 MRI LUMBAR SPINE W/O DYE: CPT

## 2024-07-05 ENCOUNTER — TELEPHONE (OUTPATIENT)
Dept: HEMATOLOGY | Age: 74
End: 2024-07-05

## 2024-07-08 NOTE — PROGRESS NOTES
Progress Note      Pt Name: Carlos Ramirez  YOB: 1950  MRN: [id number]    Date of evaluation: 8/28/2024  History Obtained From:  patient, spouse - Naima, electronic medical record    CHIEF COMPLAINT:    Chief Complaint   Patient presents with    Follow-up     Thrombocytopenia      Current active problems  Thrombocytopenia  B12 deficiency    HISTORY OF PRESENT ILLNESS:    Carlos Ramirez is a 74 y.o.  male with B12 deficiency and mild to moderate thrombocytopenia followed in the office since 9/19/2019.  He was previously taking oral B12 daily but is not taking it at present.  He has not had any hospitalizations or transfusions since his last visit.  His wife reports that overall he has been doing fairly well-blood pressure elevated at times she believes because he forgets to take his medication.  He has continued on lisinopril 40 mg daily.    He has diabetes controlled with diet only his A1c 5.5 on 1/14/2024.  His diabetic neuropathy however he continues on Neurontin 800 3 times daily.  He denies any COPD exacerbation. He continues taking his Aricept 5 nightly, Namenda 10 twice daily with persistent memory issues.    HEMATOLOGY HISTORY: Thrombocytopenia, B12 deficiency  Carlos was seen in initial hematology consultation on 9/19/2019 referred Zeke MACHUCA for evaluation of thrombocytopenia.     Carlos presented to his initial visit with his wife.  I asked him if he had ever had a prior issues with low platelets and he denied knowing any history.  He did report that he bruised easily and bleeds easily at times, \"as long as I can remember\".  He denied taking aspirin or oral nonsteroidals.     CBCs obtained from epic:          Abdominal ultrasound 10/7/2015 (ordered by Dr. Hameed) revealed a \"normal spleen\"-no measurement given.  I have asked him if he remembered previously being seen by Dr. Hameed, and he reported that he did not.     He denied any prior history of liver  08/28/2024    HGB 15.1 08/28/2024    HCT 44.4 08/28/2024    MCV 94.9 (H) 08/28/2024     (L) 08/28/2024       VISIT DIAGNOSES  1. Thrombocytopenia (HCC)    2. Macrocytic anemia    3. B12 deficiency    4. Screening for malignant neoplasm of prostate          ASSESSMENT/PLAN:    1.  Thrombocytopenia -not at goal but stable  2.  B12 deficiency -not at goal reassess    He did have a positive antiplatelet antibody so he may have a mild ITP.  However, his platelet count has stayed around the 100,000 range.    Serology 5/4/2023    Folate-17.9  Zinc-65.5  Copper-81.4          He continues on aspirin 81 daily.    ,000 which is stable to improved for him.    PLAN  Continue monitor CBC annually  Recheck baseline substrate      3.  Macrocytosis -without anemia-stable        Hgb 15.1 with MCV 94.9.    PLAN  Follow CBC  Recheck baseline substrate as she is not taking B12 at present      HEALTHCARE MAINTENANCE    Prostate cancer screening.  5/4/2023 PSA 3.66.  Screening PSA will be drawn today    Colon cancer screening.  Colonoscopy per Dr. Pearl 8/16/2023 with - Diverticulosis in the left colon. - No specimens collected. 5 year recall      Immunization History   Administered Date(s) Administered    COVID-19, J&J, PF, 0.5 mL 04/01/2021    Influenza, Quadv, adjuvanted, 65 yrs +, IM, PF (Fluad) 09/22/2020   He did get a COVID booster but does not have the date with him today      Orders Placed This Encounter   Procedures    CBC with Auto Differential    PSA Screening    Comprehensive Metabolic Panel    Lactate Dehydrogenase    Vitamin B12    Folate    Haptoglobin    Reticulocytes    Iron and TIBC    Ferritin         Return in about 1 year (around 8/28/2025) for With Gerry.  CBC and reevaluation.    Gerry English PA-C  9:22 AM  8/28/2024

## 2024-07-16 DIAGNOSIS — I10 ESSENTIAL HYPERTENSION: ICD-10-CM

## 2024-07-16 DIAGNOSIS — E11.42 DIABETIC PERIPHERAL NEUROPATHY ASSOCIATED WITH TYPE 2 DIABETES MELLITUS (HCC): ICD-10-CM

## 2024-07-16 DIAGNOSIS — R11.0 NAUSEA: ICD-10-CM

## 2024-07-16 DIAGNOSIS — E11.9 TYPE 2 DIABETES MELLITUS WITHOUT COMPLICATION, WITHOUT LONG-TERM CURRENT USE OF INSULIN (HCC): ICD-10-CM

## 2024-07-17 RX ORDER — ONDANSETRON 4 MG/1
TABLET, ORALLY DISINTEGRATING ORAL
Qty: 30 TABLET | Refills: 0 | Status: SHIPPED | OUTPATIENT
Start: 2024-07-17

## 2024-07-17 RX ORDER — DEXTROMETHORPHAN HYDROBROMIDE AND QUINIDINE SULFATE 20; 10 MG/1; MG/1
1 CAPSULE, GELATIN COATED ORAL DAILY
Qty: 60 CAPSULE | Refills: 3 | Status: SHIPPED | OUTPATIENT
Start: 2024-07-17

## 2024-07-17 RX ORDER — DONEPEZIL HYDROCHLORIDE 5 MG/1
5 TABLET, FILM COATED ORAL NIGHTLY
Qty: 90 TABLET | Refills: 1 | Status: SHIPPED | OUTPATIENT
Start: 2024-07-17

## 2024-07-17 RX ORDER — MEMANTINE HYDROCHLORIDE 10 MG/1
10 TABLET ORAL 2 TIMES DAILY
Qty: 180 TABLET | Refills: 1 | Status: SHIPPED | OUTPATIENT
Start: 2024-07-17

## 2024-07-17 NOTE — TELEPHONE ENCOUNTER
Requested Prescriptions     Pending Prescriptions Disp Refills    dextromethorphan-quiNIDine (NUEDEXTA) 20-10 MG CAPS per capsule 60 capsule 3     Sig: Take 1 capsule by mouth daily    memantine (NAMENDA) 10 MG tablet 180 tablet 1     Sig: Take 1 tablet by mouth 2 times daily    donepezil (ARICEPT) 5 MG tablet 90 tablet 1     Sig: Take 1 tablet by mouth nightly       Last Office Visit: 5/31/2023  Next Office Visit: Visit date not found  Last Medication Refill: 1/9/2024 with 1 REYNA

## 2024-07-22 RX ORDER — PRAVASTATIN SODIUM 80 MG/1
80 TABLET ORAL NIGHTLY
Qty: 30 TABLET | Refills: 1 | OUTPATIENT
Start: 2024-07-22

## 2024-07-22 RX ORDER — GABAPENTIN 800 MG/1
800 TABLET ORAL 3 TIMES DAILY
Qty: 90 TABLET | Refills: 0 | OUTPATIENT
Start: 2024-07-22 | End: 2024-08-21

## 2024-08-26 ENCOUNTER — TELEPHONE (OUTPATIENT)
Dept: HEMATOLOGY | Age: 74
End: 2024-08-26

## 2024-08-26 NOTE — TELEPHONE ENCOUNTER
Called Patient and reminded patient of their appointment on 08/28/2024 and patient confirmed they would be here. Reminded patient to just come at appointment time, and to not come at the lab appointment time. Reminded patient that we will not check them in any more than 30 minutes before appointment time.  We have now moved to the [facility] that is located between our old office and the ER at the [facility]

## 2024-08-28 ENCOUNTER — HOSPITAL ENCOUNTER (OUTPATIENT)
Dept: INFUSION THERAPY | Age: 74
Discharge: HOME | End: 2024-08-28
Payer: MEDICARE

## 2024-08-28 ENCOUNTER — OFFICE VISIT (OUTPATIENT)
Dept: HEMATOLOGY | Age: 74
End: 2024-08-28
Payer: MEDICARE

## 2024-08-28 VITALS
WEIGHT: 255 LBS | SYSTOLIC BLOOD PRESSURE: 126 MMHG | BODY MASS INDEX: 38.65 KG/M2 | DIASTOLIC BLOOD PRESSURE: 80 MMHG | TEMPERATURE: 97.7 F | HEART RATE: 75 BPM | HEIGHT: 68 IN | OXYGEN SATURATION: 97 %

## 2024-08-28 DIAGNOSIS — D53.9 MACROCYTIC ANEMIA: ICD-10-CM

## 2024-08-28 DIAGNOSIS — E53.8 B12 DEFICIENCY: ICD-10-CM

## 2024-08-28 DIAGNOSIS — D69.6 THROMBOCYTOPENIA (HCC): Primary | ICD-10-CM

## 2024-08-28 DIAGNOSIS — Z12.5 SCREENING FOR MALIGNANT NEOPLASM OF PROSTATE: ICD-10-CM

## 2024-08-28 DIAGNOSIS — D69.6 THROMBOCYTOPENIA (HCC): ICD-10-CM

## 2024-08-28 LAB
ALBUMIN SERPL-MCNC: 4.1 G/DL (ref 3.5–5.2)
ALP SERPL-CCNC: 69 U/L (ref 40–129)
ALT SERPL-CCNC: 12 U/L (ref 5–41)
ANION GAP SERPL CALCULATED.3IONS-SCNC: 13 MMOL/L (ref 7–19)
AST SERPL-CCNC: 16 U/L (ref 5–40)
BASOPHILS # BLD: 0.02 K/UL (ref 0.01–0.08)
BASOPHILS NFR BLD: 0.3 % (ref 0.1–1.2)
BILIRUB SERPL-MCNC: 0.6 MG/DL (ref 0–1.2)
BUN SERPL-MCNC: 22 MG/DL (ref 8–23)
CALCIUM SERPL-MCNC: 9.7 MG/DL (ref 8.8–10.2)
CHLORIDE SERPL-SCNC: 103 MMOL/L (ref 98–107)
CO2 SERPL-SCNC: 22 MMOL/L (ref 22–29)
CREAT SERPL-MCNC: 1.3 MG/DL (ref 0.7–1.2)
EOSINOPHIL # BLD: 0.19 K/UL (ref 0.04–0.54)
EOSINOPHIL NFR BLD: 2.4 % (ref 0.7–7)
ERYTHROCYTE [DISTWIDTH] IN BLOOD BY AUTOMATED COUNT: 12.8 % (ref 11.6–14.4)
FERRITIN SERPL-MCNC: 297.2 NG/ML (ref 30–400)
FOLATE SERPL-MCNC: 12.6 NG/ML (ref 4.5–32.2)
GLUCOSE SERPL-MCNC: 201 MG/DL (ref 70–99)
HCT VFR BLD AUTO: 44.4 % (ref 40.1–51)
HGB BLD-MCNC: 15.1 G/DL (ref 13.7–17.5)
IRON SATN MFR SERPL: 45 % (ref 14–50)
IRON SERPL-MCNC: 126 UG/DL (ref 59–158)
LDH SERPL-CCNC: 149 U/L (ref 135–225)
LYMPHOCYTES # BLD: 2.22 K/UL (ref 1.18–3.74)
LYMPHOCYTES NFR BLD: 27.9 % (ref 19.3–53.1)
MCH RBC QN AUTO: 32.3 PG (ref 25.7–32.2)
MCHC RBC AUTO-ENTMCNC: 34 G/DL (ref 32.3–36.5)
MCV RBC AUTO: 94.9 FL (ref 79–92.2)
MONOCYTES # BLD: 0.45 K/UL (ref 0.24–0.82)
MONOCYTES NFR BLD: 5.7 % (ref 4.7–12.5)
NEUTROPHILS # BLD: 5.02 K/UL (ref 1.56–6.13)
NEUTS SEG NFR BLD: 63.1 % (ref 34–71.1)
PLATELET # BLD AUTO: 151 K/UL (ref 163–337)
PMV BLD AUTO: 8.8 FL (ref 7.4–10.4)
POTASSIUM SERPL-SCNC: 4.2 MMOL/L (ref 3.5–5.1)
PROT SERPL-MCNC: 7.4 G/DL (ref 6.4–8.3)
PSA SERPL-MCNC: 3.57 NG/ML (ref 0–4)
RBC # BLD AUTO: 4.68 M/UL (ref 4.63–6.08)
SODIUM SERPL-SCNC: 138 MMOL/L (ref 136–145)
TIBC SERPL-MCNC: 280 UG/DL (ref 250–400)
VIT B12 SERPL-MCNC: 537 PG/ML (ref 232–1245)
WBC # BLD AUTO: 7.95 K/UL (ref 4.23–9.07)

## 2024-08-28 PROCEDURE — G8427 DOCREV CUR MEDS BY ELIG CLIN: HCPCS | Performed by: PHYSICIAN ASSISTANT

## 2024-08-28 PROCEDURE — G8417 CALC BMI ABV UP PARAM F/U: HCPCS | Performed by: PHYSICIAN ASSISTANT

## 2024-08-28 PROCEDURE — 1036F TOBACCO NON-USER: CPT | Performed by: PHYSICIAN ASSISTANT

## 2024-08-28 PROCEDURE — 1123F ACP DISCUSS/DSCN MKR DOCD: CPT | Performed by: PHYSICIAN ASSISTANT

## 2024-08-28 PROCEDURE — 36415 COLL VENOUS BLD VENIPUNCTURE: CPT

## 2024-08-28 PROCEDURE — 99214 OFFICE O/P EST MOD 30 MIN: CPT | Performed by: PHYSICIAN ASSISTANT

## 2024-08-28 PROCEDURE — 85025 COMPLETE CBC W/AUTO DIFF WBC: CPT

## 2024-08-28 PROCEDURE — 85045 AUTOMATED RETICULOCYTE COUNT: CPT

## 2024-08-28 PROCEDURE — 80053 COMPREHEN METABOLIC PANEL: CPT

## 2024-08-28 PROCEDURE — 99212 OFFICE O/P EST SF 10 MIN: CPT

## 2024-08-28 PROCEDURE — 83615 LACTATE (LD) (LDH) ENZYME: CPT

## 2024-08-28 PROCEDURE — 3017F COLORECTAL CA SCREEN DOC REV: CPT | Performed by: PHYSICIAN ASSISTANT

## 2024-08-28 ASSESSMENT — ENCOUNTER SYMPTOMS
COLOR CHANGE: 0
BLOOD IN STOOL: 0
ABDOMINAL DISTENTION: 0
ABDOMINAL PAIN: 0
EYE DISCHARGE: 0
DIARRHEA: 0
TROUBLE SWALLOWING: 0
VOICE CHANGE: 0
PHOTOPHOBIA: 0
NAUSEA: 0
SHORTNESS OF BREATH: 0
WHEEZING: 0
COUGH: 0
CONSTIPATION: 0
EYE ITCHING: 0
VOMITING: 0
SORE THROAT: 0
BACK PAIN: 1

## 2024-09-17 ENCOUNTER — HOSPITAL ENCOUNTER (OUTPATIENT)
Dept: MRI IMAGING | Age: 74
Discharge: HOME | End: 2024-09-17
Payer: MEDICARE

## 2024-09-17 DIAGNOSIS — M75.111 INCOMPLETE ROTATOR CUFF TEAR OR RUPTURE OF RIGHT SHOULDER, NOT SPECIFIED AS TRAUMATIC: ICD-10-CM

## 2024-09-17 PROCEDURE — 73221 MRI JOINT UPR EXTREM W/O DYE: CPT

## 2024-10-18 DIAGNOSIS — E11.9 TYPE 2 DIABETES MELLITUS WITHOUT COMPLICATION, WITHOUT LONG-TERM CURRENT USE OF INSULIN (HCC): ICD-10-CM

## 2024-10-18 DIAGNOSIS — J30.1 SEASONAL ALLERGIC RHINITIS DUE TO POLLEN: ICD-10-CM

## 2024-10-18 DIAGNOSIS — E11.42 DIABETIC PERIPHERAL NEUROPATHY ASSOCIATED WITH TYPE 2 DIABETES MELLITUS (HCC): ICD-10-CM

## 2024-10-18 DIAGNOSIS — R11.0 NAUSEA: ICD-10-CM

## 2024-10-18 DIAGNOSIS — I10 ESSENTIAL HYPERTENSION: ICD-10-CM

## 2024-10-21 ENCOUNTER — TELEPHONE (OUTPATIENT)
Dept: NEUROLOGY | Age: 74
End: 2024-10-21

## 2024-10-21 RX ORDER — TAMSULOSIN HYDROCHLORIDE 0.4 MG/1
0.4 CAPSULE ORAL DAILY
Qty: 90 CAPSULE | Refills: 3 | OUTPATIENT
Start: 2024-10-21

## 2024-10-21 RX ORDER — MEMANTINE HYDROCHLORIDE 10 MG/1
10 TABLET ORAL 2 TIMES DAILY
Qty: 180 TABLET | Refills: 1 | Status: SHIPPED | OUTPATIENT
Start: 2024-10-21

## 2024-10-21 RX ORDER — PRAVASTATIN SODIUM 80 MG/1
80 TABLET ORAL NIGHTLY
Qty: 30 TABLET | Refills: 1 | OUTPATIENT
Start: 2024-10-21

## 2024-10-21 RX ORDER — AZELASTINE 1 MG/ML
1 SPRAY, METERED NASAL 2 TIMES DAILY
Qty: 2 EACH | Refills: 1 | OUTPATIENT
Start: 2024-10-21

## 2024-10-21 RX ORDER — AMLODIPINE BESYLATE 5 MG/1
5 TABLET ORAL DAILY
Qty: 90 TABLET | Refills: 1 | OUTPATIENT
Start: 2024-10-21

## 2024-10-21 RX ORDER — ONDANSETRON 4 MG/1
TABLET, ORALLY DISINTEGRATING ORAL
Qty: 30 TABLET | Refills: 0 | OUTPATIENT
Start: 2024-10-21

## 2024-10-21 RX ORDER — PANTOPRAZOLE SODIUM 40 MG/1
40 TABLET, DELAYED RELEASE ORAL DAILY
Qty: 90 TABLET | Refills: 1 | OUTPATIENT
Start: 2024-10-21

## 2024-10-21 RX ORDER — GABAPENTIN 800 MG/1
800 TABLET ORAL 3 TIMES DAILY
Qty: 90 TABLET | Refills: 0 | OUTPATIENT
Start: 2024-10-21 | End: 2024-11-20

## 2024-10-21 RX ORDER — LISINOPRIL 40 MG/1
TABLET ORAL
Qty: 90 TABLET | Refills: 1 | OUTPATIENT
Start: 2024-10-21

## 2024-10-21 RX ORDER — DEXTROMETHORPHAN HYDROBROMIDE AND QUINIDINE SULFATE 20; 10 MG/1; MG/1
1 CAPSULE, GELATIN COATED ORAL DAILY
Qty: 60 CAPSULE | Refills: 3 | Status: SHIPPED | OUTPATIENT
Start: 2024-10-21

## 2024-10-21 RX ORDER — DONEPEZIL HYDROCHLORIDE 5 MG/1
5 TABLET, FILM COATED ORAL NIGHTLY
Qty: 90 TABLET | Refills: 1 | Status: SHIPPED | OUTPATIENT
Start: 2024-10-21

## 2024-10-21 RX ORDER — TRAZODONE HYDROCHLORIDE 50 MG/1
50 TABLET, FILM COATED ORAL NIGHTLY
Qty: 30 TABLET | Refills: 1 | OUTPATIENT
Start: 2024-10-21

## 2024-10-21 NOTE — TELEPHONE ENCOUNTER
Carlos Ramirez has requested a refill on his medication.      Last office visit : 5/31/2023   Next office visit : Visit date not found     Requested Prescriptions     Pending Prescriptions Disp Refills    dextromethorphan-quiNIDine (NUEDEXTA) 20-10 MG CAPS per capsule 60 capsule 3     Sig: Take 1 capsule by mouth daily    memantine (NAMENDA) 10 MG tablet 180 tablet 1     Sig: Take 1 tablet by mouth 2 times daily    donepezil (ARICEPT) 5 MG tablet 90 tablet 1     Sig: Take 1 tablet by mouth nightly

## 2024-10-21 NOTE — TELEPHONE ENCOUNTER
Denied  PA Detail   Note from payer: Request Reference Number: [id number]. NUEDEXTA CAP 20-10MG is denied for not meeting the prior authorization requirement(s). Details of this decision are in the notice attached below or have been faxed to you. Appeals are not supported through ePA. Please refer to the fax case notice for appeals information and instructions.  Payer: [employer] - Medicare Case ID: [id number]    [phone]    [phone]  Electronic appeal: Not supported  Prior auth initiated by: [facility] - [address] - P [phone] - F [phone]  View History  Notes     Time User Attachment    Attachment received from payer. 10/21/2024 12:07 PM Narendra, Yaneth Outgoing Prescription Prior Authorization Response Document      Pharmacy Benefits   Open EncounterNo primary plan selected  Other Plans   Medication Being Authorized    dextromethorphan-quiNIDine (NUEDEXTA) 20-10 MG CAPS per capsule  Take 1 capsule by mouth daily  Dispense: 60 capsule Refills: 3   Start: 10/21/2024   Class: Normal   This order has been released to its destination.  To be filled at: [facility] - [address] - P [phone] - F [phone]

## 2024-11-26 NOTE — TELEPHONE ENCOUNTER
Carlos L Helton called to request a refill on his medication.      Last office visit : 4/16/2024   Next office visit : 12/4/2024     Requested Prescriptions     Pending Prescriptions Disp Refills    tiZANidine (ZANAFLEX) 4 MG tablet [Pharmacy Med Name: TIZANIDINE HCL 4 MG TABS 4 Tablet] 30 tablet 1     Sig: TAKE 1 TABLET BY MOUTH EVERY 8 HOURS AS NEEDED (BACK PAIN)            Rashida Boss MA

## 2024-12-04 ENCOUNTER — OFFICE VISIT (OUTPATIENT)
Dept: PRIMARY CARE CLINIC | Age: 74
End: 2024-12-04

## 2024-12-04 VITALS
DIASTOLIC BLOOD PRESSURE: 70 MMHG | OXYGEN SATURATION: 94 % | HEART RATE: 70 BPM | HEIGHT: 68 IN | TEMPERATURE: 97.4 F | BODY MASS INDEX: 41.22 KG/M2 | WEIGHT: 272 LBS | SYSTOLIC BLOOD PRESSURE: 130 MMHG

## 2024-12-04 DIAGNOSIS — E11.42 DIABETIC PERIPHERAL NEUROPATHY ASSOCIATED WITH TYPE 2 DIABETES MELLITUS (HCC): ICD-10-CM

## 2024-12-04 DIAGNOSIS — E11.9 TYPE 2 DIABETES MELLITUS WITHOUT COMPLICATION, WITHOUT LONG-TERM CURRENT USE OF INSULIN (HCC): ICD-10-CM

## 2024-12-04 DIAGNOSIS — J30.1 SEASONAL ALLERGIC RHINITIS DUE TO POLLEN: ICD-10-CM

## 2024-12-04 DIAGNOSIS — I10 ESSENTIAL HYPERTENSION: ICD-10-CM

## 2024-12-04 DIAGNOSIS — R11.0 NAUSEA: ICD-10-CM

## 2024-12-04 LAB
ALCOHOL URINE: NORMAL
AMPHETAMINE SCREEN URINE: NORMAL
BARBITURATE SCREEN URINE: NORMAL
BENZODIAZEPINE SCREEN, URINE: NORMAL
BUPRENORPHINE URINE: NORMAL
COCAINE METABOLITE SCREEN URINE: NORMAL
FENTANYL SCREEN, URINE: NORMAL
GABAPENTIN SCREEN, URINE: NORMAL
MDMA, URINE: NORMAL
METHADONE SCREEN, URINE: NORMAL
METHAMPHETAMINE, URINE: NORMAL
OPIATE SCREEN URINE: NORMAL
OXYCODONE SCREEN URINE: NORMAL
PHENCYCLIDINE SCREEN URINE: NORMAL
PROPOXYPHENE SCREEN, URINE: NORMAL
SYNTHETIC CANNABINOIDS(K2) SCREEN, URINE: NORMAL
THC SCREEN, URINE: NORMAL
TRAMADOL SCREEN URINE: NORMAL
TRICYCLIC ANTIDEPRESSANTS, UR: NORMAL

## 2024-12-04 RX ORDER — AZELASTINE 1 MG/ML
1 SPRAY, METERED NASAL 2 TIMES DAILY
Qty: 2 EACH | Refills: 1 | Status: SHIPPED | OUTPATIENT
Start: 2024-12-04

## 2024-12-04 RX ORDER — GABAPENTIN 800 MG/1
800 TABLET ORAL 3 TIMES DAILY
Qty: 90 TABLET | Refills: 0 | Status: SHIPPED | OUTPATIENT
Start: 2024-12-04 | End: 2025-01-03

## 2024-12-04 RX ORDER — PANTOPRAZOLE SODIUM 40 MG/1
40 TABLET, DELAYED RELEASE ORAL DAILY
Qty: 90 TABLET | Refills: 1 | Status: SHIPPED | OUTPATIENT
Start: 2024-12-04

## 2024-12-04 RX ORDER — EPINEPHRINE 0.3 MG/.3ML
INJECTION SUBCUTANEOUS
Qty: 0.3 ML | Refills: 3 | Status: SHIPPED | OUTPATIENT
Start: 2024-12-04

## 2024-12-04 RX ORDER — PRAVASTATIN SODIUM 80 MG/1
80 TABLET ORAL NIGHTLY
Qty: 30 TABLET | Refills: 1 | Status: SHIPPED | OUTPATIENT
Start: 2024-12-04

## 2024-12-04 RX ORDER — TRAZODONE HYDROCHLORIDE 50 MG/1
50 TABLET, FILM COATED ORAL NIGHTLY
Qty: 30 TABLET | Refills: 1 | Status: SHIPPED | OUTPATIENT
Start: 2024-12-04

## 2024-12-04 RX ORDER — ONDANSETRON 4 MG/1
TABLET, ORALLY DISINTEGRATING ORAL
Qty: 30 TABLET | Refills: 0 | Status: SHIPPED | OUTPATIENT
Start: 2024-12-04

## 2024-12-04 RX ORDER — LISINOPRIL 40 MG/1
TABLET ORAL
Qty: 90 TABLET | Refills: 1 | Status: SHIPPED | OUTPATIENT
Start: 2024-12-04

## 2024-12-04 RX ORDER — AMLODIPINE BESYLATE 5 MG/1
5 TABLET ORAL DAILY
Qty: 90 TABLET | Refills: 1 | Status: SHIPPED | OUTPATIENT
Start: 2024-12-04

## 2024-12-04 RX ORDER — TAMSULOSIN HYDROCHLORIDE 0.4 MG/1
0.4 CAPSULE ORAL DAILY
Qty: 90 CAPSULE | Refills: 3 | Status: SHIPPED | OUTPATIENT
Start: 2024-12-04

## 2024-12-04 SDOH — ECONOMIC STABILITY: FOOD INSECURITY: WITHIN THE PAST 12 MONTHS, THE FOOD YOU BOUGHT JUST DIDN'T LAST AND YOU DIDN'T HAVE MONEY TO GET MORE.: NEVER TRUE

## 2024-12-04 SDOH — ECONOMIC STABILITY: FOOD INSECURITY: WITHIN THE PAST 12 MONTHS, YOU WORRIED THAT YOUR FOOD WOULD RUN OUT BEFORE YOU GOT MONEY TO BUY MORE.: NEVER TRUE

## 2024-12-04 SDOH — ECONOMIC STABILITY: INCOME INSECURITY: HOW HARD IS IT FOR YOU TO PAY FOR THE VERY BASICS LIKE FOOD, HOUSING, MEDICAL CARE, AND HEATING?: NOT HARD AT ALL

## 2024-12-04 ASSESSMENT — ENCOUNTER SYMPTOMS
COUGH: 0
VOICE CHANGE: 0
SHORTNESS OF BREATH: 0
PHOTOPHOBIA: 0
BACK PAIN: 0
VOMITING: 0
NAUSEA: 0
COLOR CHANGE: 0
RHINORRHEA: 0

## 2024-12-04 NOTE — PROGRESS NOTES
amLODIPine (NORVASC) 5 MG tablet     Sig: Take 1 tablet by mouth daily     Dispense:  90 tablet     Refill:  1    gabapentin (NEURONTIN) 800 MG tablet     Sig: Take 1 tablet by mouth 3 times daily for 30 days. Max Daily Amount: 2,400 mg     Dispense:  90 tablet     Refill:  0         ORDERS:  Orders Placed This Encounter   Procedures    Vitamin D 25 Hydroxy    Lipid Panel    Hemoglobin A1C    Comprehensive Metabolic Panel    TSH    CBC with Auto Differential    Microalbumin / Creatinine Urine Ratio    POCT Rapid Drug Screen       Follow-up:  Return in about 3 months (around 3/4/2025) for medicare AW.    PATIENT INSTRUCTIONS:  Patient Instructions   Fasting labs in [address].   Follow-up in 3 months or sooner if needed.   Electronically signed by BRIGETTE Heard on 12/4/2024 at 1:57 PM    EMR Dragon/transcription disclaimer:  Much of thisencounter note is electronic transcription/translation of spoken language to printed texts.  The electronic translation of spoken language may be erroneous, or at times, nonsensical words or phrases may be inadvertentlytranscribed.  Although I have reviewed the note for such errors, some may still exist.

## 2024-12-10 PROBLEM — M75.111 NONTRAUMATIC INCOMPLETE TEAR OF RIGHT ROTATOR CUFF: Status: ACTIVE | Noted: 2024-12-10

## 2025-01-17 DIAGNOSIS — E11.9 TYPE 2 DIABETES MELLITUS WITHOUT COMPLICATION, WITHOUT LONG-TERM CURRENT USE OF INSULIN (HCC): ICD-10-CM

## 2025-01-17 DIAGNOSIS — E11.42 DIABETIC PERIPHERAL NEUROPATHY ASSOCIATED WITH TYPE 2 DIABETES MELLITUS (HCC): ICD-10-CM

## 2025-01-17 DIAGNOSIS — I10 ESSENTIAL HYPERTENSION: ICD-10-CM

## 2025-01-17 LAB
25(OH)D3 SERPL-MCNC: 34.2 NG/ML
ALBUMIN SERPL-MCNC: 4.6 G/DL (ref 3.5–5.2)
ALP SERPL-CCNC: 83 U/L (ref 40–129)
ALT SERPL-CCNC: 11 U/L (ref 5–41)
ANION GAP SERPL CALCULATED.3IONS-SCNC: 12 MMOL/L (ref 7–19)
AST SERPL-CCNC: 14 U/L (ref 5–40)
BACTERIA URNS QL MICRO: NEGATIVE /HPF
BASOPHILS # BLD: 0 K/UL (ref 0–0.2)
BASOPHILS NFR BLD: 0.3 % (ref 0–1)
BILIRUB SERPL-MCNC: 0.8 MG/DL (ref 0.2–1.2)
BILIRUB UR QL STRIP: NEGATIVE
BUN SERPL-MCNC: 20 MG/DL (ref 8–23)
CALCIUM SERPL-MCNC: 10 MG/DL (ref 8.8–10.2)
CHLORIDE SERPL-SCNC: 101 MMOL/L (ref 98–111)
CHOLEST SERPL-MCNC: 198 MG/DL (ref 0–199)
CLARITY UR: CLEAR
CO2 SERPL-SCNC: 28 MMOL/L (ref 22–29)
COLOR UR: YELLOW
CREAT SERPL-MCNC: 1.4 MG/DL (ref 0.7–1.2)
CREAT UR-MCNC: 147.1 MG/DL (ref 39–259)
CRYSTALS URNS MICRO: NORMAL /HPF
EOSINOPHIL # BLD: 0.2 K/UL (ref 0–0.6)
EOSINOPHIL NFR BLD: 2.1 % (ref 0–5)
EPI CELLS #/AREA URNS AUTO: 1 /HPF (ref 0–5)
ERYTHROCYTE [DISTWIDTH] IN BLOOD BY AUTOMATED COUNT: 12.7 % (ref 11.5–14.5)
GLUCOSE SERPL-MCNC: 126 MG/DL (ref 70–99)
GLUCOSE UR STRIP.AUTO-MCNC: NEGATIVE MG/DL
HBA1C MFR BLD: 6.4 % (ref 4–5.6)
HCT VFR BLD AUTO: 49.6 % (ref 42–52)
HDLC SERPL-MCNC: 49 MG/DL (ref 40–60)
HGB BLD-MCNC: 16.1 G/DL (ref 14–18)
HGB UR STRIP.AUTO-MCNC: NEGATIVE MG/L
HYALINE CASTS #/AREA URNS AUTO: 2 /HPF (ref 0–8)
IMM GRANULOCYTES # BLD: 0 K/UL
KETONES UR STRIP.AUTO-MCNC: NEGATIVE MG/DL
LDLC SERPL CALC-MCNC: 107 MG/DL
LEUKOCYTE ESTERASE UR QL STRIP.AUTO: NEGATIVE
LYMPHOCYTES # BLD: 4 K/UL (ref 1.1–4.5)
LYMPHOCYTES NFR BLD: 40.1 % (ref 20–40)
MCH RBC QN AUTO: 32.1 PG (ref 27–31)
MCHC RBC AUTO-ENTMCNC: 32.5 G/DL (ref 33–37)
MCV RBC AUTO: 99 FL (ref 80–94)
MICROALBUMIN UR-MCNC: 13.8 MG/DL (ref 0–1.99)
MICROALBUMIN/CREAT UR-RTO: 93.8 MG/G
MONOCYTES # BLD: 0.6 K/UL (ref 0–0.9)
MONOCYTES NFR BLD: 6.5 % (ref 0–10)
NEUTROPHILS # BLD: 5 K/UL (ref 1.5–7.5)
NEUTS SEG NFR BLD: 50.8 % (ref 50–65)
NITRITE UR QL STRIP.AUTO: NEGATIVE
PH UR STRIP.AUTO: 5 [PH] (ref 5–8)
PLATELET # BLD AUTO: 133 K/UL (ref 130–400)
PMV BLD AUTO: 9.5 FL (ref 9.4–12.4)
POTASSIUM SERPL-SCNC: 4.4 MMOL/L (ref 3.5–5)
PROT SERPL-MCNC: 7.7 G/DL (ref 6.4–8.3)
PROT UR STRIP.AUTO-MCNC: 30 MG/DL
RBC # BLD AUTO: 5.01 M/UL (ref 4.7–6.1)
RBC #/AREA URNS AUTO: 2 /HPF (ref 0–4)
SODIUM SERPL-SCNC: 141 MMOL/L (ref 136–145)
SP GR UR STRIP.AUTO: 1.02 (ref 1–1.03)
TRIGL SERPL-MCNC: 211 MG/DL (ref 0–149)
TSH SERPL DL<=0.005 MIU/L-ACNC: 2.21 UIU/ML (ref 0.27–4.2)
UROBILINOGEN UR STRIP.AUTO-MCNC: 0.2 E.U./DL
WBC # BLD AUTO: 9.9 K/UL (ref 4.8–10.8)
WBC #/AREA URNS AUTO: 1 /HPF (ref 0–5)

## 2025-02-07 DIAGNOSIS — E11.9 TYPE 2 DIABETES MELLITUS WITHOUT COMPLICATION, WITHOUT LONG-TERM CURRENT USE OF INSULIN (HCC): ICD-10-CM

## 2025-02-07 DIAGNOSIS — I10 ESSENTIAL HYPERTENSION: ICD-10-CM

## 2025-02-07 DIAGNOSIS — E11.42 DIABETIC PERIPHERAL NEUROPATHY ASSOCIATED WITH TYPE 2 DIABETES MELLITUS (HCC): ICD-10-CM

## 2025-02-07 DIAGNOSIS — R11.0 NAUSEA: ICD-10-CM

## 2025-02-10 RX ORDER — MEMANTINE HYDROCHLORIDE 10 MG/1
10 TABLET ORAL 2 TIMES DAILY
Qty: 180 TABLET | Refills: 1 | Status: SHIPPED | OUTPATIENT
Start: 2025-02-10

## 2025-02-10 RX ORDER — DONEPEZIL HYDROCHLORIDE 5 MG/1
5 TABLET, FILM COATED ORAL NIGHTLY
Qty: 90 TABLET | Refills: 1 | Status: SHIPPED | OUTPATIENT
Start: 2025-02-10

## 2025-02-10 NOTE — TELEPHONE ENCOUNTER
Requested Prescriptions     Pending Prescriptions Disp Refills    memantine (NAMENDA) 10 MG tablet 180 tablet 1     Sig: Take 1 tablet by mouth 2 times daily    donepezil (ARICEPT) 5 MG tablet 90 tablet 1     Sig: Take 1 tablet by mouth nightly       Last Office Visit: 5/31/2023  Next Office Visit: Visit date not found  Last Medication Refill: 10/21/24 w 1 rf

## 2025-02-10 NOTE — TELEPHONE ENCOUNTER
Carlos L Helton called to request a refill on his medication.      Last office visit : 12/4/2024   Next office visit : 3/4/2025     Last UDS:   Benzodiazepine Screen, Urine   Date Value Ref Range Status   12/04/2024 None Detected  Final     Buprenorphine Urine   Date Value Ref Range Status   12/04/2024 None Detected  Final     Cocaine Metabolite Screen, Urine   Date Value Ref Range Status   12/04/2024 None Detected  Final     Gabapentin Screen, Urine   Date Value Ref Range Status   03/16/2021 -  Final     Oxycodone Screen, Ur   Date Value Ref Range Status   12/04/2024 None Detected  Final     Propoxyphene Screen, Urine   Date Value Ref Range Status   12/04/2024 None Detected  Final     THC Screen, Urine   Date Value Ref Range Status   12/04/2024 None Detected  Final     Tricyclic Antidepressants, Urine   Date Value Ref Range Status   12/04/2024 None Detected  Final       Last Juvencio: 02.10.2025  Medication Contract: 12.4.2024   Last Fill: 12.04.2024    Requested Prescriptions     Pending Prescriptions Disp Refills    tiZANidine (ZANAFLEX) 4 MG tablet 30 tablet 1     Sig: Take 1 tablet by mouth every 8 hours as needed (back pain)    tamsulosin (FLOMAX) 0.4 MG capsule 90 capsule 3     Sig: Take 1 capsule by mouth daily    pravastatin (PRAVACHOL) 80 MG tablet 30 tablet 1     Sig: Take 1 tablet by mouth nightly    pantoprazole (PROTONIX) 40 MG tablet 90 tablet 1     Sig: Take 1 tablet by mouth daily Indications: Reflux Gastritis    ondansetron (ZOFRAN-ODT) 4 MG disintegrating tablet 30 tablet 0     Sig: DISSOLVE 1 TABLET UNDER THE TONGUE EVERY 8 HOURS AS NEEDED FOR NAUSEA OR VOMITING    lisinopril (PRINIVIL;ZESTRIL) 40 MG tablet 90 tablet 1     Sig: TAKE ONE TABLET BY MOUTH DAILY.Cancel previous script BRIGETTE Triana    EPINEPHrine (EPIPEN) 0.3 MG/0.3ML SOAJ injection 0.3 mL 3     Sig: Use as directed for allergic reaction    amLODIPine (NORVASC) 5 MG tablet 90 tablet 1     Sig: Take 1 tablet by mouth daily

## 2025-02-11 RX ORDER — EPINEPHRINE 0.3 MG/.3ML
INJECTION SUBCUTANEOUS
Qty: 0.3 ML | Refills: 3 | Status: SHIPPED | OUTPATIENT
Start: 2025-02-11

## 2025-02-11 RX ORDER — AMLODIPINE BESYLATE 5 MG/1
5 TABLET ORAL DAILY
Qty: 90 TABLET | Refills: 1 | Status: SHIPPED | OUTPATIENT
Start: 2025-02-11

## 2025-02-11 RX ORDER — PANTOPRAZOLE SODIUM 40 MG/1
40 TABLET, DELAYED RELEASE ORAL DAILY
Qty: 90 TABLET | Refills: 1 | Status: SHIPPED | OUTPATIENT
Start: 2025-02-11

## 2025-02-11 RX ORDER — GABAPENTIN 800 MG/1
800 TABLET ORAL 3 TIMES DAILY
Qty: 90 TABLET | Refills: 0 | Status: SHIPPED | OUTPATIENT
Start: 2025-02-11 | End: 2025-03-13

## 2025-02-11 RX ORDER — TAMSULOSIN HYDROCHLORIDE 0.4 MG/1
0.4 CAPSULE ORAL DAILY
Qty: 90 CAPSULE | Refills: 3 | Status: SHIPPED | OUTPATIENT
Start: 2025-02-11

## 2025-02-11 RX ORDER — ONDANSETRON 4 MG/1
TABLET, ORALLY DISINTEGRATING ORAL
Qty: 30 TABLET | Refills: 0 | Status: SHIPPED | OUTPATIENT
Start: 2025-02-11

## 2025-02-11 RX ORDER — LISINOPRIL 40 MG/1
TABLET ORAL
Qty: 90 TABLET | Refills: 1 | Status: SHIPPED | OUTPATIENT
Start: 2025-02-11

## 2025-02-11 RX ORDER — PRAVASTATIN SODIUM 80 MG/1
80 TABLET ORAL NIGHTLY
Qty: 30 TABLET | Refills: 1 | Status: SHIPPED | OUTPATIENT
Start: 2025-02-11

## 2025-02-13 ENCOUNTER — OFFICE VISIT (OUTPATIENT)
Age: 75
End: 2025-02-13
Payer: MEDICARE

## 2025-02-13 ENCOUNTER — HOSPITAL ENCOUNTER (OUTPATIENT)
Dept: GENERAL RADIOLOGY | Age: 75
Discharge: HOME | End: 2025-02-13
Payer: MEDICARE

## 2025-02-13 ENCOUNTER — TELEPHONE (OUTPATIENT)
Dept: PRIMARY CARE CLINIC | Age: 75
End: 2025-02-13

## 2025-02-13 VITALS
RESPIRATION RATE: 20 BRPM | DIASTOLIC BLOOD PRESSURE: 66 MMHG | HEIGHT: 70 IN | BODY MASS INDEX: 39.37 KG/M2 | OXYGEN SATURATION: 97 % | SYSTOLIC BLOOD PRESSURE: 122 MMHG | HEART RATE: 73 BPM | TEMPERATURE: 97.5 F | WEIGHT: 275 LBS

## 2025-02-13 DIAGNOSIS — M25.561 ACUTE PAIN OF RIGHT KNEE: ICD-10-CM

## 2025-02-13 DIAGNOSIS — M25.561 ACUTE PAIN OF RIGHT KNEE: Primary | ICD-10-CM

## 2025-02-13 PROCEDURE — 73562 X-RAY EXAM OF KNEE 3: CPT

## 2025-02-13 PROCEDURE — 99213 OFFICE O/P EST LOW 20 MIN: CPT

## 2025-02-13 PROCEDURE — 1159F MED LIST DOCD IN RCRD: CPT

## 2025-02-13 PROCEDURE — 1160F RVW MEDS BY RX/DR IN RCRD: CPT

## 2025-02-13 PROCEDURE — 1123F ACP DISCUSS/DSCN MKR DOCD: CPT

## 2025-02-13 ASSESSMENT — ENCOUNTER SYMPTOMS
CHOKING: 0
EYE DISCHARGE: 0
SORE THROAT: 0
COLOR CHANGE: 0
SHORTNESS OF BREATH: 0
WHEEZING: 0
SINUS PRESSURE: 0
EYE PAIN: 0
STRIDOR: 0
EYE REDNESS: 0
COUGH: 0
APNEA: 0
SINUS PAIN: 0
TROUBLE SWALLOWING: 0
DIARRHEA: 0
CONSTIPATION: 0
ABDOMINAL PAIN: 0
ABDOMINAL DISTENTION: 0
FACIAL SWELLING: 0
CHEST TIGHTNESS: 0
VOMITING: 0
NAUSEA: 0

## 2025-02-13 NOTE — PATIENT INSTRUCTIONS
Xray of right knee ordered; will call with results.    Voltaren gel prescribed. Use as directed    Rotate ice/heat as needed - use only 15 minutes at a time    Encouraged adequate rest    Recommended compression/bracing    Recommended elevation of the area    Patient may use ibuprofen or tylenol for pain    The patient is to follow up with PCP or return to clinic if symptoms worsen/fail to improve.    Any condition can change, despite proper treatment. Therefore, if symptoms still persist or worsen after treatment plan intitated today, either go to the nearest ER, or call PCP, or return to UC for further evaluation.    Urgent Care evaluation today is not a substitute for PCP visit. Follow up care is your responsibility to discuss and review this UC visit.

## 2025-02-13 NOTE — PROGRESS NOTES
(ANTIVERT) 25 MG tablet Take 1 tablet by mouth 3 times daily as needed for Dizziness      Diabetic Shoe MISC by Does not apply route DISPENSE ONE PAIR DIABETIC SHOES AND THREE PAIRS OF HEAT MOLDED INSERTS 1 each 0    aspirin 81 MG chewable tablet Take 1 tablet by mouth daily 30 tablet 3    triamcinolone (KENALOG) 0.1 % cream Apply topically 2 times daily. (Patient taking differently: Apply topically 2 times daily Apply topically 2 times daily.) 1 Tube 1    Misc. Devices (WALKER) MISC 1 each by Does not apply route daily 1 each 0     No current facility-administered medications for this visit.       Allergies   Allergen Reactions    Bee Venom Anaphylaxis    Pcn [Penicillins] Other (See Comments)     Made pt \"black out\"       Health Maintenance   Topic Date Due    DTaP/Tdap/Td vaccine (1 - Tdap) Never done    Shingles vaccine (1 of 2) Never done    Respiratory Syncytial Virus (RSV) Pregnant or age 60 yrs+ (1 - Risk 60-74 years 1-dose series) Never done    Diabetic retinal exam  08/21/2014    Colorectal Cancer Screen  09/14/2020    Diabetic foot exam  09/22/2021    COVID-19 Vaccine (4 - 2024-25 season) 09/01/2024    Annual Wellness Visit (Medicare)  09/03/2024    Depression Monitoring  01/14/2025    A1C test (Diabetic or Prediabetic)  01/17/2026    Diabetic Alb to Cr ratio (uACR) test  01/17/2026    Lipids  01/17/2026    GFR test (Diabetes, CKD 3-4, OR last GFR 15-59)  01/17/2026    Flu vaccine  Completed    Pneumococcal 50+ years Vaccine  Completed    AAA screen  Completed    Hepatitis C screen  Completed    Hepatitis A vaccine  Aged Out    Hepatitis B vaccine  Aged Out    Hib vaccine  Aged Out    Polio vaccine  Aged Out    Meningococcal (ACWY) vaccine  Aged Out       Subjective:   Review of Systems   Constitutional:  Negative for activity change, appetite change, chills, diaphoresis, fatigue, fever and unexpected weight change.   HENT:  Negative for congestion, dental problem, ear discharge, ear pain, facial

## 2025-02-24 DIAGNOSIS — M25.561 RIGHT KNEE PAIN, UNSPECIFIED CHRONICITY: Primary | ICD-10-CM

## 2025-02-27 ENCOUNTER — HOSPITAL ENCOUNTER (OUTPATIENT)
Dept: MRI IMAGING | Age: 75
Discharge: HOME | End: 2025-02-27
Payer: MEDICARE

## 2025-02-27 DIAGNOSIS — M25.561 RIGHT KNEE PAIN, UNSPECIFIED CHRONICITY: ICD-10-CM

## 2025-02-27 PROCEDURE — 73721 MRI JNT OF LWR EXTRE W/O DYE: CPT

## 2025-03-04 ENCOUNTER — OFFICE VISIT (OUTPATIENT)
Dept: PRIMARY CARE CLINIC | Age: 75
End: 2025-03-04

## 2025-03-04 VITALS
HEART RATE: 74 BPM | WEIGHT: 269 LBS | OXYGEN SATURATION: 98 % | BODY MASS INDEX: 38.51 KG/M2 | TEMPERATURE: 97 F | SYSTOLIC BLOOD PRESSURE: 126 MMHG | HEIGHT: 70 IN | DIASTOLIC BLOOD PRESSURE: 78 MMHG

## 2025-03-04 DIAGNOSIS — S83.206A ACUTE TORN MENISCUS OF KNEE, RIGHT, INITIAL ENCOUNTER: Primary | ICD-10-CM

## 2025-03-04 DIAGNOSIS — Z00.00 MEDICARE ANNUAL WELLNESS VISIT, SUBSEQUENT: ICD-10-CM

## 2025-03-04 RX ORDER — PRAVASTATIN SODIUM 80 MG/1
80 TABLET ORAL NIGHTLY
Qty: 90 TABLET | Refills: 1 | Status: SHIPPED | OUTPATIENT
Start: 2025-03-04

## 2025-03-04 RX ORDER — TRAZODONE HYDROCHLORIDE 50 MG/1
50 TABLET ORAL NIGHTLY
Qty: 90 TABLET | Refills: 1 | Status: SHIPPED | OUTPATIENT
Start: 2025-03-04

## 2025-03-04 SDOH — ECONOMIC STABILITY: FOOD INSECURITY: WITHIN THE PAST 12 MONTHS, THE FOOD YOU BOUGHT JUST DIDN'T LAST AND YOU DIDN'T HAVE MONEY TO GET MORE.: NEVER TRUE

## 2025-03-04 SDOH — ECONOMIC STABILITY: FOOD INSECURITY: WITHIN THE PAST 12 MONTHS, YOU WORRIED THAT YOUR FOOD WOULD RUN OUT BEFORE YOU GOT MONEY TO BUY MORE.: NEVER TRUE

## 2025-03-04 ASSESSMENT — PATIENT HEALTH QUESTIONNAIRE - PHQ9
6. FEELING BAD ABOUT YOURSELF - OR THAT YOU ARE A FAILURE OR HAVE LET YOURSELF OR YOUR FAMILY DOWN: NOT AT ALL
3. TROUBLE FALLING OR STAYING ASLEEP: NOT AT ALL
5. POOR APPETITE OR OVEREATING: NOT AT ALL
9. THOUGHTS THAT YOU WOULD BE BETTER OFF DEAD, OR OF HURTING YOURSELF: NOT AT ALL
1. LITTLE INTEREST OR PLEASURE IN DOING THINGS: NOT AT ALL
SUM OF ALL RESPONSES TO PHQ QUESTIONS 1-9: 0
10. IF YOU CHECKED OFF ANY PROBLEMS, HOW DIFFICULT HAVE THESE PROBLEMS MADE IT FOR YOU TO DO YOUR WORK, TAKE CARE OF THINGS AT HOME, OR GET ALONG WITH OTHER PEOPLE: NOT DIFFICULT AT ALL
4. FEELING TIRED OR HAVING LITTLE ENERGY: NOT AT ALL
8. MOVING OR SPEAKING SO SLOWLY THAT OTHER PEOPLE COULD HAVE NOTICED. OR THE OPPOSITE, BEING SO FIGETY OR RESTLESS THAT YOU HAVE BEEN MOVING AROUND A LOT MORE THAN USUAL: NOT AT ALL
SUM OF ALL RESPONSES TO PHQ QUESTIONS 1-9: 0
SUM OF ALL RESPONSES TO PHQ QUESTIONS 1-9: 0
7. TROUBLE CONCENTRATING ON THINGS, SUCH AS READING THE NEWSPAPER OR WATCHING TELEVISION: NOT AT ALL
SUM OF ALL RESPONSES TO PHQ QUESTIONS 1-9: 0
2. FEELING DOWN, DEPRESSED OR HOPELESS: NOT AT ALL

## 2025-03-04 NOTE — PROGRESS NOTES
tablet TAKE 1 TABLET BY MOUTH DAILY Yes Renay Banks APRN - NP   albuterol sulfate HFA (PROVENTIL;VENTOLIN;PROAIR) 108 (90 Base) MCG/ACT inhaler Inhale 2 puffs into the lungs every 6 hours as needed for Wheezing Yes ProviderAmanda MD   meclizine (ANTIVERT) 25 MG tablet Take 1 tablet by mouth 3 times daily as needed for Dizziness Yes Provider, MD Amanda   Diabetic Shoe MISC by Does not apply route DISPENSE ONE PAIR DIABETIC SHOES AND THREE PAIRS OF HEAT MOLDED INSERTS Yes Bakari Mooney MD   aspirin 81 MG chewable tablet Take 1 tablet by mouth daily Yes Renay Banks APRN - NP   triamcinolone (KENALOG) 0.1 % cream Apply topically 2 times daily.  Patient taking differently: Apply topically 2 times daily Apply topically 2 times daily. Yes Elba Bird APRN   Misc. Devices (WALKER) MISC 1 each by Does not apply route daily Yes Elba Bird APRN   omega-3 acid ethyl esters (LOVAZA) 1 G capsule Take 2 capsules by mouth 2 times daily  Kyung Saucedo PA-C       CareTeam (Including outside providers/suppliers regularly involved in providing care):   Patient Care Team:  Inez Garcia APRN as PCP - General (Family Nurse Practitioner)  Inez Garcia APRN as PCP - Empaneled Provider  Rosa Pearl MD (Gastroenterology)  Chris Jo DPM (Podiatry)  Cheyenne Arce APRN as Nurse Practitioner (Certified Nurse Specialist)  Janelle Shelley APRN as Advanced Practice Nurse (Neurology)  Alfa Sims MD as Consulting Physician (Interventional Cardiology)  Rama Montes De Oca APRN - CNP as Nurse Practitioner (Nurse Practitioner, Family)     Recommendations for Preventive Services Due: see orders and patient instructions/AVS.  Recommended screening schedule for the next 5-10 years is provided to the patient in written form: see Patient Instructions/AVS.     Reviewed and updated this visit:  Tobacco  Allergies  Meds  Problems  Med Hx  Surg Hx  Fam Hx  Sexual   Hx      Sexual

## 2025-03-04 NOTE — PATIENT INSTRUCTIONS
2024 [employer].   Care instructions adapted under license by [employer]. If you have questions about a medical condition or this instruction, always ask your healthcare professional. [employer] disclaims any warranty or liability for your use of this information.         A Healthy Heart: Care Instructions  Overview     Coronary artery disease, also called heart disease, occurs when a substance called plaque builds up in the vessels that supply oxygen-rich blood to your heart muscle. This can narrow the blood vessels and reduce blood flow. A heart attack happens when blood flow is completely blocked. A high-fat diet, smoking, and other factors increase the risk of heart disease.  Your doctor has found that you have a chance of having heart disease. A heart-healthy lifestyle can help keep your heart healthy and prevent heart disease. This lifestyle includes eating healthy, being active, staying at a weight that's healthy for you, and not smoking or using tobacco. It also includes taking medicines as directed, managing other health conditions, and trying to get a healthy amount of sleep.  Follow-up care is a key part of your treatment and safety. Be sure to make and go to all appointments, and call your doctor if you are having problems. It's also a good idea to know your test results and keep a list of the medicines you take.  How can you care for yourself at home?  Diet    Use less salt when you cook and eat. This helps lower your blood pressure. Taste food before salting. Add only a little salt when you think you need it. With time, your taste buds will adjust to less salt.     Eat fewer snack items, fast foods, canned soups, and other high-salt, high-fat, processed foods.     Read food labels and try to avoid saturated and trans fats. They increase your risk of heart disease by raising cholesterol levels.     Limit the amount of solid fat--butter, margarine, and shortening--you eat. Use

## 2025-03-12 ENCOUNTER — OFFICE VISIT (OUTPATIENT)
Age: 75
End: 2025-03-12

## 2025-03-12 DIAGNOSIS — M25.561 RIGHT KNEE PAIN, UNSPECIFIED CHRONICITY: Primary | ICD-10-CM
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
## 2025-03-12 DIAGNOSIS — S83.241A ACUTE MEDIAL MENISCUS TEAR OF RIGHT KNEE, INITIAL ENCOUNTER: ICD-10-CM

## 2025-03-12 DIAGNOSIS — M17.11 PRIMARY OSTEOARTHRITIS OF RIGHT KNEE: ICD-10-CM

## 2025-03-12 RX ORDER — BETAMETHASONE SODIUM PHOSPHATE AND BETAMETHASONE ACETATE 3; 3 MG/ML; MG/ML
6 INJECTION, SUSPENSION INTRA-ARTICULAR; INTRALESIONAL; INTRAMUSCULAR; SOFT TISSUE ONCE
Status: SHIPPED | OUTPATIENT
Start: 2025-03-12

## 2025-03-12 RX ORDER — MELOXICAM 7.5 MG/1
7.5 TABLET ORAL DAILY
Qty: 30 TABLET | Refills: 3 | Status: SHIPPED | OUTPATIENT
Start: 2025-03-12

## 2025-03-12 RX ORDER — BUPIVACAINE HYDROCHLORIDE 5 MG/ML
3 INJECTION, SOLUTION EPIDURAL; INTRACAUDAL ONCE
Status: SHIPPED | OUTPATIENT
Start: 2025-03-12

## 2025-03-12 RX ORDER — LIDOCAINE HYDROCHLORIDE 10 MG/ML
1 INJECTION, SOLUTION INFILTRATION; PERINEURAL ONCE
Status: SHIPPED | OUTPATIENT
Start: 2025-03-12

## 2025-03-12 NOTE — PROGRESS NOTES
Orthopaedic History and Physical - New Patient    NAME:  Carlos Ramirez   : 1950  MRN: [id number]      3/12/2025     CHIEF COMPLAINT:  Right knee pain    HISTORY OF PRESENT ILLNESS:   Patient is a 74-year-old male that presents to the clinic today as a new patient for evaluation of right knee pain.  Wife provides majority of history as she reports patient does have dementia.  She states that at the beginning of February, patient was trying to clean leaves out of the yard when he suffered a fall.  Wife reports that the right knee did immediately swell following the injury but quickly subsided.  She states that she has been giving naproxen and patient is on Percocet 10 mg for his back from pain management.  Due to continued pain he was seen at urgent care about 2 weeks after initial injury where x-ray imaging was obtained.  Patient continued having discomfort and followed up with PCP.  MRI of the right knee was ordered and patient was instructed to follow-up with orthopedics.  Wife reports that patient is now using a cane and does have a walker at home to help with ambulation.  She states that patient complains of pain intermittently.  Describes as dull, achy.  They present to the clinic today for further evaluation treatment.      Past Medical History:        Diagnosis Date    Arthritis     CAD (coronary artery disease)     sees dr. zhou    Chronic back pain     COPD (chronic obstructive pulmonary disease) ([facility])     Hypertension     Palliative care patient 2024    Peripheral sensory neuropathy     Pleurisy     Shoulder pain     TIA (transient ischemic attack)     Type II or unspecified type diabetes mellitus without mention of complication, not stated as uncontrolled        Past Surgical History:        Procedure Laterality Date    APPENDECTOMY      CARDIAC CATHETERIZATION  2019    PIETER to LAD    SHOULDER SURGERY Left 2020    LEFT REVERSE TOTAL SHOULDER ARTHROPLASTY performed by Colin VILLEDA

## 2025-06-04 ENCOUNTER — HOSPITAL ENCOUNTER (OUTPATIENT)
Age: 75
Setting detail: OBSERVATION
Discharge: HOME | End: 2025-06-07
Attending: EMERGENCY MEDICINE | Admitting: STUDENT IN AN ORGANIZED HEALTH CARE EDUCATION/TRAINING PROGRAM
Payer: MEDICARE

## 2025-06-04 ENCOUNTER — APPOINTMENT (OUTPATIENT)
Dept: GENERAL RADIOLOGY | Age: 75
End: 2025-06-04
Payer: MEDICARE

## 2025-06-04 ENCOUNTER — APPOINTMENT (OUTPATIENT)
Dept: CT IMAGING | Age: 75
End: 2025-06-04
Payer: MEDICARE

## 2025-06-04 DIAGNOSIS — N17.9 AKI (ACUTE KIDNEY INJURY): Primary | ICD-10-CM

## 2025-06-04 DIAGNOSIS — R53.1 GENERALIZED WEAKNESS: ICD-10-CM

## 2025-06-04 DIAGNOSIS — R07.9 CHEST PAIN, UNSPECIFIED TYPE: ICD-10-CM

## 2025-06-04 DIAGNOSIS — R07.9 CHEST PAIN: ICD-10-CM

## 2025-06-04 DIAGNOSIS — R26.2 AMBULATORY DYSFUNCTION: ICD-10-CM

## 2025-06-04 PROBLEM — N18.31 ACUTE KIDNEY INJURY SUPERIMPOSED ON STAGE 3A CHRONIC KIDNEY DISEASE (HCC): Status: ACTIVE | Noted: 2025-06-04

## 2025-06-04 LAB
ALBUMIN SERPL-MCNC: 4.1 G/DL (ref 3.5–5.2)
ALP SERPL-CCNC: 85 U/L (ref 40–129)
ALT SERPL-CCNC: 15 U/L (ref 10–50)
ANION GAP SERPL CALCULATED.3IONS-SCNC: 12 MMOL/L (ref 8–16)
AST SERPL-CCNC: 17 U/L (ref 10–50)
BASOPHILS # BLD: 0 K/UL (ref 0–0.2)
BASOPHILS NFR BLD: 0.2 % (ref 0–1)
BILIRUB SERPL-MCNC: 0.5 MG/DL (ref 0.2–1.2)
BILIRUB UR QL STRIP: NEGATIVE
BUN SERPL-MCNC: 45 MG/DL (ref 8–23)
CALCIUM SERPL-MCNC: 9.6 MG/DL (ref 8.8–10.2)
CHLORIDE SERPL-SCNC: 102 MMOL/L (ref 98–107)
CLARITY UR: CLEAR
CO2 SERPL-SCNC: 21 MMOL/L (ref 22–29)
COLOR UR: YELLOW
CREAT SERPL-MCNC: 1.9 MG/DL (ref 0.7–1.2)
EKG P AXIS: 21 DEGREES
EKG P-R INTERVAL: 160 MS
EKG Q-T INTERVAL: 404 MS
EKG QRS DURATION: 92 MS
EKG QTC CALCULATION (BAZETT): 403 MS
EKG T AXIS: 13 DEGREES
EOSINOPHIL # BLD: 0.3 K/UL (ref 0–0.6)
EOSINOPHIL NFR BLD: 3 % (ref 0–5)
ERYTHROCYTE [DISTWIDTH] IN BLOOD BY AUTOMATED COUNT: 12.7 % (ref 11.5–14.5)
GLUCOSE BLD-MCNC: 130 MG/DL (ref 70–99)
GLUCOSE SERPL-MCNC: 172 MG/DL (ref 70–99)
GLUCOSE UR STRIP.AUTO-MCNC: NEGATIVE MG/DL
HBA1C MFR BLD: 6.6 % (ref 4–5.6)
HCT VFR BLD AUTO: 42.1 % (ref 42–52)
HGB BLD-MCNC: 14.4 G/DL (ref 14–18)
HGB UR STRIP.AUTO-MCNC: NEGATIVE MG/L
IMM GRANULOCYTES # BLD: 0 K/UL
KETONES UR STRIP.AUTO-MCNC: NEGATIVE MG/DL
LEUKOCYTE ESTERASE UR QL STRIP.AUTO: NEGATIVE
LYMPHOCYTES # BLD: 2.3 K/UL (ref 1.1–4.5)
LYMPHOCYTES NFR BLD: 27 % (ref 20–40)
MCH RBC QN AUTO: 32.5 PG (ref 27–31)
MCHC RBC AUTO-ENTMCNC: 34.2 G/DL (ref 33–37)
MCV RBC AUTO: 95 FL (ref 80–94)
MONOCYTES # BLD: 0.7 K/UL (ref 0–0.9)
MONOCYTES NFR BLD: 7.9 % (ref 0–10)
NEUTROPHILS # BLD: 5.3 K/UL (ref 1.5–7.5)
NEUTS SEG NFR BLD: 61.5 % (ref 50–65)
NITRITE UR QL STRIP.AUTO: NEGATIVE
PERFORMED ON: ABNORMAL
PH UR STRIP.AUTO: 5.5 [PH] (ref 5–8)
PLATELET # BLD AUTO: 122 K/UL (ref 130–400)
PMV BLD AUTO: 9.4 FL (ref 9.4–12.4)
POTASSIUM SERPL-SCNC: 4.8 MMOL/L (ref 3.5–5.1)
PROT SERPL-MCNC: 6.6 G/DL (ref 6.4–8.3)
PROT UR STRIP.AUTO-MCNC: NEGATIVE MG/DL
RBC # BLD AUTO: 4.43 M/UL (ref 4.7–6.1)
SODIUM SERPL-SCNC: 135 MMOL/L (ref 136–145)
SP GR UR STRIP.AUTO: 1.01 (ref 1–1.03)
TROPONIN, HIGH SENSITIVITY: 14 NG/L (ref 0–22)
TROPONIN, HIGH SENSITIVITY: 15 NG/L (ref 0–22)
TROPONIN, HIGH SENSITIVITY: 22 NG/L (ref 0–22)
UROBILINOGEN UR STRIP.AUTO-MCNC: 0.2 E.U./DL
WBC # BLD AUTO: 8.5 K/UL (ref 4.8–10.8)

## 2025-06-04 PROCEDURE — 2580000003 HC RX 258: Performed by: EMERGENCY MEDICINE

## 2025-06-04 PROCEDURE — 81003 URINALYSIS AUTO W/O SCOPE: CPT

## 2025-06-04 PROCEDURE — 80053 COMPREHEN METABOLIC PANEL: CPT

## 2025-06-04 PROCEDURE — 82962 GLUCOSE BLOOD TEST: CPT

## 2025-06-04 PROCEDURE — G0378 HOSPITAL OBSERVATION PER HR: HCPCS

## 2025-06-04 PROCEDURE — 96361 HYDRATE IV INFUSION ADD-ON: CPT

## 2025-06-04 PROCEDURE — 71045 X-RAY EXAM CHEST 1 VIEW: CPT

## 2025-06-04 PROCEDURE — 70450 CT HEAD/BRAIN W/O DYE: CPT

## 2025-06-04 PROCEDURE — 96372 THER/PROPH/DIAG INJ SC/IM: CPT

## 2025-06-04 PROCEDURE — 99285 EMERGENCY DEPT VISIT HI MDM: CPT

## 2025-06-04 PROCEDURE — 83036 HEMOGLOBIN GLYCOSYLATED A1C: CPT

## 2025-06-04 PROCEDURE — 93005 ELECTROCARDIOGRAM TRACING: CPT

## 2025-06-04 PROCEDURE — 6370000000 HC RX 637 (ALT 250 FOR IP): Performed by: EMERGENCY MEDICINE

## 2025-06-04 PROCEDURE — 36415 COLL VENOUS BLD VENIPUNCTURE: CPT

## 2025-06-04 PROCEDURE — 85025 COMPLETE CBC W/AUTO DIFF WBC: CPT

## 2025-06-04 PROCEDURE — 6370000000 HC RX 637 (ALT 250 FOR IP): Performed by: NURSE PRACTITIONER

## 2025-06-04 PROCEDURE — 2580000003 HC RX 258: Performed by: NURSE PRACTITIONER

## 2025-06-04 PROCEDURE — 6360000002 HC RX W HCPCS: Performed by: NURSE PRACTITIONER

## 2025-06-04 PROCEDURE — 84484 ASSAY OF TROPONIN QUANT: CPT

## 2025-06-04 RX ORDER — ACETAMINOPHEN 325 MG/1
650 TABLET ORAL EVERY 6 HOURS PRN
Status: DISCONTINUED | OUTPATIENT
Start: 2025-06-04 | End: 2025-06-07 | Stop reason: HOSPADM

## 2025-06-04 RX ORDER — NITROGLYCERIN 0.4 MG/1
0.4 TABLET SUBLINGUAL EVERY 5 MIN PRN
Status: DISCONTINUED | OUTPATIENT
Start: 2025-06-04 | End: 2025-06-07 | Stop reason: HOSPADM

## 2025-06-04 RX ORDER — ENOXAPARIN SODIUM 100 MG/ML
40 INJECTION SUBCUTANEOUS DAILY
Status: DISCONTINUED | OUTPATIENT
Start: 2025-06-04 | End: 2025-06-04 | Stop reason: DRUGHIGH

## 2025-06-04 RX ORDER — ATORVASTATIN CALCIUM 40 MG/1
40 TABLET, FILM COATED ORAL NIGHTLY
Status: DISCONTINUED | OUTPATIENT
Start: 2025-06-04 | End: 2025-06-07 | Stop reason: HOSPADM

## 2025-06-04 RX ORDER — GABAPENTIN 400 MG/1
800 CAPSULE ORAL 3 TIMES DAILY
Status: DISCONTINUED | OUTPATIENT
Start: 2025-06-04 | End: 2025-06-05

## 2025-06-04 RX ORDER — ALBUTEROL SULFATE 90 UG/1
2 INHALANT RESPIRATORY (INHALATION) EVERY 6 HOURS PRN
Status: DISCONTINUED | OUTPATIENT
Start: 2025-06-04 | End: 2025-06-07 | Stop reason: HOSPADM

## 2025-06-04 RX ORDER — AMLODIPINE BESYLATE 5 MG/1
5 TABLET ORAL DAILY
Status: DISCONTINUED | OUTPATIENT
Start: 2025-06-04 | End: 2025-06-07 | Stop reason: HOSPADM

## 2025-06-04 RX ORDER — ASPIRIN 81 MG/1
81 TABLET, CHEWABLE ORAL DAILY
Status: DISCONTINUED | OUTPATIENT
Start: 2025-06-05 | End: 2025-06-07 | Stop reason: HOSPADM

## 2025-06-04 RX ORDER — ACETAMINOPHEN 650 MG/1
650 SUPPOSITORY RECTAL EVERY 6 HOURS PRN
Status: DISCONTINUED | OUTPATIENT
Start: 2025-06-04 | End: 2025-06-07 | Stop reason: HOSPADM

## 2025-06-04 RX ORDER — MEMANTINE HYDROCHLORIDE 5 MG/1
10 TABLET ORAL 2 TIMES DAILY
Status: DISCONTINUED | OUTPATIENT
Start: 2025-06-04 | End: 2025-06-07 | Stop reason: HOSPADM

## 2025-06-04 RX ORDER — MECLIZINE HYDROCHLORIDE 25 MG/1
25 TABLET ORAL 3 TIMES DAILY PRN
Status: DISCONTINUED | OUTPATIENT
Start: 2025-06-04 | End: 2025-06-07 | Stop reason: HOSPADM

## 2025-06-04 RX ORDER — SODIUM CHLORIDE, SODIUM LACTATE, POTASSIUM CHLORIDE, AND CALCIUM CHLORIDE .6; .31; .03; .02 G/100ML; G/100ML; G/100ML; G/100ML
1000 INJECTION, SOLUTION INTRAVENOUS ONCE
Status: COMPLETED | OUTPATIENT
Start: 2025-06-04 | End: 2025-06-04

## 2025-06-04 RX ORDER — INSULIN LISPRO 100 [IU]/ML
0-4 INJECTION, SOLUTION INTRAVENOUS; SUBCUTANEOUS
Status: DISCONTINUED | OUTPATIENT
Start: 2025-06-04 | End: 2025-06-07 | Stop reason: HOSPADM

## 2025-06-04 RX ORDER — ONDANSETRON 4 MG/1
4 TABLET, ORALLY DISINTEGRATING ORAL EVERY 8 HOURS PRN
Status: DISCONTINUED | OUTPATIENT
Start: 2025-06-04 | End: 2025-06-07 | Stop reason: HOSPADM

## 2025-06-04 RX ORDER — TAMSULOSIN HYDROCHLORIDE 0.4 MG/1
0.4 CAPSULE ORAL DAILY
Status: DISCONTINUED | OUTPATIENT
Start: 2025-06-04 | End: 2025-06-07 | Stop reason: HOSPADM

## 2025-06-04 RX ORDER — POLYETHYLENE GLYCOL 3350 17 G/17G
17 POWDER, FOR SOLUTION ORAL DAILY PRN
Status: DISCONTINUED | OUTPATIENT
Start: 2025-06-04 | End: 2025-06-07 | Stop reason: HOSPADM

## 2025-06-04 RX ORDER — DEXTROSE MONOHYDRATE 100 MG/ML
INJECTION, SOLUTION INTRAVENOUS CONTINUOUS PRN
Status: DISCONTINUED | OUTPATIENT
Start: 2025-06-04 | End: 2025-06-07 | Stop reason: HOSPADM

## 2025-06-04 RX ORDER — DONEPEZIL HYDROCHLORIDE 10 MG/1
5 TABLET, FILM COATED ORAL NIGHTLY
Status: DISCONTINUED | OUTPATIENT
Start: 2025-06-04 | End: 2025-06-07 | Stop reason: HOSPADM

## 2025-06-04 RX ORDER — SODIUM CHLORIDE 0.9 % (FLUSH) 0.9 %
5-40 SYRINGE (ML) INJECTION PRN
Status: DISCONTINUED | OUTPATIENT
Start: 2025-06-04 | End: 2025-06-07 | Stop reason: HOSPADM

## 2025-06-04 RX ORDER — POTASSIUM CHLORIDE 1500 MG/1
40 TABLET, EXTENDED RELEASE ORAL PRN
Status: DISCONTINUED | OUTPATIENT
Start: 2025-06-04 | End: 2025-06-07 | Stop reason: HOSPADM

## 2025-06-04 RX ORDER — MAGNESIUM SULFATE IN WATER 40 MG/ML
2000 INJECTION, SOLUTION INTRAVENOUS PRN
Status: DISCONTINUED | OUTPATIENT
Start: 2025-06-04 | End: 2025-06-07 | Stop reason: HOSPADM

## 2025-06-04 RX ORDER — SODIUM CHLORIDE 9 MG/ML
INJECTION, SOLUTION INTRAVENOUS PRN
Status: DISCONTINUED | OUTPATIENT
Start: 2025-06-04 | End: 2025-06-07 | Stop reason: HOSPADM

## 2025-06-04 RX ORDER — OXYCODONE AND ACETAMINOPHEN 10; 325 MG/1; MG/1
1 TABLET ORAL EVERY 6 HOURS PRN
COMMUNITY

## 2025-06-04 RX ORDER — TRAZODONE HYDROCHLORIDE 50 MG/1
50 TABLET ORAL NIGHTLY
Status: DISCONTINUED | OUTPATIENT
Start: 2025-06-04 | End: 2025-06-07 | Stop reason: HOSPADM

## 2025-06-04 RX ORDER — OXYCODONE AND ACETAMINOPHEN 10; 325 MG/1; MG/1
1 TABLET ORAL ONCE
Status: COMPLETED | OUTPATIENT
Start: 2025-06-04 | End: 2025-06-04

## 2025-06-04 RX ORDER — ENOXAPARIN SODIUM 100 MG/ML
30 INJECTION SUBCUTANEOUS 2 TIMES DAILY
Status: DISCONTINUED | OUTPATIENT
Start: 2025-06-04 | End: 2025-06-07 | Stop reason: HOSPADM

## 2025-06-04 RX ORDER — SODIUM CHLORIDE 0.9 % (FLUSH) 0.9 %
5-40 SYRINGE (ML) INJECTION EVERY 12 HOURS SCHEDULED
Status: DISCONTINUED | OUTPATIENT
Start: 2025-06-04 | End: 2025-06-07 | Stop reason: HOSPADM

## 2025-06-04 RX ORDER — GLUCAGON 1 MG/ML
1 KIT INJECTION PRN
Status: DISCONTINUED | OUTPATIENT
Start: 2025-06-04 | End: 2025-06-07 | Stop reason: HOSPADM

## 2025-06-04 RX ORDER — POTASSIUM CHLORIDE 7.45 MG/ML
10 INJECTION INTRAVENOUS PRN
Status: DISCONTINUED | OUTPATIENT
Start: 2025-06-04 | End: 2025-06-07 | Stop reason: HOSPADM

## 2025-06-04 RX ORDER — ONDANSETRON 2 MG/ML
4 INJECTION INTRAMUSCULAR; INTRAVENOUS EVERY 6 HOURS PRN
Status: DISCONTINUED | OUTPATIENT
Start: 2025-06-04 | End: 2025-06-07 | Stop reason: HOSPADM

## 2025-06-04 RX ORDER — PANTOPRAZOLE SODIUM 40 MG/1
40 TABLET, DELAYED RELEASE ORAL DAILY
Status: DISCONTINUED | OUTPATIENT
Start: 2025-06-04 | End: 2025-06-07 | Stop reason: HOSPADM

## 2025-06-04 RX ORDER — SODIUM CHLORIDE 9 MG/ML
INJECTION, SOLUTION INTRAVENOUS CONTINUOUS
Status: ACTIVE | OUTPATIENT
Start: 2025-06-04 | End: 2025-06-05

## 2025-06-04 RX ORDER — MECLIZINE HCL 12.5 MG 12.5 MG/1
25 TABLET ORAL ONCE
Status: COMPLETED | OUTPATIENT
Start: 2025-06-04 | End: 2025-06-04

## 2025-06-04 RX ADMIN — TRAZODONE HYDROCHLORIDE 50 MG: 50 TABLET ORAL at 20:58

## 2025-06-04 RX ADMIN — SODIUM CHLORIDE, SODIUM LACTATE, POTASSIUM CHLORIDE, AND CALCIUM CHLORIDE 1000 ML: .6; .31; .03; .02 INJECTION, SOLUTION INTRAVENOUS at 12:07

## 2025-06-04 RX ADMIN — ATORVASTATIN CALCIUM 40 MG: 40 TABLET, FILM COATED ORAL at 20:58

## 2025-06-04 RX ADMIN — MEMANTINE HYDROCHLORIDE 10 MG: 5 TABLET ORAL at 20:58

## 2025-06-04 RX ADMIN — SODIUM CHLORIDE: 0.9 INJECTION, SOLUTION INTRAVENOUS at 18:17

## 2025-06-04 RX ADMIN — DONEPEZIL HYDROCHLORIDE 5 MG: 10 TABLET, FILM COATED ORAL at 20:58

## 2025-06-04 RX ADMIN — MECLIZINE 25 MG: 12.5 TABLET ORAL at 12:08

## 2025-06-04 RX ADMIN — GABAPENTIN 800 MG: 400 CAPSULE ORAL at 20:58

## 2025-06-04 RX ADMIN — OXYCODONE HYDROCHLORIDE AND ACETAMINOPHEN 1 TABLET: 10; 325 TABLET ORAL at 22:49

## 2025-06-04 RX ADMIN — ENOXAPARIN SODIUM 30 MG: 100 INJECTION SUBCUTANEOUS at 20:58

## 2025-06-04 RX ADMIN — AMLODIPINE BESYLATE 5 MG: 5 TABLET ORAL at 20:58

## 2025-06-04 SDOH — ECONOMIC STABILITY: FOOD INSECURITY: WITHIN THE PAST 12 MONTHS, YOU WORRIED THAT YOUR FOOD WOULD RUN OUT BEFORE YOU GOT MONEY TO BUY MORE.: NEVER TRUE

## 2025-06-04 SDOH — ECONOMIC STABILITY: INCOME INSECURITY: IN THE PAST 12 MONTHS, HAS THE ELECTRIC, GAS, OIL, OR WATER COMPANY THREATENED TO SHUT OFF SERVICE IN YOUR HOME?: NO

## 2025-06-04 SDOH — ECONOMIC STABILITY: INCOME INSECURITY: HOW HARD IS IT FOR YOU TO PAY FOR THE VERY BASICS LIKE FOOD, HOUSING, MEDICAL CARE, AND HEATING?: NOT HARD AT ALL

## 2025-06-04 ASSESSMENT — ENCOUNTER SYMPTOMS
VOMITING: 0
DIARRHEA: 0
COUGH: 0
NAUSEA: 0
SHORTNESS OF BREATH: 0
BACK PAIN: 0
ABDOMINAL PAIN: 0

## 2025-06-04 ASSESSMENT — PATIENT HEALTH QUESTIONNAIRE - PHQ9
SUM OF ALL RESPONSES TO PHQ QUESTIONS 1-9: 0
1. LITTLE INTEREST OR PLEASURE IN DOING THINGS: NOT AT ALL
SUM OF ALL RESPONSES TO PHQ QUESTIONS 1-9: 0
2. FEELING DOWN, DEPRESSED OR HOPELESS: NOT AT ALL
SUM OF ALL RESPONSES TO PHQ QUESTIONS 1-9: 0
SUM OF ALL RESPONSES TO PHQ QUESTIONS 1-9: 0

## 2025-06-04 ASSESSMENT — PAIN SCALES - GENERAL
PAINLEVEL_OUTOF10: 6
PAINLEVEL_OUTOF10: 3

## 2025-06-04 ASSESSMENT — PAIN DESCRIPTION - LOCATION: LOCATION: GENERALIZED

## 2025-06-04 ASSESSMENT — PAIN - FUNCTIONAL ASSESSMENT: PAIN_FUNCTIONAL_ASSESSMENT: ACTIVITIES ARE NOT PREVENTED

## 2025-06-04 ASSESSMENT — PAIN DESCRIPTION - DESCRIPTORS: DESCRIPTORS: ACHING

## 2025-06-04 NOTE — PROGRESS NOTES
Pharmacy Automatic Dose Adjustment                Subcutaneous Anticoagulant for Prophylaxis    Carlos Ramirez is a 74 y.o. male.     Recent Labs     06/04/25  1056   CREATININE 1.9*       Estimated Creatinine Clearance: 41 mL/min (A) (based on SCr of 1.9 mg/dL (H)).    Weight:  Wt Readings from Last 1 Encounters:   06/04/25 117.9 kg (260 lb)           Pharmacy has adjusted subcutaneous anticoagulant for prophylaxis to Enoxaparin 30 mg SC twice daily based on the patient's weight and estimated CrCl per [facility] policy.               Electronically signed by Katlyn Sesya RPH on 6/4/2025 at 3:07 PM

## 2025-06-04 NOTE — H&P
[facility]      Hospitalist - History & Physical      PCP: Inez Garcia APRN    Date of Admission: 6/4/2025    Date of Service: 6/4/2025    Chief Complaint:  Fatigue and chest pain     History Of Present Illness:   The patient is a 74 y.o. male who presented to [facility] ED for evaluation of fatigue and chest pain. Pt has history of copd, chronic pain, CAD with prior stent placement, diabetes, dementia and hypertension.    Pt is here with his wife who assists with history. Pt has had worsening generalized weakness, fatigue over past few days after spreading mulch in his yard at home. He has had vague dizziness, headache and chest discomfort similar to prior anginal symptoms followed by Dr. Sims. He has had no fevers or recent illness.     In ED, cxr-No acute pulmonary infiltrate is identified. CT head-No acute intracranial process. Creatinine 1.9/creatinine 1.4 on 1/17/2025, troponin 22, hgb 14.4, platelets 122k. Pt is admitted inpatient to hospitalist.    Past Medical History:        Diagnosis Date    Arthritis     CAD (coronary artery disease)     sees dr. sims    Chronic back pain     COPD (chronic obstructive pulmonary disease) ([facility])     Hypertension     Palliative care patient 01/16/2024    Peripheral sensory neuropathy     Pleurisy     Shoulder pain     TIA (transient ischemic attack)     Type II or unspecified type diabetes mellitus without mention of complication, not stated as uncontrolled        Past Surgical History:        Procedure Laterality Date    APPENDECTOMY      CARDIAC CATHETERIZATION  11/04/2019    PIETER to LAD    SHOULDER SURGERY Left 12/17/2020    LEFT REVERSE TOTAL SHOULDER ARTHROPLASTY performed by Colin Martinez MD at [facility] OR       Home Medications:  Prior to Admission medications    Medication Sig Start Date End Date Taking? Authorizing Provider   meloxicam (MOBIC) 7.5 MG tablet Take 1 tablet by mouth daily 3/12/25   Pam Carson, APRN - CNP   pravastatin (PRAVACHOL) 80 MG  superimposed on stage 3a chronic kidney disease  -consult nephrology as warranted  -ns at 75cc/hr  -monitor renal fxn/lytes  -avoid nephrotoxic agents  -I's and O's  -daily weight  Resolved Problems:    * No resolved hospital problems. *  Signed:  BRIGETTE Boswell - CNP, 6/4/2025 3:08 PM

## 2025-06-04 NOTE — ED NOTES
ED TO INPATIENT SBAR HANDOFF    Patient Name: Carlos Ramirez   : 1950  74 y.o.   Family/Caregiver Present: yes  Code Status Order: Full Code    C-SSRS: Risk of Suicide: No Risk  Sitter No  Restraints:         Situation  Chief Complaint:   Chief Complaint   Patient presents with    Fatigue     Pt states that he is having chest pain and dizziness starting today.  Pt has hx of STEMI     Patient Diagnosis: Chest pain in adult [R07.9]     Brief Description of Patient's Condition:  74 y.o. male who presents to the emergency department for generalized weakness fatigue, dizziness and right sided chest discomfort.  Patient was out laying mulch today and wife was concerned he possibly overexerted himself appeared pale and very weak.  She took his blood pressure and it was 92/55.  He did not pass out.  Most recent falls a couple days ago denies any head trauma.  Does admit to a mild frontal headache.  Complains of right sided chest soreness sensation but states feels somewhat similar to when had prior stents placed.  Denies any vomiting or diarrhea.  Normal by mouth intake.  Again admits to feeling dizzy lightheaded somewhat vertigo-like symptoms spinning sensation.  No history of vertigo.  No fever or chills or recent illnesses.  Having trouble ambulating due to his symptoms.   Mental Status: oriented and alert  Arrived from: home    Imaging:   CT HEAD WO CONTRAST   Final Result       No acute intracranial process.       The critical findings for this stat stroke examination were discussed with Julio Perez of the emergency room at 13: 21 hours central  time on 25 by Taylor Lagunas M.D.        All CT scans are performed using dose optimization techniques as appropriate to the performed exam and include    at least one of the following: Automated exposure control, adjustment of the mA and/or kV according to size, and the use of iterative reconstruction technique.        ______________________________________

## 2025-06-04 NOTE — ED PROVIDER NOTES
[facility] EMERGENCY DEPARTMENT  eMERGENCY dEPARTMENT eNCOUnter      Pt Name: Carlos Ramirez  MRN: [id number]  Birthdate 1950  Date of evaluation: 6/4/2025  Provider: TACOS LUX MD    CHIEF COMPLAINT       Chief Complaint   Patient presents with    Fatigue     Pt states that he is having chest pain and dizziness starting today.  Pt has hx of STEMI         HISTORY OF PRESENT ILLNESS   (Location/Symptom, Timing/Onset,Context/Setting, Quality, Duration, Modifying Factors, Severity)  Note limiting factors.   Carlos Ramirez is a 74 y.o. male who presents to the emergency department for generalized weakness fatigue, dizziness and right sided chest discomfort.  Patient was out laying mulch today and wife was concerned he possibly overexerted himself appeared pale and very weak.  She took his blood pressure and it was 92/55.  He did not pass out.  Most recent falls a couple days ago denies any head trauma.  Does admit to a mild frontal headache.  Complains of right sided chest soreness sensation but states feels somewhat similar to when had prior stents placed.  Denies any vomiting or diarrhea.  Normal by mouth intake.  Again admits to feeling dizzy lightheaded somewhat vertigo-like symptoms spinning sensation.  No history of vertigo.  No fever or chills or recent illnesses.  Having trouble ambulating due to his symptoms.    HPI    NursingNotes were reviewed.    REVIEW OF SYSTEMS    (2-9 systems for level 4, 10 or more for level 5)     Review of Systems   Constitutional:  Positive for fatigue. Negative for chills and fever.   Eyes:  Negative for visual disturbance.   Respiratory:  Negative for cough and shortness of breath.    Cardiovascular:  Positive for chest pain. Negative for leg swelling.   Gastrointestinal:  Negative for abdominal pain, diarrhea, nausea and vomiting.   Genitourinary:  Negative for dysuria and frequency.   Musculoskeletal:  Negative for back pain and neck pain.   Neurological:  Positive for  normal.      Pupils: Pupils are equal, round, and reactive to light.   Neck:      Trachea: No tracheal deviation.   Cardiovascular:      Rate and Rhythm: Normal rate and regular rhythm.      Pulses: Normal pulses.      Heart sounds: Normal heart sounds. No murmur heard.  Pulmonary:      Effort: Pulmonary effort is normal.      Breath sounds: Normal breath sounds. No wheezing or rales.   Abdominal:      Palpations: Abdomen is soft.      Tenderness: There is no abdominal tenderness.   Musculoskeletal:         General: Normal range of motion.      Cervical back: Normal range of motion and neck supple.      Right lower leg: No edema.      Left lower leg: No edema.   Skin:     General: Skin is warm and dry.   Neurological:      Mental Status: He is alert and oriented to person, place, and time.      GCS: GCS eye subscore is 4. GCS verbal subscore is 5. GCS motor subscore is 6.      Cranial Nerves: No dysarthria or facial asymmetry.      Sensory: Sensation is intact.      Motor: No weakness or abnormal muscle tone.      Coordination: Coordination is intact.           DIAGNOSTIC RESULTS     EKG: All EKG's areinterpreted by the Emergency Department Physician who either signs or Co-signs this chart in the absence of a cardiologist.    60 normal sinus rhythm T wave flattening noted in lead III and aVF no obvious elevation or depression QTc 400 nondiagnostic EKG    RADIOLOGY:  Non-plain film images such as CT, Ultrasound and MRI are read by the radiologist. Plain radiographic images are visualized and preliminarily interpreted bythe emergency physician with the below findings:          CT HEAD WO CONTRAST   Final Result       No acute intracranial process.       The critical findings for this stat stroke examination were discussed with Julio Perez of the emergency room at 13: 21 hours central  time on 6/4/25 by Taylor Lagunas M.D.        All CT scans are performed using dose optimization techniques as appropriate to the performed

## 2025-06-05 ENCOUNTER — APPOINTMENT (OUTPATIENT)
Age: 75
End: 2025-06-05
Payer: MEDICARE

## 2025-06-05 PROBLEM — I20.0 UNSTABLE ANGINA (HCC): Status: ACTIVE | Noted: 2025-06-05

## 2025-06-05 PROBLEM — R07.9 CHEST PAIN: Status: ACTIVE | Noted: 2025-06-04

## 2025-06-05 LAB
ANION GAP SERPL CALCULATED.3IONS-SCNC: 12 MMOL/L (ref 8–16)
BNP BLD-MCNC: 129 PG/ML (ref 0–124)
BUN SERPL-MCNC: 28 MG/DL (ref 8–23)
CALCIUM SERPL-MCNC: 9.6 MG/DL (ref 8.8–10.2)
CHLORIDE SERPL-SCNC: 107 MMOL/L (ref 98–107)
CHOLEST SERPL-MCNC: 183 MG/DL (ref 0–199)
CO2 SERPL-SCNC: 20 MMOL/L (ref 22–29)
CREAT SERPL-MCNC: 1.5 MG/DL (ref 0.7–1.2)
ECHO AO ASC DIAM: 4.2 CM
ECHO AO ASCENDING AORTA INDEX: 1.9 CM/M2
ECHO AO ROOT DIAM: 2.6 CM
ECHO AO ROOT INDEX: 1.18 CM/M2
ECHO AV AREA PEAK VELOCITY: 4.1 CM2
ECHO AV AREA VTI: 4.3 CM2
ECHO AV AREA/BSA PEAK VELOCITY: 1.9 CM2/M2
ECHO AV AREA/BSA VTI: 1.9 CM2/M2
ECHO AV MEAN GRADIENT: 2 MMHG
ECHO AV MEAN VELOCITY: 0.6 M/S
ECHO AV PEAK GRADIENT: 3 MMHG
ECHO AV PEAK VELOCITY: 0.9 M/S
ECHO AV VELOCITY RATIO: 0.89
ECHO AV VTI: 24.2 CM
ECHO BSA: 2.33 M2
ECHO BSA: 2.33 M2
ECHO EST RA PRESSURE: 3 MMHG
ECHO IVC PROX: 1.5 CM
ECHO LA AREA 2C: 23.6 CM2
ECHO LA AREA 4C: 24.9 CM2
ECHO LA DIAMETER INDEX: 1.99 CM/M2
ECHO LA DIAMETER: 4.4 CM
ECHO LA MAJOR AXIS: 6.2 CM
ECHO LA MINOR AXIS: 5.8 CM
ECHO LA TO AORTIC ROOT RATIO: 1.69
ECHO LA VOL BP: 80 ML (ref 18–58)
ECHO LA VOL MOD A2C: 78 ML (ref 18–58)
ECHO LA VOL MOD A4C: 78 ML (ref 18–58)
ECHO LA VOL/BSA BIPLANE: 36 ML/M2 (ref 16–34)
ECHO LA VOLUME INDEX MOD A2C: 35 ML/M2 (ref 16–34)
ECHO LA VOLUME INDEX MOD A4C: 35 ML/M2 (ref 16–34)
ECHO LV E' LATERAL VELOCITY: 8.27 CM/S
ECHO LV E' SEPTAL VELOCITY: 6.96 CM/S
ECHO LV EDV A2C: 121 ML
ECHO LV EDV A4C: 143 ML
ECHO LV EDV INDEX A4C: 65 ML/M2
ECHO LV EDV NDEX A2C: 55 ML/M2
ECHO LV EF PHYSICIAN: 60 %
ECHO LV EJECTION FRACTION A2C: 58 %
ECHO LV EJECTION FRACTION A4C: 62 %
ECHO LV EJECTION FRACTION BIPLANE: 60 % (ref 55–100)
ECHO LV ESV A2C: 51 ML
ECHO LV ESV A4C: 55 ML
ECHO LV ESV INDEX A2C: 23 ML/M2
ECHO LV ESV INDEX A4C: 25 ML/M2
ECHO LV FRACTIONAL SHORTENING: 39 % (ref 28–44)
ECHO LV INTERNAL DIMENSION DIASTOLE INDEX: 2.53 CM/M2
ECHO LV INTERNAL DIMENSION DIASTOLIC: 5.6 CM (ref 4.2–5.9)
ECHO LV INTERNAL DIMENSION SYSTOLIC INDEX: 1.54 CM/M2
ECHO LV INTERNAL DIMENSION SYSTOLIC: 3.4 CM
ECHO LV IVSD: 1.3 CM (ref 0.6–1)
ECHO LV MASS 2D: 296.6 G (ref 88–224)
ECHO LV MASS INDEX 2D: 134.2 G/M2 (ref 49–115)
ECHO LV POSTERIOR WALL DIASTOLIC: 1.2 CM (ref 0.6–1)
ECHO LV RELATIVE WALL THICKNESS RATIO: 0.43
ECHO LVOT AREA: 4.5 CM2
ECHO LVOT AV VTI INDEX: 0.95
ECHO LVOT DIAM: 2.4 CM
ECHO LVOT MEAN GRADIENT: 2 MMHG
ECHO LVOT PEAK GRADIENT: 3 MMHG
ECHO LVOT PEAK GRADIENT: 3 MMHG
ECHO LVOT PEAK VELOCITY: 0.8 M/S
ECHO LVOT STROKE VOLUME INDEX: 47.1 ML/M2
ECHO LVOT SV: 104 ML
ECHO LVOT VTI: 23 CM
ECHO MV A VELOCITY: 0.71 M/S
ECHO MV E DECELERATION TIME (DT): 261 MS
ECHO MV E VELOCITY: 0.55 M/S
ECHO MV E/A RATIO: 0.77
ECHO MV E/E' LATERAL: 6.65
ECHO MV E/E' RATIO (AVERAGED): 7.28
ECHO MV E/E' SEPTAL: 7.9
ECHO RA AREA 4C: 19.8 CM2
ECHO RA END SYSTOLIC VOLUME APICAL 4 CHAMBER INDEX BSA: 28 ML/M2
ECHO RA VOLUME: 61 ML
ECHO RV BASAL DIMENSION: 4.1 CM
ECHO RV LONGITUDINAL DIMENSION: 8.7 CM
ECHO RV MID DIMENSION: 3.4 CM
ECHO RV TAPSE: 2.6 CM (ref 1.7–?)
ERYTHROCYTE [DISTWIDTH] IN BLOOD BY AUTOMATED COUNT: 12.7 % (ref 11.5–14.5)
GLUCOSE BLD-MCNC: 120 MG/DL (ref 70–99)
GLUCOSE BLD-MCNC: 125 MG/DL (ref 70–99)
GLUCOSE BLD-MCNC: 158 MG/DL (ref 70–99)
GLUCOSE BLD-MCNC: 179 MG/DL (ref 70–99)
GLUCOSE BLD-MCNC: 192 MG/DL (ref 70–99)
GLUCOSE SERPL-MCNC: 130 MG/DL (ref 70–99)
HCT VFR BLD AUTO: 43.8 % (ref 42–52)
HDLC SERPL-MCNC: 40 MG/DL (ref 40–60)
HGB BLD-MCNC: 14.6 G/DL (ref 14–18)
LDLC SERPL CALC-MCNC: 105 MG/DL
MCH RBC QN AUTO: 31.9 PG (ref 27–31)
MCHC RBC AUTO-ENTMCNC: 33.3 G/DL (ref 33–37)
MCV RBC AUTO: 95.6 FL (ref 80–94)
PERFORMED ON: ABNORMAL
PLATELET # BLD AUTO: 117 K/UL (ref 130–400)
PMV BLD AUTO: 9.3 FL (ref 9.4–12.4)
POTASSIUM SERPL-SCNC: 4.7 MMOL/L (ref 3.5–5)
RBC # BLD AUTO: 4.58 M/UL (ref 4.7–6.1)
SODIUM SERPL-SCNC: 139 MMOL/L (ref 136–145)
T4 SERPL-MCNC: 7.7 UG/DL (ref 4.5–11.7)
TRIGL SERPL-MCNC: 191 MG/DL (ref 0–149)
TROPONIN, HIGH SENSITIVITY: 14 NG/L (ref 0–22)
TSH SERPL DL<=0.005 MIU/L-ACNC: 2.46 UIU/ML (ref 0.27–4.2)
WBC # BLD AUTO: 6.3 K/UL (ref 4.8–10.8)

## 2025-06-05 PROCEDURE — 6360000002 HC RX W HCPCS: Performed by: NURSE PRACTITIONER

## 2025-06-05 PROCEDURE — 2580000003 HC RX 258: Performed by: INTERNAL MEDICINE

## 2025-06-05 PROCEDURE — 84436 ASSAY OF TOTAL THYROXINE: CPT

## 2025-06-05 PROCEDURE — 2709999900 HC NON-CHARGEABLE SUPPLY: Performed by: INTERNAL MEDICINE

## 2025-06-05 PROCEDURE — 85027 COMPLETE CBC AUTOMATED: CPT

## 2025-06-05 PROCEDURE — 6360000004 HC RX CONTRAST MEDICATION: Performed by: INTERNAL MEDICINE

## 2025-06-05 PROCEDURE — 94760 N-INVAS EAR/PLS OXIMETRY 1: CPT

## 2025-06-05 PROCEDURE — 96372 THER/PROPH/DIAG INJ SC/IM: CPT

## 2025-06-05 PROCEDURE — G0378 HOSPITAL OBSERVATION PER HR: HCPCS

## 2025-06-05 PROCEDURE — 6370000000 HC RX 637 (ALT 250 FOR IP): Performed by: NURSE PRACTITIONER

## 2025-06-05 PROCEDURE — 96366 THER/PROPH/DIAG IV INF ADDON: CPT

## 2025-06-05 PROCEDURE — C1894 INTRO/SHEATH, NON-LASER: HCPCS | Performed by: INTERNAL MEDICINE

## 2025-06-05 PROCEDURE — 36415 COLL VENOUS BLD VENIPUNCTURE: CPT

## 2025-06-05 PROCEDURE — 84443 ASSAY THYROID STIM HORMONE: CPT

## 2025-06-05 PROCEDURE — 93458 L HRT ARTERY/VENTRICLE ANGIO: CPT | Performed by: INTERNAL MEDICINE

## 2025-06-05 PROCEDURE — C8929 TTE W OR WO FOL WCON,DOPPLER: HCPCS

## 2025-06-05 PROCEDURE — 96361 HYDRATE IV INFUSION ADD-ON: CPT

## 2025-06-05 PROCEDURE — 80061 LIPID PANEL: CPT

## 2025-06-05 PROCEDURE — 6370000000 HC RX 637 (ALT 250 FOR IP)

## 2025-06-05 PROCEDURE — 82962 GLUCOSE BLOOD TEST: CPT

## 2025-06-05 PROCEDURE — 2500000003 HC RX 250 WO HCPCS: Performed by: NURSE PRACTITIONER

## 2025-06-05 PROCEDURE — 80048 BASIC METABOLIC PNL TOTAL CA: CPT

## 2025-06-05 PROCEDURE — 2500000003 HC RX 250 WO HCPCS: Performed by: INTERNAL MEDICINE

## 2025-06-05 PROCEDURE — 83880 ASSAY OF NATRIURETIC PEPTIDE: CPT

## 2025-06-05 PROCEDURE — C1769 GUIDE WIRE: HCPCS | Performed by: INTERNAL MEDICINE

## 2025-06-05 PROCEDURE — 93306 TTE W/DOPPLER COMPLETE: CPT | Performed by: INTERNAL MEDICINE

## 2025-06-05 PROCEDURE — 99222 1ST HOSP IP/OBS MODERATE 55: CPT | Performed by: INTERNAL MEDICINE

## 2025-06-05 PROCEDURE — 99152 MOD SED SAME PHYS/QHP 5/>YRS: CPT | Performed by: INTERNAL MEDICINE

## 2025-06-05 PROCEDURE — 6360000004 HC RX CONTRAST MEDICATION: Performed by: NURSE PRACTITIONER

## 2025-06-05 PROCEDURE — 6360000002 HC RX W HCPCS: Performed by: INTERNAL MEDICINE

## 2025-06-05 PROCEDURE — 84484 ASSAY OF TROPONIN QUANT: CPT

## 2025-06-05 PROCEDURE — 96365 THER/PROPH/DIAG IV INF INIT: CPT

## 2025-06-05 RX ORDER — OXYMETAZOLINE HYDROCHLORIDE 0.05 G/100ML
2 SPRAY NASAL 2 TIMES DAILY
Status: DISCONTINUED | OUTPATIENT
Start: 2025-06-05 | End: 2025-06-07 | Stop reason: HOSPADM

## 2025-06-05 RX ORDER — HEPARIN SODIUM 1000 [USP'U]/ML
INJECTION, SOLUTION INTRAVENOUS; SUBCUTANEOUS PRN
Status: DISCONTINUED | OUTPATIENT
Start: 2025-06-05 | End: 2025-06-05 | Stop reason: HOSPADM

## 2025-06-05 RX ORDER — OXYCODONE AND ACETAMINOPHEN 10; 325 MG/1; MG/1
1 TABLET ORAL EVERY 6 HOURS PRN
Refills: 0 | Status: DISCONTINUED | OUTPATIENT
Start: 2025-06-05 | End: 2025-06-07 | Stop reason: HOSPADM

## 2025-06-05 RX ORDER — FENTANYL CITRATE 50 UG/ML
INJECTION, SOLUTION INTRAMUSCULAR; INTRAVENOUS PRN
Status: DISCONTINUED | OUTPATIENT
Start: 2025-06-05 | End: 2025-06-05 | Stop reason: HOSPADM

## 2025-06-05 RX ORDER — IODIXANOL 320 MG/ML
INJECTION, SOLUTION INTRAVASCULAR PRN
Status: DISCONTINUED | OUTPATIENT
Start: 2025-06-05 | End: 2025-06-05 | Stop reason: HOSPADM

## 2025-06-05 RX ORDER — AZELASTINE 1 MG/ML
1 SPRAY, METERED NASAL 2 TIMES DAILY
Status: DISCONTINUED | OUTPATIENT
Start: 2025-06-05 | End: 2025-06-05 | Stop reason: RX

## 2025-06-05 RX ORDER — ASPIRIN 325 MG
325 TABLET ORAL ONCE
Status: DISCONTINUED | OUTPATIENT
Start: 2025-06-05 | End: 2025-06-07 | Stop reason: HOSPADM

## 2025-06-05 RX ORDER — MIDAZOLAM HYDROCHLORIDE 1 MG/ML
INJECTION, SOLUTION INTRAMUSCULAR; INTRAVENOUS PRN
Status: DISCONTINUED | OUTPATIENT
Start: 2025-06-05 | End: 2025-06-05 | Stop reason: HOSPADM

## 2025-06-05 RX ORDER — GABAPENTIN 300 MG/1
300 CAPSULE ORAL 3 TIMES DAILY
Status: DISCONTINUED | OUTPATIENT
Start: 2025-06-05 | End: 2025-06-06 | Stop reason: DRUGHIGH

## 2025-06-05 RX ORDER — NITROGLYCERIN 20 MG/100ML
INJECTION INTRAVENOUS PRN
Status: DISCONTINUED | OUTPATIENT
Start: 2025-06-05 | End: 2025-06-05 | Stop reason: HOSPADM

## 2025-06-05 RX ORDER — POLYETHYLENE GLYCOL 3350 17 G/17G
17 POWDER, FOR SOLUTION ORAL DAILY
Status: DISCONTINUED | OUTPATIENT
Start: 2025-06-05 | End: 2025-06-07 | Stop reason: HOSPADM

## 2025-06-05 RX ADMIN — POLYETHYLENE GLYCOL 3350 17 G: 17 POWDER, FOR SOLUTION ORAL at 23:54

## 2025-06-05 RX ADMIN — SULFUR HEXAFLUORIDE 2 ML: 60.7; .19; .19 INJECTION, POWDER, LYOPHILIZED, FOR SUSPENSION INTRAVENOUS; INTRAVESICAL at 07:28

## 2025-06-05 RX ADMIN — OXYCODONE HYDROCHLORIDE AND ACETAMINOPHEN 1 TABLET: 10; 325 TABLET ORAL at 23:34

## 2025-06-05 RX ADMIN — DONEPEZIL HYDROCHLORIDE 5 MG: 10 TABLET, FILM COATED ORAL at 20:07

## 2025-06-05 RX ADMIN — ASPIRIN 81 MG: 81 TABLET, CHEWABLE ORAL at 08:53

## 2025-06-05 RX ADMIN — PANTOPRAZOLE SODIUM 40 MG: 40 TABLET, DELAYED RELEASE ORAL at 08:53

## 2025-06-05 RX ADMIN — SODIUM BICARBONATE: 84 INJECTION, SOLUTION INTRAVENOUS at 12:09

## 2025-06-05 RX ADMIN — SODIUM CHLORIDE, PRESERVATIVE FREE 10 ML: 5 INJECTION INTRAVENOUS at 20:11

## 2025-06-05 RX ADMIN — ENOXAPARIN SODIUM 30 MG: 100 INJECTION SUBCUTANEOUS at 08:53

## 2025-06-05 RX ADMIN — GABAPENTIN 300 MG: 300 CAPSULE ORAL at 08:53

## 2025-06-05 RX ADMIN — OXYCODONE HYDROCHLORIDE AND ACETAMINOPHEN 1 TABLET: 10; 325 TABLET ORAL at 08:53

## 2025-06-05 RX ADMIN — Medication 2 SPRAY: at 20:09

## 2025-06-05 RX ADMIN — OXYCODONE HYDROCHLORIDE AND ACETAMINOPHEN 1 TABLET: 10; 325 TABLET ORAL at 15:36

## 2025-06-05 RX ADMIN — INSULIN LISPRO 1 UNITS: 100 INJECTION, SOLUTION INTRAVENOUS; SUBCUTANEOUS at 20:16

## 2025-06-05 RX ADMIN — SODIUM BICARBONATE: 84 INJECTION, SOLUTION INTRAVENOUS at 20:04

## 2025-06-05 RX ADMIN — GABAPENTIN 300 MG: 300 CAPSULE ORAL at 15:36

## 2025-06-05 RX ADMIN — MEMANTINE HYDROCHLORIDE 10 MG: 5 TABLET ORAL at 08:53

## 2025-06-05 RX ADMIN — TRAZODONE HYDROCHLORIDE 50 MG: 50 TABLET ORAL at 20:16

## 2025-06-05 RX ADMIN — AMLODIPINE BESYLATE 5 MG: 5 TABLET ORAL at 08:53

## 2025-06-05 RX ADMIN — ATORVASTATIN CALCIUM 40 MG: 40 TABLET, FILM COATED ORAL at 20:10

## 2025-06-05 RX ADMIN — GABAPENTIN 300 MG: 300 CAPSULE ORAL at 20:10

## 2025-06-05 RX ADMIN — MEMANTINE HYDROCHLORIDE 10 MG: 5 TABLET ORAL at 20:10

## 2025-06-05 RX ADMIN — MAGNESIUM HYDROXIDE 30 ML: 400 SUSPENSION ORAL at 18:41

## 2025-06-05 RX ADMIN — TAMSULOSIN HYDROCHLORIDE 0.4 MG: 0.4 CAPSULE ORAL at 08:53

## 2025-06-05 ASSESSMENT — ENCOUNTER SYMPTOMS
DIARRHEA: 0
ABDOMINAL PAIN: 0
BACK PAIN: 0
ABDOMINAL DISTENTION: 0
CHEST TIGHTNESS: 1
SHORTNESS OF BREATH: 0
COUGH: 0
WHEEZING: 0
VOMITING: 0
BLOOD IN STOOL: 0

## 2025-06-05 ASSESSMENT — PAIN SCALES - GENERAL
PAINLEVEL_OUTOF10: 4
PAINLEVEL_OUTOF10: 5
PAINLEVEL_OUTOF10: 9
PAINLEVEL_OUTOF10: 8
PAINLEVEL_OUTOF10: 4

## 2025-06-05 ASSESSMENT — PAIN DESCRIPTION - LOCATION
LOCATION: BACK
LOCATION: BACK

## 2025-06-05 ASSESSMENT — PAIN DESCRIPTION - DESCRIPTORS
DESCRIPTORS: ACHING;NAGGING
DESCRIPTORS: ACHING;NAGGING

## 2025-06-05 ASSESSMENT — PAIN DESCRIPTION - ORIENTATION: ORIENTATION: LOWER;MID

## 2025-06-05 NOTE — PROGRESS NOTES
Pt returned from cath lab, nurse at bedside with cath lab nurse. Site assessed, some oozing to 2x2 gauze under TR band, but per cath nurse, this is a stable finding.  Pt is alert and oriented, complains of feeling chilled, some fine tremor noted.   Pulses palpated to R Radial site, bounding.   Wife at bedside, no concerns at this time.

## 2025-06-05 NOTE — PROGRESS NOTES
[facility]      Patient:  Carlos Ramirez  YOB: 1950  Date of Service: 6/5/2025  MRN: [id number]   Acct: [id number]   Primary Care Physician: Inez Garcia APRN  Advance Directive: Full Code  Admit Date: 6/4/2025       Hospital Day: 0  Portions of this note have been copied forward, however, changed to reflect the most current clinical status of this patient.    CHIEF COMPLAINT lightheadedness, chest pain     SUBJECTIVE:  no issues overnight, resting comfortably in bed     Cumulative hospital course   74-year-old male with CAD s/p stent placement, COPD, type II DM, HTN, CKD 3a, TIA, ascending aortic aneurysm 4.4 cm, Alzheimer's dementia, with complaints of chest pain.  Patient had been outside yesterday for over 5 hours laying mulch, yard work began having dizziness, lightheadedness and right-sided chest pain.  Denied nausea, vomiting, or syncopal event.  Workup in ER CT head no acute intracranial abnormality, CXR no acute cardiopulmonary process, troponin negative, creatinine 1.9 BUN 45.  Patient placed in observation with consultation to cardiology.  Initiated on IVF with improvement of renal function back to baseline.  No recurrence of chest discomfort or shortness of breath.  Plan for heart catheterization today.    Objective:   VITALS:  /88   Pulse 59   Temp 97.5 °F (36.4 °C) (Oral)   Resp 18   Ht 1.651 m (5' 5\")   Wt 117.9 kg (260 lb)   SpO2 97%   BMI 43.27 kg/m²   24HR INTAKE/OUTPUT:    Intake/Output Summary (Last 24 hours) at 6/5/2025 1246  Last data filed at 6/5/2025 0908  Gross per 24 hour   Intake --   Output 300 ml   Net -300 ml       Physical Exam  Vitals and nursing note reviewed.   Constitutional:       Appearance: Normal appearance.   HENT:      Mouth/Throat:      Mouth: Mucous membranes are moist.      Pharynx: Oropharynx is clear.   Eyes:      Extraocular Movements: Extraocular movements intact.      Conjunctiva/sclera: Conjunctivae normal.      Pupils:  size, and the use of iterative reconstruction technique.  ______________________________________ Electronically signed by: VANDANA BAILEY M.D. Date:     06/04/2025 Time:    13:16     Assessment/Plan   Principal Problem:    Chest pain in adult  - Cardiology consultation and recommendations appreciated  - Aspirin and Lipitor  - SL Nitro PRN  - ECHO   - Trend troponin negative  - FLP reviewed/ HgbA1c  - Serial and PRN EKGs  - Monitor on telemetry  - NPO after midnight   Heart cath today  Active Problems:    Acute kidney injury superimposed on stage 3a chronic kidney disease               - Creatinine 1.5 today               - IVFs               - Monitor I's and O's closely              - Monitor labs closely              - Avoid hypotension              - Avoid nephrotoxic agent    Diabetes   -Accucheck  -A1c  -Sliding scale insulin   -Hypoglycemia protocol as warranted     COPD (chronic obstructive pulmonary disease)    No signs of exacerbation  Hypertension    128/88   Norvasc    Monitor vital signs   Dementia   Aricept, Namenda     DVT Prophylaxis: Lovenox     Foster Maya, APRN - CNP, 6/5/2025 12:46 PM

## 2025-06-05 NOTE — CONSULTS
[facility]  Cardiology Consult      Requesting MD:  Manoj Dooley MD   Admit Status:         History obtained from:   [] Patient  [] Other (specify):     PROBLEM LIST:    Patient Active Problem List    Diagnosis Date Noted    Major depressive disorder, recurrent, mild 06/29/2021     Priority: Low    Major depressive disorder, recurrent, moderate 06/29/2021     Priority: Low    Major depressive disorder, recurrent, unspecified 06/29/2021     Priority: Low    Ascending aortic aneurysm 03/16/2021     Priority: Low    Left rotator cuff tear arthropathy 12/16/2020     Priority: Low    Transient alteration of awareness      Priority: Low    TIA (transient ischemic attack)      Priority: Low    Numbness and tingling      Priority: Low    Diabetic polyneuropathy associated with type 2 diabetes mellitus (HCC)      Priority: Low    Late onset Alzheimer's disease without behavioral disturbance (HCC)      Priority: Low    Bradycardia      Priority: Low    Stroke-like symptoms 12/01/2020     Priority: Low    Guaiac positive stools 12/01/2020     Priority: Low    Diabetic peripheral neuropathy associated with type 2 diabetes mellitus (HCC) 01/13/2020     Priority: Low    CAD (coronary artery disease)      Priority: Low    Chest pain, unspecified 11/04/2019     Priority: Low    Symptomatic bradycardia      Priority: Low    B12 deficiency 10/31/2019     Priority: Low    Thrombocytopenia 09/19/2019     Priority: Low    Basal cell carcinoma of left cheek 09/18/2019     Priority: Low    Seborrheic keratosis 09/18/2019     Priority: Low    Epigastric pain 08/05/2019     Priority: Low     Overview Note:     Added automatically from request for surgery [id number]      Gastroesophageal reflux disease 08/05/2019     Priority: Low     Overview Note:     Added automatically from request for surgery [id number]      Heartburn 08/05/2019     Priority: Low     Overview Note:     Added automatically from request for

## 2025-06-05 NOTE — PROGRESS NOTES
Pharmacy Renal Adjustment    Carlos Ramirez is a 74 y.o. male. Pharmacy has renally adjusted medications per protocol.    Recent Labs     06/04/25  1056   BUN 45*       Recent Labs     06/04/25  1056   CREATININE 1.9*       Estimated Creatinine Clearance: 41 mL/min (A) (based on SCr of 1.9 mg/dL (H)).    Height:   Ht Readings from Last 1 Encounters:   06/04/25 1.651 m (5' 5\")     Weight:  Wt Readings from Last 1 Encounters:   06/04/25 117.9 kg (260 lb)       Plan: Adjust the following medications based on renal function:           Gabapentin to 300 mg PO TID    Electronically signed by SHAWN PRADO RPH on 6/5/2025 at 1:54 AM

## 2025-06-05 NOTE — CONSULTS
Palliative Care:    Pt known to Palliative Care.  Pt presented to ED 6/4 with generalized weakness, and chest pain after working outside in his yard.  Upon visit with Pt he was sitting up in recliner with his wife, Naima at bedside.  Pt is alert and oriented and able to have conversation.  However, Pt relies on his wife to give details for most information.  Pt is active with yard work and chores and uses a cane or rake for support at home or in the yard.  Pt has a rollator and a walker and only uses them when ambulating longer distances outside the home.  Pt lives with his wife who is very supportive.      Past Medical History:        Past Medical History:   Diagnosis Date    Arthritis     CAD (coronary artery disease)     sees dr. zhou    Chronic back pain     COPD (chronic obstructive pulmonary disease) ([facility])     Hypertension     Palliative care patient 01/16/2024    Peripheral sensory neuropathy     Pleurisy     Shoulder pain     TIA (transient ischemic attack)     Type II or unspecified type diabetes mellitus without mention of complication, not stated as uncontrolled        Advance Directives:     POA on file with his son, as POA and PDM.  Pt is a Full Code.          Pain/Other Symptoms:  Pt states his only symptom is chest discomfort and states he doesn't know if it from yard work or other source.             How are symptoms affecting QOL:  Pt states he had remained active at home and in his yard performing yard work or assisting with household chores until onset of generalized weakness and chest discomfort.  Pt is currently resting in recliner allowing his wife to do 90% of the talking.  Pt denies SOA and other symptoms other than weakness and discomfort in his chest.        Psychological/Spiritual:    Pt is Sabianism and his ramesh is important to him.   Pt has good support of his wife.  Pt's son is supportive but works driving an [employer] long distances and is available if needed.  Son has good contact  with Pt and his wife.    Plan:  Medical management to include possible  cardiac catheterization this afternoon.  Plan upon discharge is to return home with his wife.    Patient/family discussion r/t goals:           (what does living well look like to you?  Pt's goal is for medical management to improve symptoms and to return home at previous level of activity.              Palliative Performance Scale:   Currently 50%         Palliative Care team will continue to follow and support, with ongoing review of pt's goals of care.                Electronically signed by Rosa Barnard RN on 6/5/2025 at 1:00 PM

## 2025-06-05 NOTE — PROCEDURES
PROCEDURE NOTE  Date: 6/5/2025   Name: Carlos Ramirez  YOB: 1950    Procedures    Cardiac catheterization preliminary note:    Patent stent in mid LAD with unchanged mid LAD 55% stenosis with first diagonal 50% proximal stenosis.  Normal LV systolic function.  Low LV end-diastolic pressures.      Plan: Medical management.  IV bicarbonate to continue for 6 hours.

## 2025-06-06 LAB
ANION GAP SERPL CALCULATED.3IONS-SCNC: 11 MMOL/L (ref 8–16)
BUN SERPL-MCNC: 17 MG/DL (ref 8–23)
CALCIUM SERPL-MCNC: 9 MG/DL (ref 8.8–10.2)
CHLORIDE SERPL-SCNC: 102 MMOL/L (ref 98–107)
CO2 SERPL-SCNC: 28 MMOL/L (ref 22–29)
CREAT SERPL-MCNC: 1.3 MG/DL (ref 0.7–1.2)
ERYTHROCYTE [DISTWIDTH] IN BLOOD BY AUTOMATED COUNT: 12.4 % (ref 11.5–14.5)
GLUCOSE BLD-MCNC: 154 MG/DL (ref 70–99)
GLUCOSE BLD-MCNC: 157 MG/DL (ref 70–99)
GLUCOSE BLD-MCNC: 160 MG/DL (ref 70–99)
GLUCOSE BLD-MCNC: 185 MG/DL (ref 70–99)
GLUCOSE SERPL-MCNC: 127 MG/DL (ref 70–99)
HCT VFR BLD AUTO: 43.7 % (ref 42–52)
HGB BLD-MCNC: 14.9 G/DL (ref 14–18)
MCH RBC QN AUTO: 32.2 PG (ref 27–31)
MCHC RBC AUTO-ENTMCNC: 34.1 G/DL (ref 33–37)
MCV RBC AUTO: 94.4 FL (ref 80–94)
PERFORMED ON: ABNORMAL
PLATELET # BLD AUTO: 120 K/UL (ref 130–400)
PMV BLD AUTO: 9.4 FL (ref 9.4–12.4)
POTASSIUM SERPL-SCNC: 4 MMOL/L (ref 3.5–5)
RBC # BLD AUTO: 4.63 M/UL (ref 4.7–6.1)
SODIUM SERPL-SCNC: 141 MMOL/L (ref 136–145)
WBC # BLD AUTO: 6.6 K/UL (ref 4.8–10.8)

## 2025-06-06 PROCEDURE — G0378 HOSPITAL OBSERVATION PER HR: HCPCS

## 2025-06-06 PROCEDURE — 6360000002 HC RX W HCPCS: Performed by: NURSE PRACTITIONER

## 2025-06-06 PROCEDURE — 99232 SBSQ HOSP IP/OBS MODERATE 35: CPT | Performed by: INTERNAL MEDICINE

## 2025-06-06 PROCEDURE — 96372 THER/PROPH/DIAG INJ SC/IM: CPT

## 2025-06-06 PROCEDURE — 36415 COLL VENOUS BLD VENIPUNCTURE: CPT

## 2025-06-06 PROCEDURE — 51798 US URINE CAPACITY MEASURE: CPT

## 2025-06-06 PROCEDURE — 6370000000 HC RX 637 (ALT 250 FOR IP)

## 2025-06-06 PROCEDURE — 2500000003 HC RX 250 WO HCPCS: Performed by: NURSE PRACTITIONER

## 2025-06-06 PROCEDURE — 6370000000 HC RX 637 (ALT 250 FOR IP): Performed by: NURSE PRACTITIONER

## 2025-06-06 PROCEDURE — 82962 GLUCOSE BLOOD TEST: CPT

## 2025-06-06 PROCEDURE — 85027 COMPLETE CBC AUTOMATED: CPT

## 2025-06-06 PROCEDURE — 80048 BASIC METABOLIC PNL TOTAL CA: CPT

## 2025-06-06 RX ORDER — LACTULOSE 10 G/15ML
30 SOLUTION ORAL ONCE
Status: COMPLETED | OUTPATIENT
Start: 2025-06-06 | End: 2025-06-06

## 2025-06-06 RX ORDER — GABAPENTIN 600 MG/1
600 TABLET ORAL 3 TIMES DAILY
Status: DISCONTINUED | OUTPATIENT
Start: 2025-06-06 | End: 2025-06-07 | Stop reason: HOSPADM

## 2025-06-06 RX ADMIN — TAMSULOSIN HYDROCHLORIDE 0.4 MG: 0.4 CAPSULE ORAL at 07:53

## 2025-06-06 RX ADMIN — Medication 2 SPRAY: at 07:58

## 2025-06-06 RX ADMIN — GABAPENTIN 600 MG: 600 TABLET, FILM COATED ORAL at 20:26

## 2025-06-06 RX ADMIN — AMLODIPINE BESYLATE 5 MG: 5 TABLET ORAL at 07:53

## 2025-06-06 RX ADMIN — SODIUM CHLORIDE, PRESERVATIVE FREE 10 ML: 5 INJECTION INTRAVENOUS at 20:27

## 2025-06-06 RX ADMIN — GABAPENTIN 600 MG: 600 TABLET, FILM COATED ORAL at 13:54

## 2025-06-06 RX ADMIN — LACTULOSE 30 G: 20 SOLUTION ORAL at 10:04

## 2025-06-06 RX ADMIN — ENOXAPARIN SODIUM 30 MG: 100 INJECTION SUBCUTANEOUS at 07:57

## 2025-06-06 RX ADMIN — MEMANTINE HYDROCHLORIDE 10 MG: 5 TABLET ORAL at 07:53

## 2025-06-06 RX ADMIN — GABAPENTIN 300 MG: 300 CAPSULE ORAL at 07:53

## 2025-06-06 RX ADMIN — ASPIRIN 81 MG: 81 TABLET, CHEWABLE ORAL at 07:53

## 2025-06-06 RX ADMIN — Medication 12.5 MG: at 10:04

## 2025-06-06 RX ADMIN — OXYCODONE HYDROCHLORIDE AND ACETAMINOPHEN 1 TABLET: 10; 325 TABLET ORAL at 16:16

## 2025-06-06 RX ADMIN — MEMANTINE HYDROCHLORIDE 10 MG: 5 TABLET ORAL at 20:25

## 2025-06-06 RX ADMIN — ENOXAPARIN SODIUM 30 MG: 100 INJECTION SUBCUTANEOUS at 20:26

## 2025-06-06 RX ADMIN — POLYETHYLENE GLYCOL 3350 17 G: 17 POWDER, FOR SOLUTION ORAL at 07:51

## 2025-06-06 RX ADMIN — PANTOPRAZOLE SODIUM 40 MG: 40 TABLET, DELAYED RELEASE ORAL at 07:53

## 2025-06-06 RX ADMIN — ATORVASTATIN CALCIUM 40 MG: 40 TABLET, FILM COATED ORAL at 20:26

## 2025-06-06 RX ADMIN — DONEPEZIL HYDROCHLORIDE 5 MG: 10 TABLET, FILM COATED ORAL at 20:25

## 2025-06-06 RX ADMIN — TRAZODONE HYDROCHLORIDE 50 MG: 50 TABLET ORAL at 20:26

## 2025-06-06 RX ADMIN — SODIUM CHLORIDE, PRESERVATIVE FREE 10 ML: 5 INJECTION INTRAVENOUS at 08:00

## 2025-06-06 ASSESSMENT — PAIN DESCRIPTION - LOCATION: LOCATION: BACK

## 2025-06-06 ASSESSMENT — PAIN SCALES - GENERAL: PAINLEVEL_OUTOF10: 9

## 2025-06-06 ASSESSMENT — PAIN DESCRIPTION - ORIENTATION: ORIENTATION: MID

## 2025-06-06 ASSESSMENT — PAIN DESCRIPTION - DESCRIPTORS: DESCRIPTORS: ACHING

## 2025-06-06 NOTE — PROGRESS NOTES
[facility]      Patient:  Carlos Ramirez  YOB: 1950  Date of Service: 6/6/2025  MRN: [id number]   Acct: [id number]   Primary Care Physician: Inez Garcia APRN  Advance Directive: Full Code  Admit Date: 6/4/2025       Hospital Day: 0  Portions of this note have been copied forward, however, changed to reflect the most current clinical status of this patient.    CHIEF COMPLAINT lightheadedness, chest pain     SUBJECTIVE:  endorses worsening generalized weakness    Cumulative hospital course   74-year-old male with CAD s/p stent placement, COPD, type II DM, HTN, CKD 3a, TIA, ascending aortic aneurysm 4.4 cm, Alzheimer's dementia, with complaints of chest pain.  Patient had been outside yesterday for over 5 hours laying mulch, yard work began having dizziness, lightheadedness and right-sided chest pain.  Denied nausea, vomiting, or syncopal event.  Workup in ER CT head no acute intracranial abnormality, CXR no acute cardiopulmonary process, troponin negative, creatinine 1.9 BUN 45.  Patient placed in observation with consultation to cardiology.  Initiated on IVF with improvement of renal function back to baseline.  No recurrence of chest discomfort or shortness of breath.  Heart cath patent stent to mid LAD unchanged mid LAD 55% stenosis with first diagonal 50% proximal stenosis, medical management.  Patient endorses dizziness with position change orthostatic vital signs within normal limits PT/OT ordered discussed with patient and family to consider possible rehab.    Objective:   VITALS:  BP (!) 157/69   Pulse 53   Temp 97.2 °F (36.2 °C) (Temporal)   Resp 18   Ht 1.651 m (5' 5\")   Wt 117.9 kg (260 lb)   SpO2 99%   BMI 43.27 kg/m²   24HR INTAKE/OUTPUT:    Intake/Output Summary (Last 24 hours) at 6/6/2025 1529  Last data filed at 6/6/2025 0835  Gross per 24 hour   Intake --   Output 305 ml   Net -305 ml       Physical Exam  Vitals and nursing note reviewed.   Constitutional:

## 2025-06-06 NOTE — PROGRESS NOTES
Cath site bleeding, TR reinflated to 15. Bleeding stopped. Oncoming shift made aware to closely monitor.

## 2025-06-06 NOTE — PROGRESS NOTES
This  visited with pt to follow up and provide spiritual care. Pt says he doesn't have specific spiritual needs or distress. Pt's wife says they believe in a relationship with Danial Wilkins. This  provided spiritual care with sustaining presence, support, and prayer. Pt and his wife expressed gratitude for spiritual care.       Spiritual Health History and Assessment/Progress Note  [facility]    Initial Encounter, Spiritual/Emotional Needs,  ,  ,      Name: Carlos Ramirez MRN: [id number]    Age: 74 y.o.     Sex: male   Language: English   Tenriism: [employer]   Chest pain in adult     Date: 6/6/2025            Total Time Calculated: 10 min              Spiritual Assessment began in [facility] ONCOLOGY UNIT        Referral/Consult From: Palliative Care   Encounter Overview/Reason: Initial Encounter, Spiritual/Emotional Needs  Service Provided For: Patient, Family    Darlene, Belief, Meaning:   Patient identifies as spiritual and is connected with a darlene tradition or spiritual practice  Family/Friends identify as spiritual and are connected with a darlene tradition or spiritual practice      Importance and Influence:  Patient has spiritual/personal beliefs that influence decisions regarding their health  Family/Friends have spiritual/personal beliefs that influence decisions regarding the patient's health    Community:  Patient Other: did not name a darlene community.  Family/Friends Other: did not name a darlene community.    Assessment and Plan of Care:     Patient Interventions include: Provided sacramental/Scientologist ritual  Family/Friends Interventions include: Provided sacramental/Scientologist ritual    Patient Plan of Care: Spiritual Care available upon further referral  Family/Friends Plan of Care: Spiritual Care available upon further referral    Electronically signed by Mary Behrens, Chaplain on 6/6/2025 at 3:29 PM

## 2025-06-06 NOTE — PROGRESS NOTES
chest 06/20/2017     Priority: Low    Benign hemangioma 03/23/2017     Priority: Low    Benign non-nodular prostatic hyperplasia with lower urinary tract symptoms 11/29/2016     Priority: Low    Family history of prostate cancer 11/29/2016     Priority: Low    Skin lesion of face 11/29/2016     Priority: Low    Diabetes (HCC)      Priority: Low    COPD (chronic obstructive pulmonary disease) ([facility])      Priority: Low    Chronic back pain      Priority: Low    Unstable angina ([facility]) 06/05/2025    Chest pain in adult 06/04/2025    Acute kidney injury superimposed on stage 3a chronic kidney disease ([facility]) 06/04/2025    Chest pain 06/04/2025    Nontraumatic incomplete tear of right rotator cuff 12/10/2024    Palliative care patient 01/17/2024       Admit Date:  6/4/2025    Admission Problem List: Present on Admission:   Chest pain in adult   COPD (chronic obstructive pulmonary disease) ([facility])   Diabetes ([facility])   Acute kidney injury superimposed on stage 3a chronic kidney disease ([facility])   Unstable angina ([facility])      Cardiac Specific Data:  Specialty Problems          Cardiology Problems    Chest pain, unspecified        Symptomatic bradycardia        CAD (coronary artery disease)        Bradycardia        TIA (transient ischemic attack)        Ascending aortic aneurysm        Chest pain        * (Principal) Chest pain in adult        Unstable angina ([facility])         1.  Coronary artery disease, PCI 11/4/2020 to mid LAD just after first diagonal with residual mid LAD 55% stenosis, no change with patent stent by catheterization 6/5/2025, normal LV ejection fraction.  2.  Diabetes mellitus.  3.  Ascending aortic moderate size aneurysm at 4.4 cm (stable by CTA 6/2022)  4.  Mild thrombocytopenia.  5.  Chronic low back pain.  6.  COPD with prior tobacco use stopped 30 years ago.  7.  CKD stage III  8.  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     Subjective:  Mr. Ramirez reports no chest pain overnight.  Complains about  ______________________________________ Electronically signed by: VISH DUFFY M.D. Date:     06/04/2025 Time:    13:50     CT HEAD WO CONTRAST  Result Date: 6/4/2025  CT HEAD WITHOUT CONTRAST  INDICATION: 74-year-old male patient with dizziness  COMPARISON: None  TECHNIQUE: Axial CT of the head from the skull base to the vertex without administration of intravenous contrast. Coronal and sagittal reformatted images were performed. One or more of the following dose reduction techniques were used: Automated exposure  control, adjustment of the mA and/or kV according to patient size, use of iterative reconstruction technique.  FINDINGS: No acute intracranial hemorrhage, mass effect, or midline shift.  No large acute territorial ischemia, although this maybe undetectable in the hyperacute/acute phases. No large intracranial mass given limits of this exam without intravenous contrast.  Mild global volume loss with concordant ventriculomegaly, age-appropriate. Basilar cisterns are patent.  Mild supratentorial chronic microangiopathy.  Visualized mastoid air cells and middle ears are clear. Calvarium is intact. Mild mucosal thickening in the ethmoid air cells right maxillary sinus. Postinfectious/inflammatory calcifications and right palatine tonsil.       No acute intracranial process.  The critical findings for this stat stroke examination were discussed with Julio Perez of the emergency room at 13: 21 hours central  time on 6/4/25 by Taylor Lagunas M.D.  All CT scans are performed using dose optimization techniques as appropriate to the performed exam and include at least one of the following: Automated exposure control, adjustment of the mA and/or kV according to size, and the use of iterative reconstruction technique.  ______________________________________ Electronically signed by: TAYLOR LAGUNAS M.D. Date:     06/04/2025 Time:    13:16         Assessment and Plan:    This is a 74 y.o. year old male with past medical

## 2025-06-06 NOTE — PROGRESS NOTES
Pt noted to be increasingly wobbly during ambulation with cane and one assist. Staff had to physicaly support on 2 occassions. Coordination seems impaired.   BRIGETTE Hutchison made aware.

## 2025-06-06 NOTE — CARE COORDINATION
06/06/25 0810   IMM Letter   IMM Letter given to Patient/Family/Significant other/Guardian/POA/by: Zenia Tran   IMM Letter date given: 06/06/25   IMM Letter time given: 0809     Patient declined waiting 4 hr period prior to discharge.  Second IMM given to patient and explained with patient verbalizing understanding.  All questions and concerns addressed     Signed letter placed in pt soft chart  Electronically signed by LUCIA LUCERO on 6/6/2025 at 8:10 AM

## 2025-06-06 NOTE — PROGRESS NOTES
Pharmacy Renal Adjustment    Carlos Ramirez is a 74 y.o. male. Pharmacy has renally adjusted medications per protocol.    Recent Labs     06/05/25  0618 06/06/25  0311   BUN 28* 17       Recent Labs     06/05/25  0618 06/06/25  0311   CREATININE 1.5* 1.3*       Estimated Creatinine Clearance: 60 mL/min (A) (based on SCr of 1.3 mg/dL (H)).    Height:   Ht Readings from Last 1 Encounters:   06/05/25 1.651 m (5' 5\")     Weight:  Wt Readings from Last 1 Encounters:   06/05/25 117.9 kg (260 lb)       CKD stage: IIIa         Baseline SCr: 1.3-1.4    Plan: Adjust the following medications based on renal function:           Gabapentin 300 mg PO TID adjusted to gabapentin to 600 mg PO TID for CrCl 50-79 mL/min.    Electronically signed by Milena Olivarez, Pharmacy Intern [facility] 6/6/2025 at 1:16 PM

## 2025-06-06 NOTE — PLAN OF CARE
Problem: ABCDS Injury Assessment  Goal: Absence of physical injury  6/6/2025 1315 by Sergio Kelley, RN  Outcome: Progressing  6/6/2025 0051 by Sergio Candelaria RN  Outcome: Progressing     Problem: Safety - Adult  Goal: Free from fall injury  6/6/2025 1315 by Sergio Kelley, RN  Outcome: Progressing  6/6/2025 0051 by Sergio Candelaria RN  Outcome: Progressing

## 2025-06-07 VITALS
RESPIRATION RATE: 16 BRPM | SYSTOLIC BLOOD PRESSURE: 162 MMHG | WEIGHT: 275.7 LBS | HEIGHT: 65 IN | BODY MASS INDEX: 45.94 KG/M2 | OXYGEN SATURATION: 93 % | HEART RATE: 54 BPM | DIASTOLIC BLOOD PRESSURE: 78 MMHG | TEMPERATURE: 97.4 F

## 2025-06-07 LAB
ANION GAP SERPL CALCULATED.3IONS-SCNC: 10 MMOL/L (ref 8–16)
BUN SERPL-MCNC: 13 MG/DL (ref 8–23)
CALCIUM SERPL-MCNC: 9.2 MG/DL (ref 8.8–10.2)
CHLORIDE SERPL-SCNC: 105 MMOL/L (ref 98–107)
CO2 SERPL-SCNC: 20 MMOL/L (ref 22–29)
CREAT SERPL-MCNC: 1.3 MG/DL (ref 0.7–1.2)
ERYTHROCYTE [DISTWIDTH] IN BLOOD BY AUTOMATED COUNT: 12.5 % (ref 11.5–14.5)
GLUCOSE BLD-MCNC: 143 MG/DL (ref 70–99)
GLUCOSE SERPL-MCNC: 151 MG/DL (ref 70–99)
HCT VFR BLD AUTO: 40.1 % (ref 42–52)
HGB BLD-MCNC: 13.7 G/DL (ref 14–18)
MCH RBC QN AUTO: 32.4 PG (ref 27–31)
MCHC RBC AUTO-ENTMCNC: 34.2 G/DL (ref 33–37)
MCV RBC AUTO: 94.8 FL (ref 80–94)
PERFORMED ON: ABNORMAL
PLATELET # BLD AUTO: 117 K/UL (ref 130–400)
PMV BLD AUTO: 9.5 FL (ref 9.4–12.4)
POTASSIUM SERPL-SCNC: 4.6 MMOL/L (ref 3.5–5)
RBC # BLD AUTO: 4.23 M/UL (ref 4.7–6.1)
SODIUM SERPL-SCNC: 135 MMOL/L (ref 136–145)
WBC # BLD AUTO: 7 K/UL (ref 4.8–10.8)

## 2025-06-07 PROCEDURE — 2500000003 HC RX 250 WO HCPCS: Performed by: NURSE PRACTITIONER

## 2025-06-07 PROCEDURE — 96372 THER/PROPH/DIAG INJ SC/IM: CPT

## 2025-06-07 PROCEDURE — 94760 N-INVAS EAR/PLS OXIMETRY 1: CPT

## 2025-06-07 PROCEDURE — 82962 GLUCOSE BLOOD TEST: CPT

## 2025-06-07 PROCEDURE — 85027 COMPLETE CBC AUTOMATED: CPT

## 2025-06-07 PROCEDURE — 6370000000 HC RX 637 (ALT 250 FOR IP)

## 2025-06-07 PROCEDURE — 6360000002 HC RX W HCPCS: Performed by: NURSE PRACTITIONER

## 2025-06-07 PROCEDURE — 80048 BASIC METABOLIC PNL TOTAL CA: CPT

## 2025-06-07 PROCEDURE — 36415 COLL VENOUS BLD VENIPUNCTURE: CPT

## 2025-06-07 PROCEDURE — 6370000000 HC RX 637 (ALT 250 FOR IP): Performed by: NURSE PRACTITIONER

## 2025-06-07 PROCEDURE — G0378 HOSPITAL OBSERVATION PER HR: HCPCS

## 2025-06-07 RX ADMIN — Medication 12.5 MG: at 05:01

## 2025-06-07 RX ADMIN — AMLODIPINE BESYLATE 5 MG: 5 TABLET ORAL at 08:08

## 2025-06-07 RX ADMIN — PANTOPRAZOLE SODIUM 40 MG: 40 TABLET, DELAYED RELEASE ORAL at 08:08

## 2025-06-07 RX ADMIN — OXYCODONE HYDROCHLORIDE AND ACETAMINOPHEN 1 TABLET: 10; 325 TABLET ORAL at 05:01

## 2025-06-07 RX ADMIN — GABAPENTIN 600 MG: 600 TABLET, FILM COATED ORAL at 08:08

## 2025-06-07 RX ADMIN — ASPIRIN 81 MG: 81 TABLET, CHEWABLE ORAL at 08:08

## 2025-06-07 RX ADMIN — MEMANTINE HYDROCHLORIDE 10 MG: 5 TABLET ORAL at 08:08

## 2025-06-07 RX ADMIN — POLYETHYLENE GLYCOL 3350 17 G: 17 POWDER, FOR SOLUTION ORAL at 08:08

## 2025-06-07 RX ADMIN — SODIUM CHLORIDE, PRESERVATIVE FREE 10 ML: 5 INJECTION INTRAVENOUS at 08:13

## 2025-06-07 RX ADMIN — TAMSULOSIN HYDROCHLORIDE 0.4 MG: 0.4 CAPSULE ORAL at 08:08

## 2025-06-07 RX ADMIN — ENOXAPARIN SODIUM 30 MG: 100 INJECTION SUBCUTANEOUS at 08:09

## 2025-06-07 ASSESSMENT — PAIN SCALES - GENERAL: PAINLEVEL_OUTOF10: 9

## 2025-06-07 NOTE — DISCHARGE SUMMARY
Carlos Ramirez  :  1950  MRN:  [id number]    Admit date:  2025  Discharge date:  25    Discharging Physician:  DR Dooley     Advance Directive: Full Code    Consults: IP CONSULT TO CARDIOLOGY  PALLIATIVE CARE EVAL     Primary Care Physician:  Inez Garcia, APRN    Discharge Diagnoses:  Principal Problem:    Chest pain in adult  Active Problems:    Diabetes (HCC)    COPD (chronic obstructive pulmonary disease) (HCC)    Acute kidney injury superimposed on stage 3a chronic kidney disease (HCC)    Unstable angina (HCC)  Resolved Problems:    * No resolved hospital problems. *      Portions of this note have been copied forward, however, changed to reflect the most current clinical status of this patient.  Hospital Course:   74-year-old male with CAD s/p stent placement, COPD, type II DM, HTN, CKD 3a, TIA, ascending aortic aneurysm 4.4 cm, Alzheimer's dementia, with complaints of chest pain.  Patient had been outside yesterday for over 5 hours laying [employer], yard work began having dizziness, lightheadedness and right-sided chest pain.  Denied nausea, vomiting, or syncopal event.  Workup in ER CT head no acute intracranial abnormality, CXR no acute cardiopulmonary process, troponin negative, creatinine 1.9 BUN 45.  Patient placed in observation with consultation to cardiology.  Initiated on IVF with improvement of renal function back to baseline.  No recurrence of chest discomfort or shortness of breath.  Heart cath patent stent to mid LAD unchanged mid LAD 55% stenosis with first diagonal 50% proximal stenosis, medical management.  Patient endorses dizziness with position change orthostatic vital signs within normal limits. Able to ambulate with walker without issue. Declined home health. Patient stable for discharge home. Will follow up with cardiology outpatient.   Significant Diagnostic Studies:   Cardiac procedure  Result Date: 2025  Nonobstructive CAD with unchanged intermediate grade lesion

## 2025-06-07 NOTE — PLAN OF CARE
Problem: Chronic Conditions and Co-morbidities  Goal: Patient's chronic conditions and co-morbidity symptoms are monitored and maintained or improved  Recent Flowsheet Documentation  Taken 6/7/2025 0800 by Jessenia Murillo RN  Care Plan - Patient's Chronic Conditions and Co-Morbidity Symptoms are Monitored and Maintained or Improved:   Monitor and assess patient's chronic conditions and comorbid symptoms for stability, deterioration, or improvement   Collaborate with multidisciplinary team to address chronic and comorbid conditions and prevent exacerbation or deterioration  6/7/2025 0038 by Sergio Candelaria RN  Outcome: Progressing     Problem: ABCDS Injury Assessment  Goal: Absence of physical injury  Recent Flowsheet Documentation  Taken 6/7/2025 0929 by Jessenia Murillo RN  Absence of Physical Injury: Implement safety measures based on patient assessment  6/7/2025 0038 by Sergio Candelaria RN  Outcome: Progressing     Problem: Safety - Adult  Goal: Free from fall injury  Recent Flowsheet Documentation  Taken 6/7/2025 0929 by Jessenia Murillo RN  Free From Fall Injury: Instruct family/caregiver on patient safety  6/7/2025 0038 by Sergio Candelaria RN  Outcome: Progressing

## 2025-06-07 NOTE — PROGRESS NOTES
Physical Therapy  Name: Carlos Ramirez  MRN:  [id number]  Date of service:  6/7/2025    Pt. and wife of pt deny PT needs at this time, stating pt has been up to the bathroom several times and is doing well. Pt. being d/c today.    Electronically signed by Sonia Cook PT on 6/7/2025 at 10:44 AM

## 2025-06-07 NOTE — PLAN OF CARE
Problem: Chronic Conditions and Co-morbidities  Goal: Patient's chronic conditions and co-morbidity symptoms are monitored and maintained or improved  6/7/2025 0038 by Sergio Candelaria RN  Outcome: Progressing  6/6/2025 1315 by Sergio Kelley RN  Outcome: Progressing     Problem: ABCDS Injury Assessment  Goal: Absence of physical injury  6/7/2025 0038 by Sergio Candelaria RN  Outcome: Progressing  6/6/2025 1315 by Sergio Kelley RN  Outcome: Progressing     Problem: Safety - Adult  Goal: Free from fall injury  6/7/2025 0038 by Sergio Candelaria RN  Outcome: Progressing  6/6/2025 1315 by Sergio Kelley RN  Outcome: Progressing

## 2025-06-09 ENCOUNTER — TELEPHONE (OUTPATIENT)
Dept: PRIMARY CARE CLINIC | Age: 75
End: 2025-06-09

## 2025-06-09 LAB
EKG P AXIS: 21 DEGREES
EKG P-R INTERVAL: 160 MS
EKG Q-T INTERVAL: 404 MS
EKG QRS DURATION: 92 MS
EKG QTC CALCULATION (BAZETT): 403 MS
EKG T AXIS: 13 DEGREES

## 2025-06-09 NOTE — TELEPHONE ENCOUNTER
Care Transitions Initial Follow Up Call    Outreach made within 2 business days of discharge: Yes    Patient: Carlos Ramirez   Patient : 1950   MRN: [id number]    Reason for Admission: Chest Pain  Discharge Date: 25       Spoke with: Wife Naima    Discharge department/facility: [facility] Interactive Patient Contact:  Was patient able to fill all prescriptions: No: No new meds prescribed.  Was patient instructed to bring all medications to the follow-up visit: Yes  Is patient taking all medications as directed in the discharge summary? Yes  Does patient understand their discharge instructions: Yes  Does patient have questions or concerns that need addressed prior to 7-14 day follow up office visit: No    Additional needs identified to be addressed with provider  No needs identified             Scheduled appointment with PCP within 7-14 days    Spoke with patient's wife Naima. She states he is doing great. He is currently outside 5by. She has concerns about him staying hydrated when he works outside. His appetite is good. He has not had any more chest pain. His appetite is good. No fever or chills. They will see Inez on .     Follow Up  Future Appointments   Date Time Provider Department Center   2025  1:00 PM Pam Carson APRN - CNP [place] [facility]   2025 10:00 AM Inez Garcia APRN [facility] DEP   2025  9:00 AM Praveen Gibbs APRN N LPS [facility]   2025 10:15 AM Gerry English PA-C N PAD [facility]   2025 10:15 AM SCHEDULE, L MED ONC MA L MED ONC Laura Chavez MA

## 2025-06-09 NOTE — TELEPHONE ENCOUNTER
Care Transitions Initial Follow Up Call    Outreach made within 2 business days of discharge: Yes    Patient: Carlos Ramirez   Patient : 1950   MRN: [id number]    Reason for Admission: Chest pain   Discharge Date: 25       Spoke with: Wife's identified voicemail reached. Message left asking for a call back to my direct line.     Discharge department/facility: [facility]

## 2025-06-13 ENCOUNTER — OFFICE VISIT (OUTPATIENT)
Age: 75
End: 2025-06-13

## 2025-06-13 VITALS — WEIGHT: 275 LBS | HEIGHT: 65 IN | BODY MASS INDEX: 45.82 KG/M2

## 2025-06-13 DIAGNOSIS — M17.11 PRIMARY OSTEOARTHRITIS OF RIGHT KNEE: Primary | ICD-10-CM

## 2025-06-13 DIAGNOSIS — M75.121 COMPLETE TEAR OF RIGHT ROTATOR CUFF, UNSPECIFIED WHETHER TRAUMATIC: ICD-10-CM

## 2025-06-13 DIAGNOSIS — G89.29 CHRONIC RIGHT SHOULDER PAIN: ICD-10-CM

## 2025-06-13 DIAGNOSIS — M25.511 CHRONIC RIGHT SHOULDER PAIN: ICD-10-CM

## 2025-06-13 DIAGNOSIS — M19.011 PRIMARY OSTEOARTHRITIS OF RIGHT SHOULDER: ICD-10-CM

## 2025-06-13 DIAGNOSIS — G89.29 CHRONIC PAIN OF RIGHT KNEE: ICD-10-CM

## 2025-06-13 DIAGNOSIS — M25.561 CHRONIC PAIN OF RIGHT KNEE: ICD-10-CM

## 2025-06-13 RX ORDER — BETAMETHASONE SODIUM PHOSPHATE AND BETAMETHASONE ACETATE 3; 3 MG/ML; MG/ML
6 INJECTION, SUSPENSION INTRA-ARTICULAR; INTRALESIONAL; INTRAMUSCULAR; SOFT TISSUE ONCE
Status: COMPLETED | OUTPATIENT
Start: 2025-06-13 | End: 2025-06-13

## 2025-06-13 RX ORDER — LIDOCAINE HYDROCHLORIDE 10 MG/ML
1 INJECTION, SOLUTION INFILTRATION; PERINEURAL ONCE
Status: COMPLETED | OUTPATIENT
Start: 2025-06-13 | End: 2025-06-13

## 2025-06-13 RX ORDER — BUPIVACAINE HYDROCHLORIDE 5 MG/ML
3 INJECTION, SOLUTION EPIDURAL; INTRACAUDAL; PERINEURAL ONCE
Status: COMPLETED | OUTPATIENT
Start: 2025-06-13 | End: 2025-06-13

## 2025-06-13 RX ADMIN — LIDOCAINE HYDROCHLORIDE 1 ML: 10 INJECTION, SOLUTION INFILTRATION; PERINEURAL at 15:24

## 2025-06-13 RX ADMIN — BUPIVACAINE HYDROCHLORIDE 15 MG: 5 INJECTION, SOLUTION EPIDURAL; INTRACAUDAL; PERINEURAL at 15:27

## 2025-06-13 RX ADMIN — BETAMETHASONE SODIUM PHOSPHATE AND BETAMETHASONE ACETATE 6 MG: 3; 3 INJECTION, SUSPENSION INTRA-ARTICULAR; INTRALESIONAL; INTRAMUSCULAR; SOFT TISSUE at 15:23

## 2025-06-13 NOTE — PROGRESS NOTES
Orthopaedic Clinic Note - Established Patient    NAME:  Carlos Ramirez   : 1950  MRN: [id number]      2025      CHIEF COMPLAINT: Right shoulder and right knee pain      HISTORY OF PRESENT ILLNESS:   Patient is a 74-year-old male that presents to clinic today with his wife for evaluation of right shoulder and right knee pain.  Patient followed up in March in regards to the right knee pain.  At that time patient reports right knee pain was exacerbated in February after he sustained a fall trying to clean leaves out of the yard.  Wife reports that the right knee did immediately swell following the injury but quickly subsided.  Patient is a pain management patient and takes Percocet 10 for chronic back pain.  He had x-ray imaging as well as MRI of the right knee obtained by PCP and followed up with our clinic to discuss further treatment options for right knee pain.  During that encounter patient did not want to pursue any type of surgical treatment regarding his right knee and elected to have a corticosteroid injection and Mobic was prescribed.  He presents today for 3-month follow-up regarding his right knee.  He states that the corticosteroid injection has provided relief of symptoms regarding his right knee pain and he would like to be evaluated for his right shoulder pain today as it is worse than the right knee.  He reports this is chronic in nature and has been ongoing for several years.  He actually followed up with Dr. Martinez clinic at the end of  when he had an MRI of the right shoulder performed.  Wife reports she does not believe they ever followed up as they forgot to schedule an appointment for reevaluation and due to patient's other comorbidities they were unable to address the right shoulder at that time.  Patient denies any new fall, trauma, or injury to cause increased pain to the right shoulder but reports this is causing more issue than the right knee.  Reports generalized pain and

## 2025-06-16 ENCOUNTER — TELEPHONE (OUTPATIENT)
Dept: PRIMARY CARE CLINIC | Age: 75
End: 2025-06-16

## 2025-06-16 NOTE — TELEPHONE ENCOUNTER
Patient no showed for his TCM appointment this morning with Inez Garcia. Called and left a message on identified voicemail of his wife Naima. I asked that they call back my direct line to reschedule.   Nikki DRAPER

## 2025-07-07 ENCOUNTER — OFFICE VISIT (OUTPATIENT)
Dept: CARDIOLOGY CLINIC | Age: 75
End: 2025-07-07
Payer: MEDICARE

## 2025-07-07 ENCOUNTER — RESULTS FOLLOW-UP (OUTPATIENT)
Dept: CARDIOLOGY CLINIC | Age: 75
End: 2025-07-07

## 2025-07-07 VITALS
WEIGHT: 272 LBS | DIASTOLIC BLOOD PRESSURE: 88 MMHG | HEIGHT: 70 IN | HEART RATE: 58 BPM | BODY MASS INDEX: 38.94 KG/M2 | OXYGEN SATURATION: 96 % | SYSTOLIC BLOOD PRESSURE: 132 MMHG

## 2025-07-07 DIAGNOSIS — I25.10 CORONARY ARTERY DISEASE INVOLVING NATIVE CORONARY ARTERY OF NATIVE HEART WITHOUT ANGINA PECTORIS: ICD-10-CM

## 2025-07-07 DIAGNOSIS — R79.89 ELEVATED SERUM CREATININE: ICD-10-CM

## 2025-07-07 DIAGNOSIS — I71.21 ANEURYSM OF ASCENDING AORTA WITHOUT RUPTURE: ICD-10-CM

## 2025-07-07 DIAGNOSIS — R79.89 ELEVATED SERUM CREATININE: Primary | ICD-10-CM

## 2025-07-07 DIAGNOSIS — I10 ESSENTIAL HYPERTENSION: ICD-10-CM

## 2025-07-07 DIAGNOSIS — E78.5 HYPERLIPIDEMIA, UNSPECIFIED HYPERLIPIDEMIA TYPE: ICD-10-CM

## 2025-07-07 LAB
ANION GAP SERPL CALCULATED.3IONS-SCNC: 9 MMOL/L (ref 8–16)
BUN SERPL-MCNC: 20 MG/DL (ref 8–23)
CALCIUM SERPL-MCNC: 9.3 MG/DL (ref 8.8–10.2)
CHLORIDE SERPL-SCNC: 105 MMOL/L (ref 98–107)
CO2 SERPL-SCNC: 25 MMOL/L (ref 22–29)
CREAT SERPL-MCNC: 1.3 MG/DL (ref 0.7–1.2)
GLUCOSE SERPL-MCNC: 116 MG/DL (ref 70–99)
POTASSIUM SERPL-SCNC: 4.8 MMOL/L (ref 3.5–5.1)
SODIUM SERPL-SCNC: 139 MMOL/L (ref 136–145)

## 2025-07-07 PROCEDURE — 99214 OFFICE O/P EST MOD 30 MIN: CPT | Performed by: NURSE PRACTITIONER

## 2025-07-07 PROCEDURE — 3075F SYST BP GE 130 - 139MM HG: CPT | Performed by: NURSE PRACTITIONER

## 2025-07-07 PROCEDURE — 3079F DIAST BP 80-89 MM HG: CPT | Performed by: NURSE PRACTITIONER

## 2025-07-07 PROCEDURE — 1123F ACP DISCUSS/DSCN MKR DOCD: CPT | Performed by: NURSE PRACTITIONER

## 2025-07-07 PROCEDURE — 1159F MED LIST DOCD IN RCRD: CPT | Performed by: NURSE PRACTITIONER

## 2025-07-07 NOTE — TELEPHONE ENCOUNTER
----- Message from Praveen CHUA sent at 7/7/2025 12:51 PM CDT -----  Please call let him know that creatinine is stable at 1.3.  All electrolytes are within normal limits.  Glucose slightly elevated at 116.

## 2025-07-11 NOTE — TELEPHONE ENCOUNTER
Carlos L Helton called to request a refill on his medication.      Last office visit : 3/4/2025   Next office visit : Visit date not found     Requested Prescriptions     Pending Prescriptions Disp Refills    tiZANidine (ZANAFLEX) 4 MG tablet [Pharmacy Med Name: TIZANIDINE HCL 4 MG TABLET 4 Tablet] 90 tablet 1     Sig: TAKE 1 TABLET BY MOUTH EVERY 8 HOURS AS NEEDED (BACK PAIN)            Shanna Newton LPN

## 2025-07-24 ENCOUNTER — TELEPHONE (OUTPATIENT)
Dept: HEMATOLOGY | Age: 75
End: 2025-07-24

## 2025-07-24 NOTE — TELEPHONE ENCOUNTER
Called patient to reschedule appointment on 8/28 due to Gerry English being out of office. Left message with date and time and office phone number.

## (undated) DEVICE — ULTRACLEAN ACCESSORY ELECTRODE 1" (2.54 CM) COATED BLADE: Brand: ULTRACLEAN

## (undated) DEVICE — TBG SMPL FLTR LINE NASL 02/C02 A/ BX/100

## (undated) DEVICE — PAD,ARMBOARD,CONV,FOAM,2X8X20",12PR/CS: Brand: MEDLINE

## (undated) DEVICE — THE CHANNEL CLEANING BRUSH IS A NYLON FLEXI BRUSH ATTACHED TO A FLEXIBLE PLASTIC SHEATH DESIGNED TO SAFELY REMOVE DEBRIS FROM FLEXIBLE ENDOSCOPES.

## (undated) DEVICE — DUAL CUT SAGITTAL BLADE

## (undated) DEVICE — COVER,TABLE,44X90,STERILE: Brand: MEDLINE

## (undated) DEVICE — Device: Brand: NOMOLINE™ LH ADULT NASAL CO2 CANNULA WITH O2 4M

## (undated) DEVICE — 3M™ IOBAN™ 2 ANTIMICROBIAL INCISE DRAPE 6651EZ: Brand: IOBAN™ 2

## (undated) DEVICE — SENSR O2 OXIMAX FNGR A/ 18IN NONSTR

## (undated) DEVICE — TUBE ET 8MM NSL ORAL BASIC CUF INTMED MURPHY EYE RADPQ MRK

## (undated) DEVICE — SUTURE VCRL SZ 2-0 L36IN ABSRB UD L36MM CT-1 1/2 CIR J945H

## (undated) DEVICE — SUTURE VCRL SZ 0 L27IN ABSRB UD L36MM CT-1 1/2 CIR J260H

## (undated) DEVICE — CUFF,BP,DISP,1 TUBE,ADULT,HP: Brand: MEDLINE

## (undated) DEVICE — Device: Brand: DEFENDO AIR/WATER/SUCTION AND BIOPSY VALVE

## (undated) DEVICE — T-MAX DISPOSABLE FACE MASK 8 PER BOX

## (undated) DEVICE — RADIFOCUS OPTITORQUE ANGIOGRAPHIC CATHETER: Brand: OPTITORQUE

## (undated) DEVICE — SHOULDER STABILIZATION KIT,                                    DISPOSABLE 12 PER BOX

## (undated) DEVICE — DRESSING FOAM W4XL10IN SIL RECT ADH WTRPRF FLM BK W/ BORD

## (undated) DEVICE — BLANKET WRM W40.2XL55.9IN IORT LO BODY + MISTRAL AIR

## (undated) DEVICE — ELECTROSURGICAL PENCIL BUTTON SWITCH NON COATED BLADE ELECTRODE 10 FT (3 M) CORD HOLSTER: Brand: MEGADYNE

## (undated) DEVICE — STYLET INTUB 5FR L25CM ORAL DESIGNED RET DESIRED CRV SMOOTH

## (undated) DEVICE — KIT MFLD ISOLATN NACL CNTRST PRT TBNG SPIK W/ PRSS TRNSDUC

## (undated) DEVICE — TR BAND RADIAL ARTERY COMPRESSION DEVICE: Brand: TR BAND

## (undated) DEVICE — BANDAGE GZ W2XL75IN ST RAYON POLY CNFRM STRTCH LTWT

## (undated) DEVICE — SHEET,DRAPE,53X77,STERILE: Brand: MEDLINE

## (undated) DEVICE — DRAPE,SHOULDER,BEACH CHAIR,STERILE: Brand: MEDLINE

## (undated) DEVICE — ADHESIVE SKIN CLSR 0.7ML TOP DERMBND ADV

## (undated) DEVICE — SOLUTION IV IRRIG POUR BRL 0.9% SODIUM CHL 2F7124

## (undated) DEVICE — MASK,OXYGEN,MED CONC,ADLT,7' TUB, UC: Brand: PENDING

## (undated) DEVICE — CATHETER COR DIAG PIGTAILS PIG 145 CRV 5FR 110CM 6 SIDE H

## (undated) DEVICE — STERILE POLYISOPRENE POWDER-FREE SURGICAL GLOVES: Brand: PROTEXIS

## (undated) DEVICE — ENDOGATOR AUXILIARY WATER JET CONNECTOR: Brand: ENDOGATOR

## (undated) DEVICE — GUIDEWIRE VASC STR 0.035 INX260 CM STD STIFFNESS GO2WIRE

## (undated) DEVICE — GLIDESHEATH SS KIT HYDROPHILIC COATED INTRODUCER SHEATH: Brand: GLIDESHEATH

## (undated) DEVICE — ORTHOSES THERMOPLASTIC ACROMIOCLAVICULAR SHLDR PREFABRICATED

## (undated) DEVICE — YANKAUER,BULB TIP WITH VENT: Brand: ARGYLE

## (undated) DEVICE — BIPOLAR SEALER 23-112-1 AQM 6.0: Brand: AQUAMANTYS ®

## (undated) DEVICE — SUTURE ETHBND EXCEL SZ 5 L30IN NONABSORBABLE GRN L40MM V-37 MB66G

## (undated) DEVICE — Z INACTIVE USE 2660664 SOLUTION IRRIG 3000ML 0.9% SOD CHL USP UROMATIC PLAS CONT

## (undated) DEVICE — BLADE SURG NO10 C STL DISP ST

## (undated) DEVICE — Device

## (undated) DEVICE — BLADE MAC GRN LT DISPOSABLE

## (undated) DEVICE — GAUZE,SPONGE,AVANT,4"X4",4PLY,STRL,10/TR: Brand: MEDLINE

## (undated) DEVICE — SUTURE PERMAHAND SZ 2-0 L30IN NONABSORBABLE BLK L60MM KS 623H

## (undated) DEVICE — STERILE LATEX POWDER FREE SURGICAL GLOVES WITH HYDROGEL COATING: Brand: PROTEXIS

## (undated) DEVICE — HANDPIECE SET WITH COAXIAL MULTI-ORIFICE TIP AND SUCTION TUBE: Brand: INTERPULSE

## (undated) DEVICE — CONMED SCOPE SAVER BITE BLOCK, 20X27 MM: Brand: SCOPE SAVER

## (undated) DEVICE — SHOULDER CDS

## (undated) DEVICE — GUIDEWIRE VASC L260CM DIA0038IN TIP L3MM PTFE J TIP FIX COR

## (undated) DEVICE — TWIST DRILL 4.7MM DIA 127.0MM LONG

## (undated) DEVICE — DRESSING BORDERED ADH GZ UNIV GEN USE 8INX4IN AND 6INX2IN

## (undated) DEVICE — SOLUTION IV 250ML 0.9% SOD CHL PH 5 INJ USP VIAFLX PLAS

## (undated) DEVICE — CHLORAPREP 26ML ORANGE

## (undated) DEVICE — KIT ANGIO W/ AT P65 PREM HND CTRL FOR CNTRST DEL ANGIOTOUCH